# Patient Record
Sex: MALE | Race: WHITE | NOT HISPANIC OR LATINO | Employment: OTHER | URBAN - METROPOLITAN AREA
[De-identification: names, ages, dates, MRNs, and addresses within clinical notes are randomized per-mention and may not be internally consistent; named-entity substitution may affect disease eponyms.]

---

## 2017-03-22 ENCOUNTER — OFFICE VISIT (OUTPATIENT)
Dept: FAMILY MEDICINE | Facility: CLINIC | Age: 77
End: 2017-03-22
Payer: MEDICARE

## 2017-03-22 VITALS
WEIGHT: 269.63 LBS | BODY MASS INDEX: 36.52 KG/M2 | HEIGHT: 72 IN | TEMPERATURE: 98 F | OXYGEN SATURATION: 97 % | DIASTOLIC BLOOD PRESSURE: 82 MMHG | SYSTOLIC BLOOD PRESSURE: 134 MMHG | HEART RATE: 67 BPM

## 2017-03-22 DIAGNOSIS — M19.90 ARTHRITIS: Primary | ICD-10-CM

## 2017-03-22 DIAGNOSIS — E66.9 OBESITY, UNSPECIFIED OBESITY SEVERITY, UNSPECIFIED OBESITY TYPE: ICD-10-CM

## 2017-03-22 DIAGNOSIS — L40.9 PSORIASIS: ICD-10-CM

## 2017-03-22 DIAGNOSIS — I10 ESSENTIAL HYPERTENSION: ICD-10-CM

## 2017-03-22 PROCEDURE — 99397 PER PM REEVAL EST PAT 65+ YR: CPT | Mod: S$GLB,,, | Performed by: FAMILY MEDICINE

## 2017-03-22 RX ORDER — ATENOLOL 50 MG/1
50 TABLET ORAL 2 TIMES DAILY
Qty: 180 TABLET | Refills: 3 | Status: SHIPPED | OUTPATIENT
Start: 2017-03-22 | End: 2018-05-04 | Stop reason: SDUPTHER

## 2017-03-22 RX ORDER — NIFEDIPINE 90 MG/1
90 TABLET, EXTENDED RELEASE ORAL DAILY
Qty: 90 TABLET | Refills: 3 | Status: SHIPPED | OUTPATIENT
Start: 2017-03-22 | End: 2018-05-04 | Stop reason: SDUPTHER

## 2017-03-22 NOTE — PROGRESS NOTES
Subjective:      Patient ID: Aquiles Montana is a 76 y.o. male.    Chief Complaint: Medication Refill    HPI Comments: Refills; saw Brogle, yearly; note for parking declines labs; n    Medication Refill   Associated symptoms include arthralgias, joint swelling and a rash. Pertinent negatives include no abdominal pain, chest pain, congestion, fatigue, fever, headaches, myalgias, sore throat or weakness.     Review of Systems   Constitutional: Negative for appetite change, fatigue, fever and unexpected weight change.   HENT: Negative for congestion, ear pain, sinus pressure and sore throat.    Eyes: Negative for pain and visual disturbance.   Respiratory: Positive for shortness of breath.    Cardiovascular: Positive for leg swelling. Negative for chest pain.   Gastrointestinal: Negative for abdominal pain, constipation and diarrhea.   Endocrine: Negative for polyuria.   Genitourinary: Negative for difficulty urinating and frequency.   Musculoskeletal: Positive for arthralgias, gait problem and joint swelling. Negative for back pain and myalgias.   Skin: Positive for rash. Negative for color change.        Right lower leg rash 40 years   Allergic/Immunologic: Negative.    Neurological: Negative for syncope, weakness and headaches.   Hematological: Does not bruise/bleed easily.   Psychiatric/Behavioral: Positive for sleep disturbance. Negative for dysphoric mood and suicidal ideas. The patient is not nervous/anxious.    All other systems reviewed and are negative.    Objective:     Physical Exam   Constitutional: He is oriented to person, place, and time. He appears well-developed and well-nourished. No distress.   HENT:   Head: Normocephalic and atraumatic.   Right Ear: External ear normal.   Left Ear: External ear normal.   Mouth/Throat: Oropharynx is clear and moist. No oropharyngeal exudate.   Eyes: Conjunctivae and EOM are normal. Pupils are equal, round, and reactive to light. Right eye exhibits no discharge. Left  eye exhibits no discharge. No scleral icterus.   Neck: Normal range of motion. Neck supple. No JVD present. No tracheal deviation present. No thyromegaly present.   Cardiovascular: Normal rate and regular rhythm.  Exam reveals no gallop and no friction rub.    No murmur heard.  Pulmonary/Chest: Effort normal and breath sounds normal. No stridor. No respiratory distress. He has no wheezes. He has no rales. He exhibits no tenderness.   Abdominal: Soft. He exhibits no distension and no mass. There is no tenderness. There is no rebound and no guarding.   Musculoskeletal: Normal range of motion. He exhibits no edema, tenderness or deformity.   Lymphadenopathy:     He has no cervical adenopathy.   Neurological: He is alert and oriented to person, place, and time. He has normal reflexes. He displays normal reflexes. No cranial nerve deficit. He exhibits normal muscle tone. Coordination normal.   Skin: Skin is warm and dry. Rash noted. He is not diaphoretic. No erythema. No pallor.   Right lower leg psoriasis rash   Psychiatric: He has a normal mood and affect. His behavior is normal. Judgment and thought content normal.   Nursing note and vitals reviewed.    Assessment:     1. Arthritis    2. Essential hypertension    3. Obesity, unspecified obesity severity, unspecified obesity type    4. Psoriasis      Plan:     New Prescriptions    No medications on file     Discontinued Medications    No medications on file     Modified Medications    Modified Medication Previous Medication    ATENOLOL (TENORMIN) 50 MG TABLET atenolol (TENORMIN) 50 MG tablet       Take 1 tablet (50 mg total) by mouth 2 (two) times daily.    Take 1 tablet (50 mg total) by mouth 2 (two) times daily.    NIFEDIPINE (PROCARDIA-XL) 90 MG (OSM) TR24 nifedipine (PROCARDIA-XL) 90 MG (OSM) TR24       Take 1 tablet (90 mg total) by mouth once daily.    Take 1 tablet (90 mg total) by mouth once daily.       Arthritis    Essential hypertension    Obesity,  unspecified obesity severity, unspecified obesity type    Psoriasis    Other orders  -     atenolol (TENORMIN) 50 MG tablet; Take 1 tablet (50 mg total) by mouth 2 (two) times daily.  Dispense: 180 tablet; Refill: 3  -     nifedipine (PROCARDIA-XL) 90 MG (OSM) TR24; Take 1 tablet (90 mg total) by mouth once daily.  Dispense: 90 tablet; Refill: 3

## 2017-03-22 NOTE — MR AVS SNAPSHOT
[facility]  [address] [facility] [place] [address]  Phone: [phone]  Fax: [phone]                  Aquiles Montana   3/22/2017 9:20 AM   Office Visit    Description:  Male : 1940   Provider:  Marty Hernandez MD   Department:  [facility]           Reason for Visit     Medication Refill           Diagnoses this Visit        Comments    Arthritis    -  Primary     Essential hypertension         Obesity, unspecified obesity severity, unspecified obesity type         Psoriasis                To Do List           Goals (5 Years of Data)     None      Follow-Up and Disposition     Return in about 1 year (around 3/22/2018).       These Medications        Disp Refills Start End    atenolol (TENORMIN) 50 MG tablet 180 tablet 3 3/22/2017     Take 1 tablet (50 mg total) by mouth 2 (two) times daily. - Oral    Pharmacy: [facility] [address] [place], [place] [address] [facility] Ph #: [phone]       nifedipine (PROCARDIA-XL) 90 MG (OSM) TR24 90 tablet 3 3/22/2017 3/22/2018    Take 1 tablet (90 mg total) by mouth once daily. - Oral    Pharmacy: [facility] [address] [place], [place] [address] [facility] Ph #: [phone]         [facility] On Call     [facility] On Call Nurse Care Line -  Assistance  Registered nurses in the [facility] provide clinical advisement, health education, appointment booking, and other advisory services.  Call for this free service at [phone].             Medications           Message regarding Medications     Verify the changes and/or additions to your medication regime listed below are the same as discussed with your clinician today.  If any of these changes or additions are incorrect, please notify your healthcare provider.             Verify that the below list of medications is an accurate representation of the medications you are currently taking.  If none reported, the list may be blank. If incorrect, please contact your  healthcare provider. Carry this list with you in case of emergency.           Current Medications     aspirin (ECOTRIN) 81 MG EC tablet Take 81 mg by mouth once daily.    atenolol (TENORMIN) 50 MG tablet Take 1 tablet (50 mg total) by mouth 2 (two) times daily.    latanoprost 0.005 % ophthalmic solution Place 1 drop into both eyes every evening.    nifedipine (PROCARDIA-XL) 90 MG (OSM) TR24 Take 1 tablet (90 mg total) by mouth once daily.           Clinical Reference Information           Your Vitals Were     BP Pulse Temp Height Weight SpO2    134/82 67 97.8 °F (36.6 °C) (Oral) 6' (1.829 m) 122.3 kg (269 lb 10 oz) 97%    BMI                36.57 kg/m2          Blood Pressure          Most Recent Value    BP  134/82      Allergies as of 3/22/2017     No Known Allergies      Immunizations Administered on Date of Encounter - 3/22/2017     None      [facility] Sign-Up     Activating your [facility] account is as easy as 1-2-3!     1) Visit [email or web address], select Sign Up Now, enter this activation code and your date of birth, then select Next.  [email or web address]  Expires: 5/6/2017  9:55 AM      2) Create a username and password to use when you visit [facility] in the future and select a security question in case you lose your password and select Next.    3) Enter your e-mail address and click Sign Up!    Additional Information  If you have questions, please e-mail [email or web address] or call [phone] to talk to our [facility] staff. Remember, [facility] is NOT to be used for urgent needs. For medical emergencies, dial 911.         Language Assistance Services     ATTENTION: Language assistance services are available, free of charge. Please call [phone].      ATENCIÓN: Si ketanla mariangel, tiene a layne disposición servicios gratuitos de asistencia lingüística. Llame al [phone].     CHÚ Ý: N?u b?n nói Ti?ng Vi?t, có các d?ch v? h? tr? ngôn ng? mi?n phí dành cho b?n. G?i s? [phone].         [facility] -  [facility] complies with applicable Federal civil rights laws and does not discriminate on the basis of race, color, national origin, age, disability, or sex.

## 2017-03-23 PROBLEM — L40.9 PSORIASIS: Status: ACTIVE | Noted: 2017-03-23

## 2017-12-15 ENCOUNTER — LAB VISIT (OUTPATIENT)
Dept: LAB | Facility: HOSPITAL | Age: 77
End: 2017-12-15
Attending: UROLOGY
Payer: MEDICARE

## 2017-12-15 ENCOUNTER — OFFICE VISIT (OUTPATIENT)
Dept: UROLOGY | Facility: CLINIC | Age: 77
End: 2017-12-15
Payer: MEDICARE

## 2017-12-15 VITALS — HEIGHT: 72 IN | WEIGHT: 269 LBS | BODY MASS INDEX: 36.44 KG/M2

## 2017-12-15 DIAGNOSIS — R35.1 NOCTURIA: ICD-10-CM

## 2017-12-15 DIAGNOSIS — N40.1 BENIGN NON-NODULAR PROSTATIC HYPERPLASIA WITH LOWER URINARY TRACT SYMPTOMS: Primary | ICD-10-CM

## 2017-12-15 DIAGNOSIS — N40.1 BENIGN NON-NODULAR PROSTATIC HYPERPLASIA WITH LOWER URINARY TRACT SYMPTOMS: ICD-10-CM

## 2017-12-15 LAB
BILIRUB UR QL STRIP: NEGATIVE
CLARITY UR REFRACT.AUTO: ABNORMAL
COLOR UR AUTO: YELLOW
GLUCOSE UR QL STRIP: NEGATIVE
HGB UR QL STRIP: NEGATIVE
KETONES UR QL STRIP: NEGATIVE
LEUKOCYTE ESTERASE UR QL STRIP: NEGATIVE
NITRITE UR QL STRIP: NEGATIVE
PH UR STRIP: 5 [PH] (ref 5–8)
PROSTATE SPECIFIC ANTIGEN, TOTAL: 2.7 NG/ML
PROT UR QL STRIP: NEGATIVE
PSA FREE MFR SERPL: 38.15 %
PSA FREE SERPL-MCNC: 1.03 NG/ML
SP GR UR STRIP: 1.02 (ref 1–1.03)
URN SPEC COLLECT METH UR: ABNORMAL
UROBILINOGEN UR STRIP-ACNC: NEGATIVE EU/DL

## 2017-12-15 PROCEDURE — 36415 COLL VENOUS BLD VENIPUNCTURE: CPT | Mod: PO

## 2017-12-15 PROCEDURE — 99213 OFFICE O/P EST LOW 20 MIN: CPT | Mod: S$PBB,,, | Performed by: UROLOGY

## 2017-12-15 PROCEDURE — 99999 PR PBB SHADOW E&M-EST. PATIENT-LVL III: CPT | Mod: PBBFAC,,, | Performed by: UROLOGY

## 2017-12-15 PROCEDURE — 84153 ASSAY OF PSA TOTAL: CPT | Mod: PO

## 2017-12-15 PROCEDURE — 81003 URINALYSIS AUTO W/O SCOPE: CPT

## 2017-12-15 PROCEDURE — 99213 OFFICE O/P EST LOW 20 MIN: CPT | Mod: PBBFAC,PO | Performed by: UROLOGY

## 2017-12-15 NOTE — PROGRESS NOTES
Subjective:       Patient ID: Aquiles Montana is a 77 y.o. male.    Chief Complaint: Annual Exam (prostate check)    78 yo WM with hx of BPH. S/P TURP 4 years ago. Doing well.      Benign Prostatic Hypertrophy   This is a chronic problem. The current episode started more than 1 year ago. The problem has been gradually improving since onset. Irritative symptoms include nocturia (1). Irritative symptoms do not include frequency or urgency. Obstructive symptoms do not include dribbling, incomplete emptying, an intermittent stream, a slower stream, straining or a weak stream. Pertinent negatives include no chills, dysuria, genital pain, hematuria, hesitancy, nausea or vomiting. AUA score is 0-7. He is not sexually active. Nothing aggravates the symptoms. Treatments tried: TURP. The treatment provided significant relief. He has been using treatment for 2 or more years.     Review of Systems   Constitutional: Negative for activity change, appetite change, chills, diaphoresis, fatigue, fever and unexpected weight change.   HENT: Negative for congestion, hearing loss, sinus pressure and trouble swallowing.    Eyes: Negative for photophobia, pain, discharge and visual disturbance.   Respiratory: Negative for apnea, cough and shortness of breath.    Cardiovascular: Negative for chest pain, palpitations and leg swelling.   Gastrointestinal: Negative for abdominal distention, abdominal pain, anal bleeding, blood in stool, constipation, diarrhea, nausea, rectal pain and vomiting.   Endocrine: Negative for cold intolerance, heat intolerance, polydipsia, polyphagia and polyuria.   Genitourinary: Positive for nocturia (1). Negative for decreased urine volume, difficulty urinating, discharge, dysuria, enuresis, flank pain, frequency, genital sores, hematuria, hesitancy, incomplete emptying, penile pain, penile swelling, scrotal swelling, testicular pain and urgency.   Musculoskeletal: Negative for arthralgias, back pain and  myalgias.   Skin: Negative for color change, pallor, rash and wound.   Allergic/Immunologic: Negative for environmental allergies, food allergies and immunocompromised state.   Neurological: Negative for dizziness, seizures, weakness and headaches.   Hematological: Negative for adenopathy. Does not bruise/bleed easily.   Psychiatric/Behavioral: Negative.        Objective:      Physical Exam   Nursing note and vitals reviewed.  Constitutional: He is oriented to person, place, and time. He appears well-developed and well-nourished.   HENT:   Head: Normocephalic.   Nose: Nose normal.   Mouth/Throat: Oropharynx is clear and moist.   Eyes: Conjunctivae and EOM are normal. Pupils are equal, round, and reactive to light.   Neck: Normal range of motion. Neck supple.   Cardiovascular: Normal rate, regular rhythm, normal heart sounds and intact distal pulses.    Pulmonary/Chest: Effort normal and breath sounds normal.   Abdominal: Soft. Bowel sounds are normal.   Musculoskeletal: Normal range of motion.   Neurological: He is alert and oriented to person, place, and time. He has normal reflexes.   Skin: Skin is warm and dry.     Psychiatric: He has a normal mood and affect. His behavior is normal. Judgment and thought content normal.       Assessment:       1. Benign non-nodular prostatic hyperplasia with lower urinary tract symptoms    2. Nocturia        Plan:       Patient Instructions   Check PSA and U/A.  F/U yearly

## 2018-05-04 ENCOUNTER — OFFICE VISIT (OUTPATIENT)
Dept: FAMILY MEDICINE | Facility: CLINIC | Age: 78
End: 2018-05-04
Payer: MEDICARE

## 2018-05-04 VITALS
TEMPERATURE: 98 F | HEART RATE: 65 BPM | SYSTOLIC BLOOD PRESSURE: 150 MMHG | OXYGEN SATURATION: 97 % | DIASTOLIC BLOOD PRESSURE: 90 MMHG | WEIGHT: 279.75 LBS | HEIGHT: 72 IN | BODY MASS INDEX: 37.89 KG/M2

## 2018-05-04 DIAGNOSIS — I10 ESSENTIAL HYPERTENSION: ICD-10-CM

## 2018-05-04 DIAGNOSIS — L40.9 PSORIASIS: Primary | ICD-10-CM

## 2018-05-04 DIAGNOSIS — R60.0 LEG EDEMA, RIGHT: ICD-10-CM

## 2018-05-04 DIAGNOSIS — E66.9 OBESITY, UNSPECIFIED CLASSIFICATION, UNSPECIFIED OBESITY TYPE, UNSPECIFIED WHETHER SERIOUS COMORBIDITY PRESENT: ICD-10-CM

## 2018-05-04 DIAGNOSIS — N40.1 BENIGN NON-NODULAR PROSTATIC HYPERPLASIA WITH LOWER URINARY TRACT SYMPTOMS: ICD-10-CM

## 2018-05-04 PROCEDURE — 99213 OFFICE O/P EST LOW 20 MIN: CPT | Mod: S$GLB,,, | Performed by: FAMILY MEDICINE

## 2018-05-04 RX ORDER — TRIAMCINOLONE ACETONIDE 1 MG/G
OINTMENT TOPICAL 2 TIMES DAILY
COMMUNITY
End: 2019-11-22 | Stop reason: SDUPTHER

## 2018-05-04 RX ORDER — SULFAMETHOXAZOLE AND TRIMETHOPRIM 800; 160 MG/1; MG/1
TABLET ORAL
COMMUNITY
Start: 2018-04-23 | End: 2018-05-04

## 2018-05-04 RX ORDER — NIFEDIPINE 90 MG/1
90 TABLET, EXTENDED RELEASE ORAL DAILY
Qty: 90 TABLET | Refills: 3 | Status: SHIPPED | OUTPATIENT
Start: 2018-05-04 | End: 2019-06-12 | Stop reason: SDUPTHER

## 2018-05-04 RX ORDER — ATENOLOL 50 MG/1
50 TABLET ORAL 2 TIMES DAILY
Qty: 180 TABLET | Refills: 3 | Status: SHIPPED | OUTPATIENT
Start: 2018-05-04 | End: 2019-06-12 | Stop reason: SDUPTHER

## 2018-05-04 NOTE — PROGRESS NOTES
Subjective:      Patient ID: Aquiles Montana is a 77 y.o. male.    Chief Complaint: Medication Refill (atenolol, nifedipine refill for one year  patient is due for pneumo vaccine)      HPI   Check up, refills for BP  Saw vi Zheng, rx salve for psoriasis        Review of Systems   Constitutional: Negative for appetite change, fatigue, fever and unexpected weight change.   HENT: Negative for congestion, ear pain, sinus pressure and sore throat.    Eyes: Negative for pain and visual disturbance.   Respiratory: Negative for shortness of breath.    Cardiovascular: Positive for leg swelling. Negative for chest pain.        Right leg   Gastrointestinal: Negative for abdominal pain, constipation and diarrhea.   Endocrine: Negative for polyuria.   Genitourinary: Negative for difficulty urinating and frequency.   Musculoskeletal: Positive for arthralgias. Negative for back pain and myalgias.   Skin: Positive for wound. Negative for color change.        See photo   Allergic/Immunologic: Negative.    Neurological: Negative for syncope, weakness and headaches.   Hematological: Does not bruise/bleed easily.   Psychiatric/Behavioral: Negative for dysphoric mood and suicidal ideas. The patient is not nervous/anxious.    All other systems reviewed and are negative.    Objective:     Physical Exam   Constitutional: He is oriented to person, place, and time. He appears well-developed and well-nourished. No distress.   HENT:   Head: Normocephalic and atraumatic.   Right Ear: External ear normal.   Left Ear: External ear normal.   Mouth/Throat: Oropharynx is clear and moist. No oropharyngeal exudate.   Eyes: Conjunctivae and EOM are normal. Pupils are equal, round, and reactive to light. Right eye exhibits no discharge. Left eye exhibits no discharge. No scleral icterus.   Neck: Normal range of motion. Neck supple. No JVD present. No tracheal deviation present. No thyromegaly present.   Cardiovascular: Normal rate and regular  rhythm.  Exam reveals no gallop and no friction rub.    No murmur heard.  Pulmonary/Chest: Effort normal and breath sounds normal. No stridor. No respiratory distress. He has no wheezes. He has no rales. He exhibits no tenderness.   Abdominal: Soft. He exhibits no distension and no mass. There is no tenderness. There is no rebound and no guarding.   Musculoskeletal: Normal range of motion. He exhibits no edema or tenderness.   Lymphadenopathy:     He has no cervical adenopathy.   Neurological: He is alert and oriented to person, place, and time. He has normal reflexes. He displays normal reflexes. No cranial nerve deficit. He exhibits normal muscle tone. Coordination normal.   Skin: Skin is warm and dry. No rash noted. He is not diaphoretic. No erythema. No pallor.   See photo   Psychiatric: He has a normal mood and affect. His behavior is normal. Judgment and thought content normal.   Nursing note and vitals reviewed.    Assessment:     1. Psoriasis    2. Essential hypertension    3. Obesity, unspecified classification, unspecified obesity type, unspecified whether serious comorbidity present    4. Leg edema, right    5. Benign non-nodular prostatic hyperplasia with lower urinary tract symptoms      Plan:     New Prescriptions    No medications on file     Discontinued Medications    SULFAMETHOXAZOLE-TRIMETHOPRIM 800-160MG (BACTRIM DS) 800-160 MG TAB         Modified Medications    Modified Medication Previous Medication    ATENOLOL (TENORMIN) 50 MG TABLET atenolol (TENORMIN) 50 MG tablet       Take 1 tablet (50 mg total) by mouth 2 (two) times daily.    Take 1 tablet (50 mg total) by mouth 2 (two) times daily.    NIFEDIPINE (PROCARDIA-XL) 90 MG (OSM) 24 HR TABLET nifedipine (PROCARDIA-XL) 90 MG (OSM) TR24       Take 1 tablet (90 mg total) by mouth once daily.    Take 1 tablet (90 mg total) by mouth once daily.       Psoriasis  Comments:  see leg lesion photo  Orders:  -     Ambulatory referral to  Dermatology    Essential hypertension    Obesity, unspecified classification, unspecified obesity type, unspecified whether serious comorbidity present    Leg edema, right    Benign non-nodular prostatic hyperplasia with lower urinary tract symptoms    Other orders  -     NIFEdipine (PROCARDIA-XL) 90 MG (OSM) 24 hr tablet; Take 1 tablet (90 mg total) by mouth once daily.  Dispense: 90 tablet; Refill: 3  -     atenolol (TENORMIN) 50 MG tablet; Take 1 tablet (50 mg total) by mouth 2 (two) times daily.  Dispense: 180 tablet; Refill: 3    check bp at [facility]se, let me know next few weeks; referred to dermatology main campus; stop ointment and abx, cont bp meds

## 2018-05-22 ENCOUNTER — CLINICAL SUPPORT (OUTPATIENT)
Dept: FAMILY MEDICINE | Facility: CLINIC | Age: 78
End: 2018-05-22
Payer: MEDICARE

## 2018-05-22 DIAGNOSIS — I10 ESSENTIAL HYPERTENSION: Primary | ICD-10-CM

## 2018-05-28 ENCOUNTER — TELEPHONE (OUTPATIENT)
Dept: FAMILY MEDICINE | Facility: CLINIC | Age: 78
End: 2018-05-28

## 2018-05-29 VITALS — DIASTOLIC BLOOD PRESSURE: 82 MMHG | SYSTOLIC BLOOD PRESSURE: 132 MMHG

## 2018-05-29 NOTE — TELEPHONE ENCOUNTER
"Make the home bp reading found in epic under media "official" VS so it is recorded and recongized. The last one listed is a good number.  "

## 2018-12-18 ENCOUNTER — OFFICE VISIT (OUTPATIENT)
Dept: UROLOGY | Facility: CLINIC | Age: 78
End: 2018-12-18
Payer: MEDICARE

## 2018-12-18 ENCOUNTER — LAB VISIT (OUTPATIENT)
Dept: LAB | Facility: HOSPITAL | Age: 78
End: 2018-12-18
Attending: UROLOGY
Payer: MEDICARE

## 2018-12-18 VITALS — WEIGHT: 279 LBS | BODY MASS INDEX: 37.79 KG/M2 | HEIGHT: 72 IN

## 2018-12-18 DIAGNOSIS — N40.1 BENIGN NON-NODULAR PROSTATIC HYPERPLASIA WITH LOWER URINARY TRACT SYMPTOMS: ICD-10-CM

## 2018-12-18 DIAGNOSIS — R35.1 NOCTURIA: ICD-10-CM

## 2018-12-18 DIAGNOSIS — N40.1 BENIGN NON-NODULAR PROSTATIC HYPERPLASIA WITH LOWER URINARY TRACT SYMPTOMS: Primary | ICD-10-CM

## 2018-12-18 LAB
PROSTATE SPECIFIC ANTIGEN, TOTAL: 3 NG/ML
PSA FREE MFR SERPL: 49 %
PSA FREE SERPL-MCNC: 1.47 NG/ML

## 2018-12-18 PROCEDURE — 36415 COLL VENOUS BLD VENIPUNCTURE: CPT | Mod: PO

## 2018-12-18 PROCEDURE — 99213 OFFICE O/P EST LOW 20 MIN: CPT | Mod: S$PBB,,, | Performed by: UROLOGY

## 2018-12-18 PROCEDURE — 99999 PR PBB SHADOW E&M-EST. PATIENT-LVL III: CPT | Mod: PBBFAC,,, | Performed by: UROLOGY

## 2018-12-18 PROCEDURE — 99213 OFFICE O/P EST LOW 20 MIN: CPT | Mod: PBBFAC,PO | Performed by: UROLOGY

## 2018-12-18 PROCEDURE — 84154 ASSAY OF PSA FREE: CPT | Mod: PO

## 2018-12-18 NOTE — PROGRESS NOTES
Subjective:       Patient ID: Aquiles Montana is a 78 y.o. male.    Chief Complaint: Benign Prostatic Hypertrophy    77 yo WM with BPH. Here for f/u and PSA check. S/P TURP~6 yrs ago. No BPH meds. Nocturia x 2, Frequency x 2-4.      Benign Prostatic Hypertrophy   This is a chronic problem. The current episode started more than 1 year ago. The problem has been gradually improving since onset. Irritative symptoms include frequency (x2-4) and nocturia (x2). Irritative symptoms do not include urgency. Obstructive symptoms do not include dribbling, incomplete emptying, an intermittent stream, a slower stream, straining or a weak stream. Pertinent negatives include no chills, dysuria, genital pain, hematuria, hesitancy, nausea or vomiting. AUA score is 0-7. He is not sexually active. Nothing aggravates the symptoms. Treatments tried: TURP. The treatment provided significant relief.     Review of Systems   Constitutional: Negative for activity change, appetite change, chills, diaphoresis, fatigue, fever and unexpected weight change.   HENT: Negative for congestion, hearing loss, sinus pressure and trouble swallowing.    Eyes: Negative for photophobia, pain, discharge and visual disturbance.   Respiratory: Negative for apnea, cough and shortness of breath.    Cardiovascular: Negative for chest pain, palpitations and leg swelling.   Gastrointestinal: Negative for abdominal distention, abdominal pain, anal bleeding, blood in stool, constipation, diarrhea, nausea, rectal pain and vomiting.   Endocrine: Negative for cold intolerance, heat intolerance, polydipsia, polyphagia and polyuria.   Genitourinary: Positive for frequency (x2-4) and nocturia (x2). Negative for decreased urine volume, difficulty urinating, discharge, dysuria, enuresis, flank pain, genital sores, hematuria, hesitancy, incomplete emptying, penile pain, penile swelling, scrotal swelling, testicular pain and urgency.   Musculoskeletal: Negative for arthralgias,  back pain and myalgias.   Skin: Negative for color change, pallor, rash and wound.   Allergic/Immunologic: Negative for environmental allergies, food allergies and immunocompromised state.   Neurological: Negative for dizziness, seizures, weakness and headaches.   Hematological: Negative for adenopathy. Does not bruise/bleed easily.   Psychiatric/Behavioral: Negative.        Objective:      Physical Exam   Nursing note and vitals reviewed.  Constitutional: He is oriented to person, place, and time. He appears well-developed and well-nourished.   HENT:   Head: Normocephalic.   Nose: Nose normal.   Mouth/Throat: Oropharynx is clear and moist.   Eyes: Conjunctivae and EOM are normal. Pupils are equal, round, and reactive to light.   Neck: Normal range of motion. Neck supple.   Cardiovascular: Normal rate, regular rhythm, normal heart sounds and intact distal pulses.    Pulmonary/Chest: Effort normal and breath sounds normal.   Abdominal: Soft. Bowel sounds are normal.   Genitourinary: Rectum normal, prostate normal, testes normal and penis normal. Uncircumcised.   Genitourinary Comments: TUR Defect   Musculoskeletal: Normal range of motion.   Neurological: He is alert and oriented to person, place, and time. He has normal reflexes.   Skin: Skin is warm and dry.     Psychiatric: He has a normal mood and affect. His behavior is normal. Judgment and thought content normal.       Assessment:       1. Benign non-nodular prostatic hyperplasia with lower urinary tract symptoms    2. Nocturia        Plan:       Patient Instructions   Check U/A and PSA at lab  F/U 1 year with PSA

## 2019-06-12 ENCOUNTER — OFFICE VISIT (OUTPATIENT)
Dept: FAMILY MEDICINE | Facility: CLINIC | Age: 79
End: 2019-06-12
Payer: MEDICARE

## 2019-06-12 VITALS
SYSTOLIC BLOOD PRESSURE: 138 MMHG | HEIGHT: 72 IN | DIASTOLIC BLOOD PRESSURE: 82 MMHG | HEART RATE: 66 BPM | WEIGHT: 283.81 LBS | BODY MASS INDEX: 38.44 KG/M2 | TEMPERATURE: 98 F | OXYGEN SATURATION: 95 %

## 2019-06-12 DIAGNOSIS — L40.9 PSORIASIS: ICD-10-CM

## 2019-06-12 DIAGNOSIS — R60.0 LEG EDEMA, RIGHT: Primary | ICD-10-CM

## 2019-06-12 DIAGNOSIS — D49.2 ABNORMAL SKIN GROWTH: ICD-10-CM

## 2019-06-12 PROCEDURE — 99213 OFFICE O/P EST LOW 20 MIN: CPT | Mod: S$GLB,,, | Performed by: FAMILY MEDICINE

## 2019-06-12 PROCEDURE — 99213 PR OFFICE/OUTPT VISIT, EST, LEVL III, 20-29 MIN: ICD-10-PCS | Mod: S$GLB,,, | Performed by: FAMILY MEDICINE

## 2019-06-12 RX ORDER — NIFEDIPINE 90 MG/1
90 TABLET, EXTENDED RELEASE ORAL DAILY
Qty: 90 TABLET | Refills: 3 | Status: SHIPPED | OUTPATIENT
Start: 2019-06-12 | End: 2020-06-03 | Stop reason: SDUPTHER

## 2019-06-12 RX ORDER — ATENOLOL 50 MG/1
50 TABLET ORAL 2 TIMES DAILY
Qty: 180 TABLET | Refills: 3 | Status: SHIPPED | OUTPATIENT
Start: 2019-06-12 | End: 2020-06-03 | Stop reason: SDUPTHER

## 2019-06-12 NOTE — PROGRESS NOTES
Subjective:      Patient ID: Aquiles Montana is a 78 y.o. male.    Chief Complaint: Medication Refill (also requesting a referral to Dr. Taylor)      HPI wants a referal to see Dr Jet Taylor for worsening chronic right lower leg rash with alrge heaped up elevated lesion  Has seen dermalogist in past; no surgeries  Wants fluid pill as that leg swells at time  Never had biopsy    Review of Systems   Cardiovascular: Positive for leg swelling.        Right leg only, swells and drains   Skin: Positive for color change, rash and wound.        drains   All other systems reviewed and are negative.    Objective:     Physical Exam   Constitutional: He is oriented to person, place, and time. He appears well-developed and well-nourished. No distress.   HENT:   Head: Normocephalic and atraumatic.   Right Ear: External ear normal.   Left Ear: External ear normal.   Mouth/Throat: Oropharynx is clear and moist. No oropharyngeal exudate.   Eyes: Pupils are equal, round, and reactive to light. Conjunctivae and EOM are normal. Right eye exhibits no discharge. Left eye exhibits no discharge. No scleral icterus.   Neck: Normal range of motion. Neck supple. No JVD present. No tracheal deviation present. No thyromegaly present.   Cardiovascular: Normal rate and regular rhythm. Exam reveals no gallop and no friction rub.   No murmur heard.  Pulmonary/Chest: Effort normal and breath sounds normal. No stridor. No respiratory distress. He has no wheezes. He has no rales. He exhibits no tenderness.   Abdominal: Soft. He exhibits no distension and no mass. There is no tenderness. There is no rebound and no guarding.   Musculoskeletal: Normal range of motion. He exhibits no edema or tenderness.   Lymphadenopathy:     He has no cervical adenopathy.   Neurological: He is alert and oriented to person, place, and time. He has normal reflexes. He displays normal reflexes. No cranial nerve deficit. He exhibits normal muscle tone. Coordination  normal.   Skin: Skin is warm. No rash noted. He is not diaphoretic. No erythema. No pallor.   Right leg with chronic inflammed, raised leaking skin lesion right lower ant leg   Psychiatric: He has a normal mood and affect. His behavior is normal. Judgment and thought content normal.   Nursing note and vitals reviewed.    Assessment:     1. Leg edema, right    2. Psoriasis    3. Abnormal skin growth      Plan:        Medication List           Accurate as of 6/12/19 11:59 PM. If you have any questions, ask your nurse or doctor.               CONTINUE taking these medications    aspirin 81 MG EC tablet  Commonly known as:  ECOTRIN     atenolol 50 MG tablet  Commonly known as:  TENORMIN  Take 1 tablet (50 mg total) by mouth 2 (two) times daily.     latanoprost 0.005 % ophthalmic solution     NIFEdipine 90 MG (OSM) 24 hr tablet  Commonly known as:  PROCARDIA-XL  Take 1 tablet (90 mg total) by mouth once daily.     triamcinolone acetonide 0.1% 0.1 % ointment  Commonly known as:  KENALOG           Where to Get Your Medications      These medications were sent to [facility] [address] [place], [place] [address] [id number]  [facility]  [address] [place] [address]    Phone:  [phone]   · atenolol 50 MG tablet  · NIFEdipine 90 MG (OSM) 24 hr tablet       Leg edema, right    Psoriasis  -     Ambulatory referral to General Surgery  -     Ambulatory referral to Dermatology    Abnormal skin growth  -     Ambulatory referral to General Surgery  -     Ambulatory referral to Dermatology    Other orders  -     NIFEdipine (PROCARDIA-XL) 90 MG (OSM) 24 hr tablet; Take 1 tablet (90 mg total) by mouth once daily.  Dispense: 90 tablet; Refill: 3  -     atenolol (TENORMIN) 50 MG tablet; Take 1 tablet (50 mg total) by mouth 2 (two) times daily.  Dispense: 180 tablet; Refill: 3    to denisse Taylor and vi Marino, at [facility]  rx written

## 2019-08-20 ENCOUNTER — INITIAL CONSULT (OUTPATIENT)
Dept: DERMATOLOGY | Facility: CLINIC | Age: 79
End: 2019-08-20
Payer: MEDICARE

## 2019-08-20 ENCOUNTER — TELEPHONE (OUTPATIENT)
Dept: HEMATOLOGY/ONCOLOGY | Facility: CLINIC | Age: 79
End: 2019-08-20

## 2019-08-20 ENCOUNTER — TELEPHONE (OUTPATIENT)
Dept: FAMILY MEDICINE | Facility: CLINIC | Age: 79
End: 2019-08-20

## 2019-08-20 DIAGNOSIS — R60.9 EDEMA, UNSPECIFIED TYPE: ICD-10-CM

## 2019-08-20 DIAGNOSIS — M79.89 LEG SWELLING: Primary | ICD-10-CM

## 2019-08-20 DIAGNOSIS — I82.539 CHRONIC DEEP VEIN THROMBOSIS (DVT) OF POPLITEAL VEIN, UNSPECIFIED LATERALITY: ICD-10-CM

## 2019-08-20 DIAGNOSIS — C44.722 SQUAMOUS CELL CARCINOMA OF SKIN OF RIGHT LOWER EXTREMITY: Primary | ICD-10-CM

## 2019-08-20 PROCEDURE — 99212 OFFICE O/P EST SF 10 MIN: CPT | Mod: PBBFAC | Performed by: DERMATOLOGY

## 2019-08-20 PROCEDURE — 99202 OFFICE O/P NEW SF 15 MIN: CPT | Mod: S$PBB,,, | Performed by: DERMATOLOGY

## 2019-08-20 PROCEDURE — 99202 PR OFFICE/OUTPT VISIT, NEW, LEVL II, 15-29 MIN: ICD-10-PCS | Mod: S$PBB,,, | Performed by: DERMATOLOGY

## 2019-08-20 PROCEDURE — 99999 PR PBB SHADOW E&M-EST. PATIENT-LVL II: CPT | Mod: PBBFAC,,, | Performed by: DERMATOLOGY

## 2019-08-20 PROCEDURE — 99999 PR PBB SHADOW E&M-EST. PATIENT-LVL II: ICD-10-PCS | Mod: PBBFAC,,, | Performed by: DERMATOLOGY

## 2019-08-20 NOTE — TELEPHONE ENCOUNTER
----- Message from Cathie Edmonds sent at 8/20/2019 11:41 AM CDT -----  Contact: wife [phone]  Patient wants to know about getting a referral or if she has to make an appt

## 2019-08-20 NOTE — TELEPHONE ENCOUNTER
Once we get results [facility]

## 2019-08-20 NOTE — LETTER
August 21, 2019      No Recipients           [facility] - Dermatology Surgery  [address]  [facility] [address]  Phone: [phone]  Fax: [phone]          Patient: Aquiles Montana   MR Number: [id number]   YOB: 1940   Date of Visit: 8/20/2019       Dear :    Thank you for referring Aquiles Montana to me for evaluation. Attached you will find relevant portions of my assessment and plan of care.    If you have questions, please do not hesitate to call me. I look forward to following Aquiles Montana along with you.    Sincerely,    Heather Roberts MD    Enclosure  CC:  No Recipients    If you would like to receive this communication electronically, please contact [email or web address] or [phone] to request more information on [employer] Link access.    For providers and/or their staff who would like to refer a patient to [facility], please contact us through our one-stop-shop provider referral line, [facility], at [phone].    If you feel you have received this communication in error or would no longer like to receive these types of communications, please e-mail [email or web address]

## 2019-08-20 NOTE — TELEPHONE ENCOUNTER
Called pt back and they are requesting a venous US of his right leg. Pt wife said that he has skin cancer and his right leg and the dermatologist suggested getting an US to see if the swelling is due to circulation or the skin cancer

## 2019-08-20 NOTE — NURSING
Received call from patient intake.  Pt and wife requesting to see someone concerning an appointment for SCC of right leg.  Pathology report located and contacted Dr. Castano's office.  Escorted to clinic area where appointment has been scheduled for pt to be seen today.  All records scanned into media.

## 2019-08-21 ENCOUNTER — HOSPITAL ENCOUNTER (OUTPATIENT)
Dept: RADIOLOGY | Facility: HOSPITAL | Age: 79
Discharge: HOME | End: 2019-08-21
Attending: FAMILY MEDICINE
Payer: MEDICARE

## 2019-08-21 ENCOUNTER — TELEPHONE (OUTPATIENT)
Dept: SURGERY | Facility: CLINIC | Age: 79
End: 2019-08-21

## 2019-08-21 DIAGNOSIS — M79.89 LEG SWELLING: ICD-10-CM

## 2019-08-21 DIAGNOSIS — R60.9 EDEMA, UNSPECIFIED TYPE: ICD-10-CM

## 2019-08-21 PROCEDURE — 93971 EXTREMITY STUDY: CPT | Mod: TC,PO,RT

## 2019-08-21 NOTE — PROGRESS NOTES
"REFERRING MD:  Jet Arrieta MD    CHIEF COMPLAINT:  New patient being consulted for Mohs' surgery evaluation.    HISTORY OF PRESENT ILLNESS:  78 y.o. male presents with a few year history of growth on the R anterior lower leg. States it started off smaller however has recently started to grow. Patient saw dermatologist Dr. Marino who did the initial bx and according to the patient it came back as noncancerous. He was then referred patient to Dr. Arrieta a general surgeon/wound care specialist for treatment with an unna boot. Dr. Arrieta subsequently did another, deeper biopsy and this came back (+) SCC.  At this point, patient was referred to the [facility].  States the swelling in his leg started around the same time as this skin cancer did and according to him, all cardiac workup has been negative. Not on any diuretics.     Negative for scabbing.  Negative for crusting.  Negative for bleeding.  Negative for itching.    Biopsy consistent with squamous cell carcinoma.     No prior treatment.    Pacemaker: No  Defibrillator: No  Artificial joints: No  Artificial heart valves: No    PAST MEDICAL HISTORY:  Past Medical History:   Diagnosis Date    Hypertension     Urinary tract infection        PAST SURGICAL HISTORY:  Past Surgical History:   Procedure Laterality Date    hand sugery      PROSTATE SURGERY      "Rotor rooter- opened up"        SOCIAL HISTORY:  Dependencies:  never smoked    PERTINENT MEDICATIONS:  See medications list.  aspirin    ALLERGIES:  Patient has no known allergies.    ROS:  Skin: See HPI  Constitutional: No fatigue, fever, malaise, weight loss, or night sweats.  Cardiovascular: No chest pain, palpitations, or edema.  Respiratory: No coughing, wheezing, SOB, or sputum production.    Physical Exam   Skin:                General: Mood and affect normal. Alert and orient X3. Normal appearance.  RLE: R anterior lower leg with a 65 x 75 mm exophytic crusted fungating nodule with odor " and drainage and with associated 2+ pitting bradley edema and diffuse peripheral scaly crusted plaques distal to the lesion.    IMPRESSION:  Biopsy proven squamous cell carcinoma, R anterior lower leg, path# [id number].    PLAN:  The diagnosis and the pathology report were discussed in detail with the patient. Treatment options were reviewed, including Mohs Micrographic Surgery, radiation, topical therapy, and standard excision.  After careful review of patient's history and physical exam, and after discussion of treatment options, the decision was made to refer to Surgical Oncology for wide local excision in OR as lesion too large to obtain adequate local anesthesia.  Disc concern for poor wound healing given significant leg edema. Rec daily use of compression hose to help limit swelling for now and may continue with daily dressings.

## 2019-08-21 NOTE — TELEPHONE ENCOUNTER
Called and spoke with patient's wife Jessica, she confirmed the appointment 8/29/19 at 9am. She voiced understanding of his appt date, time, and location. Appt letter sent.    ----- Message from Patel Singh MD sent at 8/21/2019  2:06 PM CDT -----  Next week  ----- Message -----  From: Nuris Lyons MA  Sent: 8/21/2019   1:40 PM  To: Patel Singh MD    When would you like to see this patient?    Nuris DE JESUS  ----- Message -----  From: Nuris Lyons MA  Sent: 8/20/2019   4:26 PM  To: Nuris Lyons MA        ----- Message -----  From: ST Keyur  Sent: 8/20/2019   2:54 PM  To: Samantha Skinner    Good Afternoon,  this patient was seen today by Dr. Roberts in consult for a SCC of the r anterior lower leg. She would like to refer him over to . Please contact Patient to set up a surgical consult.   Thank you

## 2019-08-22 ENCOUNTER — TELEPHONE (OUTPATIENT)
Dept: ADMINISTRATIVE | Facility: OTHER | Age: 79
End: 2019-08-22

## 2019-08-22 ENCOUNTER — TELEPHONE (OUTPATIENT)
Dept: FAMILY MEDICINE | Facility: CLINIC | Age: 79
End: 2019-08-22

## 2019-08-22 NOTE — TELEPHONE ENCOUNTER
He does have evidence of a clot in the vein, but it is not a new clot.    I'm starting eliquis and placing cardiology consult for second opinion.

## 2019-08-22 NOTE — TELEPHONE ENCOUNTER
----- Message from Nora Clemens sent at 8/22/2019  9:20 AM CDT -----  Contact: [facility], [phone]  Called in requesting clarification for Eliquis prescription received.

## 2019-08-22 NOTE — TELEPHONE ENCOUNTER
Spoke with pt they said that the elaquis is 300 dollars I got them and appt with Dr Lea tomorrow at 1pm in [facility] to get a second opinion they don't want to pay that cost if the Dr will eventually change the medication

## 2019-08-23 ENCOUNTER — OFFICE VISIT (OUTPATIENT)
Dept: CARDIOLOGY | Facility: CLINIC | Age: 79
End: 2019-08-23
Payer: MEDICARE

## 2019-08-23 VITALS
HEIGHT: 70 IN | HEART RATE: 80 BPM | SYSTOLIC BLOOD PRESSURE: 160 MMHG | BODY MASS INDEX: 38.51 KG/M2 | WEIGHT: 269 LBS | DIASTOLIC BLOOD PRESSURE: 80 MMHG

## 2019-08-23 DIAGNOSIS — Z01.818 PRE-OP EVALUATION: ICD-10-CM

## 2019-08-23 DIAGNOSIS — I10 ESSENTIAL HYPERTENSION: Primary | ICD-10-CM

## 2019-08-23 PROCEDURE — 99999 PR PBB SHADOW E&M-EST. PATIENT-LVL III: CPT | Mod: PBBFAC,,, | Performed by: INTERNAL MEDICINE

## 2019-08-23 PROCEDURE — 99203 PR OFFICE/OUTPT VISIT, NEW, LEVL III, 30-44 MIN: ICD-10-PCS | Mod: S$PBB,,, | Performed by: INTERNAL MEDICINE

## 2019-08-23 PROCEDURE — 99213 OFFICE O/P EST LOW 20 MIN: CPT | Mod: PBBFAC,PO | Performed by: INTERNAL MEDICINE

## 2019-08-23 PROCEDURE — 99203 OFFICE O/P NEW LOW 30 MIN: CPT | Mod: S$PBB,,, | Performed by: INTERNAL MEDICINE

## 2019-08-23 PROCEDURE — 99999 PR PBB SHADOW E&M-EST. PATIENT-LVL III: ICD-10-PCS | Mod: PBBFAC,,, | Performed by: INTERNAL MEDICINE

## 2019-08-23 NOTE — PROGRESS NOTES
"Subjective:   Patient ID:  Aquiles Montana is a 78 y.o. male who presents for evaluation of No chief complaint on file.      HPI: 77 y/o male with HTN present with complaints of abnormal US of right leg. 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 No pain in leg. Skin surgery is planned for patient     Past Medical History:   Diagnosis Date    Hypertension     Urinary tract infection        Past Surgical History:   Procedure Laterality Date    hand sugery      PROSTATE SURGERY      "Rotor rooter- opened up"       Social History     Tobacco Use    Smoking status: Never Smoker    Smokeless tobacco: Never Used   Substance Use Topics    Alcohol use: No     Alcohol/week: 0.0 oz    Drug use: No       Family History   Problem Relation Age of Onset    Hypertension Father     Hypertension Mother     No Known Problems Sister     Arthritis Son     Prostate cancer Neg Hx     Kidney disease Neg Hx        Patient's Medications   New Prescriptions    No medications on file   Previous Medications    APIXABAN (ELIQUIS) 5 MG TAB    Take 1 tablet (5 mg total) by mouth 2 (two) times daily. For blood clot    ASPIRIN (ECOTRIN) 81 MG EC TABLET    Take 81 mg by mouth once daily.    ATENOLOL (TENORMIN) 50 MG TABLET    Take 1 tablet (50 mg total) by mouth 2 (two) times daily.    LATANOPROST 0.005 % OPHTHALMIC SOLUTION    Place 1 drop into both eyes every evening.    NIFEDIPINE (PROCARDIA-XL) 90 MG (OSM) 24 HR TABLET    Take 1 tablet (90 mg total) by mouth once daily.    TRIAMCINOLONE ACETONIDE 0.1% (KENALOG) 0.1 % OINTMENT    Apply topically 2 (two) times daily.   Modified Medications    No medications on file   Discontinued Medications    No medications on file        Review of patient's allergies indicates:  No Known Allergies    Review of Systems   Constitution: Negative.   HENT: Negative.    Eyes: Negative.    Cardiovascular: Negative.    Respiratory: Negative.    Endocrine: Negative.  "   Hematologic/Lymphatic: Negative.    Skin: Negative.    Musculoskeletal: Negative.    Gastrointestinal: Negative.    Neurological: Negative.    Psychiatric/Behavioral: Negative.    Allergic/Immunologic: Negative.      Objective:   Physical Exam   Constitutional: He is oriented to person, place, and time. He appears well-developed and well-nourished. No distress.   Examination of the digits showed no clubbing or cyanosis   HENT:   Head: Normocephalic and atraumatic.   Eyes: Pupils are equal, round, and reactive to light. Conjunctivae are normal. Right eye exhibits no discharge.   Neck: Normal range of motion. Neck supple. No JVD present. No thyromegaly present.   No carotid bruits   Cardiovascular: Normal rate, regular rhythm, S1 normal, S2 normal, normal heart sounds, intact distal pulses and normal pulses. PMI is not displaced. Exam reveals no gallop, no friction rub and no opening snap.   No murmur heard.  Pulmonary/Chest: Effort normal and breath sounds normal. No respiratory distress. He has no wheezes. He has no rales. He exhibits no tenderness.   Abdominal: Soft. Bowel sounds are normal. He exhibits no distension and no mass. There is no tenderness. There is no guarding.   No hepatosplenomegaly   Musculoskeletal: Normal range of motion. He exhibits no edema or tenderness.   Lymphadenopathy:     He has no cervical adenopathy.   Neurological: He is alert and oriented to person, place, and time.   Skin: Skin is warm. No rash noted. He is not diaphoretic. No erythema.   Psychiatric: He has a normal mood and affect.   Nursing note and vitals reviewed.      Lab Results   Component Value Date    WBC 9.56 01/05/2016    HGB 14.4 01/05/2016    HCT 42.9 01/05/2016    MCV 95 01/05/2016     01/05/2016         Chemistry        Component Value Date/Time     01/05/2016 1541    K 3.9 01/05/2016 1541     01/05/2016 1541    CO2 28 01/05/2016 1541    BUN 28 (H) 01/05/2016 1541    CREATININE 1.3 01/05/2016 1541      (H) 01/05/2016 1541        Component Value Date/Time    CALCIUM 9.4 01/05/2016 1541    ALKPHOS 76 01/05/2016 1541    AST 23 01/05/2016 1541    ALT 17 01/05/2016 1541    BILITOT 0.2 01/05/2016 1541    ESTGFRAFRICA >60.0 01/05/2016 1541    EGFRNONAA 53.4 (A) 01/05/2016 1541            Lab Results   Component Value Date    CHOL 184 01/05/2016     Lab Results   Component Value Date    HDL 35 (L) 01/05/2016     Lab Results   Component Value Date    LDLCALC 110.8 01/05/2016     Lab Results   Component Value Date    TRIG 191 (H) 01/05/2016     Lab Results   Component Value Date    CHOLHDL 19.0 (L) 01/05/2016       Lab Results   Component Value Date    TSH 1.553 01/05/2016       No results found for: HGBA1C        Assessment:     1. Essential hypertension    2. Pre-op evaluation        Plan:     Patient is moderate risk for intermediate risk surgery to have to have skin cancer surgery. No contraindications to surgery such as recent MI, decompensated CHF, tachyarrhythmias or severe obstructive valve disease. METs >4.    No need to take Eliquis, not convince of thrombus. And if present it is non occlusive and chronic with no pain.   F/u with PCP for HTN.

## 2019-08-23 NOTE — LETTER
August 23, 2019      Marty Hernandez MD  [address] [facility] [address]           [facility] - Cardiology  [address] [place] [address]. [address]  [facility] [address]  Phone: [phone]  Fax: [phone]          Patient: Aquiles Montana   MR Number: [id number]   YOB: 1940   Date of Visit: 8/23/2019       Dear Dr. Marty Hernandez:    Thank you for referring Aquiles Montana to me for evaluation. Attached you will find relevant portions of my assessment and plan of care.    If you have questions, please do not hesitate to call me. I look forward to following Aquiles Montana along with you.    Sincerely,    Duane Lea MD    Enclosure  CC:  No Recipients    If you would like to receive this communication electronically, please contact [email or web address] or [phone] to request more information on [employer] Link access.    For providers and/or their staff who would like to refer a patient to [facility], please contact us through our one-stop-shop provider referral line, [facility], at [phone].    If you feel you have received this communication in error or would no longer like to receive these types of communications, please e-mail [email or web address]

## 2019-08-29 ENCOUNTER — OFFICE VISIT (OUTPATIENT)
Dept: SURGERY | Facility: CLINIC | Age: 79
End: 2019-08-29
Payer: MEDICARE

## 2019-08-29 ENCOUNTER — HOSPITAL ENCOUNTER (OUTPATIENT)
Dept: RADIOLOGY | Facility: HOSPITAL | Age: 79
Discharge: HOME | End: 2019-08-29
Attending: SURGERY
Payer: MEDICARE

## 2019-08-29 ENCOUNTER — HOSPITAL ENCOUNTER (OUTPATIENT)
Dept: CARDIOLOGY | Facility: CLINIC | Age: 79
Discharge: HOME | End: 2019-08-29
Payer: MEDICARE

## 2019-08-29 VITALS
DIASTOLIC BLOOD PRESSURE: 60 MMHG | SYSTOLIC BLOOD PRESSURE: 131 MMHG | HEART RATE: 73 BPM | WEIGHT: 280 LBS | TEMPERATURE: 98 F | BODY MASS INDEX: 37.93 KG/M2 | HEIGHT: 72 IN

## 2019-08-29 DIAGNOSIS — C44.92 SCCA (SQUAMOUS CELL CARCINOMA) OF SKIN: ICD-10-CM

## 2019-08-29 DIAGNOSIS — Z01.818 PRE-OP TESTING: Primary | ICD-10-CM

## 2019-08-29 DIAGNOSIS — D49.2 SKIN GROWTH: ICD-10-CM

## 2019-08-29 DIAGNOSIS — C44.729 SQUAMOUS CELL CARCINOMA OF LEFT LOWER LEG: Primary | ICD-10-CM

## 2019-08-29 DIAGNOSIS — Z01.818 PRE-OP TESTING: ICD-10-CM

## 2019-08-29 PROCEDURE — 99999 PR PBB SHADOW E&M-EST. PATIENT-LVL III: CPT | Mod: PBBFAC,,, | Performed by: SURGERY

## 2019-08-29 PROCEDURE — 88305 TISSUE SPECIMEN TO PATHOLOGY, SURGERY: ICD-10-PCS | Mod: 26,,, | Performed by: PATHOLOGY

## 2019-08-29 PROCEDURE — 93010 ELECTROCARDIOGRAM REPORT: CPT | Mod: S$PBB,,, | Performed by: INTERNAL MEDICINE

## 2019-08-29 PROCEDURE — 88342 TISSUE SPECIMEN TO PATHOLOGY, SURGERY: ICD-10-PCS | Mod: 26,,, | Performed by: PATHOLOGY

## 2019-08-29 PROCEDURE — 71046 XR CHEST PA AND LATERAL: ICD-10-PCS | Mod: 26,,, | Performed by: RADIOLOGY

## 2019-08-29 PROCEDURE — 93005 ELECTROCARDIOGRAM TRACING: CPT | Mod: PBBFAC | Performed by: INTERNAL MEDICINE

## 2019-08-29 PROCEDURE — 71046 X-RAY EXAM CHEST 2 VIEWS: CPT | Mod: TC,FY

## 2019-08-29 PROCEDURE — 99204 PR OFFICE/OUTPT VISIT, NEW, LEVL IV, 45-59 MIN: ICD-10-PCS | Mod: S$PBB,,, | Performed by: SURGERY

## 2019-08-29 PROCEDURE — 93010 EKG 12-LEAD: ICD-10-PCS | Mod: S$PBB,,, | Performed by: INTERNAL MEDICINE

## 2019-08-29 PROCEDURE — 99204 OFFICE O/P NEW MOD 45 MIN: CPT | Mod: S$PBB,,, | Performed by: SURGERY

## 2019-08-29 PROCEDURE — 71046 X-RAY EXAM CHEST 2 VIEWS: CPT | Mod: 26,,, | Performed by: RADIOLOGY

## 2019-08-29 PROCEDURE — 88341 IMHCHEM/IMCYTCHM EA ADD ANTB: CPT | Mod: 26,,, | Performed by: PATHOLOGY

## 2019-08-29 PROCEDURE — 99213 OFFICE O/P EST LOW 20 MIN: CPT | Mod: PBBFAC,25 | Performed by: SURGERY

## 2019-08-29 PROCEDURE — 88305 TISSUE EXAM BY PATHOLOGIST: CPT | Performed by: PATHOLOGY

## 2019-08-29 PROCEDURE — 88342 IMHCHEM/IMCYTCHM 1ST ANTB: CPT | Mod: 26,,, | Performed by: PATHOLOGY

## 2019-08-29 PROCEDURE — 99999 PR PBB SHADOW E&M-EST. PATIENT-LVL III: ICD-10-PCS | Mod: PBBFAC,,, | Performed by: SURGERY

## 2019-08-29 PROCEDURE — 88341 PR IHC OR ICC EACH ADD'L SINGLE ANTIBODY  STAINPR: ICD-10-PCS | Mod: 26,,, | Performed by: PATHOLOGY

## 2019-08-29 NOTE — PROGRESS NOTES
Patient ID: Aquiles Montana is a 78 y.o. male.    Chief Complaint: No chief complaint on file.    HPI  Patient presents with large right lower extremity SCCC. He notes it has been growing for about a yr. Denies any other skin cancers. Denies any significant cardiac or pulmonary disease.    Review of Systems   Constitutional: Negative for chills and fever.   HENT: Negative for sinus pain and trouble swallowing.    Eyes: Negative for discharge and redness.   Respiratory: Negative for cough and shortness of breath.    Cardiovascular: Negative for chest pain.   Gastrointestinal: Negative for abdominal distention, abdominal pain, constipation, diarrhea, nausea and vomiting.   Genitourinary: Negative for dysuria.   Musculoskeletal: Negative for back pain and neck pain.   Skin: Positive for color change and wound.   Allergic/Immunologic: Negative for immunocompromised state.   Neurological: Negative for light-headedness and headaches.   Hematological: Negative for adenopathy.       Current Outpatient Medications   Medication Sig Dispense Refill    aspirin (ECOTRIN) 81 MG EC tablet Take 81 mg by mouth once daily.      atenolol (TENORMIN) 50 MG tablet Take 1 tablet (50 mg total) by mouth 2 (two) times daily. 180 tablet 3    latanoprost 0.005 % ophthalmic solution Place 1 drop into both eyes every evening.      NIFEdipine (PROCARDIA-XL) 90 MG (OSM) 24 hr tablet Take 1 tablet (90 mg total) by mouth once daily. 90 tablet 3    apixaban (ELIQUIS) 5 mg Tab Take 1 tablet (5 mg total) by mouth 2 (two) times daily. For blood clot 30 tablet 5    triamcinolone acetonide 0.1% (KENALOG) 0.1 % ointment Apply topically 2 (two) times daily.       No current facility-administered medications for this visit.        Review of patient's allergies indicates:  No Known Allergies    Past Medical History:   Diagnosis Date    Hypertension     Urinary tract infection        Past Surgical History:   Procedure Laterality Date    hand sugery    "   PROSTATE SURGERY      "Rotor rooter- opened up"       Family History   Problem Relation Age of Onset    Hypertension Father     Hypertension Mother     No Known Problems Sister     Arthritis Son     Prostate cancer Neg Hx     Kidney disease Neg Hx        Social History     Socioeconomic History    Marital status:      Spouse name: Not on file    Number of children: Not on file    Years of education: Not on file    Highest education level: Not on file   Occupational History    Not on file   Social Needs    Financial resource strain: Not on file    Food insecurity:     Worry: Not on file     Inability: Not on file    Transportation needs:     Medical: Not on file     Non-medical: Not on file   Tobacco Use    Smoking status: Never Smoker    Smokeless tobacco: Never Used   Substance and Sexual Activity    Alcohol use: No     Alcohol/week: 0.0 oz    Drug use: No    Sexual activity: Never   Lifestyle    Physical activity:     Days per week: Not on file     Minutes per session: Not on file    Stress: Not on file   Relationships    Social connections:     Talks on phone: Not on file     Gets together: Not on file     Attends [employer] service: Not on file     Active member of club or organization: Not on file     Attends meetings of clubs or organizations: Not on file     Relationship status: Not on file   Other Topics Concern    Not on file   Social History Narrative    Not on file       Vitals:    08/29/19 0919   BP: 131/60   Pulse: 73   Temp: 98.1 °F (36.7 °C)       Physical Exam   Constitutional: He is oriented to person, place, and time. He appears well-developed and well-nourished. No distress.   HENT:   Head: Normocephalic and atraumatic.   Eyes: Pupils are equal, round, and reactive to light. EOM are normal.   Neck: Normal range of motion. Neck supple.   Cardiovascular: Normal rate and regular rhythm.   Pulmonary/Chest: Effort normal. No respiratory distress.   Abdominal: Soft. He " exhibits no distension.   Musculoskeletal: Normal range of motion.   Neurological: He is alert and oriented to person, place, and time.   Skin: Skin is warm and dry. He is not diaphoretic.   Psychiatric: He has a normal mood and affect. His behavior is normal. Judgment and thought content normal.   Nursing note and vitals reviewed.            Approx 7 x 8 cm    Path:  Squamous cell carcinoma    Assessment & Plan:    3 mm punch biopsy performed of skin approx 2 cm below lateral aspect of lesion to rule out spread of disease  If biopsy negative, will proceed with 1-2 cm WLE of lesion in OR on 9/18, consent obtained  If positive for SCC, will need repeat biopsy at further distance to determine spread of SCC prior to OR    Procedure Note:  Risks and benefits explained  Skin prepped with chlorhexidine  Skin infiltrated with 1% lidocaine with epi  3 mm punch biopsy performed - specimen placed in formalin  Hemostasis achieved with 2 x 3-0 nylon sutures  Patient tolerated procedure well      I have personally taken the history and examined this patient and agree with the resident's note as stated above.  Patient has significant venous stasis disease of his right lower extremity and lower leg.  There is approximately a 7 x 8 cm heaped up except raised irregular fungating ulcerated squamous cell carcinoma on the anterior shin of the mid lower leg.  Is not well defined distally given the venous stasis disease distally.  We performed a mapping 3 mm punch biopsy approximately 2 cm distal to the heaped up area.  We will phone review this punch biopsy results and the patient was consented and scheduled for surgery.  There is no evidence of popliteal or inguinal or groin lymphadenopathy and the contralateral left lower extremity is within normal limits.    He is consented and scheduled for a 1-2 cm wide local excision in the main operating room on September 18th 2019.  We will review the distal punch biopsy results for mapping the  distal margin extent of resection at the time of surgery.    Addendum:  Distal punch biopsy was negative.  Results phone reviewed with patient's wife.  Proceed with surgery as outlined on Wed 9-18-19.

## 2019-08-29 NOTE — LETTER
August 29, 2019      Heather Roberts MD  [address]  [facility] [address]           [facility]  [address]  [facility] [address]  Phone: [phone]  Fax: [phone]          Patient: Aquiles Montana   MR Number: [id number]   YOB: 1940   Date of Visit: 8/29/2019       Dear Dr. Heather Roberts:    Thank you for referring Aquiles Montana to me for evaluation. Attached you will find relevant portions of my assessment and plan of care.    If you have questions, please do not hesitate to call me. I look forward to following Aquiles Montana along with you.    Sincerely,    Rosaura Del Cid RN    Enclosure  CC:  No Recipients    If you would like to receive this communication electronically, please contact [email or web address] or [phone] to request more information on [employer] access.    For providers and/or their staff who would like to refer a patient to [facility], please contact us through our one-stop-shop provider referral line, [facility], at [phone].    If you feel you have received this communication in error or would no longer like to receive these types of communications, please e-mail [email or web address]

## 2019-08-29 NOTE — H&P (VIEW-ONLY)
Patient ID: Aquiles Montana is a 78 y.o. male.    Chief Complaint: No chief complaint on file.    HPI  Patient presents with large right lower extremity SCCC. He notes it has been growing for about a yr. Denies any other skin cancers. Denies any significant cardiac or pulmonary disease.    Review of Systems   Constitutional: Negative for chills and fever.   HENT: Negative for sinus pain and trouble swallowing.    Eyes: Negative for discharge and redness.   Respiratory: Negative for cough and shortness of breath.    Cardiovascular: Negative for chest pain.   Gastrointestinal: Negative for abdominal distention, abdominal pain, constipation, diarrhea, nausea and vomiting.   Genitourinary: Negative for dysuria.   Musculoskeletal: Negative for back pain and neck pain.   Skin: Positive for color change and wound.   Allergic/Immunologic: Negative for immunocompromised state.   Neurological: Negative for light-headedness and headaches.   Hematological: Negative for adenopathy.       Current Outpatient Medications   Medication Sig Dispense Refill    aspirin (ECOTRIN) 81 MG EC tablet Take 81 mg by mouth once daily.      atenolol (TENORMIN) 50 MG tablet Take 1 tablet (50 mg total) by mouth 2 (two) times daily. 180 tablet 3    latanoprost 0.005 % ophthalmic solution Place 1 drop into both eyes every evening.      NIFEdipine (PROCARDIA-XL) 90 MG (OSM) 24 hr tablet Take 1 tablet (90 mg total) by mouth once daily. 90 tablet 3    apixaban (ELIQUIS) 5 mg Tab Take 1 tablet (5 mg total) by mouth 2 (two) times daily. For blood clot 30 tablet 5    triamcinolone acetonide 0.1% (KENALOG) 0.1 % ointment Apply topically 2 (two) times daily.       No current facility-administered medications for this visit.        Review of patient's allergies indicates:  No Known Allergies    Past Medical History:   Diagnosis Date    Hypertension     Urinary tract infection        Past Surgical History:   Procedure Laterality Date    hand sugery    "   PROSTATE SURGERY      "Rotor rooter- opened up"       Family History   Problem Relation Age of Onset    Hypertension Father     Hypertension Mother     No Known Problems Sister     Arthritis Son     Prostate cancer Neg Hx     Kidney disease Neg Hx        Social History     Socioeconomic History    Marital status:      Spouse name: Not on file    Number of children: Not on file    Years of education: Not on file    Highest education level: Not on file   Occupational History    Not on file   Social Needs    Financial resource strain: Not on file    Food insecurity:     Worry: Not on file     Inability: Not on file    Transportation needs:     Medical: Not on file     Non-medical: Not on file   Tobacco Use    Smoking status: Never Smoker    Smokeless tobacco: Never Used   Substance and Sexual Activity    Alcohol use: No     Alcohol/week: 0.0 oz    Drug use: No    Sexual activity: Never   Lifestyle    Physical activity:     Days per week: Not on file     Minutes per session: Not on file    Stress: Not on file   Relationships    Social connections:     Talks on phone: Not on file     Gets together: Not on file     Attends [employer] service: Not on file     Active member of club or organization: Not on file     Attends meetings of clubs or organizations: Not on file     Relationship status: Not on file   Other Topics Concern    Not on file   Social History Narrative    Not on file       Vitals:    08/29/19 0919   BP: 131/60   Pulse: 73   Temp: 98.1 °F (36.7 °C)       Physical Exam   Constitutional: He is oriented to person, place, and time. He appears well-developed and well-nourished. No distress.   HENT:   Head: Normocephalic and atraumatic.   Eyes: Pupils are equal, round, and reactive to light. EOM are normal.   Neck: Normal range of motion. Neck supple.   Cardiovascular: Normal rate and regular rhythm.   Pulmonary/Chest: Effort normal. No respiratory distress.   Abdominal: Soft. He " exhibits no distension.   Musculoskeletal: Normal range of motion.   Neurological: He is alert and oriented to person, place, and time.   Skin: Skin is warm and dry. He is not diaphoretic.   Psychiatric: He has a normal mood and affect. His behavior is normal. Judgment and thought content normal.   Nursing note and vitals reviewed.            Approx 7 x 8 cm    Path:  Squamous cell carcinoma    Assessment & Plan:    3 mm punch biopsy performed of skin approx 2 cm below lateral aspect of lesion to rule out spread of disease  If biopsy negative, will proceed with 1-2 cm WLE of lesion in OR on 9/18, consent obtained  If positive for SCC, will need repeat biopsy at further distance to determine spread of SCC prior to OR    Procedure Note:  Risks and benefits explained  Skin prepped with chlorhexidine  Skin infiltrated with 1% lidocaine with epi  3 mm punch biopsy performed - specimen placed in formalin  Hemostasis achieved with 2 x 3-0 nylon sutures  Patient tolerated procedure well      I have personally taken the history and examined this patient and agree with the resident's note as stated above.  Patient has significant venous stasis disease of his right lower extremity and lower leg.  There is approximately a 7 x 8 cm heaped up except raised irregular fungating ulcerated squamous cell carcinoma on the anterior shin of the mid lower leg.  Is not well defined distally given the venous stasis disease distally.  We performed a mapping 3 mm punch biopsy approximately 2 cm distal to the heaped up area.  We will phone review this punch biopsy results and the patient was consented and scheduled for surgery.  There is no evidence of popliteal or inguinal or groin lymphadenopathy and the contralateral left lower extremity is within normal limits.    He is consented and scheduled for a 1-2 cm wide local excision in the main operating room on September 18th 2019.  We will review the distal punch biopsy results for mapping the  distal margin extent of resection at the time of surgery.    Addendum:  Distal punch biopsy was negative.  Results phone reviewed with patient's wife.  Proceed with surgery as outlined on Wed 9-18-19.

## 2019-09-01 PROBLEM — Z01.818 PRE-OP TESTING: Status: ACTIVE | Noted: 2019-09-01

## 2019-09-01 PROBLEM — C44.92 SCCA (SQUAMOUS CELL CARCINOMA) OF SKIN: Status: ACTIVE | Noted: 2019-09-01

## 2019-09-17 ENCOUNTER — ANESTHESIA EVENT (OUTPATIENT)
Dept: SURGERY | Facility: HOSPITAL | Age: 79
End: 2019-09-17
Payer: MEDICARE

## 2019-09-17 ENCOUNTER — TELEPHONE (OUTPATIENT)
Dept: SURGERY | Facility: CLINIC | Age: 79
End: 2019-09-17

## 2019-09-17 NOTE — PRE-PROCEDURE INSTRUCTIONS
>>NPO instructions given per surgeons office.     -- Medication information (what to hold and what to take)   -- Arrival place and directions given; time to be given the day before procedure by the Surgeon's Office   -- Bathing with antibacterial/normal soap   -- Don't wear any jewelry or bring any valuables AM of surgery   -- No powder, lotions, creams (except diaper rash)    Pt verbalized understanding.

## 2019-09-17 NOTE — TELEPHONE ENCOUNTER
spoke with patient's wife regarding surgery arrival time, pt unavailable at this time. advised to arrive at [facility] at 0930 for 1100 surgery. wife verbalized understanding.

## 2019-09-17 NOTE — ANESTHESIA PREPROCEDURE EVALUATION
"[facility]  Anesthesia Pre-Operative Evaluation         Patient Name: Aquiles Montana  YOB: 1940  MRN: [id number]    SUBJECTIVE:     Pre-operative evaluation for Procedure(s) (LRB):  EXCISION-WIDE LOCAL-lower leg (Right)     09/17/2019    Aquiles Montana is a 79 y.o. male w/ a significant PMHx of HTN, arthritis, SCCA of skin, psoriasis. large right lower extremity SCCC. He notes it has been growing for about a yr. Denies any other skin cancers. Denies any significant cardiac or pulmonary disease.     Patient now presents for the above procedure(s).    LDA: None documented.     Prev airway: None documented.    Drips: None documented.      Patient Active Problem List   Diagnosis    Benign non-nodular prostatic hyperplasia with lower urinary tract symptoms    Nocturia    Hypertension    Obesity    Arthritis    Psoriasis    SCCA (squamous cell carcinoma) of skin    Pre-op testing       Review of patient's allergies indicates:  No Known Allergies    Current Inpatient Medications:      No current facility-administered medications on file prior to encounter.      Current Outpatient Medications on File Prior to Encounter   Medication Sig Dispense Refill    aspirin (ECOTRIN) 81 MG EC tablet Take 81 mg by mouth once daily.      atenolol (TENORMIN) 50 MG tablet Take 1 tablet (50 mg total) by mouth 2 (two) times daily. 180 tablet 3    latanoprost 0.005 % ophthalmic solution Place 1 drop into both eyes every evening.      NIFEdipine (PROCARDIA-XL) 90 MG (OSM) 24 hr tablet Take 1 tablet (90 mg total) by mouth once daily. 90 tablet 3    apixaban (ELIQUIS) 5 mg Tab Take 1 tablet (5 mg total) by mouth 2 (two) times daily. For blood clot 30 tablet 5    triamcinolone acetonide 0.1% (KENALOG) 0.1 % ointment Apply topically 2 (two) times daily.         Past Surgical History:   Procedure Laterality Date    hand sugery      PROSTATE SURGERY      "Rotor rooter- opened up"       Social History "     Socioeconomic History    Marital status:      Spouse name: Not on file    Number of children: Not on file    Years of education: Not on file    Highest education level: Not on file   Occupational History    Not on file   Social Needs    Financial resource strain: Not on file    Food insecurity:     Worry: Not on file     Inability: Not on file    Transportation needs:     Medical: Not on file     Non-medical: Not on file   Tobacco Use    Smoking status: Never Smoker    Smokeless tobacco: Never Used   Substance and Sexual Activity    Alcohol use: No     Alcohol/week: 0.0 oz    Drug use: No    Sexual activity: Never   Lifestyle    Physical activity:     Days per week: Not on file     Minutes per session: Not on file    Stress: Not on file   Relationships    Social connections:     Talks on phone: Not on file     Gets together: Not on file     Attends [employer] service: Not on file     Active member of club or organization: Not on file     Attends meetings of clubs or organizations: Not on file     Relationship status: Not on file   Other Topics Concern    Not on file   Social History Narrative    Not on file       OBJECTIVE:     Vital Signs Range (Last 24H):         Significant Labs:  Lab Results   Component Value Date    WBC 11.63 08/29/2019    HGB 13.6 (L) 08/29/2019    HCT 41.6 08/29/2019     08/29/2019    CHOL 184 01/05/2016    TRIG 191 (H) 01/05/2016    HDL 35 (L) 01/05/2016    ALT 17 01/05/2016    AST 23 01/05/2016     08/29/2019    K 4.6 08/29/2019     08/29/2019    CREATININE 1.1 08/29/2019    BUN 23 08/29/2019    CO2 31 (H) 08/29/2019    TSH 1.553 01/05/2016       Diagnostic Studies: No relevant studies.    EKG:   Results for orders placed or performed during the hospital encounter of 08/29/19   EKG 12-lead    Collection Time: 08/29/19 11:51 AM    Narrative    Test Reason : Z01.818,    Vent. Rate : 072 BPM     Atrial Rate : 072 BPM     P-R Int : 198 ms           QRS Dur : 090 ms      QT Int : 408 ms       P-R-T Axes : 084 -64 023 degrees     QTc Int : 446 ms    Sinus rhythm with occasional Premature ventricular complexes  Left axis deviation      No previous ECGs available  Confirmed by JOYA PAULINO MD ([id number]) on 8/30/2019 12:22:49 PM    Referred By: BELGICA PERAZA           Confirmed By:JOYA PAULINO MD       2D ECHO:  TTE:  No results found for this or any previous visit.    VITO:  No results found for this or any previous visit.    ASSESSMENT/PLAN:                                                                                                                  09/17/2019  Aquiles Montana is a 79 y.o., male.    Anesthesia Evaluation    I have reviewed the Patient Summary Reports.    I have reviewed the Nursing Notes.   I have reviewed the Medications.     Review of Systems  Anesthesia Hx:  No problems with previous Anesthesia  History of prior surgery of interest to airway management or planning: Denies Family Hx of Anesthesia complications.   Denies Personal Hx of Anesthesia complications.   Hematology/Oncology:         -- Denies Anemia:   Cardiovascular:   Exercise tolerance: poor Hypertension Denies CAD.    Denies CABG/stent.     Pulmonary:   Denies COPD.  Denies Asthma.  Denies Sleep Apnea.    Renal/:   Denies Chronic Renal Disease.     Hepatic/GI:   Denies GERD. Denies Liver Disease.    Neurological:   Denies CVA. Denies Seizures.    Endocrine:   Denies Diabetes.        Physical Exam  General:  Well nourished, Obesity    Airway/Jaw/Neck:  Airway Findings: Mouth Opening: Normal Tongue: Normal  General Airway Assessment: Adult  Mallampati: III  Improves to III with phonation.  TM Distance: Normal, at least 6 cm  Jaw/Neck Findings:  Neck ROM: Normal ROM      Dental:  Dental Findings: In tact   Chest/Lungs:  Chest/Lungs Findings: Clear to auscultation, Normal Respiratory Rate     Heart/Vascular:  Heart Findings: Rate: Normal  Rhythm: Regular Rhythm  Sounds: Normal        Mental  Status:  Mental Status Findings:  Cooperative, Alert and Oriented         Anesthesia Plan  Type of Anesthesia, risks & benefits discussed:  Anesthesia Type:  general, MAC, regional  Patient's Preference:   Intra-op Monitoring Plan: standard ASA monitors  Intra-op Monitoring Plan Comments:   Post Op Pain Control Plan: multimodal analgesia, IV/PO Opioids PRN and per primary service following discharge from PACU  Post Op Pain Control Plan Comments:   Induction:   IV  Beta Blocker:  Patient is on a Beta-Blocker and has received one dose within the past 24 hours (No further documentation required).       Informed Consent: Patient understands risks and agrees with Anesthesia plan.  Questions answered. Anesthesia consent signed with patient.  ASA Score: 3     Day of Surgery Review of History & Physical:    H&P update referred to the surgeon.         Ready For Surgery From Anesthesia Perspective.

## 2019-09-18 ENCOUNTER — ANESTHESIA (OUTPATIENT)
Dept: SURGERY | Facility: HOSPITAL | Age: 79
End: 2019-09-18
Payer: MEDICARE

## 2019-09-18 ENCOUNTER — HOSPITAL ENCOUNTER (OUTPATIENT)
Facility: HOSPITAL | Age: 79
Discharge: HOME | End: 2019-09-18
Attending: SURGERY | Admitting: SURGERY
Payer: MEDICARE

## 2019-09-18 VITALS
TEMPERATURE: 98 F | WEIGHT: 279 LBS | RESPIRATION RATE: 17 BRPM | BODY MASS INDEX: 37.79 KG/M2 | DIASTOLIC BLOOD PRESSURE: 83 MMHG | HEIGHT: 72 IN | OXYGEN SATURATION: 97 % | SYSTOLIC BLOOD PRESSURE: 153 MMHG | HEART RATE: 64 BPM

## 2019-09-18 DIAGNOSIS — C44.722 SQUAMOUS CELL CARCINOMA OF RIGHT LOWER LEG: Primary | ICD-10-CM

## 2019-09-18 PROCEDURE — 71000033 HC RECOVERY, INTIAL HOUR: Performed by: SURGERY

## 2019-09-18 PROCEDURE — 71000015 HC POSTOP RECOV 1ST HR: Performed by: SURGERY

## 2019-09-18 PROCEDURE — 88305 TISSUE EXAM BY PATHOLOGIST: CPT | Performed by: PATHOLOGY

## 2019-09-18 PROCEDURE — 63600175 PHARM REV CODE 636 W HCPCS: Performed by: SURGERY

## 2019-09-18 PROCEDURE — 25000003 PHARM REV CODE 250: Performed by: STUDENT IN AN ORGANIZED HEALTH CARE EDUCATION/TRAINING PROGRAM

## 2019-09-18 PROCEDURE — 64447 NJX AA&/STRD FEMORAL NRV IMG: CPT | Performed by: STUDENT IN AN ORGANIZED HEALTH CARE EDUCATION/TRAINING PROGRAM

## 2019-09-18 PROCEDURE — 76942: ICD-10-PCS | Mod: 26,,, | Performed by: STUDENT IN AN ORGANIZED HEALTH CARE EDUCATION/TRAINING PROGRAM

## 2019-09-18 PROCEDURE — 37000009 HC ANESTHESIA EA ADD 15 MINS: Performed by: SURGERY

## 2019-09-18 PROCEDURE — 27201423 OPTIME MED/SURG SUP & DEVICES STERILE SUPPLY: Performed by: SURGERY

## 2019-09-18 PROCEDURE — 11606 PR EXC SKIN MALIG >4 CM TRUNK,ARM,LEG: ICD-10-PCS | Mod: RT,,, | Performed by: SURGERY

## 2019-09-18 PROCEDURE — 36000707: Performed by: SURGERY

## 2019-09-18 PROCEDURE — 63600175 PHARM REV CODE 636 W HCPCS: Performed by: STUDENT IN AN ORGANIZED HEALTH CARE EDUCATION/TRAINING PROGRAM

## 2019-09-18 PROCEDURE — 25000003 PHARM REV CODE 250: Performed by: SURGERY

## 2019-09-18 PROCEDURE — 88305 TISSUE EXAM BY PATHOLOGIST: CPT | Mod: 26,,, | Performed by: PATHOLOGY

## 2019-09-18 PROCEDURE — 36000706: Performed by: SURGERY

## 2019-09-18 PROCEDURE — 76942 ECHO GUIDE FOR BIOPSY: CPT | Performed by: STUDENT IN AN ORGANIZED HEALTH CARE EDUCATION/TRAINING PROGRAM

## 2019-09-18 PROCEDURE — 88305 TISSUE SPECIMEN TO PATHOLOGY: ICD-10-PCS | Mod: 26,,, | Performed by: PATHOLOGY

## 2019-09-18 PROCEDURE — 64445: ICD-10-PCS | Mod: 59,RT,, | Performed by: STUDENT IN AN ORGANIZED HEALTH CARE EDUCATION/TRAINING PROGRAM

## 2019-09-18 PROCEDURE — 64445 NJX AA&/STRD SCIATIC NRV IMG: CPT | Mod: 59,RT,, | Performed by: STUDENT IN AN ORGANIZED HEALTH CARE EDUCATION/TRAINING PROGRAM

## 2019-09-18 PROCEDURE — 94761 N-INVAS EAR/PLS OXIMETRY MLT: CPT

## 2019-09-18 PROCEDURE — 71000044 HC DOSC ROUTINE RECOVERY FIRST HOUR: Performed by: SURGERY

## 2019-09-18 PROCEDURE — D9220A PRA ANESTHESIA: Mod: ,,, | Performed by: ANESTHESIOLOGY

## 2019-09-18 PROCEDURE — 11606 EXC TR-EXT MAL+MARG >4 CM: CPT | Mod: RT,,, | Performed by: SURGERY

## 2019-09-18 PROCEDURE — 64447 ADDUCTOR SINGLE SHOT: ICD-10-PCS | Mod: 59,RT,, | Performed by: STUDENT IN AN ORGANIZED HEALTH CARE EDUCATION/TRAINING PROGRAM

## 2019-09-18 PROCEDURE — 25000003 PHARM REV CODE 250: Performed by: ANESTHESIOLOGY

## 2019-09-18 PROCEDURE — D9220A PRA ANESTHESIA: ICD-10-PCS | Mod: ,,, | Performed by: ANESTHESIOLOGY

## 2019-09-18 PROCEDURE — 37000008 HC ANESTHESIA 1ST 15 MINUTES: Performed by: SURGERY

## 2019-09-18 RX ORDER — BUPIVACAINE HYDROCHLORIDE AND EPINEPHRINE 5; 5 MG/ML; UG/ML
INJECTION, SOLUTION EPIDURAL; INTRACAUDAL; PERINEURAL
Status: COMPLETED | OUTPATIENT
Start: 2019-09-18 | End: 2019-09-18

## 2019-09-18 RX ORDER — GLYCOPYRROLATE 0.2 MG/ML
INJECTION INTRAMUSCULAR; INTRAVENOUS
Status: DISCONTINUED | OUTPATIENT
Start: 2019-09-18 | End: 2019-09-18

## 2019-09-18 RX ORDER — SODIUM CHLORIDE 9 MG/ML
INJECTION, SOLUTION INTRAVENOUS CONTINUOUS
Status: DISCONTINUED | OUTPATIENT
Start: 2019-09-18 | End: 2019-09-18 | Stop reason: HOSPADM

## 2019-09-18 RX ORDER — HYDROCODONE BITARTRATE AND ACETAMINOPHEN 5; 325 MG/1; MG/1
TABLET ORAL
Status: DISCONTINUED
Start: 2019-09-18 | End: 2019-09-18 | Stop reason: HOSPADM

## 2019-09-18 RX ORDER — PROPOFOL 10 MG/ML
VIAL (ML) INTRAVENOUS CONTINUOUS PRN
Status: DISCONTINUED | OUTPATIENT
Start: 2019-09-18 | End: 2019-09-18

## 2019-09-18 RX ORDER — CEFAZOLIN SODIUM 1 G/3ML
2 INJECTION, POWDER, FOR SOLUTION INTRAMUSCULAR; INTRAVENOUS
Status: COMPLETED | OUTPATIENT
Start: 2019-09-18 | End: 2019-09-18

## 2019-09-18 RX ORDER — SODIUM CHLORIDE 0.9 % (FLUSH) 0.9 %
10 SYRINGE (ML) INJECTION
Status: DISCONTINUED | OUTPATIENT
Start: 2019-09-18 | End: 2019-09-18 | Stop reason: HOSPADM

## 2019-09-18 RX ORDER — FENTANYL CITRATE 50 UG/ML
50 INJECTION, SOLUTION INTRAMUSCULAR; INTRAVENOUS ONCE
Status: COMPLETED | OUTPATIENT
Start: 2019-09-18 | End: 2019-09-18

## 2019-09-18 RX ORDER — DEXAMETHASONE SODIUM PHOSPHATE 4 MG/ML
INJECTION, SOLUTION INTRA-ARTICULAR; INTRALESIONAL; INTRAMUSCULAR; INTRAVENOUS; SOFT TISSUE
Status: DISCONTINUED | OUTPATIENT
Start: 2019-09-18 | End: 2019-09-18

## 2019-09-18 RX ORDER — MIDAZOLAM HYDROCHLORIDE 1 MG/ML
1 INJECTION, SOLUTION INTRAMUSCULAR; INTRAVENOUS ONCE
Status: COMPLETED | OUTPATIENT
Start: 2019-09-18 | End: 2019-09-18

## 2019-09-18 RX ORDER — HYDROCODONE BITARTRATE AND ACETAMINOPHEN 5; 325 MG/1; MG/1
1 TABLET ORAL EVERY 6 HOURS PRN
Qty: 31 TABLET | Refills: 0 | Status: SHIPPED | OUTPATIENT
Start: 2019-09-18 | End: 2021-04-23

## 2019-09-18 RX ORDER — ONDANSETRON 2 MG/ML
INJECTION INTRAMUSCULAR; INTRAVENOUS
Status: DISCONTINUED | OUTPATIENT
Start: 2019-09-18 | End: 2019-09-18

## 2019-09-18 RX ORDER — PROPOFOL 10 MG/ML
VIAL (ML) INTRAVENOUS
Status: DISCONTINUED | OUTPATIENT
Start: 2019-09-18 | End: 2019-09-18

## 2019-09-18 RX ORDER — LIDOCAINE HYDROCHLORIDE 10 MG/ML
1 INJECTION, SOLUTION EPIDURAL; INFILTRATION; INTRACAUDAL; PERINEURAL ONCE
Status: DISCONTINUED | OUTPATIENT
Start: 2019-09-18 | End: 2019-09-18 | Stop reason: HOSPADM

## 2019-09-18 RX ORDER — FENTANYL CITRATE 50 UG/ML
25 INJECTION, SOLUTION INTRAMUSCULAR; INTRAVENOUS EVERY 5 MIN PRN
Status: DISCONTINUED | OUTPATIENT
Start: 2019-09-18 | End: 2019-09-18 | Stop reason: HOSPADM

## 2019-09-18 RX ORDER — ONDANSETRON 2 MG/ML
4 INJECTION INTRAMUSCULAR; INTRAVENOUS DAILY PRN
Status: DISCONTINUED | OUTPATIENT
Start: 2019-09-18 | End: 2019-09-18 | Stop reason: HOSPADM

## 2019-09-18 RX ORDER — PHENYLEPHRINE HYDROCHLORIDE 10 MG/ML
INJECTION INTRAVENOUS
Status: DISCONTINUED | OUTPATIENT
Start: 2019-09-18 | End: 2019-09-18

## 2019-09-18 RX ORDER — ACETAMINOPHEN 10 MG/ML
INJECTION, SOLUTION INTRAVENOUS
Status: DISCONTINUED | OUTPATIENT
Start: 2019-09-18 | End: 2019-09-18

## 2019-09-18 RX ORDER — LIDOCAINE HYDROCHLORIDE 20 MG/ML
INJECTION, SOLUTION EPIDURAL; INFILTRATION; INTRACAUDAL; PERINEURAL
Status: DISCONTINUED | OUTPATIENT
Start: 2019-09-18 | End: 2019-09-18

## 2019-09-18 RX ORDER — HYDROCODONE BITARTRATE AND ACETAMINOPHEN 5; 325 MG/1; MG/1
1 TABLET ORAL EVERY 6 HOURS PRN
Status: DISCONTINUED | OUTPATIENT
Start: 2019-09-18 | End: 2019-09-18 | Stop reason: HOSPADM

## 2019-09-18 RX ORDER — LIDOCAINE HYDROCHLORIDE 10 MG/ML
1 INJECTION, SOLUTION EPIDURAL; INFILTRATION; INTRACAUDAL; PERINEURAL ONCE
Status: COMPLETED | OUTPATIENT
Start: 2019-09-18 | End: 2019-09-18

## 2019-09-18 RX ORDER — SODIUM CHLORIDE 9 MG/ML
INJECTION, SOLUTION INTRAVENOUS CONTINUOUS PRN
Status: DISCONTINUED | OUTPATIENT
Start: 2019-09-18 | End: 2019-09-18

## 2019-09-18 RX ORDER — KETAMINE HCL IN 0.9 % NACL 50 MG/5 ML
SYRINGE (ML) INTRAVENOUS
Status: DISCONTINUED | OUTPATIENT
Start: 2019-09-18 | End: 2019-09-18

## 2019-09-18 RX ORDER — EPHEDRINE SULFATE 50 MG/ML
INJECTION, SOLUTION INTRAVENOUS
Status: DISCONTINUED | OUTPATIENT
Start: 2019-09-18 | End: 2019-09-18

## 2019-09-18 RX ADMIN — HYDROCODONE BITARTRATE AND ACETAMINOPHEN 1 TABLET: 5; 325 TABLET ORAL at 12:09

## 2019-09-18 RX ADMIN — GLYCOPYRROLATE 0.2 MG: 0.2 INJECTION, SOLUTION INTRAMUSCULAR; INTRAVENOUS at 11:09

## 2019-09-18 RX ADMIN — CEFAZOLIN 2 G: 330 INJECTION, POWDER, FOR SOLUTION INTRAMUSCULAR; INTRAVENOUS at 10:09

## 2019-09-18 RX ADMIN — PHENYLEPHRINE HYDROCHLORIDE 200 MCG: 10 INJECTION INTRAVENOUS at 11:09

## 2019-09-18 RX ADMIN — SODIUM CHLORIDE: 0.9 INJECTION, SOLUTION INTRAVENOUS at 09:09

## 2019-09-18 RX ADMIN — LIDOCAINE HYDROCHLORIDE 0.2 MG: 10 INJECTION, SOLUTION EPIDURAL; INFILTRATION; INTRACAUDAL; PERINEURAL at 09:09

## 2019-09-18 RX ADMIN — ONDANSETRON 4 MG: 2 INJECTION INTRAMUSCULAR; INTRAVENOUS at 11:09

## 2019-09-18 RX ADMIN — PROPOFOL 25 MG: 10 INJECTION, EMULSION INTRAVENOUS at 10:09

## 2019-09-18 RX ADMIN — MIDAZOLAM HYDROCHLORIDE 1 MG: 1 INJECTION, SOLUTION INTRAMUSCULAR; INTRAVENOUS at 10:09

## 2019-09-18 RX ADMIN — ACETAMINOPHEN 1000 MG: 10 INJECTION, SOLUTION INTRAVENOUS at 11:09

## 2019-09-18 RX ADMIN — BUPIVACAINE HYDROCHLORIDE AND EPINEPHRINE BITARTRATE 25 ML: 5; .005 INJECTION, SOLUTION EPIDURAL; INTRACAUDAL; PERINEURAL at 10:09

## 2019-09-18 RX ADMIN — BUPIVACAINE HYDROCHLORIDE AND EPINEPHRINE BITARTRATE 15 ML: 5; .005 INJECTION, SOLUTION EPIDURAL; INTRACAUDAL; PERINEURAL at 10:09

## 2019-09-18 RX ADMIN — PROPOFOL 25 MG: 10 INJECTION, EMULSION INTRAVENOUS at 11:09

## 2019-09-18 RX ADMIN — FENTANYL CITRATE 50 MCG: 50 INJECTION INTRAMUSCULAR; INTRAVENOUS at 10:09

## 2019-09-18 RX ADMIN — LIDOCAINE HYDROCHLORIDE 75 MG: 20 INJECTION, SOLUTION EPIDURAL; INFILTRATION; INTRACAUDAL; PERINEURAL at 11:09

## 2019-09-18 RX ADMIN — PROPOFOL 120 MG: 10 INJECTION, EMULSION INTRAVENOUS at 11:09

## 2019-09-18 RX ADMIN — SODIUM CHLORIDE, SODIUM GLUCONATE, SODIUM ACETATE, POTASSIUM CHLORIDE, MAGNESIUM CHLORIDE, SODIUM PHOSPHATE, DIBASIC, AND POTASSIUM PHOSPHATE: .53; .5; .37; .037; .03; .012; .00082 INJECTION, SOLUTION INTRAVENOUS at 11:09

## 2019-09-18 RX ADMIN — Medication 25 MG: at 10:09

## 2019-09-18 RX ADMIN — PROPOFOL 50 MG: 10 INJECTION, EMULSION INTRAVENOUS at 11:09

## 2019-09-18 RX ADMIN — DEXAMETHASONE SODIUM PHOSPHATE 8 MG: 4 INJECTION, SOLUTION INTRAMUSCULAR; INTRAVENOUS at 11:09

## 2019-09-18 RX ADMIN — PROPOFOL 50 MCG/KG/MIN: 10 INJECTION, EMULSION INTRAVENOUS at 11:09

## 2019-09-18 RX ADMIN — Medication 5 MG: at 11:09

## 2019-09-18 RX ADMIN — EPHEDRINE SULFATE 5 MG: 50 INJECTION, SOLUTION INTRAMUSCULAR; INTRAVENOUS; SUBCUTANEOUS at 11:09

## 2019-09-18 NOTE — PLAN OF CARE
Discharge instructions given, patient verbalized understanding. Consents in chart, Vitals stable, no complaints.

## 2019-09-18 NOTE — ANESTHESIA POSTPROCEDURE EVALUATION
Anesthesia Post Evaluation    Patient: Aquiles Audiffred    Procedure(s) Performed: Procedure(s) (LRB):  EXCISION-WIDE LOCAL-lower leg (Right)    Final Anesthesia Type: general  Patient location during evaluation: PACU  Patient participation: Yes- Able to Participate  Level of consciousness: awake and alert and oriented  Post-procedure vital signs: reviewed and stable  Pain management: adequate  Airway patency: patent  PONV status at discharge: No PONV  Anesthetic complications: no      Cardiovascular status: blood pressure returned to baseline, hemodynamically stable and stable  Respiratory status: unassisted, room air and spontaneous ventilation  Hydration status: euvolemic  Follow-up not needed.          Vitals Value Taken Time   /83 9/18/2019  1:30 PM   Temp 36.6 °C (97.9 °F) 9/18/2019  1:00 PM   Pulse 64 9/18/2019  1:30 PM   Resp 17 9/18/2019  1:30 PM   SpO2 97 % 9/18/2019  1:30 PM         Event Time     Out of Recovery 12:55:37          Pain/Ruddy Score: Pain Rating Prior to Med Admin: 4 (9/18/2019 12:23 PM)  Pain Rating Post Med Admin: 0 (9/18/2019  1:00 PM)  Ruddy Score: 10 (9/18/2019  1:01 PM)

## 2019-09-18 NOTE — TRANSFER OF CARE
Anesthesia Transfer of Care Note    Patient: Aquiles Audiffred    Procedure(s) Performed: Procedure(s) (LRB):  EXCISION-WIDE LOCAL-lower leg (Right)    Patient location: PACU    Anesthesia Type: general    Transport from OR: Transported from OR on 6-10 L/min O2 by face mask with adequate spontaneous ventilation    Post pain: adequate analgesia    Post assessment: no apparent anesthetic complications    Post vital signs: stable    Level of consciousness: awake    Nausea/Vomiting: no nausea/vomiting    Complications: none    Transfer of care protocol was followed      Last vitals:   Visit Vitals  BP (!) 147/76 (BP Location: Left arm, Patient Position: Lying)   Pulse 69   Temp 36.7 °C (98.1 °F) (Oral)   Resp 16   Ht 6' (1.829 m)   Wt 126.6 kg (279 lb)   SpO2 (!) 92%   BMI 37.84 kg/m²

## 2019-09-18 NOTE — PLAN OF CARE
Pt AAOx4. No complaints of pain to RLE. Dressing to site remains CDI. VSS. Tolerating sips of water. Safety maintained. Pt to be discharged home per [facility].

## 2019-09-18 NOTE — DISCHARGE INSTRUCTIONS

## 2019-09-18 NOTE — ANESTHESIA PROCEDURE NOTES
Popliteal (sciatic) single shot    Patient location during procedure: pre-op   Block not for primary anesthetic.  Reason for block: at surgeon's request and post-op pain management   Post-op Pain Location: Right Lower Extremity  Start time: 9/18/2019 10:31 AM  Timeout: 9/18/2019 10:30 AM   End time: 9/18/2019 10:35 AM    Staffing  Authorizing Provider: Lazaro Monteiro MD  Performing Provider: Mukul Ojeda MD    Preanesthetic Checklist  Completed: patient identified, site marked, surgical consent, pre-op evaluation, timeout performed, IV checked, risks and benefits discussed and monitors and equipment checked  Peripheral Block  Patient position: supine  Prep: ChloraPrep  Patient monitoring: heart rate, cardiac monitor, continuous pulse ox, continuous capnometry and frequent blood pressure checks  Block type: popliteal  Laterality: right  Injection technique: single shot  Needle  Needle type: Stimuplex   Needle gauge: 21 G  Needle length: 4 in  Needle localization: anatomical landmarks and ultrasound guidance   -ultrasound image captured on disc.  Assessment  Injection assessment: negative aspiration, negative parasthesia and local visualized surrounding nerve  Paresthesia pain: none  Heart rate change: no  Slow fractionated injection: yes  Additional Notes  VSS.  [facility] RN monitoring vitals throughout procedure.  Patient tolerated procedure well.

## 2019-09-18 NOTE — ANESTHESIA PROCEDURE NOTES
Adductor Single Shot    Patient location during procedure: pre-op   Block not for primary anesthetic.  Reason for block: at surgeon's request and post-op pain management   Post-op Pain Location: Right Lower Extremity  Start time: 9/18/2019 10:36 AM  Timeout: 9/18/2019 10:31 AM   End time: 9/18/2019 10:37 AM    Staffing  Authorizing Provider: Lazaro Monteiro MD  Performing Provider: Mukul Ojeda MD    Preanesthetic Checklist  Completed: patient identified, site marked, surgical consent, pre-op evaluation, timeout performed, IV checked, risks and benefits discussed and monitors and equipment checked  Peripheral Block  Patient position: supine  Prep: ChloraPrep  Patient monitoring: heart rate, cardiac monitor, continuous pulse ox, continuous capnometry and frequent blood pressure checks  Block type: adductor canal  Laterality: right  Injection technique: single shot  Needle  Needle type: Stimuplex   Needle gauge: 21 G  Needle length: 4 in  Needle localization: anatomical landmarks and ultrasound guidance   -ultrasound image captured on disc.  Assessment  Injection assessment: negative aspiration, negative parasthesia and local visualized surrounding nerve  Paresthesia pain: none  Heart rate change: no  Slow fractionated injection: yes  Additional Notes  VSS.  [facility] RN monitoring vitals throughout procedure.  Patient tolerated procedure well.

## 2019-09-18 NOTE — OP NOTE
DATE OF PROCEDURE:  09/18/2019    PRIMARY SURGEON:  Patel Singh M.D.    ASSISTANT:  Arabella Oswald M.D. (RES).    PREOPERATIVE DIAGNOSIS:  An 8 x 10 cm fungating exophytic ulcerated polypoid   squamous cell carcinoma of the right anterior lower leg.    POSTOPERATIVE DIAGNOSIS:  An 8 x 10 cm fungating exophytic ulcerated polypoid   squamous cell carcinoma of the right anterior lower leg on top of chronic venous   stasis disease and venous insufficiency.    PROCEDURES PERFORMED:  A 2 cm wide local excision of 8 x 10 cm area of squamous   cell carcinoma making specimen resection approximately 12 x 14 cm in greatest   dimensions.    ANESTHESIA:  Right lower extremity regional block with general anesthesia.    COMPLICATIONS:  None.    ESTIMATED BLOOD LOSS:  Minimal.    DRAINS:  No drains were placed.  The wound was not closed primarily.    PROCEDURE IN DETAIL:  The patient underwent informed consent.  The history and   physical examination was reviewed and updated.  The prior distal anterior biopsy   was negative.  The patient underwent regional block of the right lower   extremity in the preop holding area.  The right lower extremity was marked.    Again, the history and physical examination was reviewed and updated and the   consent was confirmed.  He was brought to the Operating Room under the block.    The right lower extremity was prepped and draped in a sterile fashion.  We   marked a 2 cm wide local excision margin around the 8 x 10 cm tumor, measuring   the specimen of resection approximately 12 x 14 cm.  The skin was incised   sharply.  Full-thickness skin and subcutaneous tissue was divided with   electrocautery.  The venous tributaries and venous branches were controlled with   cautery and clips as were sensory cutaneous nerves.  The patient did require   general anesthesia on top of the regional block.  Estimated blood loss was   minimal.  Full-thickness skin and subcutaneous tissue was taken down to  the   underlying fascia and the pretibial fascia on the tibia bone.  The specimen was   removed and oriented with a short stitch superiorly and a long stitch laterally   and submitted to pathology for permanent sectioning.  With hemostasis achieved,   the wound was dressed with Xeroform dressing followed by 4 x 4, ABD pad, Kerlix   roll and Ace bandage.  The patient will be seen in the Surgical Wound Care   Clinic 48 hours postoperatively to initiate local wound care management.  He   tolerated the procedure well without complication.  Estimated blood loss was   minimal.  All needle, instrument and sponge counts were correct.  The specimen   was sent to Pathology for permanent sectioning.      PETRA/IN  dd: 09/18/2019 13:00:13 (CDT)  td: 09/18/2019 13:18:10 (CDT)  Doc ID   #[id number]  Job ID #[id number]    CC:

## 2019-09-18 NOTE — BRIEF OP NOTE
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 Wound left open.     Findings/Key Components: see above    Estimated Blood Loss: 20cc         Specimens:   Specimen (12h ago, onward)    Start     Ordered    09/18/19 1134  Specimen to Pathology - Surgery  Once     Comments:  Pre-op Diagnosis: Melanoma of skin of lower limb, right [C43.71]Procedure(s):EXCISION-WIDE LOCAL-lower leg Number of specimens: Name of specimens: 1.) 2 cm wide excision of squamous cell carcinoma, right lower leg. Short stitch superior, long stitch lateral. - permanent     Start Status     09/18/19 1134 Needs to be Collected Order ID: [id number]       09/18/19 1142          Discharge Note    SUMMARY     Admit Date: 9/18/2019    Discharge Date and Time:  09/18/2019 12:05 PM    Hospital Course (synopsis of major diagnoses, care, treatment, and services provided during the course of the hospital stay): Patient presented for scheduled procedure today. Tolerated the procedure well, without complication. Extubated in OR and transferred to recovery. Plan to discharge home today with follow up appointment in 2 weeks.       Final Diagnosis: Post-Op Diagnosis Codes:     * Melanoma of skin of lower limb, right [C43.71]    Disposition: Home or Self Care with home wound care for daily dressing changes     Follow Up/Patient Instructions:     Medications:  Reconciled Home Medications:       Medication List      START taking these medications    HYDROcodone-acetaminophen 5-325 mg per tablet  Commonly known as:  NORCO  Take 1 tablet by mouth every 6 (six) hours as needed.        CONTINUE taking these medications    apixaban 5 mg Tab  Commonly known as:  ELIQUIS  Take 1 tablet (5 mg total) by mouth 2 (two) times daily. For blood clot     aspirin 81 MG EC tablet  Commonly known as:  ECOTRIN  Take 81 mg by mouth once daily.     atenolol 50 MG tablet  Commonly known as:  TENORMIN  Take 1 tablet (50 mg total) by mouth 2 (two) times daily.     latanoprost 0.005 % ophthalmic solution  Place 1 drop into both eyes every evening.     NIFEdipine 90 MG (OSM) 24 hr tablet  Commonly known as:  PROCARDIA-XL  Take 1 tablet (90 mg total) by mouth once daily.     triamcinolone acetonide 0.1% 0.1 % ointment  Commonly known as:  KENALOG  Apply topically 2 (two) times daily.          Discharge Procedure Orders   Diet Adult Regular     Keep surgical extremity elevated   Order Comments: Elevate R leg to help improve swelling and drainage as needed     Notify your health care provider if you experience any of the following:  increased confusion or weakness     Notify your health care provider if you experience any of the following:  persistent dizziness, light-headedness, or visual disturbances     Notify your health care provider if you experience any of the following:  worsening rash     Notify your health care provider if you experience any of the following:  severe persistent headache     Notify your health care provider if you experience any of the following:  difficulty breathing or increased cough     Notify your health care provider if you experience any of the following:  redness, tenderness, or signs of infection (pain, swelling, redness, odor or green/yellow discharge around incision site)     Notify your health care provider if you experience any of the following:  severe uncontrolled pain     Notify your health  care provider if you experience any of the following:  persistent nausea and vomiting or diarrhea     Notify your health care provider if you experience any of the following:  temperature >100.4     Remove dressing in 24 hours   Order Comments: Change dressing daily. Wound is covered with xeroform gauze. Then covered with gauze, kerlix wrap and ace bandage wrap.   Wound care has been consulted and should be helping with dressing changes at home     Activity as tolerated     Shower on day dressing removed (No bath)   Order Comments: Okay to take a shower in 48 hours after surgery. Do not soak/submerge incision (aka no bathing, swimming)until cleared in clinic.     Follow-up Information     Patel Singh MD In 2 weeks.    Specialty:  General Surgery  Why:  For wound check and pathology review  Contact information:  [facility]  [facility] [id number] [phone]                 KYLE Oswald MD   General Surgery- PGYIII  [phone]

## 2019-09-20 ENCOUNTER — INITIAL CONSULT (OUTPATIENT)
Dept: WOUND CARE | Facility: CLINIC | Age: 79
End: 2019-09-20
Payer: MEDICARE

## 2019-09-20 VITALS
HEART RATE: 63 BPM | BODY MASS INDEX: 37.84 KG/M2 | SYSTOLIC BLOOD PRESSURE: 136 MMHG | DIASTOLIC BLOOD PRESSURE: 80 MMHG | HEIGHT: 72 IN | TEMPERATURE: 97 F

## 2019-09-20 DIAGNOSIS — R60.0 LOWER LEG EDEMA: ICD-10-CM

## 2019-09-20 DIAGNOSIS — I83.891 VARICOSE VEINS OF RIGHT LOWER EXTREMITY WITH COMPLICATIONS: ICD-10-CM

## 2019-09-20 DIAGNOSIS — T81.89XA DELAYED SURGICAL WOUND HEALING, INITIAL ENCOUNTER: ICD-10-CM

## 2019-09-20 DIAGNOSIS — C44.729 SQUAMOUS CELL CARCINOMA OF LEFT LOWER LEG: Primary | ICD-10-CM

## 2019-09-20 PROCEDURE — 29580 STRAPPING UNNA BOOT: CPT | Mod: PBBFAC | Performed by: NURSE PRACTITIONER

## 2019-09-20 PROCEDURE — 99999 PR PBB SHADOW E&M-EST. PATIENT-LVL IV: ICD-10-PCS | Mod: PBBFAC,,, | Performed by: NURSE PRACTITIONER

## 2019-09-20 PROCEDURE — 99214 OFFICE O/P EST MOD 30 MIN: CPT | Mod: PBBFAC,25 | Performed by: NURSE PRACTITIONER

## 2019-09-20 PROCEDURE — 99203 OFFICE O/P NEW LOW 30 MIN: CPT | Mod: 25,S$PBB,, | Performed by: NURSE PRACTITIONER

## 2019-09-20 PROCEDURE — 29580 STRAPPING UNNA BOOT: CPT | Mod: S$PBB,RT,, | Performed by: NURSE PRACTITIONER

## 2019-09-20 PROCEDURE — 99203 PR OFFICE/OUTPT VISIT, NEW, LEVL III, 30-44 MIN: ICD-10-PCS | Mod: 25,S$PBB,, | Performed by: NURSE PRACTITIONER

## 2019-09-20 PROCEDURE — 99999 PR PBB SHADOW E&M-EST. PATIENT-LVL IV: CPT | Mod: PBBFAC,,, | Performed by: NURSE PRACTITIONER

## 2019-09-20 PROCEDURE — 29580 PR STRAPPING UNNA BOOT: ICD-10-PCS | Mod: S$PBB,RT,, | Performed by: NURSE PRACTITIONER

## 2019-09-20 NOTE — LETTER
September 20, 2019      Patel Singh MD  [address]  [facility] [address]           [facility]  [address]  [facility] [address]  Phone: [phone]          Patient: Aquiles Montana   MR Number: [id number]   YOB: 1940   Date of Visit: 9/20/2019       Dear Dr. Patel iSngh:    Thank you for referring Aquiles Montana to me for evaluation. Attached you will find relevant portions of my assessment and plan of care.    If you have questions, please do not hesitate to call me. I look forward to following Aquiles Montana along with you.    Sincerely,    Tiffanie Goodson, KWABENA    Enclosure  CC:  No Recipients    If you would like to receive this communication electronically, please contact [email or web address] or [phone] to request more information on [employer] Link access.    For providers and/or their staff who would like to refer a patient to [facility], please contact us through our one-stop-shop provider referral line, [facility], at [phone].    If you feel you have received this communication in error or would no longer like to receive these types of communications, please e-mail [email or web address]

## 2019-09-20 NOTE — PATIENT INSTRUCTIONS
Elevate legs as much as possible. Do not get the dressings wet and use cast covers for showering.  Should the dressing become wet, remove it, place a wet-to-dry dressing over the wound, cover with gauze and roll gauze and use ace wraps for compression and to secure bandages.  Notify home health as soon as possible to have a new dressing applied.      Call [phone] to notify us of the name and phone number of your home health agency.

## 2019-09-20 NOTE — Clinical Note
He lives in [place] and has a problem getting here because he relies on others for transportation right now.  Could you order home health and let me know which agency?  I will then send them orders for frank [facility]elton.Thanks, Maisha

## 2019-09-20 NOTE — PROGRESS NOTES
Subjective:       Patient ID: Aquiles Montana is a 79 y.o. male.    Chief Complaint: Wound Check    HPI   This is a 79 year old male referred by Dr. Singh for evaluation and management of an incisional wound to the right lower leg.  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 Singh.  His original surgical dressing is still in place.  His medical history is significant for arthritis, hypertension and psoriasis. He is afebrile. He denies increased redness, swelling or purulent drainage.  He does not complain of pain. He lives in [place] and is not driving.  He is requesting home health and would be a good candidate for their services.  Review of Systems   Constitutional: Negative for chills, diaphoresis and fever.   HENT: Negative for hearing loss, postnasal drip, rhinorrhea, sinus pressure, sneezing, sore throat, tinnitus and trouble swallowing.    Eyes: Negative for visual disturbance.   Respiratory: Negative for apnea, cough, shortness of breath and wheezing.    Cardiovascular: Positive for leg swelling. Negative for chest pain and palpitations.   Gastrointestinal: Negative for constipation, diarrhea, nausea and vomiting.   Genitourinary: Negative for difficulty urinating, dysuria, frequency and hematuria.   Musculoskeletal: Negative for arthralgias, back pain and joint swelling.   Skin: Positive for wound.   Neurological: Negative for dizziness, weakness, light-headedness and headaches.   Hematological: Does not bruise/bleed easily.   Psychiatric/Behavioral: Positive for sleep disturbance. Negative for confusion, decreased concentration and dysphoric mood. The patient is not nervous/anxious.        Objective:      Physical Exam   Constitutional: He is oriented to person, place, and time. He appears well-developed and well-nourished. No distress.   HENT:   Head: Normocephalic and atraumatic.   Mouth/Throat: Oropharynx is clear and moist.   Eyes: Pupils are equal, round,  and reactive to light. Conjunctivae and EOM are normal. Right eye exhibits no discharge. Left eye exhibits no discharge. No scleral icterus.   Neck: Normal range of motion. Neck supple. No JVD present. No tracheal deviation present. No thyromegaly present.   Cardiovascular: Normal rate, regular rhythm, normal heart sounds and intact distal pulses. Exam reveals no gallop and no friction rub.   No murmur heard.  Pulmonary/Chest: Effort normal and breath sounds normal. No respiratory distress. He has no wheezes. He has no rales.   Abdominal: Soft. Bowel sounds are normal. He exhibits no distension. There is no tenderness.   Musculoskeletal: Normal range of motion. He exhibits edema (3-4+ right lower leg). He exhibits no tenderness.        Legs:  Neurological: He is alert and oriented to person, place, and time.   Skin: Skin is warm and dry. No rash noted. He is not diaphoretic. No erythema.   Psychiatric: He has a normal mood and affect. His behavior is normal. Judgment and thought content normal.   Nursing note and vitals reviewed.        Right saige Kwan was seen in the clinic room and placed in the supine position on the treatment table.  The right leg was cleansed with Easi-clense sponges and dried thoroughly.  Medihoney gel and a hydrofiber dressing was applied to the wound and covered with an ABD pad.  Eucerin cream was applied to the lower legs.  The patient's foot was positioned at a 90 degree angle.  A zinc oxide wrap, followed by kerlix roll gauze and coban were applied using a spiral technique avoiding creases or folds.  The wrap was started behind the first metatarsal and ended below the tibial tubercle of the knee.  There was overlap of each turn half the width of the previous turn.  The compression wrap will be changed every 2-3 days.    Assessment:       1. Delayed surgical wound healing, initial encounter    2. 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 Lower leg edema                Plan:            Unna boot right lower leg as detailed above.  Patient was warned not to get the dressings wet and to use cast covers for showering.  Should the dressing become wet, he is to remove it, place a wet-to-dry dressing over the wound, cover with gauze and roll gauze and use ace wraps for compression and to secure bandages.  He should then notify this office as soon as possible to have a new dressing applied.  Return to clinic in one month.    Home Health Orders:  Cleanse wound with wound cleanser.  Apply calmoseptine to periwound area.  Apply medihoney gel to wound and cover with hydrofiber and ABD pad.  Unna boot (zinc oxide, kerlix and coban) right lower leg.  Change dressing three times weekly (next dressing change 9/23/19).

## 2019-09-21 PROCEDURE — G0180 PR HOME HEALTH MD CERTIFICATION: ICD-10-PCS | Mod: ,,, | Performed by: SURGERY

## 2019-09-21 PROCEDURE — G0180 MD CERTIFICATION HHA PATIENT: HCPCS | Mod: ,,, | Performed by: SURGERY

## 2019-09-23 ENCOUNTER — TELEPHONE (OUTPATIENT)
Dept: SURGERY | Facility: CLINIC | Age: 79
End: 2019-09-23

## 2019-09-23 NOTE — TELEPHONE ENCOUNTER
Spoke with Dione from [facility]. She questioned the Home Health orders. I faxed over orders from Tiffanie West NP this am to [facility] at [phone] and spoke with Linette. Dione stated that the orders mustn't have been put in yet. Advised if she needed anything or had any issues to please contact our office again.

## 2019-09-23 NOTE — TELEPHONE ENCOUNTER
Spoke with Linette in regards to Home Health orders. Informed Linette Plan of care orders per Tiffanie eWst NP faxed to [facility] this am to [phone].

## 2019-09-23 NOTE — TELEPHONE ENCOUNTER
Spoke with patient and spouse regarding home health status. Informed patient home health orders were sent to [facility] this am per Tiffanie West NP. Patient verbalized understanding.

## 2019-09-26 ENCOUNTER — TELEPHONE (OUTPATIENT)
Dept: HOME HEALTH SERVICES | Facility: HOSPITAL | Age: 79
End: 2019-09-26

## 2019-09-26 ENCOUNTER — EXTERNAL HOME HEALTH (OUTPATIENT)
Dept: HOME HEALTH SERVICES | Facility: HOSPITAL | Age: 79
End: 2019-09-26
Payer: MEDICARE

## 2019-10-02 ENCOUNTER — TELEPHONE (OUTPATIENT)
Dept: HOME HEALTH SERVICES | Facility: HOSPITAL | Age: 79
End: 2019-10-02

## 2019-10-02 DIAGNOSIS — C44.729 SQUAMOUS CELL CARCINOMA OF LEFT LOWER LEG: Primary | ICD-10-CM

## 2019-10-03 ENCOUNTER — OFFICE VISIT (OUTPATIENT)
Dept: SURGERY | Facility: CLINIC | Age: 79
End: 2019-10-03
Payer: MEDICARE

## 2019-10-03 VITALS
SYSTOLIC BLOOD PRESSURE: 139 MMHG | HEART RATE: 63 BPM | TEMPERATURE: 98 F | WEIGHT: 271.69 LBS | HEIGHT: 72 IN | DIASTOLIC BLOOD PRESSURE: 71 MMHG | BODY MASS INDEX: 36.8 KG/M2

## 2019-10-03 DIAGNOSIS — C44.722 SQUAMOUS CELL CARCINOMA OF RIGHT LOWER LEG: Primary | ICD-10-CM

## 2019-10-03 PROCEDURE — 99024 POSTOP FOLLOW-UP VISIT: CPT | Mod: POP,,, | Performed by: SURGERY

## 2019-10-03 PROCEDURE — 99213 OFFICE O/P EST LOW 20 MIN: CPT | Mod: PBBFAC | Performed by: SURGERY

## 2019-10-03 PROCEDURE — 99024 PR POST-OP FOLLOW-UP VISIT: ICD-10-PCS | Mod: POP,,, | Performed by: SURGERY

## 2019-10-03 PROCEDURE — 99999 PR PBB SHADOW E&M-EST. PATIENT-LVL III: CPT | Mod: PBBFAC,,, | Performed by: SURGERY

## 2019-10-03 PROCEDURE — 99999 PR PBB SHADOW E&M-EST. PATIENT-LVL III: ICD-10-PCS | Mod: PBBFAC,,, | Performed by: SURGERY

## 2019-10-03 NOTE — LETTER
[facility]  [address]  [facility] [address]  Phone: [phone]  Fax: [phone] October 4, 2019        Delonte Marino MD  [address] [facility]  [place] [address]    Patient: Aquiles Montana   MR Number: [id number]   YOB: 1940   Date of Visit: 10/3/2019     Dear Dr. Marino:    Thank you for referring Aquiles Montana to me for evaluation. Attached you will find relevant portions of my assessment and plan of care.    If you have questions, please do not hesitate to call me. I look forward to following Aquiles Montana along with you.    Sincerely,        Patel Singh MD      Enclosure

## 2019-10-03 NOTE — PROGRESS NOTES
GENERAL SURGERY  Post Operative Clinic Note    SUBJECTIVE:     HISTORY OF PRESENT ILLNESS:  Aquiles Montana is a 79 y.o. male who is s/p wide local excision measuring [id number] of large 5nmo12mq ulcerated right lower SCC on 9/18/19. Wound was left open to heal by secondary intention. Skin graft not performed due to patient's severe venous stasis disease. Patient reports doing well post operatively with good pain control. He is ambulating with a cane. He has seen Josee with wound care once and receives home health wound changes 3 times per week.    OBJECTIVE:     Most Recent Vitals:  Temp: 97.8 °F (36.6 °C) (10/03/19 1050)  Pulse: 63 (10/03/19 1050)  BP: 139/71 (10/03/19 1050)    PHYSICAL EXAM:  Physical Exam   Constitutional: He is oriented to person, place, and time and well-developed, well-nourished, and in no distress. No distress.   Cardiovascular: Normal rate and regular rhythm.   Pulmonary/Chest: No respiratory distress.   Musculoskeletal: He exhibits edema (chronic edema and venous stasis. Right improved from pre operative evaulation).   Neurological: He is alert and oriented to person, place, and time.   Skin: Skin is warm and dry. He is not diaphoretic.              LABORATORY VALUES:  Lab Results   Component Value Date    WBC 11.63 08/29/2019    HGB 13.6 (L) 08/29/2019    HCT 41.6 08/29/2019     08/29/2019     Lab Results   Component Value Date     08/29/2019    K 4.6 08/29/2019     08/29/2019    CO2 31 (H) 08/29/2019    BUN 23 08/29/2019    CREATININE 1.1 08/29/2019     (H) 08/29/2019    CALCIUM 9.9 08/29/2019    AST 23 01/05/2016    ALT 17 01/05/2016    ALKPHOS 76 01/05/2016    BILITOT 0.2 01/05/2016    PROT 7.1 01/05/2016    ALBUMIN 3.9 01/05/2016     No results found for: INR, PTT  Lab Results   Component Value Date    TSH 1.553 01/05/2016       PATHOLOGY:  SKIN, RIGHT LOWER LEG, WIDE EXCISION:  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  Size of  tumor: 7.5 CM.  Tumor thickness/depth of invasion: 11 MM.  No lymphovascular or perineural invasion.  Tumor is completely excised.    ASSESSMENT:     Aquiles Montana is a 79 y.o. male s/p right lower extremity WLE. Pathology revealing 7.5cm area of invasive SCC with negative margins. Wound healing via secondary intention with no skin graft due to venous stasis.     PLAN:  -Return to clinic in 6 weeks for wound check   -Continue home health dressing changes. Message sent to Josee, wound care, for possible weakly wound care.   -Okay to resume normal daily activities.     KYLE Oswald MD   General Surgery- PGYIII  [phone]    I have personally taken the history and examined this patient and agree with the resident's note as stated above.  Wound is showing signs of early granulation with some mild to moderate fibrinous exudate.  No surrounding cellulitis.  Continue primary wound care management by wound care team.  Margins were negative w/ WLE as above.  No role for any further oncologic therapy.  F/U in 6 weeks.

## 2019-10-11 ENCOUNTER — TELEPHONE (OUTPATIENT)
Dept: WOUND CARE | Facility: CLINIC | Age: 79
End: 2019-10-11

## 2019-10-11 NOTE — TELEPHONE ENCOUNTER
----- Message from Tiffanie Goodson NP sent at 10/11/2019  6:14 AM CDT -----  Contact: pt [phone]  Jessica (wife)  I will forward this to my nurse so she can look at the schedule.    Thanks!    Maisha  ----- Message -----  From: Jina Arredondo  Sent: 10/10/2019  11:41 AM CDT  To: Tiffanie Goodson NP    Wife is calling to get a earlier aaron. Pt is schedule for 10/23/19 Pt want to be seen to make sure everything is ok.   Please call Jessica.

## 2019-10-11 NOTE — TELEPHONE ENCOUNTER
Spoke with wife and asked if there was a changed in the wound since the patient saw Dr. Singh last week on 10/3/19 because in his note Dr. Singh stated there was no evidence of infection and continued the treatment of care with 3x weekly skilled nursing visits.  The wife stated no change however, states they don't know what the wound should look like.  Advised patient has a massive wound from and excision.  The wound bed must get cleaned up with the use of the medihoney and once it gets cleaned and fills in to the level of the skin at that time the skin will cover the wound because there will be healthy granulation tissue.  Wife also asked if patient could be seen every week - advised Maisha's access is limited as she is part-time and sees patients on Wednesday and Friday only.  Wife states she will keep the current appointment for 10/23/19

## 2019-10-21 ENCOUNTER — PATIENT OUTREACH (OUTPATIENT)
Dept: ADMINISTRATIVE | Facility: OTHER | Age: 79
End: 2019-10-21

## 2019-10-23 ENCOUNTER — OFFICE VISIT (OUTPATIENT)
Dept: WOUND CARE | Facility: CLINIC | Age: 79
End: 2019-10-23
Payer: MEDICARE

## 2019-10-23 VITALS
WEIGHT: 276.81 LBS | TEMPERATURE: 97 F | HEIGHT: 72 IN | DIASTOLIC BLOOD PRESSURE: 86 MMHG | HEART RATE: 66 BPM | SYSTOLIC BLOOD PRESSURE: 146 MMHG | BODY MASS INDEX: 37.49 KG/M2

## 2019-10-23 DIAGNOSIS — I83.891 VARICOSE VEINS OF RIGHT LOWER EXTREMITY WITH COMPLICATIONS: ICD-10-CM

## 2019-10-23 DIAGNOSIS — T81.89XA DELAYED SURGICAL WOUND HEALING, INITIAL ENCOUNTER: Primary | ICD-10-CM

## 2019-10-23 DIAGNOSIS — R60.0 LOWER LEG EDEMA: ICD-10-CM

## 2019-10-23 PROCEDURE — 11042 DBRDMT SUBQ TIS 1ST 20SQCM/<: CPT | Mod: S$PBB,,, | Performed by: NURSE PRACTITIONER

## 2019-10-23 PROCEDURE — 11045 DBRDMT SUBQ TISS EACH ADDL: CPT | Mod: S$PBB,,, | Performed by: NURSE PRACTITIONER

## 2019-10-23 PROCEDURE — 99999 PR PBB SHADOW E&M-EST. PATIENT-LVL IV: CPT | Mod: PBBFAC,,, | Performed by: NURSE PRACTITIONER

## 2019-10-23 PROCEDURE — 11042 DBRDMT SUBQ TIS 1ST 20SQCM/<: CPT | Mod: PBBFAC | Performed by: NURSE PRACTITIONER

## 2019-10-23 PROCEDURE — 29580 STRAPPING UNNA BOOT: CPT | Mod: PBBFAC,RT | Performed by: NURSE PRACTITIONER

## 2019-10-23 PROCEDURE — 99499 UNLISTED E&M SERVICE: CPT | Mod: S$PBB,,, | Performed by: NURSE PRACTITIONER

## 2019-10-23 PROCEDURE — 99214 OFFICE O/P EST MOD 30 MIN: CPT | Mod: PBBFAC,25 | Performed by: NURSE PRACTITIONER

## 2019-10-23 PROCEDURE — 11045 DBRDMT SUBQ TISS EACH ADDL: CPT | Mod: PBBFAC | Performed by: NURSE PRACTITIONER

## 2019-10-23 PROCEDURE — 11045 PR DEB SUBQ TISSUE ADD-ON: ICD-10-PCS | Mod: S$PBB,,, | Performed by: NURSE PRACTITIONER

## 2019-10-23 PROCEDURE — 99999 PR PBB SHADOW E&M-EST. PATIENT-LVL IV: ICD-10-PCS | Mod: PBBFAC,,, | Performed by: NURSE PRACTITIONER

## 2019-10-23 PROCEDURE — 11042 PR DEBRIDEMENT, SKIN, SUB-Q TISSUE,=<20 SQ CM: ICD-10-PCS | Mod: S$PBB,,, | Performed by: NURSE PRACTITIONER

## 2019-10-23 PROCEDURE — 99499 NO LOS: ICD-10-PCS | Mod: S$PBB,,, | Performed by: NURSE PRACTITIONER

## 2019-10-23 NOTE — PROGRESS NOTES
Subjective:       Patient ID: Aquiles Montana is a 79 y.o. male.    Chief Complaint: Wound Check    HPI     This is a 79 year old male referred by Dr. Singh for evaluation and management of an incisional wound to the right lower leg.  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 Singh.  He is having compression therapy wraps placed on the leg three times weekly by home health.  The wound is healing as evidenced by increased granulation tissue.  His medical history is significant for arthritis, hypertension and psoriasis. He is afebrile. He denies increased redness, swelling or purulent drainage.  He does not complain of pain. He He has home health through [facility]. .  Review of Systems   Constitutional: Negative for chills, diaphoresis and fever.   HENT: Negative for hearing loss, postnasal drip, rhinorrhea, sinus pressure, sneezing, sore throat, tinnitus and trouble swallowing.    Eyes: Negative for visual disturbance.   Respiratory: Negative for apnea, cough, shortness of breath and wheezing.    Cardiovascular: Positive for leg swelling. Negative for chest pain and palpitations.   Gastrointestinal: Negative for constipation, diarrhea, nausea and vomiting.   Genitourinary: Negative for difficulty urinating, dysuria, frequency and hematuria.   Musculoskeletal: Negative for arthralgias, back pain and joint swelling.   Skin: Positive for wound.   Neurological: Negative for dizziness, weakness, light-headedness and headaches.   Hematological: Does not bruise/bleed easily.   Psychiatric/Behavioral: Positive for sleep disturbance. Negative for confusion, decreased concentration and dysphoric mood. The patient is not nervous/anxious.        Objective:      Physical Exam   Constitutional: He is oriented to person, place, and time. He appears well-developed and well-nourished. No distress.   HENT:   Head: Normocephalic and atraumatic.   Musculoskeletal: He exhibits  edema (3-4+ right lower leg). He exhibits no tenderness.        Legs:  Neurological: He is alert and oriented to person, place, and time.   Skin: Skin is warm and dry. No rash noted. He is not diaphoretic. No erythema.   Psychiatric: He has a normal mood and affect. His behavior is normal. Judgment and thought content normal.   Nursing note and vitals reviewed.        Right shin      Procedure: Aquiles was seen in the clinic room and placed in the supine position on the treatment table. Attention was directed to the wound which was located on the right shin, where an wound measuring 113.5x11.8x0.7 cm is noted. The wound was covered with 100% fibrin and slough. No acute signs of infection were noted. The wound was non-tender. The wound was prepped with alcohol. Utilizing a curette, I debrided the wound full-thickness through skin and subcutaneous tissue to excise viable and nonviable tissue inside the wound margins. The patient tolerated well. Because of a lack of sensation, anesthesia was not necessary. The wound was flushed with wound . There was minimal bleeding with debridement which was well controlled with direct pressure. The wound was then dressed with medihoney gel and a ydrofiber dressing. The patient tolerated the procedure well.    Aquiles was seen in the clinic room and placed in the supine position on the treatment table.  The right leg was cleansed with Easi-clense sponges and dried thoroughly.  Medihoney gel and a hydrofiber dressing was applied to the wound and covered with an ABD pad.  Eucerin cream was applied to the lower legs.  The patient's foot was positioned at a 90 degree angle.  A zinc oxide wrap, followed by kerlix roll gauze and coban were applied using a spiral technique avoiding creases or folds.  The wrap was started behind the first metatarsal and ended below the tibial tubercle of the knee.  There was overlap of each turn half the width of the previous turn.  The compression wrap  will be changed every 2-3 days.    Assessment:       1. Delayed surgical wound healing, initial encounter    2. 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 Lower leg edema               Plan:            Abdirizaka boot right lower leg as detailed above.  Patient was warned not to get the dressings wet and to use cast covers for showering.  Should the dressing become wet, he is to remove it, place a wet-to-dry dressing over the wound, cover with gauze and roll gauze and use ace wraps for compression and to secure bandages.  He should then notify this office as soon as possible to have a new dressing applied.  Return to clinic in one month.  [facility] notified of orders via [employer] fax at [phone]. We will ask them to see the patient three times weekly.    Home Health Orders:  Cleanse wound with wound cleanser.  Apply hydrocortisone cream to periwound area.  Apply calmoseptine to periwound area.  Apply medihoney gel to wound and cover with hydrofiber and ABD pad.  Unna boot (zinc oxide, kerlix and coban) right lower leg.  Change dressing three times weekly.

## 2019-10-23 NOTE — Clinical Note
I debrided him today. Compare today's pic to the last visit.  It should start rakan now that it is clean.

## 2019-10-24 ENCOUNTER — TELEPHONE (OUTPATIENT)
Dept: HOME HEALTH SERVICES | Facility: HOSPITAL | Age: 79
End: 2019-10-24

## 2019-11-01 NOTE — ADDENDUM NOTE
Addendum  created 11/01/19 0453 by Lazaro Monteiro MD    Attestation recorded in Intraprocedure, Intraprocedure Attestations filed

## 2019-11-14 ENCOUNTER — OFFICE VISIT (OUTPATIENT)
Dept: SURGERY | Facility: CLINIC | Age: 79
End: 2019-11-14
Payer: MEDICARE

## 2019-11-14 VITALS
DIASTOLIC BLOOD PRESSURE: 82 MMHG | SYSTOLIC BLOOD PRESSURE: 137 MMHG | HEIGHT: 72 IN | BODY MASS INDEX: 37.07 KG/M2 | HEART RATE: 63 BPM | TEMPERATURE: 98 F | WEIGHT: 273.69 LBS

## 2019-11-14 DIAGNOSIS — C44.92 SCCA (SQUAMOUS CELL CARCINOMA) OF SKIN: Primary | ICD-10-CM

## 2019-11-14 PROCEDURE — 99999 PR PBB SHADOW E&M-EST. PATIENT-LVL III: ICD-10-PCS | Mod: PBBFAC,,, | Performed by: SURGERY

## 2019-11-14 PROCEDURE — 99999 PR PBB SHADOW E&M-EST. PATIENT-LVL III: CPT | Mod: PBBFAC,,, | Performed by: SURGERY

## 2019-11-14 PROCEDURE — 99213 OFFICE O/P EST LOW 20 MIN: CPT | Mod: PBBFAC | Performed by: SURGERY

## 2019-11-14 PROCEDURE — 99024 PR POST-OP FOLLOW-UP VISIT: ICD-10-PCS | Mod: POP,,, | Performed by: SURGERY

## 2019-11-14 PROCEDURE — 99024 POSTOP FOLLOW-UP VISIT: CPT | Mod: POP,,, | Performed by: SURGERY

## 2019-11-14 NOTE — PROGRESS NOTES
GENERAL SURGERY  Clinic Note    SUBJECTIVE:     HISTORY OF PRESENT ILLNESS:  Aquiles Montana is a 79 y.o. male who is s/p wide local excision measuring [id number] of large [id number] ulcerated right lower SCC on 9/18/19. Wound was left open to heal by secondary intention. Skin graft not performed due to patient's severe venous stasis disease. He returns today for wound check. Wound is still large but granulating nicely. He seeing wound care and receives home health wound changes 3 times per week.    OBJECTIVE:     Most Recent Vitals:  Temp: 97.5 °F (36.4 °C) (11/14/19 0846)  Pulse: 63 (11/14/19 0846)  BP: 137/82 (11/14/19 0846)    PHYSICAL EXAM:  Physical Exam   Constitutional: He is oriented to person, place, and time and well-developed, well-nourished, and in no distress. No distress.   Cardiovascular: Normal rate and regular rhythm.   Pulmonary/Chest: No respiratory distress.   Musculoskeletal: He exhibits edema (chronic edema and venous stasis. Right improved from pre operative evaulation).   Neurological: He is alert and oriented to person, place, and time.   Skin: Skin is warm and dry. He is not diaphoretic.              LABORATORY VALUES:  Lab Results   Component Value Date    WBC 11.63 08/29/2019    HGB 13.6 (L) 08/29/2019    HCT 41.6 08/29/2019     08/29/2019     Lab Results   Component Value Date     08/29/2019    K 4.6 08/29/2019     08/29/2019    CO2 31 (H) 08/29/2019    BUN 23 08/29/2019    CREATININE 1.1 08/29/2019     (H) 08/29/2019    CALCIUM 9.9 08/29/2019    AST 23 01/05/2016    ALT 17 01/05/2016    ALKPHOS 76 01/05/2016    BILITOT 0.2 01/05/2016    PROT 7.1 01/05/2016    ALBUMIN 3.9 01/05/2016     No results found for: INR, PTT  Lab Results   Component Value Date    TSH 1.553 01/05/2016       PATHOLOGY:  SKIN, RIGHT LOWER LEG, WIDE EXCISION:  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  Size of tumor: 7.5 CM.  Tumor thickness/depth of invasion:  11 MM.  No lymphovascular or perineural invasion.  Tumor is completely excised.    ASSESSMENT:     Aquiles Montana is a 79 y.o. male s/p right lower extremity WLE. Pathology revealing 7.5cm area of invasive SCC with negative margins. Wound healing via secondary intention with no skin graft due to venous stasis.     PLAN:  - Healing nicely. Continue wound care follow up and dressing changes per home health  - Return to clinic in 6 weeks for wound check      Ken Madsen MD  General Surgery PGYIII  [phone]      I have personally taken the history and examined this patient and agree with the resident's note as stated above.  The patient has approximately a 10 x 10 cm residual granular nodular granulation tissue bed of the anterior right lower leg at the site of the wide local excision of his squamous cell carcinoma.  There is a slight amount of fibrinous exudate within the wound diffusely between the nodular granular granulation tissue.  There are no signs of infection.  He will continue follow up with the wound care clinic who is currently having him have outpatient dressing changes 3 times weekly.  He will follow up with me in 6 months for clinical reassessment.    [facility]  No signs of clinical local or regional recurrence and no signs of infection as the wound continues to slowly granulate by secondary intention showing minimal if any peripheral re-epithelialization at this point.

## 2019-11-19 ENCOUNTER — EXTERNAL HOME HEALTH (OUTPATIENT)
Dept: HOME HEALTH SERVICES | Facility: HOSPITAL | Age: 79
End: 2019-11-19
Payer: MEDICARE

## 2019-11-19 ENCOUNTER — PATIENT OUTREACH (OUTPATIENT)
Dept: ADMINISTRATIVE | Facility: OTHER | Age: 79
End: 2019-11-19

## 2019-11-20 PROCEDURE — G0179 MD RECERTIFICATION HHA PT: HCPCS | Mod: ,,, | Performed by: SURGERY

## 2019-11-20 PROCEDURE — G0179 PR HOME HEALTH MD RECERTIFICATION: ICD-10-PCS | Mod: ,,, | Performed by: SURGERY

## 2019-11-22 ENCOUNTER — OFFICE VISIT (OUTPATIENT)
Dept: WOUND CARE | Facility: CLINIC | Age: 79
End: 2019-11-22
Payer: MEDICARE

## 2019-11-22 ENCOUNTER — TELEPHONE (OUTPATIENT)
Dept: HOME HEALTH SERVICES | Facility: HOSPITAL | Age: 79
End: 2019-11-22

## 2019-11-22 VITALS
HEIGHT: 72 IN | WEIGHT: 276.88 LBS | HEART RATE: 65 BPM | BODY MASS INDEX: 37.5 KG/M2 | DIASTOLIC BLOOD PRESSURE: 81 MMHG | TEMPERATURE: 98 F | SYSTOLIC BLOOD PRESSURE: 162 MMHG

## 2019-11-22 DIAGNOSIS — B35.3 TINEA PEDIS OF BOTH FEET: ICD-10-CM

## 2019-11-22 DIAGNOSIS — T81.89XA DELAYED SURGICAL WOUND HEALING, INITIAL ENCOUNTER: Primary | ICD-10-CM

## 2019-11-22 DIAGNOSIS — L30.9 DERMATITIS: ICD-10-CM

## 2019-11-22 DIAGNOSIS — I83.891 VARICOSE VEINS OF RIGHT LOWER EXTREMITY WITH COMPLICATIONS: ICD-10-CM

## 2019-11-22 DIAGNOSIS — L92.9 ABNORMAL GRANULATION TISSUE: ICD-10-CM

## 2019-11-22 PROCEDURE — 99214 OFFICE O/P EST MOD 30 MIN: CPT | Mod: PBBFAC,25 | Performed by: NURSE PRACTITIONER

## 2019-11-22 PROCEDURE — 99499 UNLISTED E&M SERVICE: CPT | Mod: S$PBB,,, | Performed by: NURSE PRACTITIONER

## 2019-11-22 PROCEDURE — 17250 PR CHEM CAUTERY GRANULATN TISSUE: ICD-10-PCS | Mod: S$PBB,,, | Performed by: NURSE PRACTITIONER

## 2019-11-22 PROCEDURE — 99999 PR PBB SHADOW E&M-EST. PATIENT-LVL IV: ICD-10-PCS | Mod: PBBFAC,,, | Performed by: NURSE PRACTITIONER

## 2019-11-22 PROCEDURE — 17250 CHEM CAUT OF GRANLTJ TISSUE: CPT | Mod: PBBFAC | Performed by: NURSE PRACTITIONER

## 2019-11-22 PROCEDURE — 17250 CHEM CAUT OF GRANLTJ TISSUE: CPT | Mod: S$PBB,,, | Performed by: NURSE PRACTITIONER

## 2019-11-22 PROCEDURE — 29580 STRAPPING UNNA BOOT: CPT | Mod: 51,S$PBB,RT, | Performed by: NURSE PRACTITIONER

## 2019-11-22 PROCEDURE — 29580 STRAPPING UNNA BOOT: CPT | Mod: PBBFAC,RT | Performed by: NURSE PRACTITIONER

## 2019-11-22 PROCEDURE — 29580 PR STRAPPING UNNA BOOT: ICD-10-PCS | Mod: 51,S$PBB,RT, | Performed by: NURSE PRACTITIONER

## 2019-11-22 PROCEDURE — 99999 PR PBB SHADOW E&M-EST. PATIENT-LVL IV: CPT | Mod: PBBFAC,,, | Performed by: NURSE PRACTITIONER

## 2019-11-22 PROCEDURE — 99499 NO LOS: ICD-10-PCS | Mod: S$PBB,,, | Performed by: NURSE PRACTITIONER

## 2019-11-22 RX ORDER — TRIAMCINOLONE ACETONIDE 1 MG/G
OINTMENT TOPICAL 2 TIMES DAILY
Qty: 453.6 G | Refills: 0 | Status: SHIPPED | OUTPATIENT
Start: 2019-11-22 | End: 2020-09-11

## 2019-11-22 RX ORDER — KETOCONAZOLE 20 MG/G
CREAM TOPICAL DAILY
Qty: 60 G | Refills: 2 | Status: SHIPPED | OUTPATIENT
Start: 2019-11-22 | End: 2020-11-04 | Stop reason: SDUPTHER

## 2019-11-22 NOTE — PROGRESS NOTES
Subjective:       Patient ID: Aquiles Montana is a 79 y.o. male.    Chief Complaint: Wound Check    HPI     This is a 79 year old male referred by Dr. Singh for evaluation and management of an incisional wound to the right lower leg.  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 Singh.  He is having compression therapy wraps placed on the leg three times weekly by home health.  The wound is healing as evidenced by increased granulation tissue and wound contracture.  His medical history is significant for arthritis, hypertension and psoriasis. He is afebrile. He denies increased redness, swelling or purulent drainage.  He does not complain of pain. He has home health through [facility]. .  Review of Systems   Constitutional: Negative for chills, diaphoresis and fever.   HENT: Negative for hearing loss, postnasal drip, rhinorrhea, sinus pressure, sneezing, sore throat, tinnitus and trouble swallowing.    Eyes: Negative for visual disturbance.   Respiratory: Negative for apnea, cough, shortness of breath and wheezing.    Cardiovascular: Positive for leg swelling. Negative for chest pain and palpitations.   Gastrointestinal: Negative for constipation, diarrhea, nausea and vomiting.   Genitourinary: Negative for difficulty urinating, dysuria, frequency and hematuria.   Musculoskeletal: Negative for arthralgias, back pain and joint swelling.   Skin: Positive for wound.   Neurological: Negative for dizziness, weakness, light-headedness and headaches.   Hematological: Does not bruise/bleed easily.   Psychiatric/Behavioral: Positive for sleep disturbance. Negative for confusion, decreased concentration and dysphoric mood. The patient is not nervous/anxious.        Objective:      Physical Exam   Constitutional: He is oriented to person, place, and time. He appears well-developed and well-nourished. No distress.   HENT:   Head: Normocephalic and atraumatic.    Musculoskeletal: He exhibits edema (3-4+ right lower leg). He exhibits no tenderness.        Legs:  Neurological: He is alert and oriented to person, place, and time.   Skin: Skin is warm and dry. No rash noted. He is not diaphoretic. No erythema.   Psychiatric: He has a normal mood and affect. His behavior is normal. Judgment and thought content normal.   Nursing note and vitals reviewed.        Right shin      PROCEDURE NOTE:  The wound was cauterized today by applying silver nitrate directly to the wound bed.  The patient tolerated the procedure well.  The wound was then covered with a dry dressing.    Aquiles was seen in the clinic room and placed in the supine position on the treatment table.  The right leg was cleansed with Easi-clense sponges and dried thoroughly.  A hydrofiber dressing was applied to the wound and covered with an ABD pad.  Eucerin cream was applied to the lower legs.  The patient's foot was positioned at a 90 degree angle.  A zinc oxide wrap, followed by kerlix roll gauze and coban were applied using a spiral technique avoiding creases or folds.  The wrap was started behind the first metatarsal and ended below the tibial tubercle of the knee.  There was overlap of each turn half the width of the previous turn.  The compression wrap will be changed every 2-3 days.    Assessment:       1. Delayed surgical wound healing, initial encounter    2. 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 Dermatitis    4. Tinea pedis of both feet    5. Abnormal granulation tissue               Plan:            Unna boot right lower leg as detailed above.  Patient was warned not to get the dressings wet and to use cast covers for showering.  Should the dressing become wet, he is to remove it, place a wet-to-dry dressing over the wound, cover with gauze and roll gauze and use ace wraps for compression and to secure bandages.  He should then notify this office as soon as possible to have a new  dressing applied.  Return to clinic in one month.  [facility] notified of orders via [facility] fax at [phone]. We will ask them to see the patient three times weekly.    Home Health Orders:  Cleanse wound with wound cleanser.  Apply triamcinolone cream to periwound area and lower leg.  Apply ketoconazole cream to foot.  Apply calmoseptine to periwound area.  Cover wound with hydrofiber and ABD pad.  Unna boot (zinc oxide, kerlix and coban) right lower leg.  Change dressing three times weekly.

## 2019-12-17 ENCOUNTER — PATIENT OUTREACH (OUTPATIENT)
Dept: ADMINISTRATIVE | Facility: OTHER | Age: 79
End: 2019-12-17

## 2019-12-18 ENCOUNTER — OFFICE VISIT (OUTPATIENT)
Dept: WOUND CARE | Facility: CLINIC | Age: 79
End: 2019-12-18
Payer: MEDICARE

## 2019-12-18 VITALS
WEIGHT: 282.19 LBS | SYSTOLIC BLOOD PRESSURE: 186 MMHG | HEIGHT: 72 IN | BODY MASS INDEX: 38.22 KG/M2 | HEART RATE: 66 BPM | DIASTOLIC BLOOD PRESSURE: 92 MMHG | TEMPERATURE: 97 F

## 2019-12-18 DIAGNOSIS — T81.89XA DELAYED SURGICAL WOUND HEALING, INITIAL ENCOUNTER: ICD-10-CM

## 2019-12-18 DIAGNOSIS — I83.891 VARICOSE VEINS OF RIGHT LOWER EXTREMITY WITH COMPLICATIONS: Primary | ICD-10-CM

## 2019-12-18 DIAGNOSIS — R60.0 LOWER LEG EDEMA: ICD-10-CM

## 2019-12-18 DIAGNOSIS — L92.9 ABNORMAL GRANULATION TISSUE: ICD-10-CM

## 2019-12-18 DIAGNOSIS — B35.3 TINEA PEDIS OF BOTH FEET: ICD-10-CM

## 2019-12-18 PROCEDURE — 17250 CHEM CAUT OF GRANLTJ TISSUE: CPT | Mod: S$PBB,,, | Performed by: NURSE PRACTITIONER

## 2019-12-18 PROCEDURE — 99999 PR PBB SHADOW E&M-EST. PATIENT-LVL IV: CPT | Mod: PBBFAC,,, | Performed by: NURSE PRACTITIONER

## 2019-12-18 PROCEDURE — 17250 CHEM CAUT OF GRANLTJ TISSUE: CPT | Mod: PBBFAC | Performed by: NURSE PRACTITIONER

## 2019-12-18 PROCEDURE — 29580 STRAPPING UNNA BOOT: CPT | Mod: PBBFAC,RT | Performed by: NURSE PRACTITIONER

## 2019-12-18 PROCEDURE — 17250 PR CHEM CAUTERY GRANULATN TISSUE: ICD-10-PCS | Mod: S$PBB,,, | Performed by: NURSE PRACTITIONER

## 2019-12-18 PROCEDURE — 99499 NO LOS: ICD-10-PCS | Mod: S$PBB,,, | Performed by: NURSE PRACTITIONER

## 2019-12-18 PROCEDURE — 29580 PR STRAPPING UNNA BOOT: ICD-10-PCS | Mod: 51,S$PBB,RT, | Performed by: NURSE PRACTITIONER

## 2019-12-18 PROCEDURE — 29580 STRAPPING UNNA BOOT: CPT | Mod: 51,S$PBB,RT, | Performed by: NURSE PRACTITIONER

## 2019-12-18 PROCEDURE — 99499 UNLISTED E&M SERVICE: CPT | Mod: S$PBB,,, | Performed by: NURSE PRACTITIONER

## 2019-12-18 PROCEDURE — 99214 OFFICE O/P EST MOD 30 MIN: CPT | Mod: PBBFAC | Performed by: NURSE PRACTITIONER

## 2019-12-18 PROCEDURE — 99999 PR PBB SHADOW E&M-EST. PATIENT-LVL IV: ICD-10-PCS | Mod: PBBFAC,,, | Performed by: NURSE PRACTITIONER

## 2019-12-18 NOTE — PROGRESS NOTES
Subjective:       Patient ID: Aquiles Montana is a 79 y.o. male.    Chief Complaint: Wound Check    HPI     This is a 79 year old male referred by Dr. Singh for evaluation and management of an incisional wound to the right lower leg.  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 Singh.  He is having compression therapy wraps placed on the leg three times weekly by home health.  The wound is healing as evidenced by increased granulation tissue and wound contracture.  His medical history is significant for arthritis, hypertension and psoriasis. He is afebrile. He denies increased redness, swelling or purulent drainage.  He does not complain of pain. He has home health through [facility]. .  Review of Systems   Constitutional: Negative for chills, diaphoresis and fever.   HENT: Negative for hearing loss, postnasal drip, rhinorrhea, sinus pressure, sneezing, sore throat, tinnitus and trouble swallowing.    Eyes: Negative for visual disturbance.   Respiratory: Negative for apnea, cough, shortness of breath and wheezing.    Cardiovascular: Positive for leg swelling. Negative for chest pain and palpitations.   Gastrointestinal: Negative for constipation, diarrhea, nausea and vomiting.   Genitourinary: Negative for difficulty urinating, dysuria, frequency and hematuria.   Musculoskeletal: Negative for arthralgias, back pain and joint swelling.   Skin: Positive for wound.   Neurological: Negative for dizziness, weakness, light-headedness and headaches.   Hematological: Does not bruise/bleed easily.   Psychiatric/Behavioral: Positive for sleep disturbance. Negative for confusion, decreased concentration and dysphoric mood. The patient is not nervous/anxious.        Objective:      Physical Exam   Constitutional: He is oriented to person, place, and time. He appears well-developed and well-nourished. No distress.   HENT:   Head: Normocephalic and atraumatic.    Musculoskeletal: He exhibits edema (3-4+ right lower leg). He exhibits no tenderness.        Legs:  Neurological: He is alert and oriented to person, place, and time.   Skin: Skin is warm and dry. No rash noted. He is not diaphoretic. No erythema.   Psychiatric: He has a normal mood and affect. His behavior is normal. Judgment and thought content normal.   Nursing note and vitals reviewed.        Right shin      PROCEDURE NOTE:  The wound was cauterized today by applying silver nitrate directly to the wound bed.  The patient tolerated the procedure well.  The wound was then covered with a dry dressing.    Aquiles was seen in the clinic room and placed in the supine position on the treatment table.  The right leg was cleansed with Easi-clense sponges and dried thoroughly.  A hydrofiber dressing was applied to the wound and covered with an ABD pad.  Eucerin cream was applied to the lower legs.  The patient's foot was positioned at a 90 degree angle.  A zinc oxide wrap, followed by kerlix roll gauze and coban were applied using a spiral technique avoiding creases or folds.  The wrap was started behind the first metatarsal and ended below the tibial tubercle of the knee.  There was overlap of each turn half the width of the previous turn.  The compression wrap will be changed every 2-3 days.    Assessment:       1. 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 Tinea pedis of both feet    3. Lower leg edema    4. Abnormal granulation tissue               Plan:            Unna boot right lower leg as detailed above.  Patient was warned not to get the dressings wet and to use cast covers for showering.  Should the dressing become wet, he is to remove it, place a wet-to-dry dressing over the wound, cover with gauze and roll gauze and use ace wraps for compression and to secure bandages.  He should then notify this office as soon as possible to have a new dressing applied.  Return to clinic in one  month.  [facility] notified of orders via [facility] fax at [phone]. We will ask them to see the patient three times weekly.    Home Health Orders:  Cleanse wound with wound cleanser.  Apply triamcinolone cream to periwound area and lower leg.  Apply ketoconazole cream to foot.  Apply calmoseptine to periwound area.  Cover wound with hydrofiber and ABD pad.  Unna boot (zinc oxide, kerlix and coban) right lower leg.  Change dressing three times weekly.

## 2019-12-20 ENCOUNTER — OFFICE VISIT (OUTPATIENT)
Dept: UROLOGY | Facility: CLINIC | Age: 79
End: 2019-12-20
Payer: MEDICARE

## 2019-12-20 ENCOUNTER — LAB VISIT (OUTPATIENT)
Dept: LAB | Facility: HOSPITAL | Age: 79
End: 2019-12-20
Attending: UROLOGY
Payer: MEDICARE

## 2019-12-20 VITALS
HEIGHT: 72 IN | RESPIRATION RATE: 18 BRPM | WEIGHT: 282 LBS | TEMPERATURE: 97 F | BODY MASS INDEX: 38.19 KG/M2 | OXYGEN SATURATION: 98 % | HEART RATE: 88 BPM | DIASTOLIC BLOOD PRESSURE: 74 MMHG | SYSTOLIC BLOOD PRESSURE: 116 MMHG

## 2019-12-20 DIAGNOSIS — N40.1 BENIGN NON-NODULAR PROSTATIC HYPERPLASIA WITH LOWER URINARY TRACT SYMPTOMS: ICD-10-CM

## 2019-12-20 DIAGNOSIS — R35.1 NOCTURIA: ICD-10-CM

## 2019-12-20 DIAGNOSIS — N40.1 BENIGN NON-NODULAR PROSTATIC HYPERPLASIA WITH LOWER URINARY TRACT SYMPTOMS: Primary | ICD-10-CM

## 2019-12-20 LAB
PROSTATE SPECIFIC ANTIGEN, TOTAL: 1.9 NG/ML (ref 0–4)
PSA FREE MFR SERPL: 53.16 %
PSA FREE SERPL-MCNC: 1.01 NG/ML (ref 0.01–1.5)

## 2019-12-20 PROCEDURE — 1159F MED LIST DOCD IN RCRD: CPT | Mod: ,,, | Performed by: UROLOGY

## 2019-12-20 PROCEDURE — 84153 ASSAY OF PSA TOTAL: CPT | Mod: PO

## 2019-12-20 PROCEDURE — 99999 PR PBB SHADOW E&M-EST. PATIENT-LVL III: ICD-10-PCS | Mod: PBBFAC,,, | Performed by: UROLOGY

## 2019-12-20 PROCEDURE — 99213 OFFICE O/P EST LOW 20 MIN: CPT | Mod: PBBFAC,PO | Performed by: UROLOGY

## 2019-12-20 PROCEDURE — 99999 PR PBB SHADOW E&M-EST. PATIENT-LVL III: CPT | Mod: PBBFAC,,, | Performed by: UROLOGY

## 2019-12-20 PROCEDURE — 1159F PR MEDICATION LIST DOCUMENTED IN MEDICAL RECORD: ICD-10-PCS | Mod: ,,, | Performed by: UROLOGY

## 2019-12-20 PROCEDURE — 36415 COLL VENOUS BLD VENIPUNCTURE: CPT | Mod: PO

## 2019-12-20 PROCEDURE — 99214 PR OFFICE/OUTPT VISIT, EST, LEVL IV, 30-39 MIN: ICD-10-PCS | Mod: S$PBB,,, | Performed by: UROLOGY

## 2019-12-20 PROCEDURE — 1126F PR PAIN SEVERITY QUANTIFIED, NO PAIN PRESENT: ICD-10-PCS | Mod: ,,, | Performed by: UROLOGY

## 2019-12-20 PROCEDURE — 99214 OFFICE O/P EST MOD 30 MIN: CPT | Mod: S$PBB,,, | Performed by: UROLOGY

## 2019-12-20 PROCEDURE — 1126F AMNT PAIN NOTED NONE PRSNT: CPT | Mod: ,,, | Performed by: UROLOGY

## 2019-12-20 NOTE — PROGRESS NOTES
Subjective:       Patient ID: Aquiles Montana is a 79 y.o. male.    Chief Complaint: Benign Prostatic Hypertrophy and Annual Exam    80 yo WM s/p TURP several years ago. Doing well. Nocturia x 2. No BPH meds. Recent wide excision of Right LE for SCCA. Healing slowly.    Benign Prostatic Hypertrophy   This is a chronic problem. The current episode started more than 1 year ago. The problem has been resolved since onset. Irritative symptoms include nocturia ( x 2). Irritative symptoms do not include frequency or urgency. Obstructive symptoms do not include dribbling, incomplete emptying, an intermittent stream, a slower stream, straining or a weak stream. Pertinent negatives include no chills, dysuria, genital pain, hematuria, hesitancy, nausea or vomiting. He is not sexually active. Nothing aggravates the symptoms. Treatments tried: TURP. He has been using treatment for 2 or more years.     Review of Systems   Constitutional: Negative for activity change, appetite change, chills, diaphoresis, fatigue, fever and unexpected weight change.   HENT: Negative for congestion, hearing loss, sinus pressure and trouble swallowing.    Eyes: Negative for photophobia, pain, discharge and visual disturbance.   Respiratory: Negative for apnea, cough and shortness of breath.    Cardiovascular: Negative for chest pain, palpitations and leg swelling.   Gastrointestinal: Negative for abdominal distention, abdominal pain, anal bleeding, blood in stool, constipation, diarrhea, nausea, rectal pain and vomiting.   Endocrine: Negative for cold intolerance, heat intolerance, polydipsia, polyphagia and polyuria.   Genitourinary: Positive for nocturia ( x 2). Negative for decreased urine volume, difficulty urinating, discharge, dysuria, enuresis, flank pain, frequency, genital sores, hematuria, hesitancy, incomplete emptying, penile pain, penile swelling, scrotal swelling, testicular pain and urgency.   Musculoskeletal: Negative for  arthralgias, back pain and myalgias.   Skin: Negative for color change, pallor, rash and wound.   Allergic/Immunologic: Negative for environmental allergies, food allergies and immunocompromised state.   Neurological: Negative for dizziness, seizures, weakness and headaches.   Hematological: Negative for adenopathy. Does not bruise/bleed easily.   Psychiatric/Behavioral: Negative.        Objective:      Physical Exam   Nursing note and vitals reviewed.  Constitutional: He is oriented to person, place, and time. He appears well-developed and well-nourished.   HENT:   Head: Normocephalic.   Nose: Nose normal.   Mouth/Throat: Oropharynx is clear and moist.   Eyes: Conjunctivae and EOM are normal. Pupils are equal, round, and reactive to light.   Neck: Normal range of motion. Neck supple.   Cardiovascular: Normal rate, regular rhythm, normal heart sounds and intact distal pulses.    Pulmonary/Chest: Effort normal and breath sounds normal.   Abdominal: Soft. Bowel sounds are normal.   Genitourinary: Penis normal.   Musculoskeletal: Normal range of motion.   Neurological: He is alert and oriented to person, place, and time. He has normal reflexes.   Skin: Skin is warm and dry.     Psychiatric: He has a normal mood and affect. His behavior is normal. Judgment and thought content normal.       Assessment:       1. Benign non-nodular prostatic hyperplasia with lower urinary tract symptoms    2. Nocturia        Plan:       There are no Patient Instructions on file for this visit.

## 2020-01-13 ENCOUNTER — PATIENT OUTREACH (OUTPATIENT)
Dept: ADMINISTRATIVE | Facility: OTHER | Age: 80
End: 2020-01-13

## 2020-01-15 ENCOUNTER — OFFICE VISIT (OUTPATIENT)
Dept: WOUND CARE | Facility: CLINIC | Age: 80
End: 2020-01-15
Payer: MEDICARE

## 2020-01-15 VITALS
SYSTOLIC BLOOD PRESSURE: 153 MMHG | TEMPERATURE: 98 F | BODY MASS INDEX: 37.3 KG/M2 | HEIGHT: 72 IN | DIASTOLIC BLOOD PRESSURE: 86 MMHG | HEART RATE: 59 BPM | WEIGHT: 275.38 LBS

## 2020-01-15 DIAGNOSIS — T81.89XA DELAYED SURGICAL WOUND HEALING, INITIAL ENCOUNTER: Primary | ICD-10-CM

## 2020-01-15 DIAGNOSIS — L92.9 ABNORMAL GRANULATION TISSUE: ICD-10-CM

## 2020-01-15 DIAGNOSIS — I83.891 VARICOSE VEINS OF RIGHT LOWER EXTREMITY WITH COMPLICATIONS: ICD-10-CM

## 2020-01-15 DIAGNOSIS — R60.0 LOWER LEG EDEMA: ICD-10-CM

## 2020-01-15 PROCEDURE — 99499 UNLISTED E&M SERVICE: CPT | Mod: S$PBB,,, | Performed by: NURSE PRACTITIONER

## 2020-01-15 PROCEDURE — 99214 OFFICE O/P EST MOD 30 MIN: CPT | Mod: PBBFAC,25 | Performed by: NURSE PRACTITIONER

## 2020-01-15 PROCEDURE — 29580 STRAPPING UNNA BOOT: CPT | Mod: 51,S$PBB,RT, | Performed by: NURSE PRACTITIONER

## 2020-01-15 PROCEDURE — 99499 NO LOS: ICD-10-PCS | Mod: S$PBB,,, | Performed by: NURSE PRACTITIONER

## 2020-01-15 PROCEDURE — 99999 PR PBB SHADOW E&M-EST. PATIENT-LVL IV: CPT | Mod: PBBFAC,,, | Performed by: NURSE PRACTITIONER

## 2020-01-15 PROCEDURE — 17250 CHEM CAUT OF GRANLTJ TISSUE: CPT | Mod: S$PBB,,, | Performed by: NURSE PRACTITIONER

## 2020-01-15 PROCEDURE — 29580 PR STRAPPING UNNA BOOT: ICD-10-PCS | Mod: 51,S$PBB,RT, | Performed by: NURSE PRACTITIONER

## 2020-01-15 PROCEDURE — 17250 CHEM CAUT OF GRANLTJ TISSUE: CPT | Mod: PBBFAC | Performed by: NURSE PRACTITIONER

## 2020-01-15 PROCEDURE — 99999 PR PBB SHADOW E&M-EST. PATIENT-LVL IV: ICD-10-PCS | Mod: PBBFAC,,, | Performed by: NURSE PRACTITIONER

## 2020-01-15 PROCEDURE — 29580 STRAPPING UNNA BOOT: CPT | Mod: PBBFAC,RT | Performed by: NURSE PRACTITIONER

## 2020-01-15 PROCEDURE — 17250 PR CHEM CAUTERY GRANULATN TISSUE: ICD-10-PCS | Mod: S$PBB,,, | Performed by: NURSE PRACTITIONER

## 2020-01-15 NOTE — PROGRESS NOTES
Subjective:       Patient ID: Aquiles Montana is a 79 y.o. male.    Chief Complaint: Wound Check    HPI     This is a 79 year old male referred by Dr. Singh for evaluation and management of an incisional wound to the right lower leg.  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 Singh.  He is having compression therapy wraps placed on the leg three times weekly by home health.  The wound is healing as evidenced by increased granulation tissue and wound contracture.  His medical history is significant for arthritis, hypertension and psoriasis. He is afebrile. He denies increased redness, swelling or purulent drainage.  He does not complain of pain. He has home health through [facility]. .  Review of Systems   Constitutional: Negative for chills, diaphoresis and fever.   HENT: Negative for hearing loss, postnasal drip, rhinorrhea, sinus pressure, sneezing, sore throat, tinnitus and trouble swallowing.    Eyes: Negative for visual disturbance.   Respiratory: Negative for apnea, cough, shortness of breath and wheezing.    Cardiovascular: Positive for leg swelling. Negative for chest pain and palpitations.   Gastrointestinal: Negative for constipation, diarrhea, nausea and vomiting.   Genitourinary: Negative for difficulty urinating, dysuria, frequency and hematuria.   Musculoskeletal: Negative for arthralgias, back pain and joint swelling.   Skin: Positive for wound.   Neurological: Negative for dizziness, weakness, light-headedness and headaches.   Hematological: Does not bruise/bleed easily.   Psychiatric/Behavioral: Positive for sleep disturbance. Negative for confusion, decreased concentration and dysphoric mood. The patient is not nervous/anxious.        Objective:      Physical Exam   Constitutional: He is oriented to person, place, and time. He appears well-developed and well-nourished. No distress.   HENT:   Head: Normocephalic and atraumatic.    Musculoskeletal: He exhibits edema (3-4+ right lower leg). He exhibits no tenderness.        Legs:  Neurological: He is alert and oriented to person, place, and time.   Skin: Skin is warm and dry. No rash noted. He is not diaphoretic. No erythema.   Psychiatric: He has a normal mood and affect. His behavior is normal. Judgment and thought content normal.   Nursing note and vitals reviewed.        Right shin          PROCEDURE NOTE:  The wound was cauterized today by applying silver nitrate directly to the wound bed.  The patient tolerated the procedure well.  The wound was then covered with a dry dressing.    Aquiles was seen in the clinic room and placed in the supine position on the treatment table.  The right leg was cleansed with Easi-clense sponges and dried thoroughly.  A hydrofiber dressing was applied to the wound and covered with an ABD pad.  Eucerin cream was applied to the lower legs.  The patient's foot was positioned at a 90 degree angle.  A zinc oxide wrap, followed by kerlix roll gauze and coban were applied using a spiral technique avoiding creases or folds.  The wrap was started behind the first metatarsal and ended below the tibial tubercle of the knee.  There was overlap of each turn half the width of the previous turn.  The compression wrap will be changed every 2-3 days.    Assessment:       1. Delayed surgical wound healing, initial encounter    2. 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 Lower leg edema    4. Abnormal granulation tissue               Plan:            Unna boot right lower leg as detailed above.  Patient was warned not to get the dressings wet and to use cast covers for showering.  Should the dressing become wet, he is to remove it, place a wet-to-dry dressing over the wound, cover with gauze and roll gauze and use ace wraps for compression and to secure bandages.  He should then notify this office as soon as possible to have a new dressing  applied.  Return to clinic in one month.  [facility] notified of orders via [facility] fax at [phone]. We will ask them to see the patient three times weekly.    Home Health Orders:  Cleanse wound with wound cleanser.  Apply triamcinolone cream to periwound area and lower leg.  Apply ketoconazole cream to foot.  Apply calmoseptine to periwound area.  Cover wound with hydrofiber and ABD pad.  Unna boot (zinc oxide, kerlix and coban) right lower leg.  Change dressing three times weekly.

## 2020-01-19 PROCEDURE — G0179 PR HOME HEALTH MD RECERTIFICATION: ICD-10-PCS | Mod: ,,, | Performed by: SURGERY

## 2020-01-19 PROCEDURE — G0179 MD RECERTIFICATION HHA PT: HCPCS | Mod: ,,, | Performed by: SURGERY

## 2020-01-22 ENCOUNTER — EXTERNAL HOME HEALTH (OUTPATIENT)
Dept: HOME HEALTH SERVICES | Facility: HOSPITAL | Age: 80
End: 2020-01-22
Payer: MEDICARE

## 2020-02-14 ENCOUNTER — TELEPHONE (OUTPATIENT)
Dept: WOUND CARE | Facility: CLINIC | Age: 80
End: 2020-02-14

## 2020-02-14 NOTE — TELEPHONE ENCOUNTER
Returned call and spoke with  nurse, Danay, who advised the patient's leg is more red than normal however, the LPN nurse sees him on a regular bases and Danay only sees patient once monthly.  Nurse asked if she could send photos through patient's [facility] account - nurse not familiar.  Nurse asked if patient's leg felt warm to touch, any increased in pain or running a fever.  Nurse stated it does not look like cellulitis.  Patient scheduled for follow up on 2/19/2020.

## 2020-02-14 NOTE — TELEPHONE ENCOUNTER
----- Message from Jet Norman sent at 2/14/2020 10:24 AM CST -----  Contact: Danay with [facility]        The caller states that she needs to send you some pictures and a report of the wounds to an email address.  Please contact the caller to discuss.    Phone # [phone]

## 2020-02-18 ENCOUNTER — PATIENT OUTREACH (OUTPATIENT)
Dept: ADMINISTRATIVE | Facility: OTHER | Age: 80
End: 2020-02-18

## 2020-02-18 NOTE — PROGRESS NOTES
Chart reviewed.   Requested updates from Care Everywhere.  Immunizations reconciled failed.   HM updated.

## 2020-02-19 ENCOUNTER — OFFICE VISIT (OUTPATIENT)
Dept: WOUND CARE | Facility: CLINIC | Age: 80
End: 2020-02-19
Payer: MEDICARE

## 2020-02-19 VITALS
WEIGHT: 280 LBS | DIASTOLIC BLOOD PRESSURE: 73 MMHG | HEIGHT: 72 IN | SYSTOLIC BLOOD PRESSURE: 131 MMHG | TEMPERATURE: 98 F | HEART RATE: 59 BPM | BODY MASS INDEX: 37.93 KG/M2

## 2020-02-19 DIAGNOSIS — T81.89XA DELAYED SURGICAL WOUND HEALING, INITIAL ENCOUNTER: Primary | ICD-10-CM

## 2020-02-19 DIAGNOSIS — I83.891 VARICOSE VEINS OF RIGHT LOWER EXTREMITY WITH COMPLICATIONS: ICD-10-CM

## 2020-02-19 DIAGNOSIS — L92.9 ABNORMAL GRANULATION TISSUE: ICD-10-CM

## 2020-02-19 PROCEDURE — 17250 PR CHEM CAUTERY GRANULATN TISSUE: ICD-10-PCS | Mod: S$PBB,,, | Performed by: NURSE PRACTITIONER

## 2020-02-19 PROCEDURE — 29580 PR STRAPPING UNNA BOOT: ICD-10-PCS | Mod: 51,S$PBB,RT, | Performed by: NURSE PRACTITIONER

## 2020-02-19 PROCEDURE — 17250 CHEM CAUT OF GRANLTJ TISSUE: CPT | Mod: PBBFAC | Performed by: NURSE PRACTITIONER

## 2020-02-19 PROCEDURE — 99499 UNLISTED E&M SERVICE: CPT | Mod: S$PBB,,, | Performed by: NURSE PRACTITIONER

## 2020-02-19 PROCEDURE — 29580 STRAPPING UNNA BOOT: CPT | Mod: 51,S$PBB,RT, | Performed by: NURSE PRACTITIONER

## 2020-02-19 PROCEDURE — 99999 PR PBB SHADOW E&M-EST. PATIENT-LVL IV: ICD-10-PCS | Mod: PBBFAC,,, | Performed by: NURSE PRACTITIONER

## 2020-02-19 PROCEDURE — 17250 CHEM CAUT OF GRANLTJ TISSUE: CPT | Mod: S$PBB,,, | Performed by: NURSE PRACTITIONER

## 2020-02-19 PROCEDURE — 29580 STRAPPING UNNA BOOT: CPT | Mod: PBBFAC,RT | Performed by: NURSE PRACTITIONER

## 2020-02-19 PROCEDURE — 99214 OFFICE O/P EST MOD 30 MIN: CPT | Mod: PBBFAC,25 | Performed by: NURSE PRACTITIONER

## 2020-02-19 PROCEDURE — 99499 NO LOS: ICD-10-PCS | Mod: S$PBB,,, | Performed by: NURSE PRACTITIONER

## 2020-02-19 PROCEDURE — 99999 PR PBB SHADOW E&M-EST. PATIENT-LVL IV: CPT | Mod: PBBFAC,,, | Performed by: NURSE PRACTITIONER

## 2020-02-19 NOTE — PROGRESS NOTES
Subjective:       Patient ID: Aquiles Montana is a 79 y.o. male.    Chief Complaint: Wound Check    HPI     This is a 79 year old male referred by Dr. Singh for evaluation and management of an incisional wound to the right lower leg.  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 Singh.  He is having compression therapy wraps placed on the leg three times weekly by home health.  The wound is healing as evidenced by increased granulation tissue and wound contracture.  His medical history is significant for arthritis, hypertension and psoriasis. He is afebrile. He denies increased redness, swelling or purulent drainage.  He does not complain of pain. He has home health through [facility]. .  Review of Systems   Constitutional: Negative for chills, diaphoresis and fever.   HENT: Negative for hearing loss, postnasal drip, rhinorrhea, sinus pressure, sneezing, sore throat, tinnitus and trouble swallowing.    Eyes: Negative for visual disturbance.   Respiratory: Negative for apnea, cough, shortness of breath and wheezing.    Cardiovascular: Positive for leg swelling. Negative for chest pain and palpitations.   Gastrointestinal: Negative for constipation, diarrhea, nausea and vomiting.   Genitourinary: Negative for difficulty urinating, dysuria, frequency and hematuria.   Musculoskeletal: Negative for arthralgias, back pain and joint swelling.   Skin: Positive for wound.   Neurological: Negative for dizziness, weakness, light-headedness and headaches.   Hematological: Does not bruise/bleed easily.   Psychiatric/Behavioral: Positive for sleep disturbance. Negative for confusion, decreased concentration and dysphoric mood. The patient is not nervous/anxious.        Objective:      Physical Exam   Constitutional: He is oriented to person, place, and time. He appears well-developed and well-nourished. No distress.   HENT:   Head: Normocephalic and atraumatic.    Musculoskeletal: He exhibits edema (3-4+ right lower leg). He exhibits no tenderness.        Legs:  Neurological: He is alert and oriented to person, place, and time.   Skin: Skin is warm and dry. No rash noted. He is not diaphoretic. No erythema.   Psychiatric: He has a normal mood and affect. His behavior is normal. Judgment and thought content normal.   Nursing note and vitals reviewed.      Right shin        PROCEDURE NOTE:  The wound was cauterized today by applying silver nitrate directly to the wound bed.  The patient tolerated the procedure well.  The wound was then covered with a dry dressing.    Aquiles was seen in the clinic room and placed in the supine position on the treatment table.  The right leg was cleansed with Easi-clense sponges and dried thoroughly.  A hydrofiber dressing was applied to the wound and covered with an ABD pad.  Eucerin cream was applied to the lower legs.  The patient's foot was positioned at a 90 degree angle.  A zinc oxide wrap with calamine, followed by kerlix roll gauze and coban were applied using a spiral technique avoiding creases or folds.  The wrap was started behind the first metatarsal and ended below the tibial tubercle of the knee.  There was overlap of each turn half the width of the previous turn.  The compression wrap will be changed every 2-3 days.    Assessment:       1. Delayed surgical wound healing, initial encounter    2. 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  Patient was warned not to get the dressings wet and to use cast covers for showering.  Should the dressing become wet, he is to remove it, place a wet-to-dry dressing over the wound, cover with gauze and roll gauze and use ace wraps for compression and to secure bandages.  He should then notify this office as soon as possible to have a new dressing applied.  Return to clinic in one month.  [facility] notified of orders via [facility] fax at [phone]. We will ask them to see the patient three times weekly.    Home Health Orders:  Cleanse wound with wound cleanser.  Apply triamcinolone cream to periwound area and lower leg.  Apply ketoconazole cream to foot.  Apply calmoseptine to periwound area.  Cover wound with hydrofiber and ABD pad.  Unna boot (zinc oxide with calamine, kerlix and coban) right lower leg.  Change dressing three times weekly.

## 2020-03-16 ENCOUNTER — TELEPHONE (OUTPATIENT)
Dept: WOUND CARE | Facility: CLINIC | Age: 80
End: 2020-03-16

## 2020-03-16 NOTE — TELEPHONE ENCOUNTER
----- Message from Jessi Funk sent at 3/16/2020  3:39 PM CDT -----  Contact: [phone]  Pt called in to r/s appt due to the virus. Pt does not want to contract the virus.

## 2020-03-18 ENCOUNTER — TELEPHONE (OUTPATIENT)
Dept: WOUND CARE | Facility: CLINIC | Age: 80
End: 2020-03-18

## 2020-03-18 NOTE — TELEPHONE ENCOUNTER
Returned call and spoke with patient who request to cancel his appointment for next week on 3/25/2020 due to the coronavirus.  Appointment cancelled per patient request; patient has home health which comes out on Monday, Wednesday and Friday.  Advised patient that we are working on setting up virtual visits and will return call to discuss the process and set up a visit once we are on line for this starting next week.

## 2020-03-19 PROCEDURE — G0179 PR HOME HEALTH MD RECERTIFICATION: ICD-10-PCS | Mod: ,,, | Performed by: SURGERY

## 2020-03-19 PROCEDURE — G0179 MD RECERTIFICATION HHA PT: HCPCS | Mod: ,,, | Performed by: SURGERY

## 2020-03-23 ENCOUNTER — EXTERNAL HOME HEALTH (OUTPATIENT)
Dept: HOME HEALTH SERVICES | Facility: HOSPITAL | Age: 80
End: 2020-03-23
Payer: MEDICARE

## 2020-05-18 PROCEDURE — G0179 MD RECERTIFICATION HHA PT: HCPCS | Mod: ,,, | Performed by: SURGERY

## 2020-05-18 PROCEDURE — G0179 PR HOME HEALTH MD RECERTIFICATION: ICD-10-PCS | Mod: ,,, | Performed by: SURGERY

## 2020-05-20 ENCOUNTER — EXTERNAL HOME HEALTH (OUTPATIENT)
Dept: HOME HEALTH SERVICES | Facility: HOSPITAL | Age: 80
End: 2020-05-20
Payer: MEDICARE

## 2020-06-03 ENCOUNTER — OFFICE VISIT (OUTPATIENT)
Dept: FAMILY MEDICINE | Facility: CLINIC | Age: 80
End: 2020-06-03
Payer: MEDICARE

## 2020-06-03 VITALS
DIASTOLIC BLOOD PRESSURE: 72 MMHG | BODY MASS INDEX: 38.24 KG/M2 | HEIGHT: 72 IN | TEMPERATURE: 98 F | SYSTOLIC BLOOD PRESSURE: 138 MMHG | WEIGHT: 282.31 LBS | HEART RATE: 63 BPM | OXYGEN SATURATION: 95 %

## 2020-06-03 DIAGNOSIS — I10 ESSENTIAL HYPERTENSION: Primary | ICD-10-CM

## 2020-06-03 DIAGNOSIS — E66.01 CLASS 2 SEVERE OBESITY WITH SERIOUS COMORBIDITY AND BODY MASS INDEX (BMI) OF 38.0 TO 38.9 IN ADULT, UNSPECIFIED OBESITY TYPE: ICD-10-CM

## 2020-06-03 PROBLEM — Z01.818 PRE-OP TESTING: Status: RESOLVED | Noted: 2019-09-01 | Resolved: 2020-06-03

## 2020-06-03 PROCEDURE — 99213 OFFICE O/P EST LOW 20 MIN: CPT | Mod: S$GLB,,, | Performed by: FAMILY MEDICINE

## 2020-06-03 PROCEDURE — 99213 PR OFFICE/OUTPT VISIT, EST, LEVL III, 20-29 MIN: ICD-10-PCS | Mod: S$GLB,,, | Performed by: FAMILY MEDICINE

## 2020-06-03 RX ORDER — NIFEDIPINE 90 MG/1
90 TABLET, EXTENDED RELEASE ORAL DAILY
Qty: 90 TABLET | Refills: 3 | Status: SHIPPED | OUTPATIENT
Start: 2020-06-03 | End: 2021-03-22

## 2020-06-03 RX ORDER — ATENOLOL 50 MG/1
50 TABLET ORAL 2 TIMES DAILY
Qty: 180 TABLET | Refills: 3 | Status: SHIPPED | OUTPATIENT
Start: 2020-06-03 | End: 2021-05-24 | Stop reason: SDUPTHER

## 2020-06-03 NOTE — PROGRESS NOTES
Subjective:      Patient ID: Aquiles Montana is a 79 y.o. male.    Chief Complaint: Follow-up and Medication Refill      Vitals:    06/03/20 1030   BP: 138/72   Pulse: 63   Temp: 97.8 °F (36.6 °C)   TempSrc: Temporal   SpO2: 95%   Weight: 128 kg (282 lb 4.8 oz)   Height: 6' (1.829 m)        HPI   Here with wife check up and Rx; had sq cell ca excised last September, Dr Singh's note read; had been going to wound care; still goes, last visit March, has had home health 3 times a week    Problem List  Patient Active Problem List   Diagnosis    Benign non-nodular prostatic hyperplasia with lower urinary tract symptoms    Nocturia    Hypertension    Obesity    Arthritis    Psoriasis    SCCA (squamous cell carcinoma) of skin    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    Delayed surgical wound healing    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    Lower leg edema    Dermatitis    Tinea pedis of both feet    Abnormal granulation tissue        ALLERGIES: Review of patient's allergies indicates:  No Known Allergies    MEDS:   Current Outpatient Medications:     aspirin (ECOTRIN) 81 MG EC tablet, Take 81 mg by mouth once daily., Disp: , Rfl:     ketoconazole (NIZORAL) 2 % cream, Apply topically once daily., Disp: 60 g, Rfl: 2    latanoprost 0.005 % ophthalmic solution, Place 1 drop into both eyes every evening., Disp: , Rfl:     NIFEdipine (PROCARDIA-XL) 90 MG (OSM) 24 hr tablet, Take 1 tablet (90 mg total) by mouth once daily., Disp: 90 tablet, Rfl: 3    triamcinolone acetonide 0.1% (KENALOG) 0.1 % ointment, Apply topically 2 (two) times daily., Disp: 453.6 g, Rfl: 0    atenoloL (TENORMIN) 50 MG tablet, Take 1 tablet (50 mg total) by mouth 2 (two) times daily., Disp: 180 tablet, Rfl: 3    HYDROcodone-acetaminophen (NORCO) 5-325 mg per tablet, Take 1 tablet by mouth every 6 (six) hours as needed. (Patient not taking: Reported on 2/19/2020), Disp: 31 tablet, Rfl:  "0      History:  Current Providers as of 6/3/2020  PCP: Marty Hernanedz MD  Care Team Provider: Dario Boucher MD  Care Team Provider: Rafael Schwab MD  Care Team Provider: King Zheng IV, MD  Care Team Provider: Joey Garg LPN  Care Team Provider: Joey Garg LPN  Care Team Provider: Marty Hernandez MD  Encounter Provider: Marty Hernandez MD, starting on Wed Yoni 3, 2020 12:00 AM  Referring Provider: not found, starting on Wed Yoni 3, 2020 12:00 AM  Consulting Physician: Marty Hernandez MD, starting on Wed Yoni 3, 2020 10:28 AM (Active)   Past Medical History:   Diagnosis Date    Hypertension     Urinary tract infection      Past Surgical History:   Procedure Laterality Date    hand sugery      PROSTATE SURGERY      "Rotor rooter- opened up"     Social History     Tobacco Use    Smoking status: Never Smoker    Smokeless tobacco: Never Used   Substance Use Topics    Alcohol use: No     Alcohol/week: 0.0 standard drinks    Drug use: No         Review of Systems   Constitutional: Negative for appetite change, fatigue, fever and unexpected weight change.   HENT: Negative for congestion, ear pain, sinus pressure and sore throat.    Eyes: Negative for pain and visual disturbance.   Respiratory: Negative for shortness of breath.    Cardiovascular: Negative for chest pain.   Gastrointestinal: Negative for abdominal pain, constipation and diarrhea.   Endocrine: Negative for polyuria.   Genitourinary: Negative for difficulty urinating and frequency.   Musculoskeletal: Negative for arthralgias, back pain and myalgias.   Skin: Positive for wound. Negative for color change.   Allergic/Immunologic: Negative.    Neurological: Negative for syncope, weakness and headaches.   Hematological: Does not bruise/bleed easily.   Psychiatric/Behavioral: Negative for dysphoric mood and suicidal ideas. The patient is not nervous/anxious.    All other systems reviewed and are negative.    Objective:     Physical Exam "   Constitutional: He is oriented to person, place, and time. He appears well-developed and well-nourished. No distress.   HENT:   Head: Normocephalic and atraumatic.   Right Ear: External ear normal.   Left Ear: External ear normal.   Mouth/Throat: Oropharynx is clear and moist. No oropharyngeal exudate.   Eyes: Pupils are equal, round, and reactive to light. Conjunctivae and EOM are normal. Right eye exhibits no discharge. Left eye exhibits no discharge. No scleral icterus.   Neck: Normal range of motion. Neck supple. No JVD present. No tracheal deviation present. No thyromegaly present.   Cardiovascular: Normal rate and regular rhythm. Exam reveals no gallop and no friction rub.   No murmur heard.  Pulmonary/Chest: Effort normal and breath sounds normal. No stridor. No respiratory distress. He has no wheezes. He has no rales. He exhibits no tenderness.   Abdominal: Soft. He exhibits no distension and no mass. There is no tenderness. There is no rebound and no guarding.   Musculoskeletal: Normal range of motion. He exhibits no edema or tenderness.   Lymphadenopathy:     He has no cervical adenopathy.   Neurological: He is alert and oriented to person, place, and time. He has normal reflexes. He displays normal reflexes. No cranial nerve deficit. He exhibits normal muscle tone. Coordination normal.   Skin: Skin is warm and dry. No rash noted. He is not diaphoretic. No erythema. No pallor.   Psychiatric: He has a normal mood and affect. His behavior is normal. Judgment and thought content normal.   Nursing note and vitals reviewed.          Assessment:     1. Essential hypertension    2. Class 2 severe obesity with serious comorbidity and body mass index (BMI) of 38.0 to 38.9 in adult, unspecified obesity type      Plan:        Medication List           Accurate as of Danyelle 3, 2020 11:05 AM. If you have any questions, ask your nurse or doctor.               CONTINUE taking these medications    aspirin 81 MG EC  tablet  Commonly known as:  ECOTRIN     atenoloL 50 MG tablet  Commonly known as:  TENORMIN  Take 1 tablet (50 mg total) by mouth 2 (two) times daily.     HYDROcodone-acetaminophen 5-325 mg per tablet  Commonly known as:  NORCO  Take 1 tablet by mouth every 6 (six) hours as needed.     ketoconazole 2 % cream  Commonly known as:  NIZORAL  Apply topically once daily.     latanoprost 0.005 % ophthalmic solution     NIFEdipine 90 MG (OSM) 24 hr tablet  Commonly known as:  PROCARDIA-XL  Take 1 tablet (90 mg total) by mouth once daily.     triamcinolone acetonide 0.1% 0.1 % ointment  Commonly known as:  KENALOG  Apply topically 2 (two) times daily.        STOP taking these medications    apixaban 5 mg Tab  Commonly known as:  ELIQUIS  Stopped by:  Marty Hernandez MD           Where to Get Your Medications      These medications were sent to [facility] [address] [place], [place] [address]lace [place] [address] [facility]  [address]  [facility] [place] [address]    Phone:  [phone]   · atenoloL 50 MG tablet  · NIFEdipine 90 MG (OSM) 24 hr tablet       Essential hypertension    Class 2 severe obesity with serious comorbidity and body mass index (BMI) of 38.0 to 38.9 in adult, unspecified obesity type    Other orders  -     atenoloL (TENORMIN) 50 MG tablet; Take 1 tablet (50 mg total) by mouth 2 (two) times daily.  Dispense: 180 tablet; Refill: 3  -     NIFEdipine (PROCARDIA-XL) 90 MG (OSM) 24 hr tablet; Take 1 tablet (90 mg total) by mouth once daily.  Dispense: 90 tablet; Refill: 3

## 2020-06-17 ENCOUNTER — PATIENT OUTREACH (OUTPATIENT)
Dept: ADMINISTRATIVE | Facility: OTHER | Age: 80
End: 2020-06-17

## 2020-06-17 ENCOUNTER — OFFICE VISIT (OUTPATIENT)
Dept: WOUND CARE | Facility: CLINIC | Age: 80
End: 2020-06-17
Payer: MEDICARE

## 2020-06-17 VITALS
WEIGHT: 280 LBS | HEART RATE: 65 BPM | DIASTOLIC BLOOD PRESSURE: 77 MMHG | HEIGHT: 72 IN | BODY MASS INDEX: 37.93 KG/M2 | SYSTOLIC BLOOD PRESSURE: 151 MMHG | TEMPERATURE: 98 F

## 2020-06-17 DIAGNOSIS — I83.891 VARICOSE VEINS OF RIGHT LOWER EXTREMITY WITH COMPLICATIONS: ICD-10-CM

## 2020-06-17 DIAGNOSIS — T81.89XA DELAYED SURGICAL WOUND HEALING, INITIAL ENCOUNTER: Primary | ICD-10-CM

## 2020-06-17 PROCEDURE — 99499 NO LOS: ICD-10-PCS | Mod: S$PBB,,, | Performed by: NURSE PRACTITIONER

## 2020-06-17 PROCEDURE — 99499 UNLISTED E&M SERVICE: CPT | Mod: S$PBB,,, | Performed by: NURSE PRACTITIONER

## 2020-06-17 PROCEDURE — 99999 PR PBB SHADOW E&M-EST. PATIENT-LVL IV: CPT | Mod: PBBFAC,,, | Performed by: NURSE PRACTITIONER

## 2020-06-17 PROCEDURE — 99999 PR PBB SHADOW E&M-EST. PATIENT-LVL IV: ICD-10-PCS | Mod: PBBFAC,,, | Performed by: NURSE PRACTITIONER

## 2020-06-17 PROCEDURE — 99214 OFFICE O/P EST MOD 30 MIN: CPT | Mod: PBBFAC | Performed by: NURSE PRACTITIONER

## 2020-06-17 NOTE — PROGRESS NOTES
Patient's chart was reviewed.   Requested updates within Care Everywhere.  Immunizations reconciled.    Health Maintenance was updated.\

## 2020-06-17 NOTE — PROGRESS NOTES
Subjective:       Patient ID: Aquiles Montana is a 79 y.o. male.    Chief Complaint: Wound Check    HPI     This is a 79 year old male referred by Dr. Singh for evaluation and management of an incisional wound to the right lower leg.  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 Singh.  He is having compression therapy wraps placed on the leg three times weekly by home health.  The wound is healing as evidenced by wound contracture.  His medical history is significant for arthritis, hypertension and psoriasis. He is afebrile. He denies increased redness, swelling or purulent drainage.  He does not complain of pain. He has home health through [facility]. .  Review of Systems   Constitutional: Negative for chills, diaphoresis and fever.   HENT: Negative for hearing loss, postnasal drip, rhinorrhea, sinus pressure, sneezing, sore throat, tinnitus and trouble swallowing.    Eyes: Negative for visual disturbance.   Respiratory: Negative for apnea, cough, shortness of breath and wheezing.    Cardiovascular: Positive for leg swelling. Negative for chest pain and palpitations.   Gastrointestinal: Negative for constipation, diarrhea, nausea and vomiting.   Genitourinary: Negative for difficulty urinating, dysuria, frequency and hematuria.   Musculoskeletal: Negative for arthralgias, back pain and joint swelling.   Skin: Positive for wound.   Neurological: Negative for dizziness, weakness, light-headedness and headaches.   Hematological: Does not bruise/bleed easily.   Psychiatric/Behavioral: Positive for sleep disturbance. Negative for confusion, decreased concentration and dysphoric mood. The patient is not nervous/anxious.        Objective:      Physical Exam  Vitals signs and nursing note reviewed.   Constitutional:       General: He is not in acute distress.     Appearance: He is well-developed. He is not diaphoretic.   HENT:      Head: Normocephalic and atraumatic.    Musculoskeletal:         General: No tenderness.        Legs:    Skin:     General: Skin is warm and dry.      Findings: No erythema or rash.   Neurological:      Mental Status: He is alert and oriented to person, place, and time.   Psychiatric:         Behavior: Behavior normal.         Thought Content: Thought content normal.         Judgment: Judgment normal.         Right saige Kwan was seen in the clinic room and placed in the supine position on the treatment table.  The right leg was cleansed with Easi-clense sponges and dried thoroughly.  Medihoney gel and a hydrofiber dressing was applied to the wound.  Eucerin cream was applied to the lower legs.  The patient's foot was positioned at a 90 degree angle.  A zinc oxide wrap with calamine, followed by kerlix roll gauze and coban were applied using a spiral technique avoiding creases or folds.  The wrap was started behind the first metatarsal and ended below the tibial tubercle of the knee.  There was overlap of each turn half the width of the previous turn.  The compression wrap will be changed every 2-3 days.    Assessment:       1. Delayed surgical wound healing, initial encounter    2. 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  Patient was warned not to get the dressings wet and to use cast covers for showering.  Should the dressing become wet, he is to remove it, place a wet-to-dry dressing over the wound, cover with gauze and roll gauze and use ace wraps for compression and to secure bandages.  He should then notify this office as soon as possible to have a new dressing applied.  Return to clinic in one month.  [facility] notified of orders via [employer] fax at [phone]. We will ask them to see the patient three times weekly.    Home Health Orders:  Cleanse wound with wound cleanser.  Apply triamcinolone cream to periwound area and lower  leg.  Apply ketoconazole cream to foot.  Apply calmoseptine to periwound area.  Apply medihoney gel to wound and cover wound with hydrofiber.  Unna boot (zinc oxide with calamine, kerlix and coban) right lower leg.  Change dressing two times weekly.

## 2020-06-18 ENCOUNTER — DOCUMENT SCAN (OUTPATIENT)
Dept: HOME HEALTH SERVICES | Facility: HOSPITAL | Age: 80
End: 2020-06-18
Payer: MEDICARE

## 2020-06-30 ENCOUNTER — PATIENT OUTREACH (OUTPATIENT)
Dept: ADMINISTRATIVE | Facility: OTHER | Age: 80
End: 2020-06-30

## 2020-07-02 ENCOUNTER — OFFICE VISIT (OUTPATIENT)
Dept: SURGERY | Facility: CLINIC | Age: 80
End: 2020-07-02
Payer: MEDICARE

## 2020-07-02 VITALS
BODY MASS INDEX: 37.93 KG/M2 | WEIGHT: 280 LBS | HEART RATE: 70 BPM | HEIGHT: 72 IN | DIASTOLIC BLOOD PRESSURE: 79 MMHG | SYSTOLIC BLOOD PRESSURE: 146 MMHG

## 2020-07-02 DIAGNOSIS — C44.92 SCCA (SQUAMOUS CELL CARCINOMA) OF SKIN: Primary | ICD-10-CM

## 2020-07-02 PROCEDURE — 99213 PR OFFICE/OUTPT VISIT, EST, LEVL III, 20-29 MIN: ICD-10-PCS | Mod: S$PBB,,, | Performed by: SURGERY

## 2020-07-02 PROCEDURE — 99999 PR PBB SHADOW E&M-EST. PATIENT-LVL III: ICD-10-PCS | Mod: PBBFAC,,, | Performed by: SURGERY

## 2020-07-02 PROCEDURE — 99213 OFFICE O/P EST LOW 20 MIN: CPT | Mod: PBBFAC | Performed by: SURGERY

## 2020-07-02 PROCEDURE — 99213 OFFICE O/P EST LOW 20 MIN: CPT | Mod: S$PBB,,, | Performed by: SURGERY

## 2020-07-02 PROCEDURE — 99999 PR PBB SHADOW E&M-EST. PATIENT-LVL III: CPT | Mod: PBBFAC,,, | Performed by: SURGERY

## 2020-07-02 NOTE — PROGRESS NOTES
GENERAL SURGERY  Clinic Note    SUBJECTIVE:     HISTORY OF PRESENT ILLNESS:  Aquiles Montana is a 79 y.o. male who is s/p wide local excision measuring [id number] of large [id number] ulcerated right lower SCC on 9/18/19. Wound was left open to heal by secondary intention. Skin graft not performed due to patient's severe venous stasis disease. He returns today for 6 month follow up and wound check.     No recent fever, chills, lymphadenopathy or other wounds. Continues with home health dressing changes twice weekly, nursing appies an Unna Boot.         OBJECTIVE:     Most Recent Vitals:       PHYSICAL EXAM:  Physical Exam   Constitutional: He is oriented to person, place, and time and well-developed, well-nourished, and in no distress. No distress.   Cardiovascular: Normal rate and regular rhythm.   Pulmonary/Chest: No respiratory distress.   Musculoskeletal:         General: Edema (chronic edema and venous stasis. Right improved from pre operative evaulation) present.   Neurological: He is alert and oriented to person, place, and time.   Skin: Skin is warm and dry. He is not diaphoretic.                  LABORATORY VALUES:  Lab Results   Component Value Date    WBC 11.63 08/29/2019    HGB 13.6 (L) 08/29/2019    HCT 41.6 08/29/2019     08/29/2019     Lab Results   Component Value Date     08/29/2019    K 4.6 08/29/2019     08/29/2019    CO2 31 (H) 08/29/2019    BUN 23 08/29/2019    CREATININE 1.1 08/29/2019     (H) 08/29/2019    CALCIUM 9.9 08/29/2019    AST 23 01/05/2016    ALT 17 01/05/2016    ALKPHOS 76 01/05/2016    BILITOT 0.2 01/05/2016    PROT 7.1 01/05/2016    ALBUMIN 3.9 01/05/2016     No results found for: INR, PTT  Lab Results   Component Value Date    TSH 1.553 01/05/2016       PATHOLOGY:  SKIN, RIGHT LOWER LEG, WIDE EXCISION:  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  Size of tumor: 7.5 CM.  Tumor thickness/depth of invasion: 11 MM.  No  lymphovascular or perineural invasion.  Tumor is completely excised.    ASSESSMENT:     Aquiles Montana is a 79 y.o. male s/p right lower extremity WLE. Pathology revealing 7.5cm area of invasive SCC with negative margins. Wound healing via secondary intention with no skin graft due to venous stasis.     PLAN:  - Healing nicely. Continue wound care follow up and dressing changes per home health  - Return to clinic in 6 months for wound check     Jessica Olmedo MD  General Surgery Resident, PGY III  Pager [phone]    I have personally taken the history and examined this patient and agree with the resident's note as stated above.  Wound continues to granulate slowly but very nicely with healthy granulation tissue did in the wound bed.  Open wound measurements now approximately 2 x 6 cm with no signs of cellulitis or local recurrence of the squamous cell carcinoma.  Continue local wound care as being managed by the wound care team.  Follow up with me in 6 months.  I hope and expect the wound to be likely fully healed at that time.  Patient continues to understand that wound healing will be slow but steady given the prior history of radiation exposure.

## 2020-07-15 ENCOUNTER — OFFICE VISIT (OUTPATIENT)
Dept: WOUND CARE | Facility: CLINIC | Age: 80
End: 2020-07-15
Payer: MEDICARE

## 2020-07-15 VITALS
DIASTOLIC BLOOD PRESSURE: 85 MMHG | HEART RATE: 63 BPM | HEIGHT: 72 IN | WEIGHT: 283.06 LBS | BODY MASS INDEX: 38.34 KG/M2 | SYSTOLIC BLOOD PRESSURE: 181 MMHG | TEMPERATURE: 97 F

## 2020-07-15 DIAGNOSIS — I83.891 VARICOSE VEINS OF RIGHT LOWER EXTREMITY WITH COMPLICATIONS: ICD-10-CM

## 2020-07-15 DIAGNOSIS — B35.3 TINEA PEDIS OF BOTH FEET: ICD-10-CM

## 2020-07-15 DIAGNOSIS — L30.9 DERMATITIS: ICD-10-CM

## 2020-07-15 DIAGNOSIS — R60.0 LOWER LEG EDEMA: ICD-10-CM

## 2020-07-15 DIAGNOSIS — T81.89XA DELAYED SURGICAL WOUND HEALING, INITIAL ENCOUNTER: Primary | ICD-10-CM

## 2020-07-15 PROCEDURE — 99999 PR PBB SHADOW E&M-EST. PATIENT-LVL IV: ICD-10-PCS | Mod: PBBFAC,,, | Performed by: NURSE PRACTITIONER

## 2020-07-15 PROCEDURE — 99214 OFFICE O/P EST MOD 30 MIN: CPT | Mod: PBBFAC,25 | Performed by: NURSE PRACTITIONER

## 2020-07-15 PROCEDURE — 99499 NO LOS: ICD-10-PCS | Mod: S$PBB,,, | Performed by: NURSE PRACTITIONER

## 2020-07-15 PROCEDURE — 29580 STRAPPING UNNA BOOT: CPT | Mod: S$PBB,RT,, | Performed by: NURSE PRACTITIONER

## 2020-07-15 PROCEDURE — 99999 PR PBB SHADOW E&M-EST. PATIENT-LVL IV: CPT | Mod: PBBFAC,,, | Performed by: NURSE PRACTITIONER

## 2020-07-15 PROCEDURE — 99499 UNLISTED E&M SERVICE: CPT | Mod: S$PBB,,, | Performed by: NURSE PRACTITIONER

## 2020-07-15 PROCEDURE — 29580 STRAPPING UNNA BOOT: CPT | Mod: PBBFAC,RT | Performed by: NURSE PRACTITIONER

## 2020-07-15 PROCEDURE — 29580 PR STRAPPING UNNA BOOT: ICD-10-PCS | Mod: S$PBB,RT,, | Performed by: NURSE PRACTITIONER

## 2020-07-15 NOTE — PROGRESS NOTES
Subjective:       Patient ID: Aquiles Montana is a 79 y.o. male.    Chief Complaint: Wound Check    HPI     This is a 79 year old male referred by Dr. Singh for evaluation and management of an incisional wound to the right lower leg.  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 Singh.  He is having compression therapy wraps placed on the leg two times weekly by home health.  The wound is healing as evidenced by wound contracture.  His medical history is significant for arthritis, hypertension and psoriasis. He is afebrile. He denies increased redness, swelling or purulent drainage.  He does not complain of pain. He has home health through [employer]. .  Review of Systems   Constitutional: Negative for chills, diaphoresis and fever.   HENT: Negative for hearing loss, postnasal drip, rhinorrhea, sinus pressure, sneezing, sore throat, tinnitus and trouble swallowing.    Eyes: Negative for visual disturbance.   Respiratory: Negative for apnea, cough, shortness of breath and wheezing.    Cardiovascular: Positive for leg swelling. Negative for chest pain and palpitations.   Gastrointestinal: Negative for constipation, diarrhea, nausea and vomiting.   Genitourinary: Negative for difficulty urinating, dysuria, frequency and hematuria.   Musculoskeletal: Negative for arthralgias, back pain and joint swelling.   Skin: Positive for wound.   Neurological: Negative for dizziness, weakness, light-headedness and headaches.   Hematological: Does not bruise/bleed easily.   Psychiatric/Behavioral: Positive for sleep disturbance. Negative for confusion, decreased concentration and dysphoric mood. The patient is not nervous/anxious.        Objective:      Physical Exam  Vitals signs and nursing note reviewed.   Constitutional:       General: He is not in acute distress.     Appearance: He is well-developed. He is not diaphoretic.   HENT:      Head: Normocephalic and atraumatic.    Musculoskeletal:         General: No tenderness.        Legs:    Skin:     General: Skin is warm and dry.      Findings: No erythema or rash.   Neurological:      Mental Status: He is alert and oriented to person, place, and time.   Psychiatric:         Behavior: Behavior normal.         Thought Content: Thought content normal.         Judgment: Judgment normal.         Right saige Kwan was seen in the clinic room and placed in the supine position on the treatment table.  The right leg was cleansed with Easi-clense sponges and dried thoroughly.  Promogran and a mepilex foam dressing was applied to the wound.  Eucerin cream was applied to the lower legs.  The patient's foot was positioned at a 90 degree angle.  A zinc oxide wrap with calamine, followed by kerlix roll gauze and coban were applied using a spiral technique avoiding creases or folds.  The wrap was started behind the first metatarsal and ended below the tibial tubercle of the knee.  There was overlap of each turn half the width of the previous turn.  The compression wrap will be changed every 2-3 days.    Assessment:       1. Delayed surgical wound healing, initial encounter    2. 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 Tinea pedis of both feet    4. Dermatitis               Plan:            Unna boot right lower leg as detailed above.  Patient was warned not to get the dressings wet and to use cast covers for showering.  Should the dressing become wet, he is to remove it, place a wet-to-dry dressing over the wound, cover with gauze and roll gauze and use ace wraps for compression and to secure bandages.  He should then notify this office as soon as possible to have a new dressing applied.  Return to clinic in one month.  [facility] notified of orders via [employer] fax at [phone]. We will ask them to see the patient three times weekly.    [facility] Orders:  Cleanse wound with wound cleanser.  Apply  triamcinolone cream to periwound area and lower leg.  Apply ketoconazole cream to foot.  Apply calmoseptine to periwound area.  Apply promogran to wound and cover wound with mepilex foam.  Unna boot (zinc oxide with calamine, kerlix and coban) right lower leg.  Change dressing two times weekly.

## 2020-07-17 DIAGNOSIS — Z71.89 COMPLEX CARE COORDINATION: ICD-10-CM

## 2020-07-17 PROCEDURE — G0179 MD RECERTIFICATION HHA PT: HCPCS | Mod: ,,, | Performed by: SURGERY

## 2020-07-17 PROCEDURE — G0179 PR HOME HEALTH MD RECERTIFICATION: ICD-10-PCS | Mod: ,,, | Performed by: SURGERY

## 2020-07-22 ENCOUNTER — EXTERNAL HOME HEALTH (OUTPATIENT)
Dept: HOME HEALTH SERVICES | Facility: HOSPITAL | Age: 80
End: 2020-07-22
Payer: MEDICARE

## 2020-07-24 ENCOUNTER — DOCUMENT SCAN (OUTPATIENT)
Dept: HOME HEALTH SERVICES | Facility: HOSPITAL | Age: 80
End: 2020-07-24
Payer: MEDICARE

## 2020-08-11 ENCOUNTER — PATIENT OUTREACH (OUTPATIENT)
Dept: ADMINISTRATIVE | Facility: OTHER | Age: 80
End: 2020-08-11

## 2020-08-12 ENCOUNTER — OFFICE VISIT (OUTPATIENT)
Dept: WOUND CARE | Facility: CLINIC | Age: 80
End: 2020-08-12
Payer: MEDICARE

## 2020-08-12 DIAGNOSIS — I83.891 VARICOSE VEINS OF RIGHT LOWER EXTREMITY WITH COMPLICATIONS: ICD-10-CM

## 2020-08-12 DIAGNOSIS — B35.3 TINEA PEDIS OF BOTH FEET: ICD-10-CM

## 2020-08-12 DIAGNOSIS — L30.9 DERMATITIS: ICD-10-CM

## 2020-08-12 DIAGNOSIS — T81.89XA DELAYED SURGICAL WOUND HEALING, INITIAL ENCOUNTER: Primary | ICD-10-CM

## 2020-08-12 PROCEDURE — 99499 NO LOS: ICD-10-PCS | Mod: S$PBB,,, | Performed by: NURSE PRACTITIONER

## 2020-08-12 PROCEDURE — 29580 PR STRAPPING UNNA BOOT: ICD-10-PCS | Mod: S$PBB,RT,, | Performed by: NURSE PRACTITIONER

## 2020-08-12 PROCEDURE — 99999 PR PBB SHADOW E&M-EST. PATIENT-LVL III: ICD-10-PCS | Mod: PBBFAC,,, | Performed by: NURSE PRACTITIONER

## 2020-08-12 PROCEDURE — 99213 OFFICE O/P EST LOW 20 MIN: CPT | Mod: PBBFAC,25 | Performed by: NURSE PRACTITIONER

## 2020-08-12 PROCEDURE — 99499 UNLISTED E&M SERVICE: CPT | Mod: S$PBB,,, | Performed by: NURSE PRACTITIONER

## 2020-08-12 PROCEDURE — 29580 STRAPPING UNNA BOOT: CPT | Mod: S$PBB,RT,, | Performed by: NURSE PRACTITIONER

## 2020-08-12 PROCEDURE — 99999 PR PBB SHADOW E&M-EST. PATIENT-LVL III: CPT | Mod: PBBFAC,,, | Performed by: NURSE PRACTITIONER

## 2020-08-12 PROCEDURE — 29580 STRAPPING UNNA BOOT: CPT | Mod: PBBFAC,RT | Performed by: NURSE PRACTITIONER

## 2020-08-12 NOTE — PROGRESS NOTES
Subjective:       Patient ID: Aquiles Montana is a 79 y.o. male.    Chief Complaint: Wound Check    Wound Check     This is a 79 year old male referred by Dr. Singh for evaluation and management of an incisional wound to the right lower leg.  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 Singh.  He is having compression therapy wraps placed on the leg two times weekly by home health.  The wound is healing as evidenced by wound contracture.  His medical history is significant for arthritis, hypertension and psoriasis. He is afebrile. He denies increased redness, swelling or purulent drainage.  He does not complain of pain. He has home health through [employer]. .  Review of Systems   Constitutional: Negative for chills, diaphoresis and fever.   HENT: Negative for hearing loss, postnasal drip, rhinorrhea, sinus pressure, sneezing, sore throat, tinnitus and trouble swallowing.    Eyes: Negative for visual disturbance.   Respiratory: Negative for apnea, cough, shortness of breath and wheezing.    Cardiovascular: Positive for leg swelling. Negative for chest pain and palpitations.   Gastrointestinal: Negative for constipation, diarrhea, nausea and vomiting.   Genitourinary: Negative for difficulty urinating, dysuria, frequency and hematuria.   Musculoskeletal: Negative for arthralgias, back pain and joint swelling.   Skin: Positive for wound.   Neurological: Negative for dizziness, weakness, light-headedness and headaches.   Hematological: Does not bruise/bleed easily.   Psychiatric/Behavioral: Positive for sleep disturbance. Negative for confusion, decreased concentration and dysphoric mood. The patient is not nervous/anxious.        Objective:      Physical Exam  Vitals signs and nursing note reviewed.   Constitutional:       General: He is not in acute distress.     Appearance: He is well-developed. He is not diaphoretic.   HENT:      Head: Normocephalic and  atraumatic.   Musculoskeletal:         General: No tenderness.        Legs:    Skin:     General: Skin is warm and dry.      Findings: No erythema or rash.   Neurological:      Mental Status: He is alert and oriented to person, place, and time.   Psychiatric:         Behavior: Behavior normal.         Thought Content: Thought content normal.         Judgment: Judgment normal.         Right saige Kwan was seen in the clinic room and placed in the supine position on the treatment table.  The right leg was cleansed with Easi-clense sponges and dried thoroughly.  Promogran and a mepilex foam dressing was applied to the wound.  Eucerin cream was applied to the lower legs.  The patient's foot was positioned at a 90 degree angle.  A zinc oxide wrap with calamine, followed by kerlix roll gauze and coban were applied using a spiral technique avoiding creases or folds.  The wrap was started behind the first metatarsal and ended below the tibial tubercle of the knee.  There was overlap of each turn half the width of the previous turn.  The compression wrap will be changed every 2-3 days.    Assessment:       1. Delayed surgical wound healing, initial encounter    2. 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 Dermatitis    4. Tinea pedis of both feet               Plan:            Unna boot right lower leg as detailed above.  Patient was warned not to get the dressings wet and to use cast covers for showering.  Should the dressing become wet, he is to remove it, place a wet-to-dry dressing over the wound, cover with gauze and roll gauze and use ace wraps for compression and to secure bandages.  He should then notify this office as soon as possible to have a new dressing applied.  Return to clinic in one month.  [facility] notified of orders via [employer] fax at [phone]. We will ask them to see the patient three times weekly.    Home Health Orders:  Cleanse wound with wound  cleanser.  Apply triamcinolone cream to periwound area and lower leg.  Apply ketoconazole cream to foot.  Apply calmoseptine to periwound area.  Apply promogran to wound and cover wound with mepilex foam.  Unna boot (zinc oxide with calamine, kerlix and coban) right lower leg.  Change dressing two times weekly.

## 2020-08-12 NOTE — PROGRESS NOTES
Requested updates from Care Everywhere.  Reviewed chart for overdue TRISTEN topics.  Updated Health Maintenance.   Reconciled immunizations in LINKS.

## 2020-09-09 ENCOUNTER — DOCUMENT SCAN (OUTPATIENT)
Dept: HOME HEALTH SERVICES | Facility: HOSPITAL | Age: 80
End: 2020-09-09
Payer: MEDICARE

## 2020-09-09 ENCOUNTER — OFFICE VISIT (OUTPATIENT)
Dept: WOUND CARE | Facility: CLINIC | Age: 80
End: 2020-09-09
Payer: MEDICARE

## 2020-09-09 VITALS
DIASTOLIC BLOOD PRESSURE: 88 MMHG | BODY MASS INDEX: 38.67 KG/M2 | TEMPERATURE: 97 F | SYSTOLIC BLOOD PRESSURE: 156 MMHG | HEART RATE: 63 BPM | WEIGHT: 285.5 LBS | HEIGHT: 72 IN

## 2020-09-09 DIAGNOSIS — I83.891 VARICOSE VEINS OF RIGHT LOWER EXTREMITY WITH COMPLICATIONS: ICD-10-CM

## 2020-09-09 DIAGNOSIS — R60.0 LOWER LEG EDEMA: ICD-10-CM

## 2020-09-09 DIAGNOSIS — T81.89XA DELAYED SURGICAL WOUND HEALING, INITIAL ENCOUNTER: Primary | ICD-10-CM

## 2020-09-09 PROCEDURE — 99499 NO LOS: ICD-10-PCS | Mod: ,,, | Performed by: SURGERY

## 2020-09-09 PROCEDURE — 99999 PR PBB SHADOW E&M-EST. PATIENT-LVL IV: ICD-10-PCS | Mod: PBBFAC,,, | Performed by: NURSE PRACTITIONER

## 2020-09-09 PROCEDURE — 29580 PR STRAPPING UNNA BOOT: ICD-10-PCS | Mod: S$PBB,RT,, | Performed by: NURSE PRACTITIONER

## 2020-09-09 PROCEDURE — 99214 OFFICE O/P EST MOD 30 MIN: CPT | Mod: PBBFAC,25 | Performed by: NURSE PRACTITIONER

## 2020-09-09 PROCEDURE — 99499 UNLISTED E&M SERVICE: CPT | Mod: S$PBB,,, | Performed by: NURSE PRACTITIONER

## 2020-09-09 PROCEDURE — 29580 STRAPPING UNNA BOOT: CPT | Mod: PBBFAC,RT | Performed by: NURSE PRACTITIONER

## 2020-09-09 PROCEDURE — 99499 NO LOS: ICD-10-PCS | Mod: S$PBB,,, | Performed by: NURSE PRACTITIONER

## 2020-09-09 PROCEDURE — 29580 STRAPPING UNNA BOOT: CPT | Mod: S$PBB,RT,, | Performed by: NURSE PRACTITIONER

## 2020-09-09 PROCEDURE — 99499 UNLISTED E&M SERVICE: CPT | Mod: ,,, | Performed by: SURGERY

## 2020-09-09 PROCEDURE — 99999 PR PBB SHADOW E&M-EST. PATIENT-LVL IV: CPT | Mod: PBBFAC,,, | Performed by: NURSE PRACTITIONER

## 2020-09-09 NOTE — PROGRESS NOTES
Subjective:       Patient ID: Aquiles Montana is a 80 y.o. male.    Chief Complaint: Wound Check    Wound Check     This is a 79 year old male referred by Dr. Singh for evaluation and management of an incisional wound to the right lower leg.  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 Singh.  He is having compression therapy wraps placed on the leg two times weekly by home health.  The wound is healing as evidenced by wound contracture and epithelialization.  His medical history is significant for arthritis, hypertension and psoriasis. He is afebrile. He denies increased redness, swelling or purulent drainage.  He does not complain of pain. He has home health through [employer].     Review of Systems   Constitutional: Negative for chills, diaphoresis and fever.   HENT: Negative for hearing loss, postnasal drip, rhinorrhea, sinus pressure, sneezing, sore throat, tinnitus and trouble swallowing.    Eyes: Negative for visual disturbance.   Respiratory: Negative for apnea, cough, shortness of breath and wheezing.    Cardiovascular: Positive for leg swelling. Negative for chest pain and palpitations.   Gastrointestinal: Negative for constipation, diarrhea, nausea and vomiting.   Genitourinary: Negative for difficulty urinating, dysuria, frequency and hematuria.   Musculoskeletal: Negative for arthralgias, back pain and joint swelling.   Skin: Positive for wound.   Neurological: Negative for dizziness, weakness, light-headedness and headaches.   Hematological: Does not bruise/bleed easily.   Psychiatric/Behavioral: Positive for sleep disturbance. Negative for confusion, decreased concentration and dysphoric mood. The patient is not nervous/anxious.        Objective:      Physical Exam  Vitals signs and nursing note reviewed.   Constitutional:       General: He is not in acute distress.     Appearance: He is well-developed. He is not diaphoretic.   HENT:      Head:  Normocephalic and atraumatic.   Musculoskeletal:         General: No tenderness.        Legs:    Skin:     General: Skin is warm and dry.      Findings: No erythema or rash.   Neurological:      Mental Status: He is alert and oriented to person, place, and time.   Psychiatric:         Behavior: Behavior normal.         Thought Content: Thought content normal.         Judgment: Judgment normal.         Right saige Kwan was seen in the clinic room and placed in the supine position on the treatment table.  The right leg was cleansed with Easi-clense sponges and dried thoroughly.  A mepilex lite foam dressing was applied to the wound.  Eucerin cream was applied to the lower legs.  The patient's foot was positioned at a 90 degree angle.  A zinc oxide wrap with calamine, followed by kerlix roll gauze and coban were applied using a spiral technique avoiding creases or folds.  The wrap was started behind the first metatarsal and ended below the tibial tubercle of the knee.  There was overlap of each turn half the width of the previous turn.  The compression wrap will be changed every 2-3 days.    Assessment:       1. Delayed surgical wound healing, initial encounter    2. 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 Lower leg edema               Plan:            Unna boot right lower leg as detailed above.  Patient was warned not to get the dressings wet and to use cast covers for showering.  Should the dressing become wet, he is to remove it, place a wet-to-dry dressing over the wound, cover with gauze and roll gauze and use ace wraps for compression and to secure bandages.  He should then notify this office as soon as possible to have a new dressing applied.  Return to clinic in one month.  [facility] notified of orders via [employer] fax at [phone]. We will ask them to see the patient three times weekly.    Home Health Orders:  Cleanse wound with wound cleanser.  Apply triamcinolone  cream to periwound area and lower leg.  Apply ketoconazole cream to foot.  Apply mepilex lite foam dressing to cover wounds.  Unna boot (zinc oxide with calamine, kerlix and coban) right lower leg.  Change dressing two times weekly.

## 2020-09-15 ENCOUNTER — DOCUMENT SCAN (OUTPATIENT)
Dept: HOME HEALTH SERVICES | Facility: HOSPITAL | Age: 80
End: 2020-09-15
Payer: MEDICARE

## 2020-09-15 PROCEDURE — G0179 MD RECERTIFICATION HHA PT: HCPCS | Mod: ,,, | Performed by: SURGERY

## 2020-09-15 PROCEDURE — G0179 PR HOME HEALTH MD RECERTIFICATION: ICD-10-PCS | Mod: ,,, | Performed by: SURGERY

## 2020-09-17 ENCOUNTER — PATIENT OUTREACH (OUTPATIENT)
Dept: HOME HEALTH SERVICES | Facility: HOSPITAL | Age: 80
End: 2020-09-17

## 2020-09-21 ENCOUNTER — EXTERNAL HOME HEALTH (OUTPATIENT)
Dept: HOME HEALTH SERVICES | Facility: HOSPITAL | Age: 80
End: 2020-09-21
Payer: MEDICARE

## 2020-10-01 ENCOUNTER — PATIENT OUTREACH (OUTPATIENT)
Dept: ADMINISTRATIVE | Facility: OTHER | Age: 80
End: 2020-10-01

## 2020-10-01 NOTE — PROGRESS NOTES
Care Everywhere:   Immunization: no profile in links   Health Maintenance: updated  Media Review:   Legacy Review:   Order placed:   Upcoming appts:

## 2020-10-02 ENCOUNTER — OFFICE VISIT (OUTPATIENT)
Dept: WOUND CARE | Facility: CLINIC | Age: 80
End: 2020-10-02
Payer: MEDICARE

## 2020-10-02 VITALS
DIASTOLIC BLOOD PRESSURE: 86 MMHG | BODY MASS INDEX: 38.43 KG/M2 | TEMPERATURE: 98 F | WEIGHT: 283.75 LBS | SYSTOLIC BLOOD PRESSURE: 155 MMHG | HEIGHT: 72 IN | HEART RATE: 68 BPM

## 2020-10-02 DIAGNOSIS — T81.89XA DELAYED SURGICAL WOUND HEALING, INITIAL ENCOUNTER: Primary | ICD-10-CM

## 2020-10-02 DIAGNOSIS — I83.891 VARICOSE VEINS OF RIGHT LOWER EXTREMITY WITH COMPLICATIONS: ICD-10-CM

## 2020-10-02 PROCEDURE — 99499 NO LOS: ICD-10-PCS | Mod: S$PBB,,, | Performed by: NURSE PRACTITIONER

## 2020-10-02 PROCEDURE — 99214 OFFICE O/P EST MOD 30 MIN: CPT | Mod: PBBFAC | Performed by: NURSE PRACTITIONER

## 2020-10-02 PROCEDURE — 99999 PR PBB SHADOW E&M-EST. PATIENT-LVL IV: ICD-10-PCS | Mod: PBBFAC,,, | Performed by: NURSE PRACTITIONER

## 2020-10-02 PROCEDURE — 29580 PR STRAPPING UNNA BOOT: ICD-10-PCS | Mod: S$PBB,RT,, | Performed by: NURSE PRACTITIONER

## 2020-10-02 PROCEDURE — 99999 PR PBB SHADOW E&M-EST. PATIENT-LVL IV: CPT | Mod: PBBFAC,,, | Performed by: NURSE PRACTITIONER

## 2020-10-02 PROCEDURE — 29580 STRAPPING UNNA BOOT: CPT | Mod: PBBFAC,RT | Performed by: NURSE PRACTITIONER

## 2020-10-02 PROCEDURE — 99499 UNLISTED E&M SERVICE: CPT | Mod: S$PBB,,, | Performed by: NURSE PRACTITIONER

## 2020-10-02 PROCEDURE — 29580 STRAPPING UNNA BOOT: CPT | Mod: S$PBB,RT,, | Performed by: NURSE PRACTITIONER

## 2020-10-02 NOTE — PROGRESS NOTES
Subjective:       Patient ID: Aquiles Montana is a 80 y.o. male.    Chief Complaint: Wound Check    Wound Check       This is a 79 year old male referred by Dr. Singh for evaluation and management of an incisional wound to the right lower leg.  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 Singh.  He is having compression therapy wraps placed on the leg two times weekly by home health.  The wound is healing as evidenced by wound contracture and epithelialization.  His medical history is significant for arthritis, hypertension and psoriasis. He is afebrile. He denies increased redness, swelling or purulent drainage.  He does not complain of pain. He has home health through [employer].     Review of Systems   Constitutional: Negative for chills, diaphoresis and fever.   HENT: Negative for hearing loss, postnasal drip, rhinorrhea, sinus pressure, sneezing, sore throat, tinnitus and trouble swallowing.    Eyes: Negative for visual disturbance.   Respiratory: Negative for apnea, cough, shortness of breath and wheezing.    Cardiovascular: Positive for leg swelling. Negative for chest pain and palpitations.   Gastrointestinal: Negative for constipation, diarrhea, nausea and vomiting.   Genitourinary: Negative for difficulty urinating, dysuria, frequency and hematuria.   Musculoskeletal: Negative for arthralgias, back pain and joint swelling.   Skin: Positive for wound.   Neurological: Negative for dizziness, weakness, light-headedness and headaches.   Hematological: Does not bruise/bleed easily.   Psychiatric/Behavioral: Positive for sleep disturbance. Negative for confusion, decreased concentration and dysphoric mood. The patient is not nervous/anxious.        Objective:      Physical Exam  Vitals signs and nursing note reviewed.   Constitutional:       General: He is not in acute distress.     Appearance: He is well-developed. He is not diaphoretic.   HENT:      Head:  Normocephalic and atraumatic.   Musculoskeletal:         General: No tenderness.        Legs:    Skin:     General: Skin is warm and dry.      Findings: No erythema or rash.   Neurological:      Mental Status: He is alert and oriented to person, place, and time.   Psychiatric:         Behavior: Behavior normal.         Thought Content: Thought content normal.         Judgment: Judgment normal.         Right shin    PROCEDURE NOTE:  The wound was cauterized today by applying silver nitrate directly to the wound bed.  The patient tolerated the procedure well.  The wound was then covered with a dry dressing.    Aquiles was seen in the clinic room and placed in the supine position on the treatment table.  The right leg was cleansed with Easi-clense sponges and dried thoroughly.  A mepilex lite foam dressing was applied to the wound.  Eucerin cream was applied to the lower legs.  The patient's foot was positioned at a 90 degree angle.  A zinc oxide wrap with calamine, followed by kerlix roll gauze and coban were applied using a spiral technique avoiding creases or folds.  The wrap was started behind the first metatarsal and ended below the tibial tubercle of the knee.  There was overlap of each turn half the width of the previous turn.  The compression wrap will be changed every 2-3 days.    Assessment:       1. Delayed surgical wound healing, initial encounter    2. 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  Patient was warned not to get the dressings wet and to use cast covers for showering.  Should the dressing become wet, he is to remove it, place a wet-to-dry dressing over the wound, cover with gauze and roll gauze and use ace wraps for compression and to secure bandages.  He should then notify this office as soon as possible to have a new dressing applied.  Return to clinic in one month.  [facility] notified of  orders via [facility] fax at [phone]. We will ask them to see the patient weekly.    Home Health Orders:  Cleanse wound with wound cleanser.  Apply triamcinolone cream to periwound area and lower leg.  Apply ketoconazole cream to foot.  Apply mepilex lite foam dressing to cover wounds.  Unna boot (zinc oxide with calamine, kerlix and coban) right lower leg.  Change dressing weekly.

## 2020-10-05 ENCOUNTER — DOCUMENT SCAN (OUTPATIENT)
Dept: HOME HEALTH SERVICES | Facility: HOSPITAL | Age: 80
End: 2020-10-05
Payer: MEDICARE

## 2020-10-19 ENCOUNTER — DOCUMENT SCAN (OUTPATIENT)
Dept: HOME HEALTH SERVICES | Facility: HOSPITAL | Age: 80
End: 2020-10-19
Payer: MEDICARE

## 2020-10-21 ENCOUNTER — TELEPHONE (OUTPATIENT)
Dept: WOUND CARE | Facility: CLINIC | Age: 80
End: 2020-10-21

## 2020-10-31 ENCOUNTER — EXTERNAL CHRONIC CARE MANAGEMENT (OUTPATIENT)
Dept: PRIMARY CARE CLINIC | Facility: CLINIC | Age: 80
End: 2020-10-31
Payer: MEDICARE

## 2020-10-31 PROCEDURE — 99490 PR CHRONIC CARE MGMT, 1ST 20 MIN: ICD-10-PCS | Mod: S$GLB,,, | Performed by: FAMILY MEDICINE

## 2020-10-31 PROCEDURE — 99490 CHRNC CARE MGMT STAFF 1ST 20: CPT | Mod: S$GLB,,, | Performed by: FAMILY MEDICINE

## 2020-11-02 ENCOUNTER — PATIENT OUTREACH (OUTPATIENT)
Dept: ADMINISTRATIVE | Facility: OTHER | Age: 80
End: 2020-11-02

## 2020-11-02 NOTE — PROGRESS NOTES
Care Everywhere: updated  Immunization: no profile in links   Health Maintenance: updated  Media Review:   Legacy Review:   Order placed:   Upcoming appts

## 2020-11-04 ENCOUNTER — OFFICE VISIT (OUTPATIENT)
Dept: WOUND CARE | Facility: CLINIC | Age: 80
End: 2020-11-04
Payer: MEDICARE

## 2020-11-04 VITALS
HEART RATE: 58 BPM | SYSTOLIC BLOOD PRESSURE: 136 MMHG | BODY MASS INDEX: 37.99 KG/M2 | DIASTOLIC BLOOD PRESSURE: 80 MMHG | WEIGHT: 280.44 LBS | HEIGHT: 72 IN | TEMPERATURE: 98 F

## 2020-11-04 DIAGNOSIS — R60.0 LOWER LEG EDEMA: ICD-10-CM

## 2020-11-04 DIAGNOSIS — I83.891 VARICOSE VEINS OF RIGHT LOWER EXTREMITY WITH COMPLICATIONS: ICD-10-CM

## 2020-11-04 DIAGNOSIS — T81.89XA DELAYED SURGICAL WOUND HEALING, INITIAL ENCOUNTER: Primary | ICD-10-CM

## 2020-11-04 PROCEDURE — 99499 NO LOS: ICD-10-PCS | Mod: S$PBB,,, | Performed by: NURSE PRACTITIONER

## 2020-11-04 PROCEDURE — 99999 PR PBB SHADOW E&M-EST. PATIENT-LVL IV: CPT | Mod: PBBFAC,,, | Performed by: NURSE PRACTITIONER

## 2020-11-04 PROCEDURE — 29580 STRAPPING UNNA BOOT: CPT | Mod: S$PBB,RT,, | Performed by: NURSE PRACTITIONER

## 2020-11-04 PROCEDURE — 99214 OFFICE O/P EST MOD 30 MIN: CPT | Mod: PBBFAC,25 | Performed by: NURSE PRACTITIONER

## 2020-11-04 PROCEDURE — 99499 UNLISTED E&M SERVICE: CPT | Mod: S$PBB,,, | Performed by: NURSE PRACTITIONER

## 2020-11-04 PROCEDURE — 29580 PR STRAPPING UNNA BOOT: ICD-10-PCS | Mod: S$PBB,RT,, | Performed by: NURSE PRACTITIONER

## 2020-11-04 PROCEDURE — 29580 STRAPPING UNNA BOOT: CPT | Mod: PBBFAC,RT | Performed by: NURSE PRACTITIONER

## 2020-11-04 PROCEDURE — 99999 PR PBB SHADOW E&M-EST. PATIENT-LVL IV: ICD-10-PCS | Mod: PBBFAC,,, | Performed by: NURSE PRACTITIONER

## 2020-11-04 RX ORDER — KETOCONAZOLE 20 MG/G
CREAM TOPICAL DAILY
Qty: 60 G | Refills: 2 | Status: SHIPPED | OUTPATIENT
Start: 2020-11-04 | End: 2021-05-24

## 2020-11-04 RX ORDER — TRIAMCINOLONE ACETONIDE 1 MG/G
OINTMENT TOPICAL
Qty: 454 G | Refills: 0 | Status: SHIPPED | OUTPATIENT
Start: 2020-11-04 | End: 2020-12-16 | Stop reason: ALTCHOICE

## 2020-11-04 NOTE — PROGRESS NOTES
Subjective:       Patient ID: Aquiles Montana is a 80 y.o. male.    Chief Complaint: Wound Check    Wound Check     This is a 79 year old male referred by Dr. Singh for evaluation and management of an incisional wound to the right lower leg.  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 Singh.  He is having compression therapy wraps placed on the leg two times weekly by home health.  The wound is healing as evidenced by wound contracture and epithelialization.  His medical history is significant for arthritis, hypertension and psoriasis. He is afebrile. He denies increased redness, swelling or purulent drainage.  He does not complain of pain. He has home health through [employer].     Review of Systems   Constitutional: Negative for chills, diaphoresis and fever.   HENT: Negative for hearing loss, postnasal drip, rhinorrhea, sinus pressure, sneezing, sore throat, tinnitus and trouble swallowing.    Eyes: Negative for visual disturbance.   Respiratory: Negative for apnea, cough, shortness of breath and wheezing.    Cardiovascular: Positive for leg swelling. Negative for chest pain and palpitations.   Gastrointestinal: Negative for constipation, diarrhea, nausea and vomiting.   Genitourinary: Negative for difficulty urinating, dysuria, frequency and hematuria.   Musculoskeletal: Negative for arthralgias, back pain and joint swelling.   Skin: Positive for wound.   Neurological: Negative for dizziness, weakness, light-headedness and headaches.   Hematological: Does not bruise/bleed easily.   Psychiatric/Behavioral: Positive for sleep disturbance. Negative for confusion, decreased concentration and dysphoric mood. The patient is not nervous/anxious.        Objective:      Physical Exam  Vitals signs and nursing note reviewed.   Constitutional:       General: He is not in acute distress.     Appearance: He is well-developed. He is not diaphoretic.   HENT:      Head:  Normocephalic and atraumatic.   Musculoskeletal:         General: No tenderness.        Legs:    Skin:     General: Skin is warm and dry.      Findings: No erythema or rash.   Neurological:      Mental Status: He is alert and oriented to person, place, and time.   Psychiatric:         Behavior: Behavior normal.         Thought Content: Thought content normal.         Judgment: Judgment normal.         Right saige Kwan was seen in the clinic room and placed in the supine position on the treatment table.  The right leg was cleansed with Easi-clense sponges and dried thoroughly.  A mepilex lite foam dressing was applied to the wound.  Eucerin cream was applied to the lower legs.  The patient's foot was positioned at a 90 degree angle.  A zinc oxide wrap with calamine, followed by kerlix roll gauze and coban were applied using a spiral technique avoiding creases or folds.  The wrap was started behind the first metatarsal and ended below the tibial tubercle of the knee.  There was overlap of each turn half the width of the previous turn.  The compression wrap will be changed every 2-3 days.    Assessment:       1. Delayed surgical wound healing, initial encounter    2. 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 Lower leg edema               Plan:            Unna boot right lower leg as detailed above.  Patient was warned not to get the dressings wet and to use cast covers for showering.  Should the dressing become wet, he is to remove it, place a wet-to-dry dressing over the wound, cover with gauze and roll gauze and use ace wraps for compression and to secure bandages.  He should then notify this office as soon as possible to have a new dressing applied.  Return to clinic in 6 weeks.  [facility] notified of orders via [employer] fax at [phone]. We will ask them to see the patient weekly.    Home Health Orders:  Cleanse wound with wound cleanser.  Apply triamcinolone cream to  periwound area and lower leg.  Apply ketoconazole cream to foot.  Apply mepilex lite foam dressing to cover wounds.  Unna boot (zinc oxide with calamine, kerlix and coban) right lower leg.  Change dressing weekly.

## 2020-11-14 PROCEDURE — G0179 PR HOME HEALTH MD RECERTIFICATION: ICD-10-PCS | Mod: ,,, | Performed by: FAMILY MEDICINE

## 2020-11-14 PROCEDURE — G0179 MD RECERTIFICATION HHA PT: HCPCS | Mod: ,,, | Performed by: FAMILY MEDICINE

## 2020-11-20 ENCOUNTER — EXTERNAL HOME HEALTH (OUTPATIENT)
Dept: HOME HEALTH SERVICES | Facility: HOSPITAL | Age: 80
End: 2020-11-20
Payer: MEDICARE

## 2020-11-20 ENCOUNTER — PATIENT OUTREACH (OUTPATIENT)
Dept: HOME HEALTH SERVICES | Facility: HOSPITAL | Age: 80
End: 2020-11-20

## 2020-11-30 ENCOUNTER — EXTERNAL CHRONIC CARE MANAGEMENT (OUTPATIENT)
Dept: PRIMARY CARE CLINIC | Facility: CLINIC | Age: 80
End: 2020-11-30
Payer: MEDICARE

## 2020-11-30 PROCEDURE — 99490 CHRNC CARE MGMT STAFF 1ST 20: CPT | Mod: S$GLB,,, | Performed by: FAMILY MEDICINE

## 2020-11-30 PROCEDURE — 99490 PR CHRONIC CARE MGMT, 1ST 20 MIN: ICD-10-PCS | Mod: S$GLB,,, | Performed by: FAMILY MEDICINE

## 2020-12-15 ENCOUNTER — PATIENT OUTREACH (OUTPATIENT)
Dept: ADMINISTRATIVE | Facility: OTHER | Age: 80
End: 2020-12-15

## 2020-12-16 ENCOUNTER — OFFICE VISIT (OUTPATIENT)
Dept: WOUND CARE | Facility: CLINIC | Age: 80
End: 2020-12-16
Payer: MEDICARE

## 2020-12-16 VITALS
HEIGHT: 72 IN | TEMPERATURE: 98 F | DIASTOLIC BLOOD PRESSURE: 90 MMHG | SYSTOLIC BLOOD PRESSURE: 151 MMHG | WEIGHT: 284.81 LBS | HEART RATE: 58 BPM | BODY MASS INDEX: 38.58 KG/M2

## 2020-12-16 DIAGNOSIS — L30.9 DERMATITIS: Primary | ICD-10-CM

## 2020-12-16 DIAGNOSIS — R60.0 LOWER LEG EDEMA: ICD-10-CM

## 2020-12-16 PROBLEM — I83.891 VARICOSE VEINS OF RIGHT LOWER EXTREMITY WITH COMPLICATIONS: Status: RESOLVED | Noted: 2019-09-20 | Resolved: 2020-12-16

## 2020-12-16 PROBLEM — T81.89XA DELAYED SURGICAL WOUND HEALING: Status: RESOLVED | Noted: 2019-09-20 | Resolved: 2020-12-16

## 2020-12-16 PROBLEM — L92.9 ABNORMAL GRANULATION TISSUE: Status: RESOLVED | Noted: 2019-11-22 | Resolved: 2020-12-16

## 2020-12-16 PROCEDURE — 99999 PR PBB SHADOW E&M-EST. PATIENT-LVL IV: CPT | Mod: PBBFAC,,, | Performed by: NURSE PRACTITIONER

## 2020-12-16 PROCEDURE — 99212 PR OFFICE/OUTPT VISIT, EST, LEVL II, 10-19 MIN: ICD-10-PCS | Mod: S$PBB,,, | Performed by: NURSE PRACTITIONER

## 2020-12-16 PROCEDURE — 99214 OFFICE O/P EST MOD 30 MIN: CPT | Mod: PBBFAC | Performed by: NURSE PRACTITIONER

## 2020-12-16 PROCEDURE — 99212 OFFICE O/P EST SF 10 MIN: CPT | Mod: S$PBB,,, | Performed by: NURSE PRACTITIONER

## 2020-12-16 PROCEDURE — 99999 PR PBB SHADOW E&M-EST. PATIENT-LVL IV: ICD-10-PCS | Mod: PBBFAC,,, | Performed by: NURSE PRACTITIONER

## 2020-12-16 RX ORDER — TRIAMCINOLONE ACETONIDE 1 MG/G
CREAM TOPICAL 2 TIMES DAILY
Qty: 453.6 G | Refills: 0 | Status: SHIPPED | OUTPATIENT
Start: 2020-12-16 | End: 2021-04-23

## 2020-12-16 NOTE — PATIENT INSTRUCTIONS
Apply triamcinolone cream twice daily to right shin as directed.  Once dry flaky skin is resolved, you may use a good moisturizer such as Cerave Cream, Eucerin Cream or Aquaphor one to two times daily. Bathe with a mild soap such as dove or ivory.

## 2020-12-31 ENCOUNTER — EXTERNAL CHRONIC CARE MANAGEMENT (OUTPATIENT)
Dept: PRIMARY CARE CLINIC | Facility: CLINIC | Age: 80
End: 2020-12-31
Payer: MEDICARE

## 2020-12-31 PROCEDURE — 99490 PR CHRONIC CARE MGMT, 1ST 20 MIN: ICD-10-PCS | Mod: S$GLB,,, | Performed by: FAMILY MEDICINE

## 2020-12-31 PROCEDURE — 99490 CHRNC CARE MGMT STAFF 1ST 20: CPT | Mod: S$GLB,,, | Performed by: FAMILY MEDICINE

## 2021-01-04 ENCOUNTER — PATIENT OUTREACH (OUTPATIENT)
Dept: HOME HEALTH SERVICES | Facility: HOSPITAL | Age: 81
End: 2021-01-04

## 2021-01-05 ENCOUNTER — EXTERNAL HOME HEALTH (OUTPATIENT)
Dept: HOME HEALTH SERVICES | Facility: HOSPITAL | Age: 81
End: 2021-01-05
Payer: MEDICARE

## 2021-01-13 PROCEDURE — G0179 MD RECERTIFICATION HHA PT: HCPCS | Mod: ,,, | Performed by: FAMILY MEDICINE

## 2021-01-13 PROCEDURE — G0179 PR HOME HEALTH MD RECERTIFICATION: ICD-10-PCS | Mod: ,,, | Performed by: FAMILY MEDICINE

## 2021-01-19 ENCOUNTER — PATIENT OUTREACH (OUTPATIENT)
Dept: HOME HEALTH SERVICES | Facility: HOSPITAL | Age: 81
End: 2021-01-19

## 2021-01-20 ENCOUNTER — EXTERNAL HOME HEALTH (OUTPATIENT)
Dept: HOME HEALTH SERVICES | Facility: HOSPITAL | Age: 81
End: 2021-01-20
Payer: MEDICARE

## 2021-01-27 ENCOUNTER — DOCUMENT SCAN (OUTPATIENT)
Dept: HOME HEALTH SERVICES | Facility: HOSPITAL | Age: 81
End: 2021-01-27
Payer: MEDICARE

## 2021-01-27 ENCOUNTER — PATIENT OUTREACH (OUTPATIENT)
Dept: ADMINISTRATIVE | Facility: OTHER | Age: 81
End: 2021-01-27

## 2021-01-27 ENCOUNTER — OFFICE VISIT (OUTPATIENT)
Dept: WOUND CARE | Facility: CLINIC | Age: 81
End: 2021-01-27
Payer: MEDICARE

## 2021-01-27 ENCOUNTER — TELEPHONE (OUTPATIENT)
Dept: WOUND CARE | Facility: CLINIC | Age: 81
End: 2021-01-27

## 2021-01-27 VITALS
WEIGHT: 284.81 LBS | SYSTOLIC BLOOD PRESSURE: 149 MMHG | DIASTOLIC BLOOD PRESSURE: 87 MMHG | HEIGHT: 72 IN | HEART RATE: 70 BPM | TEMPERATURE: 98 F | BODY MASS INDEX: 38.58 KG/M2

## 2021-01-27 DIAGNOSIS — R60.0 LOWER LEG EDEMA: ICD-10-CM

## 2021-01-27 DIAGNOSIS — S81.801A OPEN WOUND OF RIGHT LOWER LEG, INITIAL ENCOUNTER: Primary | ICD-10-CM

## 2021-01-27 PROCEDURE — 99999 PR PBB SHADOW E&M-EST. PATIENT-LVL IV: CPT | Mod: PBBFAC,,, | Performed by: NURSE PRACTITIONER

## 2021-01-27 PROCEDURE — 29580 STRAPPING UNNA BOOT: CPT | Mod: S$PBB,RT,, | Performed by: NURSE PRACTITIONER

## 2021-01-27 PROCEDURE — 29580 PR STRAPPING UNNA BOOT: ICD-10-PCS | Mod: S$PBB,RT,, | Performed by: NURSE PRACTITIONER

## 2021-01-27 PROCEDURE — 99999 PR PBB SHADOW E&M-EST. PATIENT-LVL IV: ICD-10-PCS | Mod: PBBFAC,,, | Performed by: NURSE PRACTITIONER

## 2021-01-27 PROCEDURE — 29580 STRAPPING UNNA BOOT: CPT | Mod: PBBFAC,RT | Performed by: NURSE PRACTITIONER

## 2021-01-27 PROCEDURE — 99214 OFFICE O/P EST MOD 30 MIN: CPT | Mod: PBBFAC | Performed by: NURSE PRACTITIONER

## 2021-01-29 ENCOUNTER — DOCUMENT SCAN (OUTPATIENT)
Dept: HOME HEALTH SERVICES | Facility: HOSPITAL | Age: 81
End: 2021-01-29
Payer: MEDICARE

## 2021-01-31 ENCOUNTER — EXTERNAL CHRONIC CARE MANAGEMENT (OUTPATIENT)
Dept: PRIMARY CARE CLINIC | Facility: CLINIC | Age: 81
End: 2021-01-31
Payer: MEDICARE

## 2021-01-31 PROCEDURE — 99490 PR CHRONIC CARE MGMT, 1ST 20 MIN: ICD-10-PCS | Mod: S$GLB,,, | Performed by: FAMILY MEDICINE

## 2021-01-31 PROCEDURE — 99490 CHRNC CARE MGMT STAFF 1ST 20: CPT | Mod: S$GLB,,, | Performed by: FAMILY MEDICINE

## 2021-02-23 ENCOUNTER — TELEPHONE (OUTPATIENT)
Dept: FAMILY MEDICINE | Facility: CLINIC | Age: 81
End: 2021-02-23

## 2021-02-23 DIAGNOSIS — M79.89 LEG SWELLING: Primary | ICD-10-CM

## 2021-03-14 PROCEDURE — G0179 MD RECERTIFICATION HHA PT: HCPCS | Mod: ,,, | Performed by: FAMILY MEDICINE

## 2021-03-14 PROCEDURE — G0179 PR HOME HEALTH MD RECERTIFICATION: ICD-10-PCS | Mod: ,,, | Performed by: FAMILY MEDICINE

## 2021-03-18 ENCOUNTER — PATIENT OUTREACH (OUTPATIENT)
Dept: ADMINISTRATIVE | Facility: OTHER | Age: 81
End: 2021-03-18

## 2021-03-18 ENCOUNTER — TELEPHONE (OUTPATIENT)
Dept: CARDIOLOGY | Facility: CLINIC | Age: 81
End: 2021-03-18

## 2021-03-22 ENCOUNTER — OFFICE VISIT (OUTPATIENT)
Dept: CARDIOLOGY | Facility: CLINIC | Age: 81
End: 2021-03-22
Payer: MEDICARE

## 2021-03-22 VITALS
SYSTOLIC BLOOD PRESSURE: 144 MMHG | OXYGEN SATURATION: 99 % | DIASTOLIC BLOOD PRESSURE: 76 MMHG | BODY MASS INDEX: 37.68 KG/M2 | HEART RATE: 66 BPM | WEIGHT: 278.19 LBS | HEIGHT: 72 IN

## 2021-03-22 DIAGNOSIS — I10 ESSENTIAL HYPERTENSION: Primary | ICD-10-CM

## 2021-03-22 DIAGNOSIS — M79.89 RIGHT LEG SWELLING: ICD-10-CM

## 2021-03-22 PROBLEM — R60.0 LOWER LEG EDEMA: Status: RESOLVED | Noted: 2019-09-20 | Resolved: 2021-03-22

## 2021-03-22 PROCEDURE — 99999 PR PBB SHADOW E&M-EST. PATIENT-LVL III: CPT | Mod: PBBFAC,,, | Performed by: INTERNAL MEDICINE

## 2021-03-22 PROCEDURE — 99999 PR PBB SHADOW E&M-EST. PATIENT-LVL III: ICD-10-PCS | Mod: PBBFAC,,, | Performed by: INTERNAL MEDICINE

## 2021-03-22 PROCEDURE — 99213 OFFICE O/P EST LOW 20 MIN: CPT | Mod: PBBFAC,PO | Performed by: INTERNAL MEDICINE

## 2021-03-22 PROCEDURE — 99214 PR OFFICE/OUTPT VISIT, EST, LEVL IV, 30-39 MIN: ICD-10-PCS | Mod: S$PBB,,, | Performed by: INTERNAL MEDICINE

## 2021-03-22 PROCEDURE — 99214 OFFICE O/P EST MOD 30 MIN: CPT | Mod: S$PBB,,, | Performed by: INTERNAL MEDICINE

## 2021-03-22 RX ORDER — LOSARTAN POTASSIUM 100 MG/1
100 TABLET ORAL DAILY
Qty: 90 TABLET | Refills: 4 | Status: SHIPPED | OUTPATIENT
Start: 2021-03-22 | End: 2021-04-23

## 2021-04-12 ENCOUNTER — TELEPHONE (OUTPATIENT)
Dept: CARDIOLOGY | Facility: CLINIC | Age: 81
End: 2021-04-12

## 2021-04-13 DIAGNOSIS — I10 ESSENTIAL HYPERTENSION: Primary | ICD-10-CM

## 2021-04-13 RX ORDER — DOXAZOSIN 2 MG/1
2 TABLET ORAL NIGHTLY
Qty: 30 TABLET | Refills: 4 | Status: SHIPPED | OUTPATIENT
Start: 2021-04-13 | End: 2021-04-23

## 2021-04-21 ENCOUNTER — PATIENT OUTREACH (OUTPATIENT)
Dept: ADMINISTRATIVE | Facility: OTHER | Age: 81
End: 2021-04-21

## 2021-04-23 ENCOUNTER — OFFICE VISIT (OUTPATIENT)
Dept: CARDIOLOGY | Facility: CLINIC | Age: 81
End: 2021-04-23
Payer: MEDICARE

## 2021-04-23 VITALS
DIASTOLIC BLOOD PRESSURE: 74 MMHG | WEIGHT: 281.63 LBS | HEIGHT: 72 IN | SYSTOLIC BLOOD PRESSURE: 142 MMHG | HEART RATE: 64 BPM | OXYGEN SATURATION: 98 % | BODY MASS INDEX: 38.15 KG/M2

## 2021-04-23 DIAGNOSIS — M79.89 RIGHT LEG SWELLING: ICD-10-CM

## 2021-04-23 DIAGNOSIS — I10 ESSENTIAL HYPERTENSION: ICD-10-CM

## 2021-04-23 DIAGNOSIS — I44.4 LEFT ANTERIOR FASCICULAR BLOCK: ICD-10-CM

## 2021-04-23 PROCEDURE — 99213 PR OFFICE/OUTPT VISIT, EST, LEVL III, 20-29 MIN: ICD-10-PCS | Mod: S$PBB,,, | Performed by: INTERNAL MEDICINE

## 2021-04-23 PROCEDURE — 99213 OFFICE O/P EST LOW 20 MIN: CPT | Mod: S$PBB,,, | Performed by: INTERNAL MEDICINE

## 2021-04-23 PROCEDURE — 99999 PR PBB SHADOW E&M-EST. PATIENT-LVL III: ICD-10-PCS | Mod: PBBFAC,,, | Performed by: INTERNAL MEDICINE

## 2021-04-23 PROCEDURE — 99999 PR PBB SHADOW E&M-EST. PATIENT-LVL III: CPT | Mod: PBBFAC,,, | Performed by: INTERNAL MEDICINE

## 2021-04-23 PROCEDURE — 99213 OFFICE O/P EST LOW 20 MIN: CPT | Mod: PBBFAC,PO | Performed by: INTERNAL MEDICINE

## 2021-04-23 RX ORDER — NIFEDIPINE 90 MG/1
90 TABLET, FILM COATED, EXTENDED RELEASE ORAL DAILY
COMMUNITY
End: 2021-04-23 | Stop reason: SDUPTHER

## 2021-04-23 RX ORDER — NIFEDIPINE 90 MG/1
90 TABLET, FILM COATED, EXTENDED RELEASE ORAL DAILY
Qty: 90 TABLET | Refills: 4 | Status: SHIPPED | OUTPATIENT
Start: 2021-04-23 | End: 2021-05-24 | Stop reason: SDUPTHER

## 2021-04-28 ENCOUNTER — TELEPHONE (OUTPATIENT)
Dept: FAMILY MEDICINE | Facility: CLINIC | Age: 81
End: 2021-04-28

## 2021-05-10 ENCOUNTER — PATIENT OUTREACH (OUTPATIENT)
Dept: ADMINISTRATIVE | Facility: HOSPITAL | Age: 81
End: 2021-05-10

## 2021-05-10 DIAGNOSIS — I10 ESSENTIAL HYPERTENSION: Primary | ICD-10-CM

## 2021-05-14 ENCOUNTER — LAB VISIT (OUTPATIENT)
Dept: LAB | Facility: HOSPITAL | Age: 81
End: 2021-05-14
Attending: FAMILY MEDICINE
Payer: MEDICARE

## 2021-05-14 ENCOUNTER — PES CALL (OUTPATIENT)
Dept: ADMINISTRATIVE | Facility: CLINIC | Age: 81
End: 2021-05-14

## 2021-05-14 DIAGNOSIS — I10 ESSENTIAL HYPERTENSION: ICD-10-CM

## 2021-05-14 LAB
CHOLEST SERPL-MCNC: 163 MG/DL (ref 120–199)
CHOLEST/HDLC SERPL: 6 {RATIO} (ref 2–5)
HDLC SERPL-MCNC: 27 MG/DL (ref 40–75)
HDLC SERPL: 16.6 % (ref 20–50)
LDLC SERPL CALC-MCNC: 113.2 MG/DL (ref 63–159)
NONHDLC SERPL-MCNC: 136 MG/DL
TRIGL SERPL-MCNC: 114 MG/DL (ref 30–150)

## 2021-05-14 PROCEDURE — 36415 COLL VENOUS BLD VENIPUNCTURE: CPT | Mod: PO | Performed by: FAMILY MEDICINE

## 2021-05-14 PROCEDURE — 80061 LIPID PANEL: CPT | Performed by: FAMILY MEDICINE

## 2021-05-17 ENCOUNTER — TELEPHONE (OUTPATIENT)
Dept: FAMILY MEDICINE | Facility: CLINIC | Age: 81
End: 2021-05-17

## 2021-05-20 ENCOUNTER — PATIENT OUTREACH (OUTPATIENT)
Dept: HOME HEALTH SERVICES | Facility: HOSPITAL | Age: 81
End: 2021-05-20

## 2021-05-21 ENCOUNTER — EXTERNAL HOME HEALTH (OUTPATIENT)
Dept: HOME HEALTH SERVICES | Facility: HOSPITAL | Age: 81
End: 2021-05-21
Payer: MEDICARE

## 2021-05-21 PROBLEM — E66.01 CLASS 2 SEVERE OBESITY WITH SERIOUS COMORBIDITY AND BODY MASS INDEX (BMI) OF 38.0 TO 38.9 IN ADULT: Status: ACTIVE | Noted: 2021-05-21

## 2021-05-24 ENCOUNTER — OFFICE VISIT (OUTPATIENT)
Dept: FAMILY MEDICINE | Facility: CLINIC | Age: 81
End: 2021-05-24
Payer: MEDICARE

## 2021-05-24 VITALS
HEIGHT: 72 IN | TEMPERATURE: 98 F | DIASTOLIC BLOOD PRESSURE: 66 MMHG | WEIGHT: 282.5 LBS | BODY MASS INDEX: 38.26 KG/M2 | OXYGEN SATURATION: 94 % | HEART RATE: 68 BPM | SYSTOLIC BLOOD PRESSURE: 132 MMHG

## 2021-05-24 DIAGNOSIS — E78.6 LOW HDL (UNDER 40): ICD-10-CM

## 2021-05-24 DIAGNOSIS — Z00.00 WELLNESS EXAMINATION: Primary | ICD-10-CM

## 2021-05-24 DIAGNOSIS — E66.01 CLASS 2 SEVERE OBESITY WITH SERIOUS COMORBIDITY AND BODY MASS INDEX (BMI) OF 38.0 TO 38.9 IN ADULT, UNSPECIFIED OBESITY TYPE: ICD-10-CM

## 2021-05-24 DIAGNOSIS — I10 ESSENTIAL HYPERTENSION: ICD-10-CM

## 2021-05-24 PROCEDURE — 99213 OFFICE O/P EST LOW 20 MIN: CPT | Mod: S$GLB,,, | Performed by: FAMILY MEDICINE

## 2021-05-24 PROCEDURE — 99213 PR OFFICE/OUTPT VISIT, EST, LEVL III, 20-29 MIN: ICD-10-PCS | Mod: S$GLB,,, | Performed by: FAMILY MEDICINE

## 2021-05-24 RX ORDER — NIFEDIPINE 90 MG/1
90 TABLET, FILM COATED, EXTENDED RELEASE ORAL DAILY
Qty: 90 TABLET | Refills: 3 | Status: SHIPPED | OUTPATIENT
Start: 2021-05-24 | End: 2022-06-29 | Stop reason: SDUPTHER

## 2021-05-24 RX ORDER — ATENOLOL 50 MG/1
50 TABLET ORAL 2 TIMES DAILY
Qty: 180 TABLET | Refills: 3 | Status: CANCELLED | OUTPATIENT
Start: 2021-05-24

## 2021-05-24 RX ORDER — ATENOLOL 50 MG/1
50 TABLET ORAL 2 TIMES DAILY
Qty: 180 TABLET | Refills: 3 | Status: SHIPPED | OUTPATIENT
Start: 2021-05-24 | End: 2022-07-07 | Stop reason: SDUPTHER

## 2021-05-24 RX ORDER — NIFEDIPINE 90 MG/1
90 TABLET, FILM COATED, EXTENDED RELEASE ORAL DAILY
Qty: 90 TABLET | Refills: 4 | Status: CANCELLED | OUTPATIENT
Start: 2021-05-24

## 2021-05-24 RX ORDER — ATENOLOL 50 MG/1
50 TABLET ORAL 2 TIMES DAILY
Qty: 180 TABLET | Refills: 3 | Status: SHIPPED | OUTPATIENT
Start: 2021-05-24 | End: 2021-05-24 | Stop reason: SDUPTHER

## 2021-05-28 ENCOUNTER — DOCUMENT SCAN (OUTPATIENT)
Dept: HOME HEALTH SERVICES | Facility: HOSPITAL | Age: 81
End: 2021-05-28
Payer: MEDICARE

## 2021-06-03 NOTE — PROGRESS NOTES
Left another message for Mr. Hernandez to reschedule his one month follow-up with NP and labs that he missed on 5/27.    Subjective:       Patient ID: Aquiles Montana is a 80 y.o. male.    Chief Complaint: Wound Check    Wound Check     This is a 79 year old male referred by Dr. Singh for evaluation and management of an incisional wound to the right lower leg.  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 Singh.  He is having compression therapy wraps placed on the leg two times weekly by home health.  The wound is now healed.  His medical history is significant for arthritis, hypertension and psoriasis. He is afebrile. He denies increased redness, swelling or purulent drainage.  He does not complain of pain. He has home health through [facility].     Review of Systems   Constitutional: Negative for chills, diaphoresis and fever.   HENT: Negative for hearing loss, postnasal drip, rhinorrhea, sinus pressure, sneezing, sore throat, tinnitus and trouble swallowing.    Eyes: Negative for visual disturbance.   Respiratory: Negative for apnea, cough, shortness of breath and wheezing.    Cardiovascular: Positive for leg swelling. Negative for chest pain and palpitations.   Gastrointestinal: Negative for constipation, diarrhea, nausea and vomiting.   Genitourinary: Negative for difficulty urinating, dysuria, frequency and hematuria.   Musculoskeletal: Negative for arthralgias, back pain and joint swelling.   Skin: Positive for wound.   Neurological: Negative for dizziness, weakness, light-headedness and headaches.   Hematological: Does not bruise/bleed easily.   Psychiatric/Behavioral: Positive for sleep disturbance. Negative for confusion, decreased concentration and dysphoric mood. The patient is not nervous/anxious.        Objective:      Physical Exam  Vitals signs and nursing note reviewed.   Constitutional:       General: He is not in acute distress.     Appearance: He is well-developed. He is not diaphoretic.   HENT:      Head: Normocephalic and atraumatic.   Musculoskeletal:          General: No tenderness.        Legs:    Skin:     General: Skin is warm and dry.      Findings: No erythema or rash.   Neurological:      Mental Status: He is alert and oriented to person, place, and time.   Psychiatric:         Behavior: Behavior normal.         Thought Content: Thought content normal.         Judgment: Judgment normal.         Right shin      Assessment:       1. Dermatitis    2. Lower leg edema               Plan:            Apply triamcinolone cream twice daily to affected area right shin.  [facility] notified to discharge patient from wound care via [employer] fax at [phone].  Return to this clinic as needed.

## 2022-03-11 ENCOUNTER — PES CALL (OUTPATIENT)
Dept: ADMINISTRATIVE | Facility: CLINIC | Age: 82
End: 2022-03-11
Payer: MEDICARE

## 2022-04-25 ENCOUNTER — PATIENT OUTREACH (OUTPATIENT)
Dept: ADMINISTRATIVE | Facility: HOSPITAL | Age: 82
End: 2022-04-25
Payer: MEDICARE

## 2022-05-16 ENCOUNTER — PATIENT OUTREACH (OUTPATIENT)
Dept: ADMINISTRATIVE | Facility: HOSPITAL | Age: 82
End: 2022-05-16
Payer: MEDICARE

## 2022-06-15 ENCOUNTER — PATIENT OUTREACH (OUTPATIENT)
Dept: ADMINISTRATIVE | Facility: HOSPITAL | Age: 82
End: 2022-06-15
Payer: MEDICARE

## 2022-06-29 ENCOUNTER — OFFICE VISIT (OUTPATIENT)
Dept: FAMILY MEDICINE | Facility: CLINIC | Age: 82
End: 2022-06-29
Payer: MEDICARE

## 2022-06-29 VITALS
HEIGHT: 72 IN | WEIGHT: 280 LBS | TEMPERATURE: 98 F | DIASTOLIC BLOOD PRESSURE: 62 MMHG | BODY MASS INDEX: 37.93 KG/M2 | HEART RATE: 73 BPM | OXYGEN SATURATION: 95 % | SYSTOLIC BLOOD PRESSURE: 110 MMHG

## 2022-06-29 DIAGNOSIS — Z12.5 ENCOUNTER FOR SCREENING FOR MALIGNANT NEOPLASM OF PROSTATE: ICD-10-CM

## 2022-06-29 DIAGNOSIS — E66.01 CLASS 2 SEVERE OBESITY WITH SERIOUS COMORBIDITY AND BODY MASS INDEX (BMI) OF 38.0 TO 38.9 IN ADULT, UNSPECIFIED OBESITY TYPE: ICD-10-CM

## 2022-06-29 DIAGNOSIS — I10 ESSENTIAL HYPERTENSION: ICD-10-CM

## 2022-06-29 DIAGNOSIS — C44.722 SQUAMOUS CELL CARCINOMA OF RIGHT LOWER LEG: ICD-10-CM

## 2022-06-29 DIAGNOSIS — R22.42 LOCALIZED SWELLING, MASS AND LUMP, LEFT LOWER LIMB: ICD-10-CM

## 2022-06-29 DIAGNOSIS — I89.0 LYMPHEDEMA: Primary | ICD-10-CM

## 2022-06-29 DIAGNOSIS — Z00.00 WELLNESS EXAMINATION: ICD-10-CM

## 2022-06-29 DIAGNOSIS — L03.116 CELLULITIS OF LEFT LOWER EXTREMITY: ICD-10-CM

## 2022-06-29 PROCEDURE — 99214 OFFICE O/P EST MOD 30 MIN: CPT | Mod: S$GLB,,, | Performed by: FAMILY MEDICINE

## 2022-06-29 PROCEDURE — 99214 PR OFFICE/OUTPT VISIT, EST, LEVL IV, 30-39 MIN: ICD-10-PCS | Mod: S$GLB,,, | Performed by: FAMILY MEDICINE

## 2022-06-29 RX ORDER — FUROSEMIDE 40 MG/1
40 TABLET ORAL DAILY
Qty: 30 TABLET | Refills: 11 | Status: ON HOLD | OUTPATIENT
Start: 2022-06-29 | End: 2023-11-03 | Stop reason: HOSPADM

## 2022-06-29 RX ORDER — CEPHALEXIN 500 MG/1
500 CAPSULE ORAL 2 TIMES DAILY
Qty: 20 CAPSULE | Refills: 0 | Status: SHIPPED | OUTPATIENT
Start: 2022-06-29 | End: 2023-02-09

## 2022-06-29 NOTE — TELEPHONE ENCOUNTER
Care Due:                  Date            Visit Type   Department     Provider  --------------------------------------------------------------------------------                                EP -                              PRIMARY      [facility]  Last Visit: 06-      CARE ([facility])   KEELY Hernandez                               -                              PRIMARY      [facility]  Next Visit: 07-      CARE ([facility])   KEELY Hernandez                                                            Last  Test          Frequency    Reason                     Performed    Due Date  --------------------------------------------------------------------------------    CMP.........  12 months..  furosemide...............  Not Found    Overdue    [employer] Embedded Care Gaps. Reference number: [id number]. 6/29/2022   4:10:50 PM CDT

## 2022-06-29 NOTE — PROGRESS NOTES
Subjective:      Patient ID: Aquiles Montana is a 81 y.o. male.    Chief Complaint: No chief complaint on file.      Vitals:    06/29/22 1438   BP: 110/62   Pulse: 73   Temp: 97.6 °F (36.4 °C)   TempSrc: Oral   SpO2: 95%   Weight: 127 kg (279 lb 15.8 oz)   Height: 6' (1.829 m)        HPI   Left leg swelling and redness for one month; used to be normal; was always right leg with issues and Dr Arrieta at [facility], now it is his leg also; denies SOB; no fever; no surgery aor trauma to laft leg    Problem List  Patient Active Problem List   Diagnosis    Benign non-nodular prostatic hyperplasia with lower urinary tract symptoms    Nocturia    Hypertension    Obesity    Arthritis    Psoriasis    SCCA (squamous cell carcinoma) of skin    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    Dermatitis    Tinea pedis of both feet    Open wound of right lower leg    Right leg swelling    Left anterior fascicular block    Class 2 severe obesity with serious comorbidity and body mass index (BMI) of 38.0 to 38.9 in adult        ALLERGIES: Review of patient's allergies indicates:  No Known Allergies    MEDS:   Current Outpatient Medications:     aspirin (ECOTRIN) 81 MG EC tablet, Take 81 mg by mouth once daily., Disp: , Rfl:     atenoloL (TENORMIN) 50 MG tablet, Take 1 tablet (50 mg total) by mouth 2 (two) times daily., Disp: 180 tablet, Rfl: 3    latanoprost 0.005 % ophthalmic solution, Place 1 drop into both eyes every evening., Disp: , Rfl:     NIFEdipine (ADALAT CC) 90 MG TbSR, Take 1 tablet (90 mg total) by mouth once daily., Disp: 90 tablet, Rfl: 3    cephALEXin (KEFLEX) 500 MG capsule, Take 1 capsule (500 mg total) by mouth 2 (two) times daily. For infection, Disp: 20 capsule, Rfl: 0    furosemide (LASIX) 40 MG tablet, Take 1 tablet (40 mg total) by mouth once daily., Disp: 30 tablet, Rfl: 11      History:  Current Providers as of 6/29/2022  PCP: Marty Hernandez MD  Care Team Provider: Dario Boucher MD  Care  "Team Provider: Rafael Schwab MD  Care Team Provider: Sola Hampton LPN  Care Team Provider: Patel Singh  Encounter Provider: Marty Hernandez MD, starting on Wed Jun 29, 2022 12:00 AM  Referring Provider: not found, starting on Wed Jun 29, 2022 12:00 AM  Consulting Physician: Marty Hernandez MD, starting on Wed Jun 29, 2022  2:37 PM (Active)   Past Medical History:   Diagnosis Date    Hypertension     Urinary tract infection      Past Surgical History:   Procedure Laterality Date    hand sugery      PROSTATE SURGERY      "Rotor rooter- opened up"     Social History     Tobacco Use    Smoking status: Never Smoker    Smokeless tobacco: Never Used   Substance Use Topics    Alcohol use: No     Alcohol/week: 0.0 standard drinks    Drug use: No         Review of Systems   Cardiovascular: Positive for leg swelling.   Skin: Positive for color change and rash.   All other systems reviewed and are negative.    Objective:     Physical Exam  Vitals and nursing note reviewed.   Constitutional:       General: He is not in acute distress.     Appearance: He is well-developed. He is obese. He is not diaphoretic.   HENT:      Head: Normocephalic and atraumatic.      Right Ear: External ear normal.      Left Ear: External ear normal.      Mouth/Throat:      Pharynx: No oropharyngeal exudate.   Eyes:      General: No scleral icterus.        Right eye: No discharge.         Left eye: No discharge.      Conjunctiva/sclera: Conjunctivae normal.      Pupils: Pupils are equal, round, and reactive to light.   Neck:      Thyroid: No thyromegaly.      Vascular: No JVD.      Trachea: No tracheal deviation.   Cardiovascular:      Rate and Rhythm: Normal rate and regular rhythm.      Heart sounds: No murmur heard.    No friction rub. No gallop.   Pulmonary:      Effort: Pulmonary effort is normal. No respiratory distress.      Breath sounds: Normal breath sounds. No stridor. No wheezing or rales.   Chest:      " Chest wall: No tenderness.   Abdominal:      General: There is no distension.      Palpations: Abdomen is soft. There is no mass.      Tenderness: There is no abdominal tenderness. There is no guarding or rebound.   Musculoskeletal:         General: Swelling present. No tenderness. Normal range of motion.      Cervical back: Normal range of motion and neck supple.      Right lower leg: Edema present.      Left lower leg: Edema present.   Lymphadenopathy:      Cervical: No cervical adenopathy.   Skin:     General: Skin is warm and dry.      Coloration: Skin is not pale.      Findings: Erythema present. No rash.   Neurological:      Mental Status: He is alert and oriented to person, place, and time.      Cranial Nerves: No cranial nerve deficit.      Motor: No abnormal muscle tone.      Coordination: Coordination normal.      Deep Tendon Reflexes: Reflexes are normal and symmetric. Reflexes normal.   Psychiatric:         Behavior: Behavior normal.         Thought Content: Thought content normal.         Judgment: Judgment normal.             Assessment:     1. Lymphedema    2. Cellulitis of left lower extremity    3. Class 2 severe obesity with serious comorbidity and body mass index (BMI) of 38.0 to 38.9 in adult, unspecified obesity type    4. Wellness examination    5. Essential hypertension    6. 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    7. Encounter for screening for malignant neoplasm of prostate     8. Localized swelling, mass and lump, left lower limb       Plan:        Medication List          Accurate as of June 29, 2022  3:58 PM. If you have any questions, ask your nurse or doctor.            START taking these medications    cephALEXin 500 MG capsule  Commonly known as: KEFLEX  Take 1 capsule (500 mg total) by mouth 2 (two) times daily. For infection  Started by: Marty Hernandez MD     furosemide 40 MG tablet  Commonly known as: LASIX  Take 1 tablet (40 mg total) by mouth once daily.  Started by: Marty  MAGO Hernandez MD        CHANGE how you take these medications    NIFEdipine 90 MG Tbsr  Commonly known as: ADALAT CC  Take 1 tablet (90 mg total) by mouth once daily.  What changed: Another medication with the same name was removed. Continue taking this medication, and follow the directions you see here.  Changed by: Marty Hernandez MD        CONTINUE taking these medications    aspirin 81 MG EC tablet  Commonly known as: ECOTRIN     atenoloL 50 MG tablet  Commonly known as: TENORMIN  Take 1 tablet (50 mg total) by mouth 2 (two) times daily.     latanoprost 0.005 % ophthalmic solution           Where to Get Your Medications      These medications were sent to [id number] - [place], [place] - [address] [facility]  [address] [facility] [address]    Phone: [phone]   · cephALEXin 500 MG capsule  · furosemide 40 MG tablet       Lymphedema  -     CBC Auto Differential; Future; Expected date: 06/29/2022  -     Comprehensive Metabolic Panel; Future; Expected date: 06/29/2022  -     BNP; Future; Expected date: 06/29/2022  -     Lipid Panel; Future  -     PSA, Screening; Future  -     Sedimentation rate; Future  -     TSH; Future  -     Urinalysis; Future  -     Vitamin D; Future  -     US Lower Extremity Veins Left; Future; Expected date: 06/29/2022    Cellulitis of left lower extremity  -     CBC Auto Differential; Future; Expected date: 06/29/2022  -     Comprehensive Metabolic Panel; Future; Expected date: 06/29/2022  -     BNP; Future; Expected date: 06/29/2022  -     Lipid Panel; Future  -     PSA, Screening; Future  -     Sedimentation rate; Future  -     TSH; Future  -     Urinalysis; Future  -     Vitamin D; Future  -     US Lower Extremity Veins Left; Future; Expected date: 06/29/2022    Class 2 severe obesity with serious comorbidity and body mass index (BMI) of 38.0 to 38.9 in adult, unspecified obesity type  -     CBC Auto Differential; Future; Expected date: 06/29/2022  -     Comprehensive Metabolic  Panel; Future; Expected date: 06/29/2022  -     BNP; Future; Expected date: 06/29/2022  -     Lipid Panel; Future  -     PSA, Screening; Future  -     Sedimentation rate; Future  -     TSH; Future  -     Urinalysis; Future  -     Vitamin D; Future  -     US Lower Extremity Veins Left; Future; Expected date: 06/29/2022    Wellness examination  -     CBC Auto Differential; Future; Expected date: 06/29/2022  -     Comprehensive Metabolic Panel; Future; Expected date: 06/29/2022  -     BNP; Future; Expected date: 06/29/2022  -     Lipid Panel; Future  -     PSA, Screening; Future  -     Sedimentation rate; Future  -     TSH; Future  -     Urinalysis; Future  -     Vitamin D; Future  -     US Lower Extremity Veins Left; Future; Expected date: 06/29/2022    Essential hypertension  -     CBC Auto Differential; Future; Expected date: 06/29/2022  -     Comprehensive Metabolic Panel; Future; Expected date: 06/29/2022  -     BNP; Future; Expected date: 06/29/2022  -     Lipid Panel; Future  -     PSA, Screening; Future  -     Sedimentation rate; Future  -     TSH; Future  -     Urinalysis; Future  -     Vitamin D; Future  -     US Lower Extremity Veins Left; Future; Expected date: 06/29/2022    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  -     CBC Auto Differential; Future; Expected date: 06/29/2022  -     Comprehensive Metabolic Panel; Future; Expected date: 06/29/2022  -     BNP; Future; Expected date: 06/29/2022  -     Lipid Panel; Future  -     PSA, Screening; Future  -     Sedimentation rate; Future  -     TSH; Future  -     Urinalysis; Future  -     Vitamin D; Future  -     US Lower Extremity Veins Left; Future; Expected date: 06/29/2022    Encounter for screening for malignant neoplasm of prostate   -     PSA, Screening; Future  -     US Lower Extremity Veins Left; Future; Expected date: 06/29/2022    Localized swelling, mass and lump, left lower limb   -     US Lower Extremity Veins Left; Future; Expected date:  06/29/2022    Other orders  -     furosemide (LASIX) 40 MG tablet; Take 1 tablet (40 mg total) by mouth once daily.  Dispense: 30 tablet; Refill: 11  -     cephALEXin (KEFLEX) 500 MG capsule; Take 1 capsule (500 mg total) by mouth 2 (two) times daily. For infection  Dispense: 20 capsule; Refill: 0

## 2022-07-02 RX ORDER — NIFEDIPINE 90 MG/1
90 TABLET, FILM COATED, EXTENDED RELEASE ORAL DAILY
Qty: 90 TABLET | Refills: 3 | Status: SHIPPED | OUTPATIENT
Start: 2022-07-02 | End: 2023-08-11

## 2022-07-05 ENCOUNTER — HOSPITAL ENCOUNTER (OUTPATIENT)
Dept: RADIOLOGY | Facility: HOSPITAL | Age: 82
Discharge: HOME | End: 2022-07-05
Attending: FAMILY MEDICINE
Payer: MEDICARE

## 2022-07-05 DIAGNOSIS — Z12.5 ENCOUNTER FOR SCREENING FOR MALIGNANT NEOPLASM OF PROSTATE: ICD-10-CM

## 2022-07-05 DIAGNOSIS — I10 ESSENTIAL HYPERTENSION: ICD-10-CM

## 2022-07-05 DIAGNOSIS — R22.42 LOCALIZED SWELLING, MASS AND LUMP, LEFT LOWER LIMB: ICD-10-CM

## 2022-07-05 DIAGNOSIS — I89.0 LYMPHEDEMA: ICD-10-CM

## 2022-07-05 DIAGNOSIS — L03.116 CELLULITIS OF LEFT LOWER EXTREMITY: ICD-10-CM

## 2022-07-05 DIAGNOSIS — Z00.00 WELLNESS EXAMINATION: ICD-10-CM

## 2022-07-05 DIAGNOSIS — E66.01 CLASS 2 SEVERE OBESITY WITH SERIOUS COMORBIDITY AND BODY MASS INDEX (BMI) OF 38.0 TO 38.9 IN ADULT, UNSPECIFIED OBESITY TYPE: ICD-10-CM

## 2022-07-05 DIAGNOSIS — C44.722 SQUAMOUS CELL CARCINOMA OF RIGHT LOWER LEG: ICD-10-CM

## 2022-07-05 PROCEDURE — 93971 EXTREMITY STUDY: CPT | Mod: TC,PO,LT

## 2022-07-07 ENCOUNTER — OFFICE VISIT (OUTPATIENT)
Dept: FAMILY MEDICINE | Facility: CLINIC | Age: 82
End: 2022-07-07
Payer: MEDICARE

## 2022-07-07 VITALS
SYSTOLIC BLOOD PRESSURE: 106 MMHG | DIASTOLIC BLOOD PRESSURE: 68 MMHG | OXYGEN SATURATION: 95 % | HEART RATE: 78 BPM | TEMPERATURE: 98 F | BODY MASS INDEX: 36.36 KG/M2 | HEIGHT: 72 IN | WEIGHT: 268.44 LBS

## 2022-07-07 DIAGNOSIS — E87.6 HYPOKALEMIA: Primary | ICD-10-CM

## 2022-07-07 DIAGNOSIS — M79.89 RIGHT LEG SWELLING: ICD-10-CM

## 2022-07-07 DIAGNOSIS — E78.6 LOW HDL (UNDER 40): ICD-10-CM

## 2022-07-07 DIAGNOSIS — I10 PRIMARY HYPERTENSION: ICD-10-CM

## 2022-07-07 DIAGNOSIS — E66.01 CLASS 2 SEVERE OBESITY WITH SERIOUS COMORBIDITY AND BODY MASS INDEX (BMI) OF 38.0 TO 38.9 IN ADULT, UNSPECIFIED OBESITY TYPE: ICD-10-CM

## 2022-07-07 DIAGNOSIS — S81.801A OPEN WOUND OF RIGHT LOWER LEG, INITIAL ENCOUNTER: ICD-10-CM

## 2022-07-07 PROCEDURE — 99214 OFFICE O/P EST MOD 30 MIN: CPT | Mod: S$GLB,,, | Performed by: FAMILY MEDICINE

## 2022-07-07 PROCEDURE — 99214 PR OFFICE/OUTPT VISIT, EST, LEVL IV, 30-39 MIN: ICD-10-PCS | Mod: S$GLB,,, | Performed by: FAMILY MEDICINE

## 2022-07-07 RX ORDER — ATENOLOL 50 MG/1
50 TABLET ORAL 2 TIMES DAILY
Qty: 180 TABLET | Refills: 3 | Status: SHIPPED | OUTPATIENT
Start: 2022-07-07 | End: 2023-08-11

## 2022-07-07 RX ORDER — POTASSIUM CHLORIDE 750 MG/1
10 TABLET, EXTENDED RELEASE ORAL DAILY
Qty: 90 TABLET | Refills: 3 | Status: SHIPPED | OUTPATIENT
Start: 2022-07-07 | End: 2023-10-24 | Stop reason: ALTCHOICE

## 2022-07-07 NOTE — PROGRESS NOTES
Subjective:      Patient ID: Aquiles Montana is a 81 y.o. male.    Chief Complaint: Follow-up (1 week)      Vitals:    07/07/22 1015   BP: 106/68   Pulse: 78   Temp: 97.8 °F (36.6 °C)   TempSrc: Oral   SpO2: 95%   Weight: 121.7 kg (268 lb 6.6 oz)   Height: 6' (1.829 m)        HPI   Follow up edema, celluilitis and bp  Need atenolol refilled; stil takin keflec and lasix  Has home helath comionradha to office  Goes [facility] tomorrow, wound care at [facility]  Lost 11 pounds since last visit    Problem List  Patient Active Problem List   Diagnosis    Benign non-nodular prostatic hyperplasia with lower urinary tract symptoms    Nocturia    Hypertension    Obesity    Arthritis    Psoriasis    SCCA (squamous cell carcinoma) of skin    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    Dermatitis    Tinea pedis of both feet    Open wound of right lower leg    Right leg swelling    Left anterior fascicular block    Class 2 severe obesity with serious comorbidity and body mass index (BMI) of 38.0 to 38.9 in adult        ALLERGIES: Review of patient's allergies indicates:  No Known Allergies    MEDS:   Current Outpatient Medications:     aspirin (ECOTRIN) 81 MG EC tablet, Take 81 mg by mouth once daily., Disp: , Rfl:     cephALEXin (KEFLEX) 500 MG capsule, Take 1 capsule (500 mg total) by mouth 2 (two) times daily. For infection, Disp: 20 capsule, Rfl: 0    furosemide (LASIX) 40 MG tablet, Take 1 tablet (40 mg total) by mouth once daily., Disp: 30 tablet, Rfl: 11    latanoprost 0.005 % ophthalmic solution, Place 1 drop into both eyes every evening., Disp: , Rfl:     NIFEdipine (ADALAT CC) 90 MG TbSR, Take 1 tablet (90 mg total) by mouth once daily., Disp: 90 tablet, Rfl: 3    atenoloL (TENORMIN) 50 MG tablet, Take 1 tablet (50 mg total) by mouth 2 (two) times daily., Disp: 180 tablet, Rfl: 3    potassium chloride (KLOR-CON) 10 MEQ TbSR, Take 1 tablet (10 mEq total) by mouth once daily. For potassium, Disp: 90 tablet,  "Rfl: 3      History:  Current Providers as of 7/7/2022  PCP: Marty Hernandez MD  Care Team Provider: Rafael Schwab MD  Care Team Provider: Sola Hampton LPN  Care Team Provider: Patel Singh  Care Team Provider: Delonte Marino MD  Care Team Provider: Jet Arrieta MD  Encounter Provider: Marty Hernandez MD, starting on Thu Jul 7, 2022 12:00 AM  Referring Provider: not found, starting on Thu Jul 7, 2022 12:00 AM  Consulting Physician: Marty Hernandez MD, starting on Thu Jul 7, 2022 10:12 AM (Active)   Past Medical History:   Diagnosis Date    Hypertension     Urinary tract infection      Past Surgical History:   Procedure Laterality Date    hand sugery      PROSTATE SURGERY      "Rotor rooter- opened up"     Social History     Tobacco Use    Smoking status: Never Smoker    Smokeless tobacco: Never Used   Substance Use Topics    Alcohol use: No     Alcohol/week: 0.0 standard drinks    Drug use: No         Review of Systems   Constitutional: Negative for appetite change, fatigue, fever and unexpected weight change.   HENT: Negative for congestion, ear pain, sinus pressure and sore throat.    Eyes: Negative for pain and visual disturbance.   Respiratory: Negative for shortness of breath.    Cardiovascular: Negative for chest pain.   Gastrointestinal: Negative for abdominal pain, constipation and diarrhea.   Endocrine: Negative for polyuria.   Genitourinary: Negative for difficulty urinating and frequency.   Musculoskeletal: Negative for arthralgias, back pain and myalgias.   Skin: Negative for color change.   Allergic/Immunologic: Negative.    Neurological: Negative for syncope, weakness and headaches.   Hematological: Does not bruise/bleed easily.   Psychiatric/Behavioral: Negative for dysphoric mood and suicidal ideas. The patient is not nervous/anxious.    All other systems reviewed and are negative.    Objective:     Physical Exam  Vitals and nursing note reviewed. "   Constitutional:       General: He is not in acute distress.     Appearance: He is well-developed. He is obese. He is not diaphoretic.   HENT:      Head: Normocephalic and atraumatic.      Right Ear: External ear normal.      Left Ear: External ear normal.      Mouth/Throat:      Pharynx: No oropharyngeal exudate.   Eyes:      General: No scleral icterus.        Right eye: No discharge.         Left eye: No discharge.      Conjunctiva/sclera: Conjunctivae normal.      Pupils: Pupils are equal, round, and reactive to light.   Neck:      Thyroid: No thyromegaly.      Vascular: No JVD.      Trachea: No tracheal deviation.   Cardiovascular:      Rate and Rhythm: Normal rate and regular rhythm.      Heart sounds: No murmur heard.    No friction rub. No gallop.   Pulmonary:      Effort: Pulmonary effort is normal. No respiratory distress.      Breath sounds: Normal breath sounds. No stridor. No wheezing or rales.   Chest:      Chest wall: No tenderness.   Abdominal:      General: There is no distension.      Palpations: Abdomen is soft. There is no mass.      Tenderness: There is no abdominal tenderness. There is no guarding or rebound.   Musculoskeletal:         General: No tenderness. Normal range of motion.      Cervical back: Normal range of motion and neck supple.   Lymphadenopathy:      Cervical: No cervical adenopathy.   Skin:     General: Skin is warm and dry.      Coloration: Skin is not pale.      Findings: No erythema or rash.   Neurological:      Mental Status: He is alert and oriented to person, place, and time.      Cranial Nerves: No cranial nerve deficit.      Motor: No abnormal muscle tone.      Coordination: Coordination normal.      Deep Tendon Reflexes: Reflexes are normal and symmetric. Reflexes normal.   Psychiatric:         Behavior: Behavior normal.         Thought Content: Thought content normal.         Judgment: Judgment normal.             Assessment:     1. Hypokalemia    2. Low HDL (under  40)    3. Right leg swelling    4. Open wound of right lower leg, initial encounter    5. Class 2 severe obesity with serious comorbidity and body mass index (BMI) of 38.0 to 38.9 in adult, unspecified obesity type    6. Primary hypertension      Plan:        Medication List          Accurate as of July 7, 2022 11:17 AM. If you have any questions, ask your nurse or doctor.            START taking these medications    potassium chloride 10 MEQ Tbsr  Commonly known as: KLOR-CON  Take 1 tablet (10 mEq total) by mouth once daily. For potassium  Started by: Marty Hernandez MD        CONTINUE taking these medications    aspirin 81 MG EC tablet  Commonly known as: ECOTRIN     atenoloL 50 MG tablet  Commonly known as: TENORMIN  Take 1 tablet (50 mg total) by mouth 2 (two) times daily.     cephALEXin 500 MG capsule  Commonly known as: KEFLEX  Take 1 capsule (500 mg total) by mouth 2 (two) times daily. For infection     furosemide 40 MG tablet  Commonly known as: LASIX  Take 1 tablet (40 mg total) by mouth once daily.     latanoprost 0.005 % ophthalmic solution     NIFEdipine 90 MG Tbsr  Commonly known as: ADALAT CC  Take 1 tablet (90 mg total) by mouth once daily.           Where to Get Your Medications      These medications were sent to [facility] [address] [place] [address] [address] [facility]  [address] [facility] [address]    Phone: [phone]   · atenoloL 50 MG tablet  · potassium chloride 10 MEQ Tbsr       Hypokalemia  -     Basic Metabolic Panel; Future; Expected date: 07/07/2022    Low HDL (under 40)    Right leg swelling    Open wound of right lower leg, initial encounter    Class 2 severe obesity with serious comorbidity and body mass index (BMI) of 38.0 to 38.9 in adult, unspecified obesity type    Primary hypertension  -     Basic Metabolic Panel; Future; Expected date: 07/07/2022    Other orders  -     atenoloL (TENORMIN) 50 MG tablet; Take 1 tablet (50 mg total) by mouth 2 (two) times daily.  Dispense:  180 tablet; Refill: 3  -     potassium chloride (KLOR-CON) 10 MEQ TbSR; Take 1 tablet (10 mEq total) by mouth once daily. For potassium  Dispense: 90 tablet; Refill: 3

## 2022-07-13 ENCOUNTER — TELEPHONE (OUTPATIENT)
Dept: FAMILY MEDICINE | Facility: CLINIC | Age: 82
End: 2022-07-13
Payer: MEDICARE

## 2022-07-13 DIAGNOSIS — R73.9 HYPERGLYCEMIA: Primary | ICD-10-CM

## 2022-07-13 NOTE — TELEPHONE ENCOUNTER
Wife stated MD spoke to pt at office visit on 7/7/22    Can you order A1c and send to staff so it can be linked to 8/9/22 lab appt

## 2022-07-13 NOTE — TELEPHONE ENCOUNTER
----- Message from Marty Hernandez MD sent at 7/10/2022 10:12 PM CDT -----  CALL PT TESTS ARE NORMAL

## 2022-07-13 NOTE — TELEPHONE ENCOUNTER
----- Message from Marty Hernandez MD sent at 7/10/2022 10:15 PM CDT -----  Sed rate high due to leg cellulitis  Glucose elevated, needs A1C  Potassium low; take potassium pills daily

## 2022-07-13 NOTE — TELEPHONE ENCOUNTER
----- Message from Marty Hernandez MD sent at 7/12/2022  9:32 AM CDT -----  CALL PT TESTS ARE NORMAL

## 2022-08-09 ENCOUNTER — TELEPHONE (OUTPATIENT)
Dept: FAMILY MEDICINE | Facility: CLINIC | Age: 82
End: 2022-08-09
Payer: MEDICARE

## 2022-08-09 ENCOUNTER — LAB VISIT (OUTPATIENT)
Dept: LAB | Facility: HOSPITAL | Age: 82
End: 2022-08-09
Attending: FAMILY MEDICINE
Payer: MEDICARE

## 2022-08-09 DIAGNOSIS — R73.9 HYPERGLYCEMIA: ICD-10-CM

## 2022-08-09 DIAGNOSIS — E87.6 HYPOKALEMIA: ICD-10-CM

## 2022-08-09 DIAGNOSIS — I10 PRIMARY HYPERTENSION: ICD-10-CM

## 2022-08-09 LAB
ANION GAP SERPL CALC-SCNC: 11 MMOL/L (ref 8–16)
CALCIUM SERPL-MCNC: 9.4 MG/DL (ref 8.7–10.5)
CHLORIDE SERPL-SCNC: 101 MMOL/L (ref 95–110)
CO2 SERPL-SCNC: 30 MMOL/L (ref 23–29)
CREAT SERPL-MCNC: 0.89 MG/DL (ref 0.5–1.4)
EST. GFR  (NO RACE VARIABLE): >60 ML/MIN/1.73 M^2
ESTIMATED AVG GLUCOSE: 143 MG/DL (ref 68–131)
GLUCOSE SERPL-MCNC: 122 MG/DL (ref 70–110)
HBA1C MFR BLD: 6.6 % (ref 4–5.6)
POTASSIUM SERPL-SCNC: 4.2 MMOL/L (ref 3.5–5.1)
SODIUM SERPL-SCNC: 142 MMOL/L (ref 136–145)
UUN UR-MCNC: 23 MG/DL (ref 2–20)

## 2022-08-09 PROCEDURE — 83036 HEMOGLOBIN GLYCOSYLATED A1C: CPT | Mod: PO | Performed by: FAMILY MEDICINE

## 2022-08-09 PROCEDURE — 36415 COLL VENOUS BLD VENIPUNCTURE: CPT | Mod: PO | Performed by: FAMILY MEDICINE

## 2022-08-09 PROCEDURE — 80048 BASIC METABOLIC PNL TOTAL CA: CPT | Mod: PO | Performed by: FAMILY MEDICINE

## 2022-08-11 ENCOUNTER — PES CALL (OUTPATIENT)
Dept: ADMINISTRATIVE | Facility: OTHER | Age: 82
End: 2022-08-11
Payer: MEDICARE

## 2022-08-13 NOTE — PROGRESS NOTES
You have diabetes, A1C 6.6    Lose wieght, avoid sugar and sweets and white starchy food and repeat A1C 3 months; no medicine for this yet

## 2022-08-18 ENCOUNTER — TELEPHONE (OUTPATIENT)
Dept: FAMILY MEDICINE | Facility: CLINIC | Age: 82
End: 2022-08-18
Payer: MEDICARE

## 2022-08-18 DIAGNOSIS — R73.9 HYPERGLYCEMIA: Primary | ICD-10-CM

## 2022-08-18 NOTE — TELEPHONE ENCOUNTER
----- Message from Marty Hernandez MD sent at 8/9/2022  9:33 AM CDT -----  Sugar high; get A1C; ordered

## 2022-08-18 NOTE — TELEPHONE ENCOUNTER
----- Message from Marty Hernandez MD sent at 8/13/2022  3:42 PM CDT -----  You have diabetes, A1C 6.6    Lose wieght, avoid sugar and sweets and white starchy food and repeat A1C 3 months; no medicine for this yet

## 2022-08-18 NOTE — TELEPHONE ENCOUNTER
Pt's wife has been notified and verbalized understanding of lab results. Repeat A1C scheduled. Please place A1C order so that we can link to upcoming appt.

## 2022-10-13 ENCOUNTER — OFFICE VISIT (OUTPATIENT)
Dept: FAMILY MEDICINE | Facility: CLINIC | Age: 82
End: 2022-10-13
Payer: MEDICARE

## 2022-10-13 VITALS
HEIGHT: 72 IN | OXYGEN SATURATION: 93 % | BODY MASS INDEX: 35.15 KG/M2 | HEART RATE: 77 BPM | DIASTOLIC BLOOD PRESSURE: 68 MMHG | SYSTOLIC BLOOD PRESSURE: 122 MMHG | WEIGHT: 259.56 LBS

## 2022-10-13 DIAGNOSIS — R73.9 HYPERGLYCEMIA: Primary | ICD-10-CM

## 2022-10-13 PROCEDURE — 99214 OFFICE O/P EST MOD 30 MIN: CPT | Mod: S$GLB,,, | Performed by: FAMILY MEDICINE

## 2022-10-13 PROCEDURE — 99214 PR OFFICE/OUTPT VISIT, EST, LEVL IV, 30-39 MIN: ICD-10-PCS | Mod: S$GLB,,, | Performed by: FAMILY MEDICINE

## 2022-10-13 RX ORDER — TRIAMCINOLONE ACETONIDE 1 MG/G
CREAM TOPICAL
Status: ON HOLD | COMMUNITY
Start: 2022-07-18 | End: 2023-11-03 | Stop reason: HOSPADM

## 2022-10-13 NOTE — PROGRESS NOTES
Subjective:      Patient ID: Aquiles Montana is a 82 y.o. male.    Chief Complaint: Follow-up      Vitals:    10/13/22 1038   BP: 122/68   Pulse: 77   SpO2: (!) 93%   Weight: 117.8 kg (259 lb 9.5 oz)   Height: 6' (1.829 m)        HPI   Check up; has right leg wound for which he goes to Dr Taylor at [facility]  Has home health twice a week, Mon/Wed and Brandon and wife treats rest of days  Uses santyl  Able to get cheaper comm[facility] in  [place] [address]  Lost 10 pounds in 3 months    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    Dermatitis    Tinea pedis of both feet    Open wound of right lower leg    Right leg swelling    Left anterior fascicular block    Class 2 severe obesity with serious comorbidity and body mass index (BMI) of 38.0 to 38.9 in adult        ALLERGIES: Review of patient's allergies indicates:  No Known Allergies    MEDS:   Current Outpatient Medications:     aspirin (ECOTRIN) 81 MG EC tablet, Take 81 mg by mouth once daily., Disp: , Rfl:     atenoloL (TENORMIN) 50 MG tablet, Take 1 tablet (50 mg total) by mouth 2 (two) times daily., Disp: 180 tablet, Rfl: 3    furosemide (LASIX) 40 MG tablet, Take 1 tablet (40 mg total) by mouth once daily., Disp: 30 tablet, Rfl: 11    latanoprost 0.005 % ophthalmic solution, Place 1 drop into both eyes every evening., Disp: , Rfl:     NIFEdipine (ADALAT CC) 90 MG TbSR, Take 1 tablet (90 mg total) by mouth once daily., Disp: 90 tablet, Rfl: 3    potassium chloride (KLOR-CON) 10 MEQ TbSR, Take 1 tablet (10 mEq total) by mouth once daily. For potassium, Disp: 90 tablet, Rfl: 3    triamcinolone acetonide 0.1% (KENALOG) 0.1 % cream, APPLY TO LEGS DAILY BEFORE COMPRESSION DRESSINGS, Disp: , Rfl:     cephALEXin (KEFLEX) 500 MG  "capsule, Take 1 capsule (500 mg total) by mouth 2 (two) times daily. For infection (Patient not taking: Reported on 10/13/2022), Disp: 20 capsule, Rfl: 0      History:  Current Providers as of 10/13/2022  PCP: Marty Hernandez MD  Care Team Provider: Rafael Schwab MD  Care Team Provider: Sola Hampton LPN  Care Team Provider: Patel Singh  Care Team Provider: Delonte Marino MD  Care Team Provider: Jet Arrieta MD  Encounter Provider: Marty Hernandez MD, starting on Thu Oct 13, 2022 12:00 AM  Referring Provider: not found, starting on Thu Oct 13, 2022 12:00 AM  Consulting Physician: Marty Hernandez MD, starting on Thu Oct 13, 2022  9:55 AM (Active)   Past Medical History:   Diagnosis Date    Hypertension     Urinary tract infection      Past Surgical History:   Procedure Laterality Date    hand sugery      PROSTATE SURGERY      "Rotor rooter- opened up"     Social History     Tobacco Use    Smoking status: Never    Smokeless tobacco: Never   Substance Use Topics    Alcohol use: No     Alcohol/week: 0.0 standard drinks    Drug use: No         Review of Systems   Constitutional:  Negative for appetite change, fatigue, fever and unexpected weight change.   HENT:  Negative for congestion, ear pain, sinus pressure and sore throat.    Eyes:  Negative for pain and visual disturbance.   Respiratory:  Negative for shortness of breath.    Cardiovascular:  Negative for chest pain.   Gastrointestinal:  Negative for abdominal pain, constipation and diarrhea.   Endocrine: Negative for polyuria.   Genitourinary:  Negative for difficulty urinating and frequency.   Musculoskeletal:  Negative for arthralgias, back pain and myalgias.   Skin:  Negative for color change.   Allergic/Immunologic: Negative.    Neurological:  Negative for syncope, weakness and headaches.   Hematological:  Does not bruise/bleed easily.   Psychiatric/Behavioral:  Negative for dysphoric mood and suicidal ideas. The patient is not " nervous/anxious.    All other systems reviewed and are negative.  Objective:     Physical Exam        Assessment:     No diagnosis found.  Plan:        Medication List            Accurate as of October 13, 2022 11:15 AM. If you have any questions, ask your nurse or doctor.                CONTINUE taking these medications      aspirin 81 MG EC tablet  Commonly known as: ECOTRIN     atenoloL 50 MG tablet  Commonly known as: TENORMIN  Take 1 tablet (50 mg total) by mouth 2 (two) times daily.     cephALEXin 500 MG capsule  Commonly known as: KEFLEX  Take 1 capsule (500 mg total) by mouth 2 (two) times daily. For infection     furosemide 40 MG tablet  Commonly known as: LASIX  Take 1 tablet (40 mg total) by mouth once daily.     latanoprost 0.005 % ophthalmic solution     NIFEdipine 90 MG Tbsr  Commonly known as: ADALAT CC  Take 1 tablet (90 mg total) by mouth once daily.     potassium chloride 10 MEQ Tbsr  Commonly known as: KLOR-CON  Take 1 tablet (10 mEq total) by mouth once daily. For potassium     triamcinolone acetonide 0.1% 0.1 % cream  Commonly known as: KENALOG            There are no diagnoses linked to this encounter.

## 2022-11-22 ENCOUNTER — LAB VISIT (OUTPATIENT)
Dept: LAB | Facility: HOSPITAL | Age: 82
End: 2022-11-22
Attending: FAMILY MEDICINE
Payer: MEDICARE

## 2022-11-22 DIAGNOSIS — R73.9 HYPERGLYCEMIA: ICD-10-CM

## 2022-11-22 LAB
ESTIMATED AVG GLUCOSE: 131 MG/DL (ref 68–131)
HBA1C MFR BLD: 6.2 % (ref 4–5.6)

## 2022-11-22 PROCEDURE — 36415 COLL VENOUS BLD VENIPUNCTURE: CPT | Mod: PO | Performed by: FAMILY MEDICINE

## 2022-11-22 PROCEDURE — 83036 HEMOGLOBIN GLYCOSYLATED A1C: CPT | Performed by: FAMILY MEDICINE

## 2022-11-25 ENCOUNTER — TELEPHONE (OUTPATIENT)
Dept: FAMILY MEDICINE | Facility: CLINIC | Age: 82
End: 2022-11-25
Payer: MEDICARE

## 2022-11-25 NOTE — TELEPHONE ENCOUNTER
----- Message from Marty Hernandez MD sent at 11/24/2022  9:03 PM CST -----  Please contact the patient and let them know that their results were fine and do not require any change in treatment.  Diabetes good; repeat a1c 6 months

## 2023-02-09 ENCOUNTER — OFFICE VISIT (OUTPATIENT)
Dept: FAMILY MEDICINE | Facility: CLINIC | Age: 83
End: 2023-02-09
Payer: MEDICARE

## 2023-02-09 VITALS
DIASTOLIC BLOOD PRESSURE: 62 MMHG | HEART RATE: 70 BPM | SYSTOLIC BLOOD PRESSURE: 108 MMHG | HEIGHT: 72 IN | BODY MASS INDEX: 34.94 KG/M2 | OXYGEN SATURATION: 93 % | WEIGHT: 257.94 LBS

## 2023-02-09 DIAGNOSIS — E66.01 CLASS 2 SEVERE OBESITY WITH SERIOUS COMORBIDITY AND BODY MASS INDEX (BMI) OF 38.0 TO 38.9 IN ADULT, UNSPECIFIED OBESITY TYPE: ICD-10-CM

## 2023-02-09 DIAGNOSIS — E87.6 HYPOKALEMIA: ICD-10-CM

## 2023-02-09 DIAGNOSIS — I89.0 LYMPHEDEMA: ICD-10-CM

## 2023-02-09 DIAGNOSIS — R73.9 HYPERGLYCEMIA: Primary | ICD-10-CM

## 2023-02-09 DIAGNOSIS — I10 ESSENTIAL HYPERTENSION: ICD-10-CM

## 2023-02-09 DIAGNOSIS — I10 PRIMARY HYPERTENSION: ICD-10-CM

## 2023-02-09 PROCEDURE — 99214 OFFICE O/P EST MOD 30 MIN: CPT | Mod: S$GLB,,, | Performed by: FAMILY MEDICINE

## 2023-02-09 PROCEDURE — 99214 PR OFFICE/OUTPT VISIT, EST, LEVL IV, 30-39 MIN: ICD-10-PCS | Mod: S$GLB,,, | Performed by: FAMILY MEDICINE

## 2023-02-09 NOTE — PROGRESS NOTES
Subjective:      Patient ID: Aquiles Montana is a 82 y.o. male.    Chief Complaint: No chief complaint on file.      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    Dermatitis    Tinea pedis of both feet    Open wound of right lower leg    Right leg swelling    Left anterior fascicular block    Class 2 severe obesity with serious comorbidity and body mass index (BMI) of 38.0 to 38.9 in adult        ALLERGIES: Review of patient's allergies indicates:  No Known Allergies    MEDS:   Current Outpatient Medications:     aspirin (ECOTRIN) 81 MG EC tablet, Take 81 mg by mouth once daily., Disp: , Rfl:     atenoloL (TENORMIN) 50 MG tablet, Take 1 tablet (50 mg total) by mouth 2 (two) times daily., Disp: 180 tablet, Rfl: 3    cephALEXin (KEFLEX) 500 MG capsule, Take 1 capsule (500 mg total) by mouth 2 (two) times daily. For infection, Disp: 20 capsule, Rfl: 0    furosemide (LASIX) 40 MG tablet, Take 1 tablet (40 mg total) by mouth once daily., Disp: 30 tablet, Rfl: 11    latanoprost 0.005 % ophthalmic solution, Place 1 drop into both eyes every evening., Disp: , Rfl:     NIFEdipine (ADALAT CC) 90 MG TbSR, Take 1 tablet (90 mg total) by mouth once daily., Disp: 90 tablet, Rfl: 3    potassium chloride (KLOR-CON) 10 MEQ TbSR, Take 1 tablet (10 mEq total) by mouth once daily. For potassium, Disp: 90 tablet, Rfl: 3    triamcinolone acetonide 0.1% (KENALOG) 0.1 % cream, APPLY TO LEGS DAILY BEFORE COMPRESSION DRESSINGS, Disp: , Rfl:       History:  Current Providers as of 2/9/2023  PCP: Marty Hernandez,  "MD  Care Team Provider: Rafael Schwab MD  Care Team Provider: Sola Hampton LPN  Care Team Provider: Patel Singh  Care Team Provider: Delonte Marino MD  Care Team Provider: Jet Arrieta MD  Encounter Provider: Marty Hernandez MD, starting on Thu Feb 9, 2023 12:00 AM  Referring Provider: not found, starting on Thu Feb 9, 2023 12:00 AM  Consulting Physician: Marty Hernandez MD, starting on Thu Feb 9, 2023  3:17 PM (Active)   Past Medical History:   Diagnosis Date    Hypertension     Urinary tract infection      Past Surgical History:   Procedure Laterality Date    hand sugery      PROSTATE SURGERY      "Rotor rooter- opened up"     Social History     Tobacco Use    Smoking status: Never    Smokeless tobacco: Never   Substance Use Topics    Alcohol use: No     Alcohol/week: 0.0 standard drinks    Drug use: No         Review of Systems   Constitutional:  Negative for appetite change, fatigue, fever and unexpected weight change.   HENT:  Negative for congestion, ear pain, sinus pressure and sore throat.    Eyes:  Negative for pain and visual disturbance.   Respiratory:  Negative for shortness of breath.    Cardiovascular:  Negative for chest pain.   Gastrointestinal:  Negative for abdominal pain, constipation and diarrhea.   Endocrine: Negative for polyuria.   Genitourinary:  Negative for difficulty urinating and frequency.   Musculoskeletal:  Negative for arthralgias, back pain and myalgias.   Skin:  Negative for color change.   Allergic/Immunologic: Negative.    Neurological:  Negative for syncope, weakness and headaches.   Hematological:  Does not bruise/bleed easily.   Psychiatric/Behavioral:  Negative for dysphoric mood and suicidal ideas. The patient is not nervous/anxious.    All other systems reviewed and are negative.  Objective:     Physical Exam  Vitals and nursing note reviewed.   Constitutional:       General: He is not in acute distress.     Appearance: He is well-developed. " He is not diaphoretic.   HENT:      Head: Normocephalic and atraumatic.      Right Ear: External ear normal.      Left Ear: External ear normal.      Mouth/Throat:      Pharynx: No oropharyngeal exudate.   Eyes:      General: No scleral icterus.        Right eye: No discharge.         Left eye: No discharge.      Conjunctiva/sclera: Conjunctivae normal.      Pupils: Pupils are equal, round, and reactive to light.   Neck:      Thyroid: No thyromegaly.      Vascular: No JVD.      Trachea: No tracheal deviation.   Cardiovascular:      Rate and Rhythm: Normal rate and regular rhythm.      Heart sounds: No murmur heard.    No friction rub. No gallop.   Pulmonary:      Effort: Pulmonary effort is normal. No respiratory distress.      Breath sounds: Normal breath sounds. No stridor. No wheezing or rales.   Chest:      Chest wall: No tenderness.   Abdominal:      General: There is no distension.      Palpations: Abdomen is soft. There is no mass.      Tenderness: There is no abdominal tenderness. There is no guarding or rebound.   Musculoskeletal:         General: No tenderness. Normal range of motion.      Cervical back: Normal range of motion and neck supple.   Lymphadenopathy:      Cervical: No cervical adenopathy.   Skin:     General: Skin is warm and dry.      Coloration: Skin is not pale.      Findings: No erythema or rash.   Neurological:      Mental Status: He is alert and oriented to person, place, and time.      Cranial Nerves: No cranial nerve deficit.      Motor: No abnormal muscle tone.      Coordination: Coordination normal.      Deep Tendon Reflexes: Reflexes are normal and symmetric. Reflexes normal.   Psychiatric:         Behavior: Behavior normal.         Thought Content: Thought content normal.         Judgment: Judgment normal.           Assessment:     No diagnosis found.  Plan:        Medication List            Accurate as of February 9, 2023  3:39 PM. If you have any questions, ask your nurse or  doctor.                CONTINUE taking these medications      aspirin 81 MG EC tablet  Commonly known as: ECOTRIN     atenoloL 50 MG tablet  Commonly known as: TENORMIN  Take 1 tablet (50 mg total) by mouth 2 (two) times daily.     cephALEXin 500 MG capsule  Commonly known as: KEFLEX  Take 1 capsule (500 mg total) by mouth 2 (two) times daily. For infection     furosemide 40 MG tablet  Commonly known as: LASIX  Take 1 tablet (40 mg total) by mouth once daily.     latanoprost 0.005 % ophthalmic solution     NIFEdipine 90 MG Tbsr  Commonly known as: ADALAT CC  Take 1 tablet (90 mg total) by mouth once daily.     potassium chloride 10 MEQ Tbsr  Commonly known as: KLOR-CON  Take 1 tablet (10 mEq total) by mouth once daily. For potassium     triamcinolone acetonide 0.1% 0.1 % cream  Commonly known as: KENALOG            There are no diagnoses linked to this encounter.

## 2023-04-20 ENCOUNTER — PES CALL (OUTPATIENT)
Dept: ADMINISTRATIVE | Facility: CLINIC | Age: 83
End: 2023-04-20
Payer: MEDICARE

## 2023-07-25 ENCOUNTER — LAB VISIT (OUTPATIENT)
Dept: LAB | Facility: HOSPITAL | Age: 83
End: 2023-07-25
Attending: FAMILY MEDICINE
Payer: MEDICARE

## 2023-07-25 DIAGNOSIS — R73.9 HYPERGLYCEMIA: ICD-10-CM

## 2023-07-25 LAB
ESTIMATED AVG GLUCOSE: 126 MG/DL (ref 68–131)
HBA1C MFR BLD: 6 % (ref 4–5.6)

## 2023-07-25 PROCEDURE — 83036 HEMOGLOBIN GLYCOSYLATED A1C: CPT | Performed by: FAMILY MEDICINE

## 2023-07-25 PROCEDURE — 36415 COLL VENOUS BLD VENIPUNCTURE: CPT | Mod: PO | Performed by: FAMILY MEDICINE

## 2023-08-10 ENCOUNTER — PATIENT OUTREACH (OUTPATIENT)
Dept: ADMINISTRATIVE | Facility: OTHER | Age: 83
End: 2023-08-10
Payer: MEDICARE

## 2023-08-10 ENCOUNTER — OFFICE VISIT (OUTPATIENT)
Dept: FAMILY MEDICINE | Facility: CLINIC | Age: 83
End: 2023-08-10
Payer: MEDICARE

## 2023-08-10 VITALS
WEIGHT: 242.38 LBS | DIASTOLIC BLOOD PRESSURE: 70 MMHG | HEIGHT: 72 IN | OXYGEN SATURATION: 96 % | BODY MASS INDEX: 32.83 KG/M2 | HEART RATE: 78 BPM | TEMPERATURE: 98 F | SYSTOLIC BLOOD PRESSURE: 110 MMHG

## 2023-08-10 DIAGNOSIS — I10 ESSENTIAL HYPERTENSION: ICD-10-CM

## 2023-08-10 DIAGNOSIS — E66.01 CLASS 2 SEVERE OBESITY WITH SERIOUS COMORBIDITY AND BODY MASS INDEX (BMI) OF 38.0 TO 38.9 IN ADULT, UNSPECIFIED OBESITY TYPE: ICD-10-CM

## 2023-08-10 DIAGNOSIS — M79.89 RIGHT LEG SWELLING: ICD-10-CM

## 2023-08-10 DIAGNOSIS — R73.9 HYPERGLYCEMIA: Primary | ICD-10-CM

## 2023-08-10 DIAGNOSIS — I10 PRIMARY HYPERTENSION: ICD-10-CM

## 2023-08-10 DIAGNOSIS — I89.0 LYMPHEDEMA: ICD-10-CM

## 2023-08-10 DIAGNOSIS — Z12.5 ENCOUNTER FOR SCREENING FOR MALIGNANT NEOPLASM OF PROSTATE: ICD-10-CM

## 2023-08-10 DIAGNOSIS — E87.6 HYPOKALEMIA: ICD-10-CM

## 2023-08-10 PROCEDURE — 99214 OFFICE O/P EST MOD 30 MIN: CPT | Mod: S$GLB,,, | Performed by: FAMILY MEDICINE

## 2023-08-10 PROCEDURE — 99214 PR OFFICE/OUTPT VISIT, EST, LEVL IV, 30-39 MIN: ICD-10-PCS | Mod: S$GLB,,, | Performed by: FAMILY MEDICINE

## 2023-08-10 NOTE — PROGRESS NOTES
CHW - Initial Contact    This Community Health Worker completed the Social Determinant of Health questionnaire with MRN [id number] in person today.    Pt identified barriers of most importance are: No barriers reported   Referrals to community agencies completed with patient/caregiver consent outside of [facility] include: No  Referrals were put through [facility] - No  Support and Services: No support & services have been documented.  Other information discussed the patient needs / wants help with: SDOH complete. No assistance requested at this time.  Follow up required: No  No future outreach task assigned

## 2023-08-10 NOTE — PROGRESS NOTES
Subjective:      Patient ID: Aquiles Montana is a 82 y.o. male.    Chief Complaint: Follow-up (6 month)      Vitals:    08/10/23 0952   BP: 110/70   Pulse: 78   Temp: 97.5 °F (36.4 °C)   SpO2: 96%   Weight: 109.9 kg (242 lb 6.3 oz)   Height: 6' (1.829 m)        HPI   Follow up DM 6.1 a1c, lost 15 ounds  Goes to [facility] and has [facility], [facility]  Right leg stays wraped  Here with wife, uses a walker    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    Dermatitis    Tinea pedis of both feet    Open wound of right lower leg    Right leg swelling    Left anterior fascicular block    Class 2 severe obesity with serious comorbidity and body mass index (BMI) of 38.0 to 38.9 in adult        ALLERGIES: Review of patient's allergies indicates:  No Known Allergies    MEDS:   Current Outpatient Medications:     aspirin (ECOTRIN) 81 MG EC tablet, Take 81 mg by mouth once daily., Disp: , Rfl:     latanoprost 0.005 % ophthalmic solution, Place 1 drop into both eyes every evening., Disp: , Rfl:     triamcinolone acetonide 0.1% (KENALOG) 0.1 % cream, APPLY TO LEGS DAILY BEFORE COMPRESSION DRESSINGS, Disp: , Rfl:     atenoloL (TENORMIN) 50 MG tablet, Take 1 tablet by mouth twice daily, Disp: 180 tablet, Rfl: 3    furosemide (LASIX) 40 MG tablet, Take 1 tablet (40 mg total) by mouth once daily., Disp: 30 tablet, Rfl: 11    NIFEdipine (ADALAT CC) 90 MG TbSR, Take 1 tablet by mouth once daily, Disp: 90 tablet, Rfl: 3    potassium chloride (KLOR-CON) 10 MEQ TbSR, Take 1 tablet (10 mEq total) by mouth once daily. For potassium (Patient not taking: Reported on 8/10/2023), Disp: 90 tablet, Rfl: 3      History:  Current Providers as of 8/10/2023  PCP: Marty Hernandez MD  Care Team Provider:  "Rafael Schwab MD  Care Team Provider: Sola Hampton LPN  Care Team Provider: Patel Singh  Care Team Provider: Delonte Marino MD  Care Team Provider: Jet Arrieta MD  Care Team Provider: Jack Sawyer  Encounter Provider: Marty Hernandez MD, starting on Thu Aug 10, 2023 12:00 AM  Referring Provider: not found, starting on Thu Aug 10, 2023 12:00 AM  Consulting Physician: Marty Hernandez MD, starting on Thu Aug 10, 2023  9:49 AM (Active)   Past Medical History:   Diagnosis Date    Hypertension     Urinary tract infection      Past Surgical History:   Procedure Laterality Date    hand sugery      PROSTATE SURGERY      "Rotor rooter- opened up"     Social History     Tobacco Use    Smoking status: Never     Passive exposure: Never    Smokeless tobacco: Never   Substance Use Topics    Alcohol use: No     Alcohol/week: 0.0 standard drinks of alcohol    Drug use: No         Review of Systems   Constitutional:  Negative for appetite change, fatigue, fever and unexpected weight change.   HENT:  Negative for congestion, ear pain, sinus pressure and sore throat.    Eyes:  Negative for pain and visual disturbance.   Respiratory:  Negative for shortness of breath.    Cardiovascular:  Positive for leg swelling. Negative for chest pain.   Gastrointestinal:  Negative for abdominal pain, constipation and diarrhea.   Endocrine: Negative for polyuria.   Genitourinary:  Negative for difficulty urinating and frequency.   Musculoskeletal:  Negative for arthralgias, back pain and myalgias.   Skin:  Negative for color change.   Allergic/Immunologic: Negative.    Neurological:  Negative for syncope, weakness and headaches.   Hematological:  Does not bruise/bleed easily.   Psychiatric/Behavioral:  Negative for dysphoric mood and suicidal ideas. The patient is not nervous/anxious.    All other systems reviewed and are negative.    Objective:     Physical Exam  Vitals and nursing note reviewed. "   Constitutional:       General: He is not in acute distress.     Appearance: He is well-developed. He is not diaphoretic.   HENT:      Head: Normocephalic and atraumatic.      Right Ear: External ear normal.      Left Ear: External ear normal.      Mouth/Throat:      Pharynx: No oropharyngeal exudate.   Eyes:      General: No scleral icterus.        Right eye: No discharge.         Left eye: No discharge.      Conjunctiva/sclera: Conjunctivae normal.      Pupils: Pupils are equal, round, and reactive to light.   Neck:      Thyroid: No thyromegaly.      Vascular: No JVD.      Trachea: No tracheal deviation.   Cardiovascular:      Rate and Rhythm: Normal rate and regular rhythm.      Heart sounds: No murmur heard.     No friction rub. No gallop.   Pulmonary:      Effort: Pulmonary effort is normal. No respiratory distress.      Breath sounds: Normal breath sounds. No stridor. No wheezing or rales.   Chest:      Chest wall: No tenderness.   Abdominal:      General: There is no distension.      Palpations: Abdomen is soft. There is no mass.      Tenderness: There is no abdominal tenderness. There is no guarding or rebound.   Musculoskeletal:         General: No tenderness. Normal range of motion.      Cervical back: Normal range of motion and neck supple.      Right lower leg: Edema present.   Lymphadenopathy:      Cervical: No cervical adenopathy.   Skin:     General: Skin is warm and dry.      Coloration: Skin is not pale.      Findings: No erythema or rash.   Neurological:      Mental Status: He is alert and oriented to person, place, and time.      Cranial Nerves: No cranial nerve deficit.      Motor: No abnormal muscle tone.      Coordination: Coordination normal.      Deep Tendon Reflexes: Reflexes are normal and symmetric. Reflexes normal.   Psychiatric:         Behavior: Behavior normal.         Thought Content: Thought content normal.         Judgment: Judgment normal.             Assessment:     1.  Hyperglycemia    2. Hypokalemia    3. Primary hypertension    4. Class 2 severe obesity with serious comorbidity and body mass index (BMI) of 38.0 to 38.9 in adult, unspecified obesity type    5. Essential hypertension    6. Lymphedema    7. Right leg swelling    8. Encounter for screening for malignant neoplasm of prostate      Plan:        Medication List            Accurate as of August 10, 2023 11:59 PM. If you have any questions, ask your nurse or doctor.                CONTINUE taking these medications      aspirin 81 MG EC tablet  Commonly known as: ECOTRIN     atenoloL 50 MG tablet  Commonly known as: TENORMIN  Take 1 tablet (50 mg total) by mouth 2 (two) times daily.     furosemide 40 MG tablet  Commonly known as: LASIX  Take 1 tablet (40 mg total) by mouth once daily.     latanoprost 0.005 % ophthalmic solution     NIFEdipine 90 MG Tbsr  Commonly known as: ADALAT CC  Take 1 tablet (90 mg total) by mouth once daily.     potassium chloride 10 MEQ Tbsr  Commonly known as: KLOR-CON  Take 1 tablet (10 mEq total) by mouth once daily. For potassium     triamcinolone acetonide 0.1% 0.1 % cream  Commonly known as: KENALOG            Hyperglycemia    Hypokalemia    Primary hypertension    Class 2 severe obesity with serious comorbidity and body mass index (BMI) of 38.0 to 38.9 in adult, unspecified obesity type    Essential hypertension    Lymphedema    Right leg swelling    Encounter for screening for malignant neoplasm of prostate

## 2023-08-11 RX ORDER — ATENOLOL 50 MG/1
50 TABLET ORAL 2 TIMES DAILY
Qty: 180 TABLET | Refills: 3 | Status: SHIPPED | OUTPATIENT
Start: 2023-08-11 | End: 2023-10-24

## 2023-08-11 RX ORDER — NIFEDIPINE 90 MG/1
90 TABLET, FILM COATED, EXTENDED RELEASE ORAL
Qty: 90 TABLET | Refills: 3 | Status: SHIPPED | OUTPATIENT
Start: 2023-08-11 | End: 2023-10-24

## 2023-08-11 NOTE — TELEPHONE ENCOUNTER
----- Message from Britany Wiggins sent at 8/11/2023  4:07 PM CDT -----  Contact: wife  Type:  RX Refill Request    Who Called: pt wife   Refill or New Rx:refill  RX Name and Strength:NIFEdipine (ADALAT CC) 90 MG TbSR  How is the patient currently taking it? (ex. 1XDay):1  Is this a 30 day or 90 day RX:90  Preferred Pharmacy with phone number:[facility] [address] [place] [address] [facility]   Phone: [phone]  Fax:  [phone]  Local or Mail Order:local  Ordering Provider:Mary  Would the patient rather a call back or a response via [facility]? Call   Best Call Back Number:[phone]  Additional Information:        Type:  RX Refill Request      Refill or New Rx:  RX Name and Strength:atenoloL (TENORMIN) 50 MG tablet  How is the patient currently taking it? (ex. 1XDay):2  Is this a 30 day or 90 day RX:180

## 2023-08-11 NOTE — TELEPHONE ENCOUNTER
Care Due:                  Date            Visit Type   Department     Provider  --------------------------------------------------------------------------------                                EP -                              PRIMARY      [facility]  Last Visit: 08-      CARE ([facility])   KEELY Hernandez                               -                              PRIMARY      [facility]  Next Visit: 08-      CARE ([facility])   KEELY Hernandez                                                            Last  Test          Frequency    Reason                     Performed    Due Date  --------------------------------------------------------------------------------    CMP.........  12 months..  furosemide, potassium....  07- 08-    [facility] Embedded Care Due Messages. Reference number: [id number].   8/11/2023 2:47:54 PM CDT

## 2023-08-12 NOTE — TELEPHONE ENCOUNTER
Provider Staff:  Action required for this patient     Please see care gap opportunities below in Care Due Message.    Thanks!  [facility]     Appointments      Date Provider   Last Visit   8/10/2023 Marty Hernandez MD   Next Visit   Visit date not found Marty Hernandez MD     Refill Decision Note   Aquiles Montana  is requesting a refill authorization.  Brief Assessment and Rationale for Refill:  Approve     Medication Therapy Plan:         Comments:     Note composed:7:39 PM 08/11/2023

## 2023-08-13 RX ORDER — ATENOLOL 50 MG/1
50 TABLET ORAL 2 TIMES DAILY
Qty: 180 TABLET | Refills: 3 | Status: SHIPPED | OUTPATIENT
Start: 2023-08-13 | End: 2023-10-24

## 2023-08-13 RX ORDER — NIFEDIPINE 90 MG/1
90 TABLET, FILM COATED, EXTENDED RELEASE ORAL DAILY
Qty: 90 TABLET | Refills: 3 | Status: SHIPPED | OUTPATIENT
Start: 2023-08-13 | End: 2023-10-24

## 2023-09-29 ENCOUNTER — TELEPHONE (OUTPATIENT)
Dept: FAMILY MEDICINE | Facility: CLINIC | Age: 83
End: 2023-09-29
Payer: MEDICARE

## 2023-09-29 NOTE — TELEPHONE ENCOUNTER
LM for pt stating hfu has been scheduled for 10/12/23    ----- Message from Tommy Edmonds sent at 9/29/2023  8:12 AM CDT -----  Type:  Sooner Apoointment Request    Caller is requesting a sooner appointment. Caller will  accept being placed on the waitlist and is requesting a message be sent to doctor.  Name of Caller:[facility]   When is the first available appointment?01/08/2024  Symptoms:pt was admitted for cellulitis  Would the patient rather a call back or a response via [facility]? call  Best Call Back Number:[phone]  Additional Information:  hospital f/u pt will be D/C on 10/05/2023 need to be seen within 7 days please call pt

## 2023-10-02 ENCOUNTER — TELEPHONE (OUTPATIENT)
Dept: INTERNAL MEDICINE | Facility: CLINIC | Age: 83
End: 2023-10-02
Payer: MEDICARE

## 2023-10-02 ENCOUNTER — TELEPHONE (OUTPATIENT)
Dept: HEMATOLOGY/ONCOLOGY | Facility: CLINIC | Age: 83
End: 2023-10-02
Payer: MEDICARE

## 2023-10-02 NOTE — TELEPHONE ENCOUNTER
----- Message from Sirisha Corbin sent at 10/2/2023 10:37 AM CDT -----  Type:  Patient Returning Call    Who Called:Jessica   Would the patient rather a call back or a response via [facility]? Call back   Best Call Back Number:[phone]  Additional Information: wants to change appt to 10/13 for HFU

## 2023-10-02 NOTE — TELEPHONE ENCOUNTER
Spoke with t wife and she states that pt cannot due appt on 10/12 he has a GI appt in [place].She wants to know can she do next day or call pt to set up different appt.

## 2023-10-02 NOTE — TELEPHONE ENCOUNTER
----- Message from Ajay Carmen sent at 10/2/2023  2:48 PM CDT -----  Contact: Pt  .Type:  Needs Medical Advice    Who Called: pt. Wife Jessica   Symptoms (please be specific):  Mass in groining   Would the patient rather a call back or a response via [facility]?  Call back  Best Call Back Number: [phone]  Additional Information: pt. Was told to call to schedule an appt. With provider coming from [facility].

## 2023-10-12 ENCOUNTER — PATIENT OUTREACH (OUTPATIENT)
Dept: ADMINISTRATIVE | Facility: CLINIC | Age: 83
End: 2023-10-12
Payer: MEDICARE

## 2023-10-12 ENCOUNTER — EXTERNAL HOSPITAL ADMISSION (OUTPATIENT)
Dept: ADMINISTRATIVE | Facility: CLINIC | Age: 83
End: 2023-10-12
Payer: MEDICARE

## 2023-10-12 RX ORDER — ASCORBIC ACID 250 MG
250 TABLET ORAL EVERY MORNING
COMMUNITY
Start: 2023-09-20 | End: 2024-09-19

## 2023-10-12 RX ORDER — PANTOPRAZOLE SODIUM 40 MG/1
40 TABLET, DELAYED RELEASE ORAL 2 TIMES DAILY
COMMUNITY
Start: 2023-10-12 | End: 2023-12-05 | Stop reason: SDUPTHER

## 2023-10-12 RX ORDER — DIGOXIN 125 MCG
0.12 TABLET ORAL DAILY
COMMUNITY
Start: 2023-10-11 | End: 2023-12-05 | Stop reason: SDUPTHER

## 2023-10-12 RX ORDER — KETOROLAC TROMETHAMINE 5 MG/ML
1 SOLUTION OPHTHALMIC 4 TIMES DAILY
COMMUNITY
Start: 2023-09-08 | End: 2023-12-14

## 2023-10-12 RX ORDER — ZINC SULFATE 50(220)MG
220 CAPSULE ORAL EVERY MORNING
COMMUNITY
Start: 2023-09-20 | End: 2024-09-19

## 2023-10-12 RX ORDER — OFLOXACIN 3 MG/ML
1 SOLUTION/ DROPS OPHTHALMIC 4 TIMES DAILY
COMMUNITY
Start: 2023-09-08 | End: 2023-12-14

## 2023-10-12 RX ORDER — AMIODARONE HYDROCHLORIDE 200 MG/1
200 TABLET ORAL 3 TIMES DAILY
COMMUNITY
Start: 2023-09-20 | End: 2023-10-24

## 2023-10-12 RX ORDER — MULTIVITAMIN
1 TABLET ORAL EVERY MORNING
COMMUNITY
End: 2024-01-04 | Stop reason: DRUGHIGH

## 2023-10-12 RX ORDER — CEFDINIR 300 MG/1
300 CAPSULE ORAL EVERY 12 HOURS
COMMUNITY
Start: 2023-10-11 | End: 2023-10-24

## 2023-10-12 RX ORDER — METOPROLOL SUCCINATE 100 MG/1
1 TABLET, EXTENDED RELEASE ORAL EVERY MORNING
COMMUNITY
Start: 2023-09-20 | End: 2023-10-24 | Stop reason: SDUPTHER

## 2023-10-12 RX ORDER — TALC
2 POWDER (GRAM) TOPICAL NIGHTLY PRN
COMMUNITY
Start: 2023-09-20

## 2023-10-12 NOTE — PROGRESS NOTES
C3 nurse spoke with Aquiles Montana's wife, Jessica, for a TCC post hospital discharge follow up call. The patient has a scheduled [facility] appointment with Delonte Robin MD (HEM ONC) 10/20/23 @ 8:00am, Marty Hernandez MD (PCP) 10/24/23 @ 2:00pm, Pulmonologist in [place] on 10/25/23, and Cardiologist in [place] on 11/13/23 @ 3:15pm (this is closer for them).    Needs to cancel cardiologist appointment scheduled in [place] for 10/18/23.     She also noted that the patient is also taking Tylenol PRN, Guafenissen cough syrup PRN, and Magnesium TID.

## 2023-10-12 NOTE — PROGRESS NOTES
C3 nurse attempted to contact Aquiles Montana's wife, Jessica, for a TCC post hospital discharge follow up call. No answer. Left voicemail with callback information. The patient has a scheduled [facility] appointment with Cardio 10/18/23 @ 3:15pm, Delonte Robin MD (HEM ONC) 10/20/23 @ 8:00am, and Marty Hernandez MD (PCP) 10/24/23 @ 2:00pm.

## 2023-10-23 ENCOUNTER — TELEPHONE (OUTPATIENT)
Dept: FAMILY MEDICINE | Facility: CLINIC | Age: 83
End: 2023-10-23
Payer: MEDICARE

## 2023-10-23 NOTE — TELEPHONE ENCOUNTER
----- Message from Janine Gamino sent at 10/23/2023 12:52 PM CDT -----  .Type:  Needs Medical Advice    Who Called: Nidia with Homehealth    Would the patient rather a call back or a response via [facility]? Call back  Best Call Back Number:   Additional Information:     Nidia stated pt is very confused and not making sense to what he is saying or where he is at and Nidia stated you can call her if you have further questions

## 2023-10-24 ENCOUNTER — TELEPHONE (OUTPATIENT)
Dept: FAMILY MEDICINE | Facility: CLINIC | Age: 83
End: 2023-10-24

## 2023-10-24 ENCOUNTER — TELEPHONE (OUTPATIENT)
Dept: FAMILY MEDICINE | Facility: HOSPITAL | Age: 83
End: 2023-10-24
Payer: MEDICARE

## 2023-10-24 ENCOUNTER — OFFICE VISIT (OUTPATIENT)
Dept: FAMILY MEDICINE | Facility: CLINIC | Age: 83
End: 2023-10-24
Payer: MEDICARE

## 2023-10-24 ENCOUNTER — PATIENT OUTREACH (OUTPATIENT)
Dept: ADMINISTRATIVE | Facility: OTHER | Age: 83
End: 2023-10-24
Payer: MEDICARE

## 2023-10-24 ENCOUNTER — HOSPITAL ENCOUNTER (INPATIENT)
Facility: HOSPITAL | Age: 83
LOS: 10 days | Discharge: NURSING, REHAB OR LONG-TERM CARE | DRG: 606 | End: 2023-11-03
Attending: STUDENT IN AN ORGANIZED HEALTH CARE EDUCATION/TRAINING PROGRAM | Admitting: HOSPITALIST
Payer: MEDICARE

## 2023-10-24 VITALS
SYSTOLIC BLOOD PRESSURE: 122 MMHG | OXYGEN SATURATION: 96 % | HEART RATE: 69 BPM | DIASTOLIC BLOOD PRESSURE: 74 MMHG | TEMPERATURE: 98 F

## 2023-10-24 DIAGNOSIS — A41.9 SEPSIS, DUE TO UNSPECIFIED ORGANISM, UNSPECIFIED WHETHER ACUTE ORGAN DYSFUNCTION PRESENT: ICD-10-CM

## 2023-10-24 DIAGNOSIS — M79.89 RIGHT LEG SWELLING: ICD-10-CM

## 2023-10-24 DIAGNOSIS — S81.801A OPEN WOUND OF RIGHT LOWER LEG, INITIAL ENCOUNTER: ICD-10-CM

## 2023-10-24 DIAGNOSIS — R07.9 CHEST PAIN: ICD-10-CM

## 2023-10-24 DIAGNOSIS — E87.6 HYPOKALEMIA: ICD-10-CM

## 2023-10-24 DIAGNOSIS — I10 PRIMARY HYPERTENSION: ICD-10-CM

## 2023-10-24 DIAGNOSIS — E66.01 CLASS 2 SEVERE OBESITY WITH SERIOUS COMORBIDITY AND BODY MASS INDEX (BMI) OF 38.0 TO 38.9 IN ADULT, UNSPECIFIED OBESITY TYPE: ICD-10-CM

## 2023-10-24 DIAGNOSIS — N17.9 AKI (ACUTE KIDNEY INJURY): ICD-10-CM

## 2023-10-24 DIAGNOSIS — L02.91 ABSCESS: Primary | ICD-10-CM

## 2023-10-24 DIAGNOSIS — Z71.89 ACP (ADVANCE CARE PLANNING): ICD-10-CM

## 2023-10-24 DIAGNOSIS — E83.52 HYPERCALCEMIA: ICD-10-CM

## 2023-10-24 DIAGNOSIS — C44.722 SQUAMOUS CELL CARCINOMA OF RIGHT LOWER LEG: Primary | ICD-10-CM

## 2023-10-24 DIAGNOSIS — A41.9 SEPSIS: ICD-10-CM

## 2023-10-24 DIAGNOSIS — I89.0 LYMPHEDEMA: ICD-10-CM

## 2023-10-24 DIAGNOSIS — R73.9 HYPERGLYCEMIA: ICD-10-CM

## 2023-10-24 DIAGNOSIS — R41.82 AMS (ALTERED MENTAL STATUS): ICD-10-CM

## 2023-10-24 DIAGNOSIS — I10 ESSENTIAL HYPERTENSION: ICD-10-CM

## 2023-10-24 DIAGNOSIS — L03.116 CELLULITIS OF LEFT LOWER EXTREMITY: ICD-10-CM

## 2023-10-24 LAB
ALBUMIN SERPL BCP-MCNC: 3 G/DL (ref 3.5–5.2)
ALP SERPL-CCNC: 108 U/L (ref 55–135)
ALT SERPL W/O P-5'-P-CCNC: 12 U/L (ref 10–44)
ANION GAP SERPL CALC-SCNC: 12 MMOL/L (ref 8–16)
APTT PPP: 22.9 SEC (ref 21–32)
AST SERPL-CCNC: 20 U/L (ref 10–40)
BASOPHILS # BLD AUTO: 0.08 K/UL (ref 0–0.2)
BASOPHILS NFR BLD: 0.3 % (ref 0–1.9)
BILIRUB SERPL-MCNC: 0.4 MG/DL (ref 0.1–1)
BNP SERPL-MCNC: 94 PG/ML (ref 0–99)
BUN SERPL-MCNC: 31 MG/DL (ref 8–23)
CALCIUM SERPL-MCNC: 13.1 MG/DL (ref 8.7–10.5)
CHLORIDE SERPL-SCNC: 99 MMOL/L (ref 95–110)
CO2 SERPL-SCNC: 32 MMOL/L (ref 23–29)
CREAT SERPL-MCNC: 1.7 MG/DL (ref 0.5–1.4)
CTP QC/QA: YES
DIFFERENTIAL METHOD: ABNORMAL
EOSINOPHIL # BLD AUTO: 0 K/UL (ref 0–0.5)
EOSINOPHIL NFR BLD: 0 % (ref 0–8)
ERYTHROCYTE [DISTWIDTH] IN BLOOD BY AUTOMATED COUNT: 16 % (ref 11.5–14.5)
EST. GFR  (NO RACE VARIABLE): 40 ML/MIN/1.73 M^2
FIO2: 28 %
GLUCOSE SERPL-MCNC: 130 MG/DL (ref 70–110)
HCT VFR BLD AUTO: 39.8 % (ref 40–54)
HCT VFR BLD CALC: 39.7 % (ref 36–54)
HGB BLD-MCNC: 12.3 G/DL (ref 14–18)
HGB BLD-MCNC: 12.9 G/DL (ref 9–18)
IMM GRANULOCYTES # BLD AUTO: 0.19 K/UL (ref 0–0.04)
IMM GRANULOCYTES NFR BLD AUTO: 0.7 % (ref 0–0.5)
INR PPP: 1.2 (ref 0.8–1.2)
LACTATE SERPL-SCNC: 1.6 MMOL/L (ref 0.5–2.2)
LDH SERPL L TO P-CCNC: 1.8 MMOL/L (ref 0.5–2.2)
LIPASE SERPL-CCNC: 83 U/L (ref 4–60)
LPM: 2
LYMPHOCYTES # BLD AUTO: 1 K/UL (ref 1–4.8)
LYMPHOCYTES NFR BLD: 3.9 % (ref 18–48)
MAGNESIUM SERPL-MCNC: 2.1 MG/DL (ref 1.6–2.6)
MCH RBC QN AUTO: 29 PG (ref 27–31)
MCHC RBC AUTO-ENTMCNC: 30.9 G/DL (ref 32–36)
MCV RBC AUTO: 94 FL (ref 82–98)
MONOCYTES # BLD AUTO: 1.4 K/UL (ref 0.3–1)
MONOCYTES NFR BLD: 5.3 % (ref 4–15)
NEUTROPHILS # BLD AUTO: 23 K/UL (ref 1.8–7.7)
NEUTROPHILS NFR BLD: 89.8 % (ref 38–73)
NRBC BLD-RTO: 0 /100 WBC
PCO2 BLDA: 71.4 MMHG (ref 35–45)
PH SMN: 7.36 [PH] (ref 7.35–7.45)
PHOSPHATE SERPL-MCNC: 3.4 MG/DL (ref 2.7–4.5)
PLATELET # BLD AUTO: 274 K/UL (ref 150–450)
PMV BLD AUTO: 9.8 FL (ref 9.2–12.9)
PO2 BLDA: 27.6 MMHG (ref 40–60)
POC BASE DEFICIT: 11.3 MMOL/L (ref -2–2)
POC HCO3: 39.8 MMOL/L (ref 24–28)
POC IONIZED CALCIUM: 1.66 MMOL/L (ref 1.06–1.42)
POC PERFORMED BY: ABNORMAL
POC SATURATED O2: 44.6 % (ref 95–100)
POTASSIUM BLD-SCNC: 4.5 MMOL/L (ref 3.5–5.1)
POTASSIUM SERPL-SCNC: 4.5 MMOL/L (ref 3.5–5.1)
PROT SERPL-MCNC: 7.6 G/DL (ref 6–8.4)
PROTHROMBIN TIME: 13.3 SEC (ref 9–12.5)
RBC # BLD AUTO: 4.24 M/UL (ref 4.6–6.2)
SARS-COV-2 RDRP RESP QL NAA+PROBE: NEGATIVE
SODIUM BLD-SCNC: 145 MMOL/L (ref 136–145)
SODIUM SERPL-SCNC: 143 MMOL/L (ref 136–145)
SPECIMEN SOURCE: ABNORMAL
TROPONIN I SERPL DL<=0.01 NG/ML-MCNC: 0.13 NG/ML (ref 0–0.03)
WBC # BLD AUTO: 25.67 K/UL (ref 3.9–12.7)

## 2023-10-24 PROCEDURE — 63600175 PHARM REV CODE 636 W HCPCS: Performed by: STUDENT IN AN ORGANIZED HEALTH CARE EDUCATION/TRAINING PROGRAM

## 2023-10-24 PROCEDURE — 83735 ASSAY OF MAGNESIUM: CPT | Performed by: STUDENT IN AN ORGANIZED HEALTH CARE EDUCATION/TRAINING PROGRAM

## 2023-10-24 PROCEDURE — 85610 PROTHROMBIN TIME: CPT | Performed by: STUDENT IN AN ORGANIZED HEALTH CARE EDUCATION/TRAINING PROGRAM

## 2023-10-24 PROCEDURE — 84100 ASSAY OF PHOSPHORUS: CPT | Performed by: STUDENT IN AN ORGANIZED HEALTH CARE EDUCATION/TRAINING PROGRAM

## 2023-10-24 PROCEDURE — 83605 ASSAY OF LACTIC ACID: CPT | Performed by: STUDENT IN AN ORGANIZED HEALTH CARE EDUCATION/TRAINING PROGRAM

## 2023-10-24 PROCEDURE — 87635 SARS-COV-2 COVID-19 AMP PRB: CPT | Performed by: STUDENT IN AN ORGANIZED HEALTH CARE EDUCATION/TRAINING PROGRAM

## 2023-10-24 PROCEDURE — 83690 ASSAY OF LIPASE: CPT | Performed by: STUDENT IN AN ORGANIZED HEALTH CARE EDUCATION/TRAINING PROGRAM

## 2023-10-24 PROCEDURE — 80053 COMPREHEN METABOLIC PANEL: CPT | Performed by: STUDENT IN AN ORGANIZED HEALTH CARE EDUCATION/TRAINING PROGRAM

## 2023-10-24 PROCEDURE — 85730 THROMBOPLASTIN TIME PARTIAL: CPT | Performed by: STUDENT IN AN ORGANIZED HEALTH CARE EDUCATION/TRAINING PROGRAM

## 2023-10-24 PROCEDURE — 83880 ASSAY OF NATRIURETIC PEPTIDE: CPT | Performed by: STUDENT IN AN ORGANIZED HEALTH CARE EDUCATION/TRAINING PROGRAM

## 2023-10-24 PROCEDURE — 25000003 PHARM REV CODE 250: Performed by: STUDENT IN AN ORGANIZED HEALTH CARE EDUCATION/TRAINING PROGRAM

## 2023-10-24 PROCEDURE — 96360 HYDRATION IV INFUSION INIT: CPT

## 2023-10-24 PROCEDURE — 84484 ASSAY OF TROPONIN QUANT: CPT | Performed by: STUDENT IN AN ORGANIZED HEALTH CARE EDUCATION/TRAINING PROGRAM

## 2023-10-24 PROCEDURE — 93010 ELECTROCARDIOGRAM REPORT: CPT | Mod: ,,, | Performed by: INTERNAL MEDICINE

## 2023-10-24 PROCEDURE — 12000002 HC ACUTE/MED SURGE SEMI-PRIVATE ROOM

## 2023-10-24 PROCEDURE — 96365 THER/PROPH/DIAG IV INF INIT: CPT

## 2023-10-24 PROCEDURE — 99214 PR OFFICE/OUTPT VISIT, EST, LEVL IV, 30-39 MIN: ICD-10-PCS | Mod: S$GLB,,, | Performed by: FAMILY MEDICINE

## 2023-10-24 PROCEDURE — 99214 OFFICE O/P EST MOD 30 MIN: CPT | Mod: S$GLB,,, | Performed by: FAMILY MEDICINE

## 2023-10-24 PROCEDURE — 87040 BLOOD CULTURE FOR BACTERIA: CPT | Mod: 59 | Performed by: STUDENT IN AN ORGANIZED HEALTH CARE EDUCATION/TRAINING PROGRAM

## 2023-10-24 PROCEDURE — 99285 EMERGENCY DEPT VISIT HI MDM: CPT | Mod: 25

## 2023-10-24 PROCEDURE — 93010 EKG 12-LEAD: ICD-10-PCS | Mod: ,,, | Performed by: INTERNAL MEDICINE

## 2023-10-24 PROCEDURE — 93005 ELECTROCARDIOGRAM TRACING: CPT

## 2023-10-24 PROCEDURE — 85025 COMPLETE CBC W/AUTO DIFF WBC: CPT | Performed by: STUDENT IN AN ORGANIZED HEALTH CARE EDUCATION/TRAINING PROGRAM

## 2023-10-24 RX ORDER — SULFAMETHOXAZOLE AND TRIMETHOPRIM 800; 160 MG/1; MG/1
1 TABLET ORAL 2 TIMES DAILY
Qty: 30 TABLET | Refills: 1 | Status: ON HOLD | OUTPATIENT
Start: 2023-10-24 | End: 2023-11-03 | Stop reason: HOSPADM

## 2023-10-24 RX ORDER — FUROSEMIDE 40 MG/1
40 TABLET ORAL DAILY
Status: DISCONTINUED | OUTPATIENT
Start: 2023-10-25 | End: 2023-10-25

## 2023-10-24 RX ORDER — IBUPROFEN 200 MG
16 TABLET ORAL
Status: DISCONTINUED | OUTPATIENT
Start: 2023-10-25 | End: 2023-11-03 | Stop reason: HOSPADM

## 2023-10-24 RX ORDER — SODIUM CHLORIDE 0.9 % (FLUSH) 0.9 %
3 SYRINGE (ML) INJECTION EVERY 12 HOURS PRN
Status: DISCONTINUED | OUTPATIENT
Start: 2023-10-25 | End: 2023-11-03 | Stop reason: HOSPADM

## 2023-10-24 RX ORDER — METOPROLOL SUCCINATE 50 MG/1
100 TABLET, EXTENDED RELEASE ORAL DAILY
Status: DISCONTINUED | OUTPATIENT
Start: 2023-10-25 | End: 2023-10-27

## 2023-10-24 RX ORDER — POTASSIUM CHLORIDE 20 MEQ/1
20 TABLET, EXTENDED RELEASE ORAL
Status: ON HOLD | COMMUNITY
Start: 2023-10-11 | End: 2023-11-03 | Stop reason: HOSPADM

## 2023-10-24 RX ORDER — PREDNISOLONE ACETATE 10 MG/ML
1 SUSPENSION/ DROPS OPHTHALMIC 4 TIMES DAILY
COMMUNITY
Start: 2023-09-08 | End: 2023-12-14

## 2023-10-24 RX ORDER — ONDANSETRON 2 MG/ML
4 INJECTION INTRAMUSCULAR; INTRAVENOUS EVERY 8 HOURS PRN
Status: DISCONTINUED | OUTPATIENT
Start: 2023-10-25 | End: 2023-11-03 | Stop reason: HOSPADM

## 2023-10-24 RX ORDER — COLLAGENASE SANTYL 250 [ARB'U]/G
OINTMENT TOPICAL
Status: ON HOLD | COMMUNITY
Start: 2023-09-12 | End: 2023-11-03 | Stop reason: SDUPTHER

## 2023-10-24 RX ORDER — IBUPROFEN 200 MG
24 TABLET ORAL
Status: DISCONTINUED | OUTPATIENT
Start: 2023-10-25 | End: 2023-11-03 | Stop reason: HOSPADM

## 2023-10-24 RX ORDER — GLUCAGON 1 MG
1 KIT INJECTION
Status: DISCONTINUED | OUTPATIENT
Start: 2023-10-25 | End: 2023-11-03 | Stop reason: HOSPADM

## 2023-10-24 RX ORDER — NALOXONE HCL 0.4 MG/ML
0.02 VIAL (ML) INJECTION
Status: DISCONTINUED | OUTPATIENT
Start: 2023-10-25 | End: 2023-11-03 | Stop reason: HOSPADM

## 2023-10-24 RX ORDER — PANTOPRAZOLE SODIUM 40 MG/1
40 TABLET, DELAYED RELEASE ORAL 2 TIMES DAILY
Status: DISCONTINUED | OUTPATIENT
Start: 2023-10-25 | End: 2023-10-25

## 2023-10-24 RX ORDER — HEPARIN SODIUM 5000 [USP'U]/ML
5000 INJECTION, SOLUTION INTRAVENOUS; SUBCUTANEOUS EVERY 8 HOURS
Status: DISCONTINUED | OUTPATIENT
Start: 2023-10-25 | End: 2023-10-31

## 2023-10-24 RX ORDER — DIGOXIN 125 MCG
0.12 TABLET ORAL DAILY
Status: DISCONTINUED | OUTPATIENT
Start: 2023-10-25 | End: 2023-11-03 | Stop reason: HOSPADM

## 2023-10-24 RX ORDER — TALC
6 POWDER (GRAM) TOPICAL NIGHTLY PRN
Status: DISCONTINUED | OUTPATIENT
Start: 2023-10-25 | End: 2023-11-03 | Stop reason: HOSPADM

## 2023-10-24 RX ORDER — METOPROLOL SUCCINATE 50 MG/1
100 TABLET, EXTENDED RELEASE ORAL DAILY
Qty: 90 TABLET | Refills: 3 | Status: ON HOLD | OUTPATIENT
Start: 2023-10-24 | End: 2023-11-03 | Stop reason: HOSPADM

## 2023-10-24 RX ADMIN — CEFTRIAXONE SODIUM 1 G: 1 INJECTION, POWDER, FOR SOLUTION INTRAMUSCULAR; INTRAVENOUS at 08:10

## 2023-10-24 RX ADMIN — SODIUM CHLORIDE 1000 ML: 9 INJECTION, SOLUTION INTRAVENOUS at 08:10

## 2023-10-24 NOTE — Clinical Note
Diagnosis: Sepsis, due to unspecified organism, unspecified whether acute organ dysfunction present [[id number]]  Future Attending Provider: CHRISTIAN CAMACHO [[id number]]  Admitting Provider:: CHRISTIAN CAMACHO [[id number]]

## 2023-10-24 NOTE — TELEPHONE ENCOUNTER
Kimberli from [facility] called with a Critical lab of Calcium-12.7. High Priority message sent to Dr. Marty Hernandez and staff. Value entered into Flowsheets.

## 2023-10-24 NOTE — PROGRESS NOTES
Subjective:      Patient ID: Aquiles Montana is a 83 y.o. male.    Chief Complaint: Hospital Follow Up      Vitals:    10/24/23 1402   BP: 122/74   Pulse: 69   Temp: 98 °F (36.7 °C)   TempSrc: Oral   SpO2: 96%        HPI   Here with wife and son for altered level of alertness with recent inpt stay, hx of skin cancer on leg, nodule in right groin, high WBC on labs; WC bound; pt unalbe to give hx, comes from family      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  No current outpatient medications on file.    Facility-Administered Medications Ordered in Other Visits:     collagenase ointment, , Topical (Top), Daily, Giuliana Smith MD, Given at 10/29/23 0829    dextrose 10% bolus 125 mL 125 mL, 12.5 g, Intravenous, PRN, Jessica Neff NP    dextrose 10% bolus 250 mL 250 mL, 25 g, Intravenous, PRN, Jessica Neff NP    digoxin tablet 0.125 mg, 0.125 mg, Oral, Daily, Jessica Neff NP, 0.125 mg at 10/29/23 0821    glucagon (human recombinant) injection 1 mg, 1 mg, Intramuscular, PRN, Jessica Neff NP    glucose chewable tablet 16 g, 16 g, Oral, PRN, Bridges, Jessica, NP    glucose chewable tablet 24 g, 24 g, Oral, PRN, Jessica Neff NP    heparin (porcine) injection 5,000  Units, 5,000 Units, Subcutaneous, Q8H, Jessica Neff NP, 5,000 Units at 10/29/23 0504    latanoprost 0.005 % ophthalmic solution 1 drop, 1 drop, Both Eyes, QHS, Giuliana Smith MD, 1 drop at 10/28/23 2207    melatonin tablet 6 mg, 6 mg, Oral, Nightly PRN, Jessica Neff NP    melatonin tablet 6 mg, 6 mg, Oral, Nightly, Benjamín Baxter Jr., MD, 6 mg at 10/28/23 2206    metoprolol tartrate (LOPRESSOR) tablet 25 mg, 25 mg, Oral, BID, Giuliana Smith MD, 25 mg at 10/29/23 0821    mupirocin 2 % ointment, , Nasal, BID, Veronica Clay MD    naloxone 0.4 mg/mL injection 0.02 mg, 0.02 mg, Intravenous, PRN, Jessica Neff NP    ondansetron injection 4 mg, 4 mg, Intravenous, Q8H PRN, Jessica Neff NP    pantoprazole injection 40 mg, 40 mg, Intravenous, BID, Jessica Neff NP, 40 mg at 10/29/23 0822    piperacillin-tazobactam (ZOSYN) 4.5 g in dextrose 5 % in water (D5W) 100 mL IVPB (MB+), 4.5 g, Intravenous, Q8H, Veronica Clay MD    Flushing PICC/Midline Protocol, , , Until Discontinued **AND** sodium chloride 0.9% flush 10 mL, 10 mL, Intravenous, Q6H, 10 mL at 10/29/23 0600 **AND** sodium chloride 0.9% flush 10 mL, 10 mL, Intravenous, PRN, Terrance Majano MD    Flushing PICC/Midline Protocol, , , Until Discontinued **AND** sodium chloride 0.9% flush 10 mL, 10 mL, Intravenous, Q6H, 10 mL at 10/29/23 0600 **AND** sodium chloride 0.9% flush 10 mL, 10 mL, Intravenous, PRN, Terrance Majano MD    sodium chloride 0.9% flush 3 mL, 3 mL, Intravenous, Q12H PRN, Jessica Neff NP    trazodone split tablet 25 mg, 25 mg, Oral, QHS, Benjamín Baxter Jr., MD, 25 mg at 10/28/23 2206    Pharmacy to dose Vancomycin consult, , , Once **AND** vancomycin - pharmacy to dose, , Intravenous, pharmacy to manage frequency, Jessica Neff NP    vancomycin 1,500 mg in dextrose 5 % (D5W) 250 mL IVPB (Vial-Mate), 1,500 mg, Intravenous, Q24H, Giuliana Smith MD, Stopped at 10/29/23 0317      History:  Current Providers as of  "10/24/2023  PCP: Marty Hernandez MD  Care Team Provider: Rafael Schwab MD  Care Team Provider: Sola Hampton LPN  Care Team Provider: Delonte Marino MD  Care Team Provider: Haleigh Veliz PA-C  Care Team Provider: Jack Sawyer  Encounter Provider: Marty Hernandez MD, starting on Tue Oct 24, 2023 12:00 AM  Referring Provider: not found, starting on Tue Oct 24, 2023 12:00 AM  Consulting Physician: Marty Hernandez MD, starting on Tue Oct 24, 2023  1:54 PM (Active)   Past Medical History:   Diagnosis Date    Hypertension     Urinary tract infection      Past Surgical History:   Procedure Laterality Date    hand sugery      PROSTATE SURGERY      "Rotor rooter- opened up"     Social History     Tobacco Use    Smoking status: Never     Passive exposure: Never    Smokeless tobacco: Never   Substance Use Topics    Alcohol use: No     Alcohol/week: 0.0 standard drinks of alcohol    Drug use: No         Review of Systems   Skin:  Positive for wound.   Neurological:  Positive for speech difficulty and weakness.   Psychiatric/Behavioral:  Positive for behavioral problems, confusion and decreased concentration.    All other systems reviewed and are negative.    Objective:     Physical Exam  Vitals and nursing note reviewed.   Constitutional:       General: He is not in acute distress.     Appearance: He is well-developed. He is ill-appearing. He is not toxic-appearing or diaphoretic.   HENT:      Head: Normocephalic and atraumatic.      Right Ear: External ear normal.      Left Ear: External ear normal.      Mouth/Throat:      Pharynx: No oropharyngeal exudate.   Eyes:      General: No scleral icterus.        Right eye: No discharge.         Left eye: No discharge.      Conjunctiva/sclera: Conjunctivae normal.      Pupils: Pupils are equal, round, and reactive to light.   Neck:      Thyroid: No thyromegaly.      Vascular: No JVD.      Trachea: No tracheal deviation.   Cardiovascular:      Rate " and Rhythm: Normal rate and regular rhythm.      Heart sounds: No murmur heard.     No friction rub. No gallop.   Pulmonary:      Effort: Pulmonary effort is normal. No respiratory distress.      Breath sounds: Normal breath sounds. No stridor. No wheezing or rales.   Chest:      Chest wall: No tenderness.   Abdominal:      General: There is no distension.      Palpations: Abdomen is soft. There is no mass.      Tenderness: There is no abdominal tenderness. There is no guarding or rebound.   Musculoskeletal:         General: Swelling present. No tenderness.      Cervical back: Neck supple.   Lymphadenopathy:      Cervical: No cervical adenopathy.   Skin:     General: Skin is warm and dry.      Coloration: Skin is not pale.      Findings: Lesion present. No erythema or rash.   Neurological:      Mental Status: He is alert.      Cranial Nerves: No cranial nerve deficit.      Motor: Weakness present. No abnormal muscle tone.      Coordination: Coordination normal.      Gait: Gait abnormal.      Deep Tendon Reflexes: Reflexes are normal and symmetric. Reflexes normal.             Assessment:     1. Abscess    2. Hyperglycemia    3. Hypokalemia    4. Primary hypertension    5. Lymphedema    6. Essential hypertension    7. Class 2 severe obesity with serious comorbidity and body mass index (BMI) of 38.0 to 38.9 in adult, unspecified obesity type    8. Right leg swelling    9. Open wound of right lower leg, initial encounter    10. Cellulitis of left lower extremity      Plan:        Medication List            Accurate as of October 24, 2023  7:45 PM. If you have any questions, ask your nurse or doctor.                START taking these medications      sulfamethoxazole-trimethoprim 800-160mg 800-160 mg Tab  Commonly known as: BACTRIM DS  Take 1 tablet by mouth 2 (two) times daily. For infection  Started by: Marty Hernandez MD            CHANGE how you take these medications      metoprolol succinate 50 MG 24 hr  tablet  Commonly known as: TOPROL-XL  Take 2 tablets (100 mg total) by mouth once daily.  What changed:   medication strength  when to take this  Changed by: Marty Hernandez MD     potassium chloride SA 20 MEQ tablet  Commonly known as: K-DUR,KLOR-CON  What changed: Another medication with the same name was removed. Continue taking this medication, and follow the directions you see here.  Changed by: Marty Hernandez MD            CONTINUE taking these medications      ascorbic acid (vitamin C) 250 MG tablet  Commonly known as: VITAMIN C     digoxin 125 mcg tablet  Commonly known as: LANOXIN     furosemide 40 MG tablet  Commonly known as: LASIX  Take 1 tablet (40 mg total) by mouth once daily.     ketorolac 0.5% 0.5 % Drop  Commonly known as: ACULAR     latanoprost 0.005 % ophthalmic solution     melatonin 3 mg tablet  Commonly known as: MELATIN     multivitamin with folic acid 400 mcg Tab     OCUFLOX 0.3 % ophthalmic solution  Generic drug: ofloxacin     pantoprazole 40 MG tablet  Commonly known as: PROTONIX     prednisoLONE acetate 1 % Drps  Commonly known as: PRED FORTE     SantyL ointment  Generic drug: collagenase     triamcinolone acetonide 0.1% 0.1 % cream  Commonly known as: KENALOG     zinc sulfate 50 mg zinc (220 mg) capsule  Commonly known as: ZINCATE            STOP taking these medications      amiodarone 200 MG Tab  Commonly known as: PACERONE  Stopped by: Marty Hernandez MD     apixaban 5 mg Tab  Commonly known as: ELIQUIS  Stopped by: Marty Hernandez MD     aspirin 81 MG EC tablet  Commonly known as: ECOTRIN  Stopped by: Marty Hernandez MD     atenoloL 50 MG tablet  Commonly known as: TENORMIN  Stopped by: Marty Hernandez MD     cefdinir 300 MG capsule  Commonly known as: OMNICEF  Stopped by: Marty Hernandez MD     NIFEdipine 90 MG Tbsr  Commonly known as: ADALAT CC  Stopped by: Marty Hernandez MD               Where to Get Your Medications        These medications were sent to [id number] -  [place] - [address] [facility]  [address] [place] [address]      Phone: [phone]   metoprolol succinate 50 MG 24 hr tablet  sulfamethoxazole-trimethoprim 800-160mg 800-160 mg Tab       Abscess  Comments:  right inguinal    Orders:  -     CBC Auto Differential; Future; Expected date: 10/24/2023  -     Comprehensive Metabolic Panel; Future; Expected date: 10/24/2023  -     TSH; Future  -     Sedimentation rate; Future    Hyperglycemia  -     CBC Auto Differential; Future; Expected date: 10/24/2023  -     Comprehensive Metabolic Panel; Future; Expected date: 10/24/2023  -     TSH; Future  -     Sedimentation rate; Future    Hypokalemia  -     CBC Auto Differential; Future; Expected date: 10/24/2023  -     Comprehensive Metabolic Panel; Future; Expected date: 10/24/2023  -     TSH; Future  -     Sedimentation rate; Future    Primary hypertension  -     CBC Auto Differential; Future; Expected date: 10/24/2023  -     Comprehensive Metabolic Panel; Future; Expected date: 10/24/2023  -     TSH; Future  -     Sedimentation rate; Future    Lymphedema  -     CBC Auto Differential; Future; Expected date: 10/24/2023  -     Comprehensive Metabolic Panel; Future; Expected date: 10/24/2023  -     TSH; Future  -     Sedimentation rate; Future    Essential hypertension  -     CBC Auto Differential; Future; Expected date: 10/24/2023  -     Comprehensive Metabolic Panel; Future; Expected date: 10/24/2023  -     TSH; Future  -     Sedimentation rate; Future    Class 2 severe obesity with serious comorbidity and body mass index (BMI) of 38.0 to 38.9 in adult, unspecified obesity type  -     CBC Auto Differential; Future; Expected date: 10/24/2023  -     Comprehensive Metabolic Panel; Future; Expected date: 10/24/2023  -     TSH; Future  -     Sedimentation rate; Future    Right leg swelling  -     CBC Auto Differential; Future; Expected date: 10/24/2023  -     Comprehensive Metabolic Panel; Future; Expected date:  10/24/2023  -     TSH; Future  -     Sedimentation rate; Future    Open wound of right lower leg, initial encounter  -     CBC Auto Differential; Future; Expected date: 10/24/2023  -     Comprehensive Metabolic Panel; Future; Expected date: 10/24/2023  -     TSH; Future  -     Sedimentation rate; Future    Cellulitis of left lower extremity  -     CBC Auto Differential; Future; Expected date: 10/24/2023  -     Comprehensive Metabolic Panel; Future; Expected date: 10/24/2023  -     TSH; Future  -     Sedimentation rate; Future    Other orders  -     sulfamethoxazole-trimethoprim 800-160mg (BACTRIM DS) 800-160 mg Tab; Take 1 tablet by mouth 2 (two) times daily. For infection  Dispense: 30 tablet; Refill: 1  -     metoprolol succinate (TOPROL-XL) 50 MG 24 hr tablet; Take 2 tablets (100 mg total) by mouth once daily.  Dispense: 90 tablet; Refill: 3

## 2023-10-24 NOTE — TELEPHONE ENCOUNTER
Critical lab was given to Dr. Hernandez. Dr. Hernandez advised that the pt needed to go to the ED due to his calcium being high.     Pt's spouse verbally understood the message and will bring him to the ED.

## 2023-10-24 NOTE — PROGRESS NOTES
CHW - Initial Contact    This Community Health Worker reviewed the Social Determinant of Health questionnaire with MRN [id number] in clinic today.    Pt identified barriers of most importance are: No barriers reported   Referrals to community agencies completed with patient/caregiver consent outside of [facility] include: No  Referrals were put through [facility] - No  Support and Services: No support & services have been documented.  Other information discussed the patient needs / wants help with: SDOH reviewed. No assistance requested at this time.   Follow up required: No  No future outreach task assigned

## 2023-10-25 PROBLEM — Z71.89 ACP (ADVANCE CARE PLANNING): Status: ACTIVE | Noted: 2023-10-25

## 2023-10-25 PROBLEM — E83.52 HYPERCALCEMIA: Status: ACTIVE | Noted: 2023-10-25

## 2023-10-25 PROBLEM — G93.41 ENCEPHALOPATHY, METABOLIC: Status: ACTIVE | Noted: 2023-10-25

## 2023-10-25 PROBLEM — I48.91 A-FIB: Status: ACTIVE | Noted: 2023-10-25

## 2023-10-25 PROBLEM — L02.214 ABSCESS OF RIGHT GROIN: Status: ACTIVE | Noted: 2023-10-25

## 2023-10-25 PROBLEM — D64.9 ANEMIA: Status: ACTIVE | Noted: 2023-10-25

## 2023-10-25 LAB
ALBUMIN SERPL BCP-MCNC: 2.6 G/DL (ref 3.5–5.2)
ALP SERPL-CCNC: 92 U/L (ref 55–135)
ALT SERPL W/O P-5'-P-CCNC: 10 U/L (ref 10–44)
ANION GAP SERPL CALC-SCNC: 10 MMOL/L (ref 8–16)
ANISOCYTOSIS BLD QL SMEAR: SLIGHT
AST SERPL-CCNC: 18 U/L (ref 10–40)
BACTERIA #/AREA URNS HPF: ABNORMAL /HPF
BASOPHILS # BLD AUTO: 0.13 K/UL (ref 0–0.2)
BASOPHILS NFR BLD: 0.4 % (ref 0–1.9)
BILIRUB SERPL-MCNC: 0.3 MG/DL (ref 0.1–1)
BILIRUB UR QL STRIP: NEGATIVE
BUN SERPL-MCNC: 29 MG/DL (ref 8–23)
CA-I BLDV-SCNC: 1.6 MMOL/L (ref 1.06–1.42)
CALCIUM SERPL-MCNC: 12.2 MG/DL (ref 8.7–10.5)
CHLORIDE SERPL-SCNC: 101 MMOL/L (ref 95–110)
CLARITY UR: ABNORMAL
CO2 SERPL-SCNC: 32 MMOL/L (ref 23–29)
COLOR UR: YELLOW
CREAT SERPL-MCNC: 1.4 MG/DL (ref 0.5–1.4)
DIFFERENTIAL METHOD: ABNORMAL
DIGOXIN SERPL-MCNC: 1.3 NG/ML (ref 0.8–2)
EOSINOPHIL # BLD AUTO: 0.2 K/UL (ref 0–0.5)
EOSINOPHIL NFR BLD: 0.5 % (ref 0–8)
ERYTHROCYTE [DISTWIDTH] IN BLOOD BY AUTOMATED COUNT: 15.9 % (ref 11.5–14.5)
EST. GFR  (NO RACE VARIABLE): 50 ML/MIN/1.73 M^2
GLUCOSE SERPL-MCNC: 103 MG/DL (ref 70–110)
GLUCOSE UR QL STRIP: NEGATIVE
HCT VFR BLD AUTO: 36.4 % (ref 40–54)
HGB BLD-MCNC: 11.2 G/DL (ref 14–18)
HGB UR QL STRIP: NEGATIVE
HYPOCHROMIA BLD QL SMEAR: ABNORMAL
IGA SERPL-MCNC: 599 MG/DL (ref 40–350)
IGG SERPL-MCNC: 1350 MG/DL (ref 650–1600)
IGM SERPL-MCNC: 109 MG/DL (ref 50–300)
IMM GRANULOCYTES # BLD AUTO: 0.17 K/UL (ref 0–0.04)
IMM GRANULOCYTES NFR BLD AUTO: 0.6 % (ref 0–0.5)
KETONES UR QL STRIP: NEGATIVE
LACTATE SERPL-SCNC: 1.1 MMOL/L (ref 0.5–2.2)
LDH SERPL L TO P-CCNC: 149 U/L (ref 110–260)
LEUKOCYTE ESTERASE UR QL STRIP: ABNORMAL
LIPASE SERPL-CCNC: 68 U/L (ref 4–60)
LYMPHOCYTES # BLD AUTO: 1.3 K/UL (ref 1–4.8)
LYMPHOCYTES NFR BLD: 4.4 % (ref 18–48)
MAGNESIUM SERPL-MCNC: 2 MG/DL (ref 1.6–2.6)
MCH RBC QN AUTO: 28.9 PG (ref 27–31)
MCHC RBC AUTO-ENTMCNC: 30.8 G/DL (ref 32–36)
MCV RBC AUTO: 94 FL (ref 82–98)
MICROSCOPIC COMMENT: ABNORMAL
MONOCYTES # BLD AUTO: 1.7 K/UL (ref 0.3–1)
MONOCYTES NFR BLD: 5.7 % (ref 4–15)
NEUTROPHILS # BLD AUTO: 26 K/UL (ref 1.8–7.7)
NEUTROPHILS NFR BLD: 88.4 % (ref 38–73)
NITRITE UR QL STRIP: NEGATIVE
NRBC BLD-RTO: 0 /100 WBC
OVALOCYTES BLD QL SMEAR: ABNORMAL
PH UR STRIP: 5 [PH] (ref 5–8)
PHOSPHATE SERPL-MCNC: 3.1 MG/DL (ref 2.7–4.5)
PLATELET # BLD AUTO: 244 K/UL (ref 150–450)
PLATELET BLD QL SMEAR: ABNORMAL
PMV BLD AUTO: 10.2 FL (ref 9.2–12.9)
POCT GLUCOSE: 114 MG/DL (ref 70–110)
POCT GLUCOSE: 92 MG/DL (ref 70–110)
POLYCHROMASIA BLD QL SMEAR: ABNORMAL
POTASSIUM SERPL-SCNC: 3.8 MMOL/L (ref 3.5–5.1)
PROT SERPL-MCNC: 6.7 G/DL (ref 6–8.4)
PROT UR QL STRIP: ABNORMAL
PTH-INTACT SERPL-MCNC: 6.5 PG/ML (ref 9–77)
RBC # BLD AUTO: 3.87 M/UL (ref 4.6–6.2)
RBC #/AREA URNS HPF: 27 /HPF (ref 0–4)
SODIUM SERPL-SCNC: 143 MMOL/L (ref 136–145)
SP GR UR STRIP: 1.02 (ref 1–1.03)
URN SPEC COLLECT METH UR: ABNORMAL
UROBILINOGEN UR STRIP-ACNC: NEGATIVE EU/DL
WBC # BLD AUTO: 29.46 K/UL (ref 3.9–12.7)
WBC #/AREA URNS HPF: 21 /HPF (ref 0–5)

## 2023-10-25 PROCEDURE — 51701 INSERT BLADDER CATHETER: CPT

## 2023-10-25 PROCEDURE — 83615 LACTATE (LD) (LDH) ENZYME: CPT | Performed by: INTERNAL MEDICINE

## 2023-10-25 PROCEDURE — 63600175 PHARM REV CODE 636 W HCPCS: Performed by: HOSPITALIST

## 2023-10-25 PROCEDURE — 80162 ASSAY OF DIGOXIN TOTAL: CPT | Performed by: NURSE PRACTITIONER

## 2023-10-25 PROCEDURE — 51798 US URINE CAPACITY MEASURE: CPT

## 2023-10-25 PROCEDURE — 25000003 PHARM REV CODE 250: Performed by: INTERNAL MEDICINE

## 2023-10-25 PROCEDURE — 82330 ASSAY OF CALCIUM: CPT | Performed by: NURSE PRACTITIONER

## 2023-10-25 PROCEDURE — 83605 ASSAY OF LACTIC ACID: CPT | Performed by: STUDENT IN AN ORGANIZED HEALTH CARE EDUCATION/TRAINING PROGRAM

## 2023-10-25 PROCEDURE — 83521 IG LIGHT CHAINS FREE EACH: CPT | Performed by: INTERNAL MEDICINE

## 2023-10-25 PROCEDURE — C9113 INJ PANTOPRAZOLE SODIUM, VIA: HCPCS | Performed by: NURSE PRACTITIONER

## 2023-10-25 PROCEDURE — 81000 URINALYSIS NONAUTO W/SCOPE: CPT | Performed by: HOSPITALIST

## 2023-10-25 PROCEDURE — 87086 URINE CULTURE/COLONY COUNT: CPT | Performed by: HOSPITALIST

## 2023-10-25 PROCEDURE — 86334 IMMUNOFIX E-PHORESIS SERUM: CPT | Performed by: INTERNAL MEDICINE

## 2023-10-25 PROCEDURE — 11000001 HC ACUTE MED/SURG PRIVATE ROOM

## 2023-10-25 PROCEDURE — 99222 PR INITIAL HOSPITAL CARE,LEVL II: ICD-10-PCS | Mod: ,,, | Performed by: INTERNAL MEDICINE

## 2023-10-25 PROCEDURE — 63600175 PHARM REV CODE 636 W HCPCS: Performed by: NURSE PRACTITIONER

## 2023-10-25 PROCEDURE — 27000221 HC OXYGEN, UP TO 24 HOURS

## 2023-10-25 PROCEDURE — 80053 COMPREHEN METABOLIC PANEL: CPT | Performed by: NURSE PRACTITIONER

## 2023-10-25 PROCEDURE — 83735 ASSAY OF MAGNESIUM: CPT | Performed by: NURSE PRACTITIONER

## 2023-10-25 PROCEDURE — 85025 COMPLETE CBC W/AUTO DIFF WBC: CPT | Performed by: NURSE PRACTITIONER

## 2023-10-25 PROCEDURE — 83690 ASSAY OF LIPASE: CPT | Performed by: NURSE PRACTITIONER

## 2023-10-25 PROCEDURE — 83970 ASSAY OF PARATHORMONE: CPT | Performed by: NURSE PRACTITIONER

## 2023-10-25 PROCEDURE — 25000003 PHARM REV CODE 250: Performed by: NURSE PRACTITIONER

## 2023-10-25 PROCEDURE — 86334 IMMUNOFIX E-PHORESIS SERUM: CPT | Mod: 26,,, | Performed by: PATHOLOGY

## 2023-10-25 PROCEDURE — 25000003 PHARM REV CODE 250: Performed by: HOSPITALIST

## 2023-10-25 PROCEDURE — 94761 N-INVAS EAR/PLS OXIMETRY MLT: CPT

## 2023-10-25 PROCEDURE — 25000003 PHARM REV CODE 250: Performed by: STUDENT IN AN ORGANIZED HEALTH CARE EDUCATION/TRAINING PROGRAM

## 2023-10-25 PROCEDURE — 99222 1ST HOSP IP/OBS MODERATE 55: CPT | Mod: ,,, | Performed by: INTERNAL MEDICINE

## 2023-10-25 PROCEDURE — 36415 COLL VENOUS BLD VENIPUNCTURE: CPT | Performed by: NURSE PRACTITIONER

## 2023-10-25 PROCEDURE — 84100 ASSAY OF PHOSPHORUS: CPT | Performed by: NURSE PRACTITIONER

## 2023-10-25 PROCEDURE — 86334 PATHOLOGIST INTERPRETATION IFE: ICD-10-PCS | Mod: 26,,, | Performed by: PATHOLOGY

## 2023-10-25 PROCEDURE — 82784 ASSAY IGA/IGD/IGG/IGM EACH: CPT | Mod: 59 | Performed by: INTERNAL MEDICINE

## 2023-10-25 RX ORDER — FUROSEMIDE 10 MG/ML
40 INJECTION INTRAMUSCULAR; INTRAVENOUS DAILY
Status: DISCONTINUED | OUTPATIENT
Start: 2023-10-25 | End: 2023-10-25

## 2023-10-25 RX ORDER — SODIUM CHLORIDE 9 MG/ML
INJECTION, SOLUTION INTRAVENOUS CONTINUOUS
Status: ACTIVE | OUTPATIENT
Start: 2023-10-25 | End: 2023-10-26

## 2023-10-25 RX ORDER — PANTOPRAZOLE SODIUM 40 MG/10ML
40 INJECTION, POWDER, LYOPHILIZED, FOR SOLUTION INTRAVENOUS 2 TIMES DAILY
Status: DISCONTINUED | OUTPATIENT
Start: 2023-10-25 | End: 2023-11-03 | Stop reason: HOSPADM

## 2023-10-25 RX ORDER — LATANOPROST 50 UG/ML
1 SOLUTION/ DROPS OPHTHALMIC NIGHTLY
Status: DISCONTINUED | OUTPATIENT
Start: 2023-10-25 | End: 2023-11-03 | Stop reason: HOSPADM

## 2023-10-25 RX ORDER — SODIUM CHLORIDE 9 MG/ML
INJECTION, SOLUTION INTRAVENOUS CONTINUOUS
Status: ACTIVE | OUTPATIENT
Start: 2023-10-25 | End: 2023-10-25

## 2023-10-25 RX ADMIN — SODIUM CHLORIDE: 9 INJECTION, SOLUTION INTRAVENOUS at 08:10

## 2023-10-25 RX ADMIN — SODIUM CHLORIDE: 9 INJECTION, SOLUTION INTRAVENOUS at 02:10

## 2023-10-25 RX ADMIN — PANTOPRAZOLE SODIUM 40 MG: 40 INJECTION, POWDER, LYOPHILIZED, FOR SOLUTION INTRAVENOUS at 09:10

## 2023-10-25 RX ADMIN — HEPARIN SODIUM 5000 UNITS: 5000 INJECTION INTRAVENOUS; SUBCUTANEOUS at 09:10

## 2023-10-25 RX ADMIN — CEFEPIME 2 G: 2 INJECTION, POWDER, FOR SOLUTION INTRAVENOUS at 01:10

## 2023-10-25 RX ADMIN — CEFEPIME 2 G: 2 INJECTION, POWDER, FOR SOLUTION INTRAVENOUS at 11:10

## 2023-10-25 RX ADMIN — HEPARIN SODIUM 5000 UNITS: 5000 INJECTION INTRAVENOUS; SUBCUTANEOUS at 02:10

## 2023-10-25 RX ADMIN — VANCOMYCIN HYDROCHLORIDE 1750 MG: 1.25 INJECTION, POWDER, LYOPHILIZED, FOR SOLUTION INTRAVENOUS at 02:10

## 2023-10-25 RX ADMIN — COLLAGENASE SANTYL: 250 OINTMENT TOPICAL at 02:10

## 2023-10-25 RX ADMIN — PANTOPRAZOLE SODIUM 40 MG: 40 INJECTION, POWDER, LYOPHILIZED, FOR SOLUTION INTRAVENOUS at 10:10

## 2023-10-25 NOTE — ASSESSMENT & PLAN NOTE
This patient does have evidence of infective focus  My overall impression is sepsis.  Source: Skin and Soft Tissue (location right inguinal abscess)   -- UA pending to also assess for urinary source  Antibiotics given-   Antibiotics (72h ago, onward)    Start     Stop Route Frequency Ordered    10/25/23 0100  vancomycin (VANCOCIN) 1,750 mg in dextrose 5 % (D5W) 500 mL IVPB         10/25/23 1259 IV ED 1 Time 10/25/23 0006    10/25/23 0034  vancomycin - pharmacy to dose  (vancomycin IVPB (PEDS and ADULTS))        See Hyperspace for full Linked Orders Report.    -- IV pharmacy to manage frequency 10/24/23 2335    10/25/23 0030  ceFEPIme (MAXIPIME) 2 g in dextrose 5 % in water (D5W) 100 mL IVPB (MB+)         -- IV Every 12 hours (non-standard times) 10/24/23 2336        Latest lactate reviewed-  Recent Labs   Lab 10/24/23  2019   LACTATE 1.6     Organ dysfunction indicated by Acute kidney injury and Encephalopathy    Fluid challenge Actual Body weight- Patient will receive 30ml/kg actual body weight to calculate fluid bolus for treatment of septic shock.     Post- resuscitation assessment Yes Perfusion exam was performed within 6 hours of septic shock presentation after bolus shows Adequate tissue perfusion assessed by non-invasive monitoring       Will Not start Pressors- Levophed for MAP of 65  Source control achieved by: broad spectrum abx

## 2023-10-25 NOTE — PROGRESS NOTES
Surgery follow up    History of excision right lower leg squamous cell carcinoma  size 112 x 14 cm in September 2019.  Tight froin lesion possible metastatic squamous cell carcinoma with necrosis.  Consult oncology.

## 2023-10-25 NOTE — ED NOTES
Wife and son arrived at bedside. They report pt. Has had a mental/functional decline for approx. 2 weeks. He has not ambulated in over a week but has been able to stand with max. Assist to transfer. He did eat lunch today. He was taken to a doctors appointment and called back to come to ED for abnormal lab values. Family reports pt. Has been having intermittent periods of lethargy/declining loc.

## 2023-10-25 NOTE — ASSESSMENT & PLAN NOTE
· Stable  · H/H 12.3/39.8  · S/p blood transfusions during previous admission for GIB  · No continued reports of hematemesis, melena, hematochezia, or other signs of bleeding per family  · Transfuse for hgb <7  · Monitor

## 2023-10-25 NOTE — H&P
[facility] Medicine  History & Physical    Patient Name: Aquiles Montana  MRN: [id number]  Patient Class: IP- Inpatient  Admission Date: 10/24/2023  Attending Physician: Param Shepard,*   Primary Care Provider: Marty Hernandez MD         Patient information was obtained from spouse/SO, relative(s), past medical records and ER records.     Subjective:     Principal Problem:Sepsis    Chief Complaint:   Chief Complaint   Patient presents with    Altered Mental Status     Pt reports to ED via EMS with c/o AMS that began today after doctors appointment. Pt's family reports pt has been non verbal and aggressive. EMS unsure of pt's baseline. Family reports a decline in pt since 9/10/23 after admission to hospital for pneumonia.         HPI: Aquiles Montana is an 83-year-old male w/ PMH SCC, BPH, HTN, obesity, and left anterior fascicular block. He presented to the ED via EMS for evaluation due to worsening mental status since admission 5 weeks ago. He was previously admitted to the hospital for sepsis and then discharged to a SNF. Patient's family reports that he came home recently but has just not been himself since the previous admission. Patient was seen by PCP today and status deteriorated following the visit.  Chart review of lab work performed prior to arrival today shows hypercalcemia and leukocytosis.  Also evident of acute kidney injury.  Patient was reportedly agitated and difficult to direct earlier today and at time of presentation to our facility is nonverbal. Patient opens eyes to verbal stimuli and responds to painful stimuli. History obtained from patient's family at bedside. They state that patient has not been experiencing fever, N/V/D, CP, or SOB recently. He has had a decrease in PO intake and activity. They also report that patient started experiencing delirium and hallucinations during his previous hospitalization, which have continued. Of note, patient has a large abscess  "in his right inguinal area and family states that this has been causing him quite a bit of pain recently.       Past Medical History:   Diagnosis Date    Hypertension     Urinary tract infection        Past Surgical History:   Procedure Laterality Date    hand sugery      PROSTATE SURGERY      "Rotor rooter- opened up"       Review of patient's allergies indicates:  No Known Allergies    No current facility-administered medications on file prior to encounter.     Current Outpatient Medications on File Prior to Encounter   Medication Sig    digoxin (LANOXIN) 125 mcg tablet Take 0.125 mg by mouth once daily.    furosemide (LASIX) 40 MG tablet Take 1 tablet (40 mg total) by mouth once daily.    latanoprost 0.005 % ophthalmic solution Place 1 drop into both eyes every evening.    metoprolol succinate (TOPROL-XL) 50 MG 24 hr tablet Take 2 tablets (100 mg total) by mouth once daily.    pantoprazole (PROTONIX) 40 MG tablet Take 40 mg by mouth 2 (two) times daily.    potassium chloride SA (K-DUR,KLOR-CON) 20 MEQ tablet Take 20 mEq by mouth.    sulfamethoxazole-trimethoprim 800-160mg (BACTRIM DS) 800-160 mg Tab Take 1 tablet by mouth 2 (two) times daily. For infection    ascorbic acid, vitamin C, (VITAMIN C) 250 MG tablet Take 1 tablet by mouth every morning.    ketorolac 0.5% (ACULAR) 0.5 % Drop Place 1 drop into the left eye 4 (four) times daily.    melatonin (MELATIN) 3 mg tablet Take 2 tablets by mouth every evening.    multivitamin with folic acid 400 mcg Tab Take 1 tablet by mouth every morning.    OCUFLOX 0.3 % ophthalmic solution Place 1 drop into the left eye 4 (four) times daily.    prednisoLONE acetate (PRED FORTE) 1 % DrpS Place 1 drop into the left eye 4 (four) times daily.    SANTYL ointment SMARTSI.7 CENTIMETER(S) Topical Daily    triamcinolone acetonide 0.1% (KENALOG) 0.1 % cream APPLY TO LEGS DAILY BEFORE COMPRESSION DRESSINGS    zinc sulfate (ZINCATE) 50 mg zinc (220 mg) capsule Take " 1 capsule by mouth every morning.     Family History       Problem Relation (Age of Onset)    Arthritis Son    Hypertension Father, Mother    No Known Problems Sister          Tobacco Use    Smoking status: Never     Passive exposure: Never    Smokeless tobacco: Never   Substance and Sexual Activity    Alcohol use: No     Alcohol/week: 0.0 standard drinks of alcohol    Drug use: No    Sexual activity: Not Currently     Review of Systems   Unable to perform ROS: Acuity of condition     Objective:     Vital Signs (Most Recent):  Temp: 98.4 °F (36.9 °C) (10/25/23 0046)  Pulse: 66 (10/25/23 0046)  Resp: 18 (10/25/23 0046)  BP: (!) 142/70 (10/25/23 0046)  SpO2: 95 % (10/25/23 0046) Vital Signs (24h Range):  Temp:  [97.6 °F (36.4 °C)-98.4 °F (36.9 °C)] 98.4 °F (36.9 °C)  Pulse:  [66-78] 66  Resp:  [16-20] 18  SpO2:  [93 %-100 %] 95 %  BP: ()/(58-84) 142/70     Weight: 97.8 kg (215 lb 9.8 oz)  Body mass index is 29.24 kg/m².     Physical Exam  HENT:      Head: Normocephalic.      Mouth/Throat:      Mouth: Mucous membranes are dry.   Eyes:      Pupils: Pupils are equal, round, and reactive to light.   Cardiovascular:      Rate and Rhythm: Normal rate. Rhythm irregular.      Pulses:           Dorsalis pedis pulses are 1+ on the right side and 1+ on the left side.      Heart sounds: Normal heart sounds.   Pulmonary:      Effort: Pulmonary effort is normal. No respiratory distress.      Breath sounds: Decreased breath sounds present. No wheezing, rhonchi or rales.   Abdominal:      General: Bowel sounds are normal. There is no distension.      Palpations: Abdomen is soft.      Tenderness: There is no abdominal tenderness. There is no guarding or rebound.   Musculoskeletal:      Right lower le+ Edema present.      Left lower le+ Edema present.   Skin:     General: Skin is warm.      Findings: Wound present.      Comments: Healing wound right shin. Swollen, tender, discolored abscess right inguinal area.  Pressure ulcer buttocks.   Neurological:      Mental Status: He is lethargic.      Cranial Nerves: No facial asymmetry.      Comments: Arousable to verbal and painful stimuli. Nonverbal   Psychiatric:      Comments: Unable to assess              CRANIAL NERVES     CN III, IV, VI   Pupils are equal, round, and reactive to light.       Significant Labs: All pertinent labs within the past 24 hours have been reviewed.    Significant Imaging: I have reviewed all pertinent imaging results/findings within the past 24 hours.    Assessment/Plan:     * Sepsis  This patient does have evidence of infective focus  My overall impression is sepsis.  Source: Skin and Soft Tissue (location right inguinal abscess)   -- UA pending to also assess for urinary source  Antibiotics given-   Antibiotics (72h ago, onward)    Start     Stop Route Frequency Ordered    10/25/23 0100  vancomycin (VANCOCIN) 1,750 mg in dextrose 5 % (D5W) 500 mL IVPB         10/25/23 1259 IV ED 1 Time 10/25/23 0006    10/25/23 0034  vancomycin - pharmacy to dose  (vancomycin IVPB (PEDS and ADULTS))        See Hyperspace for full Linked Orders Report.    -- IV pharmacy to manage frequency 10/24/23 2335    10/25/23 0030  ceFEPIme (MAXIPIME) 2 g in dextrose 5 % in water (D5W) 100 mL IVPB (MB+)         -- IV Every 12 hours (non-standard times) 10/24/23 2336        Latest lactate reviewed-  Recent Labs   Lab 10/24/23  2019   LACTATE 1.6     Organ dysfunction indicated by Acute kidney injury and Encephalopathy    Fluid challenge Actual Body weight- Patient will receive 30ml/kg actual body weight to calculate fluid bolus for treatment of septic shock.     Post- resuscitation assessment Yes Perfusion exam was performed within 6 hours of septic shock presentation after bolus shows Adequate tissue perfusion assessed by non-invasive monitoring       Will Not start Pressors- Levophed for MAP of 65  Source control achieved by: broad spectrum abx        Anemia  · Stable  · H/H  "12.3/39.8  · S/p blood transfusions during previous admission for GIB  · No continued reports of hematemesis, melena, hematochezia, or other signs of bleeding per family  · Transfuse for hgb <7  · Monitor       ACP (advance care planning)  Advance Care Planning     Date: 10/25/2023    Code Status  In light of the patients advanced and life limiting illness,I engaged the the family in a voluntary conversation about the patient's preferences for care  at the very end of life. The patient wishes to have a natural, peaceful death.  Along those lines, the patient does not wish to have CPR or other invasive treatments performed when his heart and/or breathing stops. I communicated to the family that a DNR order would be placed in his medical record to reflect this preference.  I spent a total of 8 minutes engaging the patient in this advance care planning discussion.           Hypercalcemia  The patient has hypercalcemia that is currently uncontrolled. The patient has the following symptoms due to their hypercalcemia: encephalopathy. The hypercalcemia is likely due to unknown - suspected 2/2 malignancy. We will obtain the following labs to work up the hypercalcemia:   alkaline phosphatase   Alkaline Phosphatase   Date Value Ref Range Status   10/24/2023 108 55 - 135 U/L Final    . We will treat the hypercalcemia with: IV fluids ordered at a rate of 125/hr. Their latest calcium has been reviewed and is listed below.  No results found for: "CAION"     Ionized calcium ordered  PTH ordered    A-fib  Patient with Persistent (7 days or more) atrial fibrillation which is controlled currently with Beta Blocker and Digoxin. Patient is currently in atrial fibrillation.[id number] Score: 2. HASBLED Score: 3. Anticoagulation not indicated due to risk of GIB.  Continue subq heparin while inpatient w/ close monitoring    Abscess of right groin  · Previously noted on MRI and CT scan 9/2023   1.  Bulky lymphadenopathy versus mass in the " right inguinal region    2.  Enlarged right external iliac chain lymph nodes, likely metastatic    3.  No other findings of metastatic disease in the abdomen or pelvis    4.  Cholelithiasis   · Noted to be SCC on biopsy  · Per family, it is now more swollen, painful, and discolored since biopsy  · Hem/onc consult ordered regarding recommendations  · May benefit from IR vs general surgery evaluation  · CT pelvis pending      Encephalopathy, metabolic  · Likely 2/2 infectious etiology  · Treat as above  · Keep NPO until mental status improves  · Maintain safety precautions      Hypertension  · Patient mildly hypotensive on arrival but responded well to IVF  · Resume home BP meds in the am if awake enough to take        VTE Risk Mitigation (From admission, onward)         Ordered     heparin (porcine) injection 5,000 Units  Every 8 hours         10/24/23 2358     IP VTE HIGH RISK PATIENT  Once         10/24/23 2358     Place sequential compression device  Until discontinued         10/24/23 2358                               Jessica Neff NP  Department of Hospital Medicine  [place] - Telemetry    N/A  Family history is reviewed and has not changed   Pertinent information:

## 2023-10-25 NOTE — ED NOTES
Pt. Is lethargic, able to say his name with repeated stimulation. Pt. Has excoriation to buttocks and small stage 2 to (L) buttock. Photo taken and placed in media section of chart. He also has a large bulging abscess area to (R) groin.

## 2023-10-25 NOTE — CONSULTS
[facility] - Telemetry  Adult Nutrition  Consult Note    SUMMARY     Recommendations    Recommendation:  1. Initiate diet when medically acceptable.   2. Monitor weight/labs.   3. RD to follow to monitor nutrition status    Goals:  Diet will be started by RD follow up  Nutrition Goal Status: new  Communication of RD Recs: reviewed with RN    Assessment and Plan  Nutrition Problem  Inadequate energy intake    Related to (etiology):   AMS, dx/hx    Signs and Symptoms (as evidenced by):   Weight loss, NPO, recent decreased appetite     Interventions:  Collaboration with other providers    Nutrition Diagnosis Status:   New      Malnutrition Assessment  Weight Loss (Malnutrition):  (11% x 2 months)   Orbital Region (Subcutaneous Fat Loss): well nourished  Upper Arm Region (Subcutaneous Fat Loss): well nourished  Thoracic and Lumbar Region: well nourished   Amish Region (Muscle Loss): well nourished  Clavicle Bone Region (Muscle Loss): well nourished  Clavicle and Acromion Bone Region (Muscle Loss): well nourished  Scapular Bone Region (Muscle Loss): well nourished  Dorsal Hand (Muscle Loss): well nourished  Patellar Region (Muscle Loss): well nourished  Anterior Thigh Region (Muscle Loss): well nourished  Posterior Calf Region (Muscle Loss): well nourished       Reason for Assessment  Reason For Assessment: consult (altered mental status, decreased appetite at home)  Diagnosis:  (sepsis)  Relevant Medical History: HTN, UTI, prostate surgery  General Information Comments: Pt NPO at this time. Wife reports pt awaiting CAT scan. Wife reports very little at meals at home and was drinking a little Ensure. Fidel 15- skin intact. NFPE completed today 10/25-nourished. Noted 27b weight loss x 2 months.  Nutrition Discharge Planning: d/c diet to be determined    Nutrition Risk Screen  Nutrition Risk Screen: other (see comments) (altered mental status)    Nutrition/Diet History  Food Preferences: no Restoration or cultural food prefs  identified  Factors Affecting Nutritional Intake: impaired cognitive status/motor control    Anthropometrics  Temp: 98 °F (36.7 °C)  Height: 6' (182.9 cm)  Height (inches): 72 in  Weight Method: Bed Scale  Weight: 97.8 kg (215 lb 9.8 oz)  Weight (lb): 215.61 lb  Ideal Body Weight (IBW), Male: 178 lb  % Ideal Body Weight, Male (lb): 121.13 %  BMI (Calculated): 29.2  BMI Grade: 25 - 29.9 - overweight  Usual Body Weight (UBW), k.9 kg (10)  % Usual Body Weight: 89.18  % Weight Change From Usual Weight: -11.01 %     Lab/Procedures/Meds  Pertinent Labs Reviewed: reviewed  Pertinent Labs Comments: BUN 31H, Crea 1.7H, Glu 130H, CA 13,1H, Alb 3,0L  Pertinent Medications Reviewed: reviewed  Pertinent Medications Comments: Lasix, heparin, pantoprazole    Estimated/Assessed Needs  Weight Used For Calorie Calculations: 97.8 kg (215 lb 9.8 oz)  Energy Calorie Requirements (kcal):   Energy Need Method: [place]-St Jeor (x 1.2)  Protein Requirements: 78g (0.8g/kg)  Weight Used For Protein Calculations: 97.8 kg (215 lb 9.8 oz)  Estimated Fluid Requirement Method: RDA Method  RDA Method (mL):      Nutrition Prescription Ordered  Current Diet Order: NPO    Evaluation of Received Nutrient/Fluid Intake  I/O: 0/400  Energy Calories Required: not meeting needs  Protein Required: not meeting needs  Fluid Required: not meeting needs  Comments: LBM 10/24  % Intake of Estimated Energy Needs: Other: NPO  % Meal Intake: NPO    Nutrition Risk  Level of Risk/Frequency of Follow-up:  (2xweekly)     Monitor and Evaluation  Food and Nutrient Intake: food and beverage intake  Food and Nutrient Adminstration: diet order  Physical Activity and Function: nutrition-related ADLs and IADLs  Anthropometric Measurements: weight  Biochemical Data, Medical Tests and Procedures: electrolyte and renal panel  Nutrition-Focused Physical Findings: overall appearance     Nutrition Follow-Up  RD Follow-up?: Yes

## 2023-10-25 NOTE — PLAN OF CARE
10/25/23 0131   Admission   Initial VN Admission Questions Complete   Communication Issues? None   Shift   Virtual Nurse - Rounding Complete   Virtual Nurse - Patient Verbalized Approval Of Camera Use;VN Rounding   Type of Frequent Check   Type Patient Rounds;Telemetry Monitoring   Safety/Activity   Patient Rounds bed in low position;bed wheels locked;call light in patient/parent reach;clutter free environment maintained;ID band on;visualized patient;placement of personal items at bedside   Safety Promotion/Fall Prevention assistive device/personal item within reach;bed alarm set   Positioning   Body Position supine   Head of Bed (HOB) Positioning HOB elevated   Pain/Comfort/Sleep   Sleep/Rest/Relaxation appears asleep;no problem identified   Cardiac   Cardiac/Telemetry Monitor On Yes     Admission questions completed. Introduced patient to VIP model, patient verbalized understanding. Educated patient on fall prevention protocol, updated plan of care. Opportunity given for pt's questions. All questions answered.

## 2023-10-25 NOTE — CONSULTS
"Nephrology Consult  H&P      Consult Requested By: Giuliana Smith MD  Reason for Consult: FERNANDO      SUBJECTIVE:     History of Present Illness:  Aquiles Montana is a 83 y.o.   male who  has a past medical history of Hypertension and Urinary tract infection.. The patient presented to the [facility] in [place] on 10/24/2023 with a primary complaint of AMS per family. Recently diagnosed with lymphoma s/p biopsy of R groin lymph  node.   ?    Review of Systems   Unable to perform ROS: Acuity of condition       Past Medical History:   Diagnosis Date    Hypertension     Urinary tract infection      Past Surgical History:   Procedure Laterality Date    hand sugery      PROSTATE SURGERY      "Rotor rooter- opened up"     Family History   Problem Relation Age of Onset    Hypertension Father     Hypertension Mother     No Known Problems Sister     Arthritis Son     Prostate cancer Neg Hx     Kidney disease Neg Hx      Social History     Tobacco Use    Smoking status: Never     Passive exposure: Never    Smokeless tobacco: Never   Substance Use Topics    Alcohol use: No     Alcohol/week: 0.0 standard drinks of alcohol    Drug use: No       Review of patient's allergies indicates:  No Known Allergies         OBJECTIVE:     Vital Signs (Most Recent)  Vitals:    10/25/23 0539 10/25/23 0737 10/25/23 1149 10/25/23 1425   BP: 128/60 120/61 117/68    BP Location:  Left arm Left arm    Patient Position:  Lying Lying    Pulse: 72 69 69    Resp: 19 18 18    Temp: 97.6 °F (36.4 °C) 98.1 °F (36.7 °C) 98 °F (36.7 °C)    TempSrc:  Oral Oral    SpO2: (!) 93% (!) 94% 95%    Weight:    97.8 kg (215 lb 9.8 oz)   Height:    6' (1.829 m)         Date 10/25/23 0700 - 10/26/23 0659   Shift 0350-8163 4306-3186 3471-8322 24 Hour Total   INTAKE   P.O. 0   0   Shift Total(mL/kg) 0(0)   0(0)   OUTPUT   Shift Total(mL/kg)       Weight (kg) 97.8 97.8 97.8 97.8           Medications:   ceFEPime (MAXIPIME) IVPB  2 g Intravenous Q12H    collagenase   Topical " (Top) Daily    digoxin  0.125 mg Oral Daily    heparin (porcine)  5,000 Units Subcutaneous Q8H    latanoprost  1 drop Both Eyes QHS    metoprolol succinate  100 mg Oral Daily    pantoprazole  40 mg Intravenous BID           Physical Exam  Vitals and nursing note reviewed.   Constitutional:       General: He is not in acute distress.     Appearance: He is ill-appearing and toxic-appearing. He is not diaphoretic.   HENT:      Head: Normocephalic and atraumatic.      Mouth/Throat:      Pharynx: No oropharyngeal exudate.   Eyes:      General: No scleral icterus.     Conjunctiva/sclera: Conjunctivae normal.      Pupils: Pupils are equal, round, and reactive to light.   Cardiovascular:      Rate and Rhythm: Normal rate and regular rhythm.      Heart sounds: Normal heart sounds. No murmur heard.  Pulmonary:      Effort: Pulmonary effort is normal. No respiratory distress.      Breath sounds: Normal breath sounds.   Abdominal:      General: Bowel sounds are normal. There is no distension.      Palpations: Abdomen is soft.      Tenderness: There is no abdominal tenderness.   Musculoskeletal:         General: Normal range of motion.      Cervical back: Normal range of motion and neck supple.   Skin:     General: Skin is warm and dry.      Findings: No erythema.   Neurological:      Mental Status: He is disoriented.      Cranial Nerves: No cranial nerve deficit.         Laboratory:  Recent Labs   Lab 10/24/23  1541 10/24/23  2019 10/24/23  2021 10/25/23  0938   WBC 26.41* 25.67*  --  29.46*   HGB 12.1* 12.3*  --  11.2*   HCT 39.5* 39.8* 39.7 36.4*    274  --  244   MONO 4.8  1.3* 5.3  1.4*  --  5.7  1.7*   EOSINOPHIL 0.3 0.0  --  0.5     Recent Labs   Lab 10/24/23  1541 10/24/23  2019 10/24/23  2021 10/25/23  0938    143  --  143   K 4.4 4.5  --  3.8   CL 98 99  --  101   CO2 36* 32*  --  32*   BUN 31* 31*  --  29*   CREATININE 1.34 1.7*  --  1.4   CALCIUM 12.7* 13.1*  --  12.2*   PHOS  --   --  3.4 3.1        Diagnostic Results:  X-Ray: Reviewed  US: Reviewed  Echo: Reviewed  ASSESSMENT/PLAN:     1. FERNANDO -  at this point  Sepsis Hypercalcemia Volume depletion   Pending CT abd for abscess  - check for construction as well    -- Increase  cc/hr for Ca+ monitor Volume status with SpO2   -- Daily Renal Function Panel  -- Avoid Hypotension.  -- Renally dose all meds  -- Please avoid nephrotoxins, including NSAIDs, aminoglycosides, IV contrast (unless absolutely necessary), gadolinium, fleets and other phosphorous-based laxatives. Caution with antibiotics.    Sepsis   -- Has Inflamed R groin lymph node ? Abscess Surgery consulted needed CT to rule out necrosis   -- IV abx     2. HTN (I10) - controlled   3. Anemia    Recent Labs   Lab 10/24/23  1541 10/24/23  2019 10/24/23  2021 10/25/23  0938   HGB 12.1* 12.3*  --  11.2*   HCT 39.5* 39.8* 39.7 36.4*    274  --  244            4. Hypercalcemia  - Cancer Hx Volume depletion   -- IVF NS 200cc/hr     Lab Results   Component Value Date    PTH 6.5 (L) 10/25/2023    CALCIUM 12.2 (HH) 10/25/2023    CAION 1.60 (H) 10/25/2023    PHOS 3.1 10/25/2023       Alkalosis - suspect volume contraction   -- IVF  NS   Lab Results   Component Value Date    CO2 32 (H) 10/25/2023           Thank you for allowing me to participate in care of your patient  With any question please call   Delvis Karimi MD     [facility]  JAM Herrera MD,   OMD LUIS FELIPE Benjamin MD E. V. Harmon, NP  [address]   [place], [address] [phone]  After hours answering service: [phone]

## 2023-10-25 NOTE — ASSESSMENT & PLAN NOTE
· Previously noted on MRI and CT scan 9/2023   1.  Bulky lymphadenopathy versus mass in the right inguinal region    2.  Enlarged right external iliac chain lymph nodes, likely metastatic    3.  No other findings of metastatic disease in the abdomen or pelvis    4.  Cholelithiasis   · Noted to be SCC on biopsy  · Per family, it is now more swollen, painful, and discolored since biopsy  · Hem/onc consult ordered regarding recommendations  · May benefit from IR vs general surgery evaluation  · CT pelvis pending

## 2023-10-25 NOTE — PROGRESS NOTES
[facility] Progress Note      Assessment/Plan:     Aquiles Montana is a 83 y.o.male with    Suspected UTI      UA with RBC 27, WBC 21, Bacteria rare      F/u UCx, cover cefepime    Sepsis 2/2 R Inguinal lymphadenitis vs abscess  This patient does have evidence of infective focus  My overall impression is sepsis.  Source: Skin and Soft Tissue (location right inguinal adenitis/abscess)   - CT Pelvis 1. Large lobular masslike focus within the right inguinal regions suggesting lymph node conglomerate.  Correlation with history of malignancy advised.  Additional lymph node enlargement noted along the right iliac chain.  Please note, this likely correlates with finding on prior CT   - WBC 29,000<--25,670<--26,410  - BCx are no growth to date  -- UA pending to also assess for urinary source  Antibiotics given- vancomycin, cefepime    Anemia  Stable  H/H 12.3/39.8  S/p blood transfusions during previous admission for GIB  No continued reports of hematemesis, melena, hematochezia, or other signs of bleeding per family  Transfuse for hgb <7  Monitor       ACP (advance care planning)  Advance Care Planning   Date: 10/25/2023  Code Status  In light of the patients advanced and life limiting illness,I engaged the the family in a voluntary conversation about the patient's preferences for care  at the very end of life. The patient wishes to have a natural, peaceful death.  Along those lines, the patient does not wish to have CPR or other invasive treatments performed when his heart and/or breathing stops. I communicated to the family that a DNR order would be placed in his medical record to reflect this preference.  I spent a total of 8 minutes engaging the patient in this advance care planning discussion.     Hypercalcemia  The patient has hypercalcemia that is currently uncontrolled. The patient has the following symptoms due to their hypercalcemia: encephalopathy. The hypercalcemia is likely due to unknown - suspected  2/2 malignancy. We will obtain the following labs to work up the hypercalcemia:   alkaline phosphatase         Alkaline Phosphatase   Date Value Ref Range Status   10/24/2023 108 55 - 135 U/L Final    . We will treat the hypercalcemia with: IV fluids ordered at a rate of 125/hr. Their latest calcium has been reviewed and is listed below.  Corrected calcium is   Ionized calcium ordered  PTH 6.5 (L)     A-fib  Patient with Persistent (7 days or more) atrial fibrillation which is controlled currently with Beta Blocker and Digoxin. Patient is currently in atrial fibrillation.[id number] Score: 2. HASBLED Score: 3. Anticoagulation not indicated due to risk of GIB.  Continue subq heparin while inpatient w/ close monitoring     Swelling of right groin  Previously noted on MRI and CT scan 9/2023          1.  Bulky lymphadenopathy versus mass in the right inguinal region           2.  Enlarged right external iliac chain lymph nodes, likely metastatic           3.  No other findings of metastatic disease in the abdomen or pelvis           4.  Cholelithiasis   Noted to be SCC on biopsy  Per family, it is now more swollen, painful, and discolored since biopsy  Hem/onc consult ordered regarding recommendations  Huge lymphadenitis with fluctuant mass , r/o tumor necrosis lymphoma  CT pelvis 1. Large lobular masslike focus within the right inguinal regions suggesting lymph node conglomerate.  Correlation with history of malignancy advised.  Additional lymph node enlargement noted along the right iliac chain. T     Encephalopathy, Hallucinations  Likely 2/2 infectious etiology vs mets to brain?  Treat as above  Keep NPO until mental status improves  Bedside swallow  Maintain safety precautions  Ask family if prior brain MRI or CT head with contrast has been performed     Hypertension  Patient mildly hypotensive on arrival but responded well to IVF  Resume home BP meds in the am if awake enough to take     Heparin 5k  q8      Subjective:      Aquiles Montana is a 83 y.o.  male who is being followed by [facility] for sepsis 2/2 R inguinal lymphadenitis in the setting of SCC and hypercalcemia    Today patient is still disoriented. Ca++ is down to 12.2, albumin 2.6==>corrected 13.3    On fluids    Review of Systems   Constitutional:  Negative for chills and fever.   HENT:  Negative for congestion and sore throat.    Respiratory:  Negative for shortness of breath.    Cardiovascular:  Negative for chest pain and leg swelling.   Genitourinary:  Negative for dysuria and urgency.   Musculoskeletal:  Negative for back pain, myalgias and neck pain.   Skin:  Positive for rash. Negative for itching.   Neurological:  Positive for weakness. Negative for dizziness.   Psychiatric/Behavioral:  Positive for hallucinations.         Objective:   Last 24 Hour Vital Signs:  BP  Min: 96/68  Max: 176/84  Temp  Av °F (36.7 °C)  Min: 97.6 °F (36.4 °C)  Max: 98.4 °F (36.9 °C)  Pulse  Av.5  Min: 66  Max: 78  Resp  Av.6  Min: 16  Max: 20  SpO2  Av.7 %  Min: 93 %  Max: 100 %  Height  Av' (182.9 cm)  Min: 6' (182.9 cm)  Max: 6' (182.9 cm)  Weight  Av.9 kg (213 lb 8.8 oz)  Min: 95 kg (209 lb 7 oz)  Max: 97.8 kg (215 lb 9.8 oz)  I/O last 3 completed shifts:  In: -   Out: 400 [Urine:400]    Physical Exam  Constitutional:       Appearance: Normal appearance.   HENT:      Head: Normocephalic and atraumatic.   Eyes:      Extraocular Movements: Extraocular movements intact.      Pupils: Pupils are equal, round, and reactive to light.   Cardiovascular:      Rate and Rhythm: Normal rate and regular rhythm.   Pulmonary:      Effort: Pulmonary effort is normal. No respiratory distress.      Breath sounds: Normal breath sounds.   Abdominal:      General: Abdomen is flat. Bowel sounds are normal.      Palpations: Abdomen is soft.   Skin:     General: Skin is warm and dry.      Findings: Erythema present.      Comments: Swollen  R inguinal lymph nodes with erythema     Neurological:      Mental Status: He is alert. He is disoriented.           Laboratory:  Laboratory Data Reviewed: yes  Pertinent Findings:      Microbiology Data Reviewed: yes  Pertinent Findings:      Other Results:  EKG (my interpretation): .    Radiology Data Reviewed: yes  Pertinent Findings:      Current Medications:     Infusions:   sodium chloride 0.9% 200 mL/hr at 10/25/23 1404        Scheduled:   ceFEPime (MAXIPIME) IVPB  2 g Intravenous Q12H    collagenase   Topical (Top) Daily    digoxin  0.125 mg Oral Daily    heparin (porcine)  5,000 Units Subcutaneous Q8H    latanoprost  1 drop Both Eyes QHS    metoprolol succinate  100 mg Oral Daily    pantoprazole  40 mg Intravenous BID    [START ON 10/26/2023] vancomycin (VANCOCIN) IV (PEDS and ADULTS)  1,500 mg Intravenous Q24H        PRN:  dextrose 10%, dextrose 10%, glucagon (human recombinant), glucose, glucose, melatonin, naloxone, ondansetron, sodium chloride 0.9%, Pharmacy to dose Vancomycin consult **AND** vancomycin - pharmacy to dose    Antibiotics and Day Number of Therapy:       Lines and Day Number of Therapy:       Giuliana Smith MD  [facility]

## 2023-10-25 NOTE — PROGRESS NOTES
Pharmacokinetic Initial Assessment: IV Vancomycin    Assessment/Plan:    Initiate intravenous vancomycin with loading dose of 1750 mg once (reduced loading dose d/t age and dhara) with subsequent doses when random concentrations are less than 15 mcg/mL  Desired empiric serum trough concentration is 10 to 15 mcg/mL  Draw vancomycin random level on 10/26 at 0100.  Pharmacy will continue to follow and monitor vancomycin.      Please contact pharmacy at extension [phone] with any questions regarding this assessment.     Thank you for the consult,   Toma Murillo       Patient brief summary:  Aquiles Montana is a 83 y.o. male initiated on antimicrobial therapy with IV Vancomycin for treatment of suspected skin & soft tissue infection    Drug Allergies:   Review of patient's allergies indicates:  No Known Allergies    Actual Body Weight:   95 kg    Renal Function:   Estimated Creatinine Clearance: 39.4 mL/min (A) (based on SCr of 1.7 mg/dL (H)).,     Dialysis Method (if applicable):  N/A    CBC (last 72 hours):  Recent Labs   Lab Result Units 10/24/23  1541 10/24/23  2019   WBC K/uL 26.41* 25.67*   Hemoglobin g/dL 12.1* 12.3*   Hematocrit % 39.5* 39.8*   Platelets K/uL 311 274   Gran % % 89.5* 89.8*   Lymph % % 4.1* 3.9*   Mono % % 4.8 5.3   Eosinophil % % 0.3 0.0   Basophil % % 0.5 0.3   Differential Method  Automated Automated       Metabolic Panel (last 72 hours):  Recent Labs   Lab Result Units 10/24/23  1541 10/24/23  2019 10/24/23  2021   Sodium mmol/L 141 143  --    Potassium mmol/L 4.4 4.5  --    Chloride mmol/L 98 99  --    CO2 mmol/L 36* 32*  --    Glucose mg/dL 195* 130*  --    BUN mg/dL 31* 31*  --    Creatinine mg/dL 1.34 1.7*  --    Albumin g/dL 3.4* 3.0*  --    Total Bilirubin mg/dL 0.5 0.4  --    Alkaline Phosphatase U/L 126 108  --    AST U/L 37 20  --    ALT U/L 18 12  --    Magnesium mg/dL  --  2.1  --    Phosphorus mg/dL  --   --  3.4       Drug levels (last 3 results):  No results for input(s):  ""VANCOMYCINRA", "VANCORANDOM", "VANCOMYCINPE", "VANCOPEAK", "VANCOMYCINTR", "VANCOTROUGH" in the last 72 hours.    Microbiologic Results:  Microbiology Results (last 7 days)       Procedure Component Value Units Date/Time    Blood culture x two cultures. Draw prior to antibiotics. [[id number]] Collected: 10/24/23 2019    Order Status: Sent Specimen: Blood from Peripheral, Antecubital, Left Updated: 10/24/23 2019    Blood culture x two cultures. Draw prior to antibiotics. [[id number]] Collected: 10/24/23 2018    Order Status: Sent Specimen: Blood from Peripheral, Hand, Right Updated: 10/24/23 2019            "

## 2023-10-25 NOTE — ASSESSMENT & PLAN NOTE
· Likely 2/2 infectious etiology  · Treat as above  · Keep NPO until mental status improves  · Maintain safety precautions

## 2023-10-25 NOTE — ASSESSMENT & PLAN NOTE
"The patient has hypercalcemia that is currently uncontrolled. The patient has the following symptoms due to their hypercalcemia: encephalopathy. The hypercalcemia is likely due to unknown - suspected 2/2 malignancy. We will obtain the following labs to work up the hypercalcemia:   alkaline phosphatase   Alkaline Phosphatase   Date Value Ref Range Status   10/24/2023 108 55 - 135 U/L Final    . We will treat the hypercalcemia with: IV fluids ordered at a rate of 125/hr. Their latest calcium has been reviewed and is listed below.  No results found for: "CAION"     Ionized calcium ordered  PTH ordered  "

## 2023-10-25 NOTE — SUBJECTIVE & OBJECTIVE
"Past Medical History:   Diagnosis Date    Hypertension     Urinary tract infection        Past Surgical History:   Procedure Laterality Date    hand sugery      PROSTATE SURGERY      "Rotor rooter- opened up"       Review of patient's allergies indicates:  No Known Allergies    No current facility-administered medications on file prior to encounter.     Current Outpatient Medications on File Prior to Encounter   Medication Sig    digoxin (LANOXIN) 125 mcg tablet Take 0.125 mg by mouth once daily.    furosemide (LASIX) 40 MG tablet Take 1 tablet (40 mg total) by mouth once daily.    latanoprost 0.005 % ophthalmic solution Place 1 drop into both eyes every evening.    metoprolol succinate (TOPROL-XL) 50 MG 24 hr tablet Take 2 tablets (100 mg total) by mouth once daily.    pantoprazole (PROTONIX) 40 MG tablet Take 40 mg by mouth 2 (two) times daily.    potassium chloride SA (K-DUR,KLOR-CON) 20 MEQ tablet Take 20 mEq by mouth.    sulfamethoxazole-trimethoprim 800-160mg (BACTRIM DS) 800-160 mg Tab Take 1 tablet by mouth 2 (two) times daily. For infection    ascorbic acid, vitamin C, (VITAMIN C) 250 MG tablet Take 1 tablet by mouth every morning.    ketorolac 0.5% (ACULAR) 0.5 % Drop Place 1 drop into the left eye 4 (four) times daily.    melatonin (MELATIN) 3 mg tablet Take 2 tablets by mouth every evening.    multivitamin with folic acid 400 mcg Tab Take 1 tablet by mouth every morning.    OCUFLOX 0.3 % ophthalmic solution Place 1 drop into the left eye 4 (four) times daily.    prednisoLONE acetate (PRED FORTE) 1 % DrpS Place 1 drop into the left eye 4 (four) times daily.    SANTYL ointment SMARTSI.7 CENTIMETER(S) Topical Daily    triamcinolone acetonide 0.1% (KENALOG) 0.1 % cream APPLY TO LEGS DAILY BEFORE COMPRESSION DRESSINGS    zinc sulfate (ZINCATE) 50 mg zinc (220 mg) capsule Take 1 capsule by mouth every morning.     Family History       Problem Relation (Age of Onset)    Arthritis Son    Hypertension Father, " Mother    No Known Problems Sister          Tobacco Use    Smoking status: Never     Passive exposure: Never    Smokeless tobacco: Never   Substance and Sexual Activity    Alcohol use: No     Alcohol/week: 0.0 standard drinks of alcohol    Drug use: No    Sexual activity: Not Currently     Review of Systems   Unable to perform ROS: Acuity of condition     Objective:     Vital Signs (Most Recent):  Temp: 98.4 °F (36.9 °C) (10/25/23 0046)  Pulse: 66 (10/25/23 0046)  Resp: 18 (10/25/23 0046)  BP: (!) 142/70 (10/25/23 0046)  SpO2: 95 % (10/25/23 0046) Vital Signs (24h Range):  Temp:  [97.6 °F (36.4 °C)-98.4 °F (36.9 °C)] 98.4 °F (36.9 °C)  Pulse:  [66-78] 66  Resp:  [16-20] 18  SpO2:  [93 %-100 %] 95 %  BP: ()/(58-84) 142/70     Weight: 97.8 kg (215 lb 9.8 oz)  Body mass index is 29.24 kg/m².     Physical Exam  HENT:      Head: Normocephalic.      Mouth/Throat:      Mouth: Mucous membranes are dry.   Eyes:      Pupils: Pupils are equal, round, and reactive to light.   Cardiovascular:      Rate and Rhythm: Normal rate. Rhythm irregular.      Pulses:           Dorsalis pedis pulses are 1+ on the right side and 1+ on the left side.      Heart sounds: Normal heart sounds.   Pulmonary:      Effort: Pulmonary effort is normal. No respiratory distress.      Breath sounds: Decreased breath sounds present. No wheezing, rhonchi or rales.   Abdominal:      General: Bowel sounds are normal. There is no distension.      Palpations: Abdomen is soft.      Tenderness: There is no abdominal tenderness. There is no guarding or rebound.   Musculoskeletal:      Right lower le+ Edema present.      Left lower le+ Edema present.   Skin:     General: Skin is warm.      Findings: Wound present.      Comments: Healing wound right shin. Swollen, tender, discolored abscess right inguinal area. Pressure ulcer buttocks.   Neurological:      Mental Status: He is lethargic.      Cranial Nerves: No facial asymmetry.      Comments:  Arousable to verbal and painful stimuli. Nonverbal   Psychiatric:      Comments: Unable to assess              CRANIAL NERVES     CN III, IV, VI   Pupils are equal, round, and reactive to light.       Significant Labs: All pertinent labs within the past 24 hours have been reviewed.    Significant Imaging: I have reviewed all pertinent imaging results/findings within the past 24 hours.

## 2023-10-25 NOTE — ASSESSMENT & PLAN NOTE
· Patient mildly hypotensive on arrival but responded well to IVF  · Resume home BP meds in the am if awake enough to take

## 2023-10-25 NOTE — HPI
Aquiles Montana is an 83-year-old male w/ PMH SCC, BPH, HTN, obesity, and left anterior fascicular block. He presented to the ED via EMS for evaluation due to worsening mental status since admission 5 weeks ago. He was previously admitted to the hospital for sepsis and then discharged to a SNF. Patient's family reports that he came home recently but has just not been himself since the previous admission. Patient was seen by PCP today and status deteriorated following the visit.  Chart review of lab work performed prior to arrival today shows hypercalcemia and leukocytosis.  Also evident of acute kidney injury.  Patient was reportedly agitated and difficult to direct earlier today and at time of presentation to our facility is nonverbal. Patient opens eyes to verbal stimuli and responds to painful stimuli. History obtained from patient's family at bedside. They state that patient has not been experiencing fever, N/V/D, CP, or SOB recently. He has had a decrease in PO intake and activity. They also report that patient started experiencing delirium and hallucinations during his previous hospitalization, which have continued. Of note, patient has a large abscess in his right inguinal area and family states that this has been causing him quite a bit of pain recently.

## 2023-10-25 NOTE — CONSULTS
"Today`s Date: 10/25/2023     Admit Date: 10/24/2023    Admitting Physician: Giuliana Smith MD    Patient`s Name: Aquiles Montana , 83 y.o. male    Reason for consultation  Evaluate right grion mass , s/p biopsy , no fever or chills   Patient Active Problem List:     Benign non-nodular prostatic hyperplasia with lower urinary tract symptoms     Nocturia     Hypertension     Obesity     Arthritis     Psoriasis     SCCA (squamous cell carcinoma) of skin     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     Dermatitis     Tinea pedis of both feet     Open wound of right lower leg     Right leg swelling     Left anterior fascicular block     Class 2 severe obesity with serious comorbidity and body mass index (BMI) of 38.0 to 38.9 in adult     Sepsis     Encephalopathy, metabolic     Abscess of right groin     A-fib     Hypercalcemia     ACP (advance care planning)     Anemia      Past Medical History:  No date: Hypertension  No date: Urinary tract infection    Past Surgical History:  No date: hand sugery  No date: PROSTATE SURGERY      Comment:  "Rotor rooter- opened up"    Prior to Admission medications :  Medication digoxin (LANOXIN) 125 mcg tablet, Sig Take 0.125 mg by mouth once daily., Start Date 10/11/23, End Date , Taking? Yes, Authorizing Provider Provider, Historical    Medication furosemide (LASIX) 40 MG tablet, Sig Take 1 tablet (40 mg total) by mouth once daily., Start Date 6/29/22, End Date , Taking? Yes, Authorizing Provider Marty Hernandez MD    Medication latanoprost 0.005 % ophthalmic solution, Sig Place 1 drop into both eyes every evening., Start Date , End Date , Taking? Yes, Authorizing Provider Provider, Historical    Medication metoprolol succinate (TOPROL-XL) 50 MG 24 hr tablet, Sig Take 2 tablets (100 mg total) by mouth once daily., Start Date 10/24/23, End Date 10/23/24, Taking? Yes, Authorizing Provider Marty Hernandez MD    Medication pantoprazole (PROTONIX) 40 MG tablet, Sig Take 40 mg by " mouth 2 (two) times daily., Start Date 10/12/23, End Date , Taking? Yes, Authorizing Provider Provider, Historical    Medication potassium chloride SA (K-DUR,KLOR-CON) 20 MEQ tablet, Sig Take 20 mEq by mouth., Start Date 10/11/23, End Date , Taking? Yes, Authorizing Provider Provider, Historical    Medication sulfamethoxazole-trimethoprim 800-160mg (BACTRIM DS) 800-160 mg Tab, Sig Take 1 tablet by mouth 2 (two) times daily. For infection, Start Date 10/24/23, End Date , Taking? Yes, Authorizing Provider Marty Hernandez MD    Medication ascorbic acid, vitamin C, (VITAMIN C) 250 MG tablet, Sig Take 1 tablet by mouth every morning., Start Date 23, End Date 24, Taking? , Authorizing Provider Provider, Historical    Medication ketorolac 0.5% (ACULAR) 0.5 % Drop, Sig Place 1 drop into the left eye 4 (four) times daily., Start Date 23, End Date , Taking? , Authorizing Provider Provider, Historical    Medication melatonin (MELATIN) 3 mg tablet, Sig Take 2 tablets by mouth every evening., Start Date 23, End Date , Taking? , Authorizing Provider Provider, Historical    Medication multivitamin with folic acid 400 mcg Tab, Sig Take 1 tablet by mouth every morning., Start Date , End Date , Taking? , Authorizing Provider Provider, Historical    Medication OCUFLOX 0.3 % ophthalmic solution, Sig Place 1 drop into the left eye 4 (four) times daily., Start Date 23, End Date , Taking? , Authorizing Provider Provider, Historical    Medication prednisoLONE acetate (PRED FORTE) 1 % DrpS, Sig Place 1 drop into the left eye 4 (four) times daily., Start Date 23, End Date , Taking? , Authorizing Provider Provider, Historical    Medication SANTYL ointment, Sig SMARTSI.7 CENTIMETER(S) Topical Daily, Start Date 23, End Date , Taking? , Authorizing Provider Provider, Historical    Medication triamcinolone acetonide 0.1% (KENALOG) 0.1 % cream, Sig APPLY TO LEGS DAILY BEFORE COMPRESSION DRESSINGS, Start Date  22, End Date , Taking? , Authorizing Provider Provider, Historical    Medication zinc sulfate (ZINCATE) 50 mg zinc (220 mg) capsule, Sig Take 1 capsule by mouth every morning., Start Date 23, End Date 24, Taking? , Authorizing Provider Provider, Historical      No current facility-administered medications on file prior to encounter.  Current Outpatient Medications on File Prior to Encounter:  digoxin (LANOXIN) 125 mcg tablet, Take 0.125 mg by mouth once daily., Disp: , Rfl:   furosemide (LASIX) 40 MG tablet, Take 1 tablet (40 mg total) by mouth once daily., Disp: 30 tablet, Rfl: 11  latanoprost 0.005 % ophthalmic solution, Place 1 drop into both eyes every evening., Disp: , Rfl:   metoprolol succinate (TOPROL-XL) 50 MG 24 hr tablet, Take 2 tablets (100 mg total) by mouth once daily., Disp: 90 tablet, Rfl: 3  pantoprazole (PROTONIX) 40 MG tablet, Take 40 mg by mouth 2 (two) times daily., Disp: , Rfl:   potassium chloride SA (K-DUR,KLOR-CON) 20 MEQ tablet, Take 20 mEq by mouth., Disp: , Rfl:   sulfamethoxazole-trimethoprim 800-160mg (BACTRIM DS) 800-160 mg Tab, Take 1 tablet by mouth 2 (two) times daily. For infection, Disp: 30 tablet, Rfl: 1  ascorbic acid, vitamin C, (VITAMIN C) 250 MG tablet, Take 1 tablet by mouth every morning., Disp: , Rfl:   ketorolac 0.5% (ACULAR) 0.5 % Drop, Place 1 drop into the left eye 4 (four) times daily., Disp: , Rfl:   melatonin (MELATIN) 3 mg tablet, Take 2 tablets by mouth every evening., Disp: , Rfl:   multivitamin with folic acid 400 mcg Tab, Take 1 tablet by mouth every morning., Disp: , Rfl:   OCUFLOX 0.3 % ophthalmic solution, Place 1 drop into the left eye 4 (four) times daily., Disp: , Rfl:   prednisoLONE acetate (PRED FORTE) 1 % DrpS, Place 1 drop into the left eye 4 (four) times daily., Disp: , Rfl:   SANTYL ointment, SMARTSI.7 CENTIMETER(S) Topical Daily, Disp: , Rfl:   triamcinolone acetonide 0.1% (KENALOG) 0.1 % cream, APPLY TO LEGS DAILY BEFORE  COMPRESSION DRESSINGS, Disp: , Rfl:   zinc sulfate (ZINCATE) 50 mg zinc (220 mg) capsule, Take 1 capsule by mouth every morning., Disp: , Rfl:          Review of patient's allergies indicates:  No Known Allergies    Social History:   reports that he has never smoked. He has never been exposed to tobacco smoke. He has never used smokeless tobacco. He reports that he does not drink alcohol and does not use drugs.     Review of patient's family history indicates:  Problem: Hypertension      Relation: Father          Age of Onset: (Not Specified)  Problem: Hypertension      Relation: Mother          Age of Onset: (Not Specified)  Problem: No Known Problems      Relation: Sister          Age of Onset: (Not Specified)  Problem: Arthritis      Relation: Son          Age of Onset: (Not Specified)  Problem: Prostate cancer      Relation: Neg Hx          Age of Onset: (Not Specified)  Problem: Kidney disease      Relation: Neg Hx          Age of Onset: (Not Specified)      PHYSICAL EXAMINATION  Temp:  [97.6 °F (36.4 °C)-98.4 °F (36.9 °C)] 98 °F (36.7 °C)  Pulse:  [66-78] 69  Resp:  [16-20] 18  SpO2:  [93 %-100 %] 95 %  BP: ()/(58-84) 117/68    General Condition:   Alert x 3     Head & Neck  Anemia: None  Jaundice: None  Neck vein: Not distended  Carotid Bruits: none  Lymph nodes: none palpable  Thyroid: normal    Chest: normal    Heart: normal    Rt. Breast: not examined  Lt. Breast: not examined  Axillary lymph nodes: none    Abdomen: Soft,  None tender with no palpable mass or organ  Hernia: none    Rectal: Defered    Extremities: normal    Vascular: normal    Specific focus Examination     Imp: huge lymphadenitis with fluctuant mass , r/o tumor necrosis lymphoma.    Plan: awaiting us and ct scan of the abdomen and pelvis.

## 2023-10-25 NOTE — ED TRIAGE NOTES
Brought in by ems for evaluation of ams for unknown amount of time. Family reports deteriorating mental status since admission in September. Also reports pt has been aggressive and nonverbal since md appointment this afternoon. Baseline unknown

## 2023-10-25 NOTE — PLAN OF CARE
JANETT met with patient and pt spouse Jessica ([phone]) at bedside to discuss discharge planning. Pt resting at this time, Jessica confirmed all information on pt chart. Pt is independent in ADLs. Pt lives at home with Jessica. Pt has 2 adult sons who live in the area and are involved with assisting pt at home. Pt has another adult son who lives in [place] and assists pt during the weekends. Jessica confirmed pt uses a RW, wheelchair, and BSC at home. Pt is current with JANETT Farfan sent referral through [facility] for resumption of care. Jessica confirmed pt will have assistance from family with transportation home at discharge. JANETT informed pt that CM will continue to follow pt throughout his transitions of care and assist with any dc needs.    Future Appointments   Date Time Provider Department Center   11/7/2023  2:20 PM Delonte Robin MD [facility] HEM ONC [facility]   8/12/2024 11:00 AM Marty Hernandez MD [facility] MED [facility]        10/25/23 1124   Discharge Assessment   Assessment Type Discharge Planning Assessment   Confirmed/corrected address, phone number and insurance Yes   Confirmed Demographics Correct on Facesheet   Source of Information family   If unable to respond/provide information was family/caregiver contacted? Yes   Contact Name/Number Jessica Garcia (Spouse) [phone]   Facility Arrived From: Sepsis   Do you expect to return to your current living situation? Yes   Do you have help at home or someone to help you manage your care at home? Yes   Who are your caregiver(s) and their phone number(s)? Jessica Garcia (Spouse) [phone] and 3 adult sons   Prior to hospitilization cognitive status: Unable to Assess   Current cognitive status: Not Oriented to Person;Not Oriented to Place;Not Oriented to Time   Walking or Climbing Stairs ambulation difficulty, requires equipment   Mobility Management rolling walker and wheelchair   Dressing/Bathing bathing difficulty, requires equipment   Dressing/Bathing  Management bedside commode   Home Accessibility wheelchair accessible   Home Layout Able to live on 1st floor   Equipment Currently Used at Home wheelchair;walker, rolling;bedside commode   Do you currently have service(s) that help you manage your care at home? No   Do you take prescription medications? Yes   Do you have prescription coverage? Yes   Coverage MEDICARE - MEDICARE PART A & B   Do you have any problems affording any of your prescribed medications? No   Is the patient taking medications as prescribed? yes   Who is going to help you get home at discharge? Jessica Garcia (Spouse) [phone] or one of patient's 3 adult sons   How do you get to doctors appointments? family or friend will provide   Are you on dialysis? No   Do you take coumadin? No   DME Needed Upon Discharge  other (see comments)  (TBD)   Discharge Plan discussed with: Spouse/sig other   Name(s) and Number(s) Jessica Garcia (Spouse) [phone]   Transition of Care Barriers None   Discharge Plan A Home Health;Home   Discharge Plan B Home with family   Financial Resource Strain   How hard is it for you to pay for the very basics like food, housing, medical care, and heating? Not very   Housing Stability   In the last 12 months, was there a time when you were not able to pay the mortgage or rent on time? N   In the last 12 months, was there a time when you did not have a steady place to sleep or slept in a shelter (including now)? N   Transportation Needs   In the past 12 months, has lack of transportation kept you from medical appointments or from getting medications? no   In the past 12 months, has lack of transportation kept you from meetings, work, or from getting things needed for daily living? No   Food Insecurity   Within the past 12 months, you worried that your food would run out before you got the money to buy more. Never true   Within the past 12 months, the food you bought just didn't last and you didn't have money to get more.  Never true   Social Connections   In a typical week, how many times do you talk on the phone with family, friends, or neighbors? Twice a week   How often do you get together with friends or relatives? More than 3   Are you , , , , never , or living with a partner?

## 2023-10-25 NOTE — CONSULTS
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 Montana is a 83 y.o. 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 Has had chronic R leg wound since that time. More recently developed swelling in groin and recently had biopsy of enlarged lymph node in groin which was positive for metastatic squamous cell carcinoma. He was brought to ED yesterday after acute onset of AMS and agitation. In ED noted to have new leukocytosis up to 25K. Right groin lesion noted as suspected site of infection. He has been started on broad-spectrum antibiotics. Labs also notable for hypercalcemia and FERNANDO.    Heme/onc consulted for recommendations. Patient seen and examined in room 465 today; he is sleeping and only briefly awakens and falls back asleep. Pertinent history obtained by wife and son who are present with him in the room.       PTA Medications   Medication Sig    digoxin (LANOXIN) 125 mcg tablet Take 0.125 mg by mouth once daily.    furosemide (LASIX) 40 MG tablet Take 1 tablet (40 mg total) by mouth once daily.    latanoprost 0.005 % ophthalmic solution Place 1 drop into both eyes every evening.    metoprolol succinate (TOPROL-XL) 50 MG 24 hr tablet Take 2 tablets (100 mg total) by mouth once daily.    pantoprazole (PROTONIX) 40 MG tablet Take 40 mg by mouth 2 (two) times daily.    potassium chloride SA (K-DUR,KLOR-CON) 20 MEQ tablet Take 20 mEq by mouth.    sulfamethoxazole-trimethoprim 800-160mg (BACTRIM DS) 800-160 mg Tab Take 1 tablet by mouth 2 (two) times daily. For infection    ascorbic acid, vitamin C, (VITAMIN C) 250 MG tablet Take 1 tablet by mouth every morning.    ketorolac 0.5% (ACULAR) 0.5 % Drop Place 1 drop into the left eye 4 (four) times daily.    melatonin (MELATIN) 3 mg tablet Take 2 tablets by mouth every  "evening.    multivitamin with folic acid 400 mcg Tab Take 1 tablet by mouth every morning.    OCUFLOX 0.3 % ophthalmic solution Place 1 drop into the left eye 4 (four) times daily.    prednisoLONE acetate (PRED FORTE) 1 % DrpS Place 1 drop into the left eye 4 (four) times daily.    SANTYL ointment SMARTSI.7 CENTIMETER(S) Topical Daily    triamcinolone acetonide 0.1% (KENALOG) 0.1 % cream APPLY TO LEGS DAILY BEFORE COMPRESSION DRESSINGS    zinc sulfate (ZINCATE) 50 mg zinc (220 mg) capsule Take 1 capsule by mouth every morning.      Review of patient's allergies indicates:  No Known Allergies    Past Medical History:   Diagnosis Date    Hypertension     Urinary tract infection        Past Surgical History:   Procedure Laterality Date    hand sugery      PROSTATE SURGERY      "Rotor rooter- opened up"      Family History   Problem Relation Age of Onset    Hypertension Father     Hypertension Mother     No Known Problems Sister     Arthritis Son     Prostate cancer Neg Hx     Kidney disease Neg Hx        Social History     Tobacco Use    Smoking status: Never     Passive exposure: Never    Smokeless tobacco: Never   Substance Use Topics    Alcohol use: No     Alcohol/week: 0.0 standard drinks of alcohol    Drug use: No        OBJECTIVE:     Vital Signs (Most Recent)   Temp: 98 °F (36.7 °C) (10/25/23 1149)  Pulse: 69 (10/25/23 1149)  Resp: 18 (10/25/23 1149)  BP: 117/68 (10/25/23 1149)  SpO2: 95 % (10/25/23 1149)  Body mass index is 29.24 kg/m².      Physical Exam:  Physical Exam  Vitals and nursing note reviewed.   Constitutional:       General: He is not in acute distress.     Appearance: He is not toxic-appearing.      Comments: Sleeping, awakens to stimuli   HENT:      Head: Normocephalic and atraumatic.   Pulmonary:      Effort: Pulmonary effort is normal. No respiratory distress.   Abdominal:      General: Abdomen is flat. There is no distension.   Skin:     Coloration: Skin is not jaundiced.      Findings: No " "erythema.      Comments: Large, fluctuant abscess in right groin   Neurological:      Mental Status: He is lethargic.           Cardiac Enzymes: Ejection Fractions:    Recent Labs     10/24/23  2019   TROPONINI 0.133*    No results found for: "EF"     Chemistries:   Recent Labs   Lab 10/24/23  1541 10/24/23  2019 10/24/23  2021 10/25/23  0938    143  --  143   K 4.4 4.5  --  3.8   CL 98 99  --  101   CO2 36* 32*  --  32*   BUN 31* 31*  --  29*   CREATININE 1.34 1.7*  --  1.4   CALCIUM 12.7* 13.1*  --  12.2*   PROT 7.5 7.6  --  6.7   BILITOT 0.5 0.4  --  0.3   ALKPHOS 126 108  --  92   ALT 18 12  --  10   AST 37 20  --  18   MG  --  2.1  --  2.0   PHOS  --   --  3.4 3.1        CBC/Anemia Labs: Coags:    Recent Labs   Lab 10/24/23  1541 10/24/23  2019 10/24/23  2021 10/25/23  0938   WBC 26.41* 25.67*  --  29.46*   HGB 12.1* 12.3*  --  11.2*   HCT 39.5* 39.8* 39.7 36.4*    274  --  244   MCV 95 94  --  94   RDW 15.8* 16.0*  --  15.9*    Recent Labs   Lab 10/24/23  2021   INR 1.2   APTT 22.9        POCT Glucose: HbA1c:    No results for input(s): "POCTGLUCOSE" in the last 168 hours. Hemoglobin A1C   Date Value Ref Range Status   07/25/2023 6.0 (H) 4.0 - 5.6 % Final     Comment:     ADA Screening Guidelines:  5.7-6.4%  Consistent with prediabetes  >or=6.5%  Consistent with diabetes    High levels of fetal hemoglobin interfere with the HbA1C  assay. Heterozygous hemoglobin variants (HbS, HgC, etc)do  not significantly interfere with this assay.   However, presence of multiple variants may affect accuracy.     11/22/2022 6.2 (H) 4.0 - 5.6 % Final     Comment:     ADA Screening Guidelines:  5.7-6.4%  Consistent with prediabetes  >or=6.5%  Consistent with diabetes    High levels of fetal hemoglobin interfere with the HbA1C  assay. Heterozygous hemoglobin variants (HbS, HgC, etc)do  not significantly interfere with this assay.   However, presence of multiple variants may affect accuracy.     08/09/2022 6.6 (H) " 4.0 - 5.6 % Final     Comment:     ADA Screening Guidelines:  5.7-6.4%  Consistent with prediabetes  >or=6.5%  Consistent with diabetes    High levels of fetal hemoglobin interfere with the HbA1C  assay. Heterozygous hemoglobin variants (HbS, HgC, etc)do  not significantly interfere with this assay.   However, presence of multiple variants may affect accuracy.          Lines/Drains:       Peripheral IV - Single Lumen 10/24/23 2010 20 G Anterior;Right Forearm (Active)   Site Assessment Clean;Dry;Intact;No redness;No swelling 10/25/23 0100   Extremity Assessment Distal to IV No abnormal discoloration;No redness;No swelling;No warmth 10/25/23 0100   Line Status Flushed;Saline locked 10/25/23 0100   Dressing Status Clean;Dry;Intact 10/25/23 0100   Dressing Intervention Integrity maintained 10/25/23 0100   Dressing Change Due 10/28/23 10/25/23 0100   Site Change Due 10/28/23 10/25/23 0100   Reason Not Rotated Not due 10/25/23 0100   Number of days: 0            Peripheral IV - Single Lumen 10/24/23 20 G Left Forearm (Active)   Site Assessment Clean;Dry;Intact;No redness;No swelling 10/25/23 0100   Extremity Assessment Distal to IV No abnormal discoloration;No redness;No swelling;No warmth 10/25/23 0100   Line Status Flushed;Saline locked 10/25/23 0100   Dressing Status Clean;Dry;Intact 10/25/23 0100   Dressing Intervention Integrity maintained 10/25/23 0100   Dressing Change Due 10/28/23 10/25/23 0100   Site Change Due 10/28/23 10/25/23 0100   Reason Not Rotated Not due 10/25/23 0100   Number of days: 1     Biopsy/Surgical Pathology Request    Narrative  Performed by PATHOLOGY GROUP [facility]  SEE DETAILS   Clinical Data: History of squamous cell skin cancer; Evaluate for       recurrence; Evaluate for metastasis                                        FINAL PATHOLOGIC DIAGNOSIS        1. Right leg, biopsy: Negative for malignancy (dermal sclerosis and         reactive epithelial changes present)                         "            2. Right groin, core biopsy: Positive for squamous cell carcinoma                                              Adria Trent M.D.                                                    [facility] Pathologist, Electronic Signature     Intradepartmental consultation: Annamaria Dan M.D.                       SPECIMEN(S) SUBMITTED: GROSS DESCRIPTION                                    1. Right leg: In formalin labeled "right leg" are multiple skin             fragments aggregating to 1.1 x 1 x 0.5 cm. The specimen is               sectioned and entirely submitted in one cassette.                      2. Right groin: In formalin labeled "right groin" are multiple             tan-yellow core fragments aggregating to 0.9 cm; which are               submitted in total in one cassette. RCM/shiraz                              CPT CODE(S):           ICD-9/10 CODE(S):      FACILITY:                  [address] x2               [[id number]]               [facility] [address]       Interpretation by Pathology Group of [address]  [id number]    ASSESSMENT/PLAN:     Active Hospital Problems    Diagnosis  POA    *Sepsis [A41.9]  Yes    Encephalopathy, metabolic [G93.41]  Yes    Abscess of right groin [L02.214]  Yes    A-fib [I48.91]  Yes    Hypercalcemia [E83.52]  Yes    ACP (advance care planning) [Z71.89]  Not Applicable    Anemia [D64.9]  Yes    Hypertension [I10]  Yes      Resolved Hospital Problems   No resolved problems to display.       SUMMARY: 83 y.o. male here with Sepsis  H/o cutaneous Squamous cell carcinoma RLE, now with biopsy-proved recurrence in RLE groin/lymph nodes. Currently admitted with sepsis presumed secondary to abscess in groin. He is currently on broad spectrum antibiotics but ultimately source control appears to be needed. Surgery consult " pending.     Hypercalcemia:  -Improving with hydration--at this time I would recommend continued hydration as FERNANDO may be contributing and if so the hypercalcemia may clear up with return of renal function. If hypercalcemia persists despite hydration, we can administer zometa for hypercalcemia of malignancy.

## 2023-10-25 NOTE — PHARMACY MED REC
"  [facility]           Pharmacy  Admission Medication History     The home medication history was taken by Cammie Mckeon.      Medication history obtained from Medications listed below were obtained from: [employer] software- [employer]    Based on information gathered for medication list, you may go to "Admission" then "Reconcile Home Medications" tabs to review and/or act upon those items.     The home medication list has been updated by the Pharmacy department.   Please read ALL comments highlighted in yellow.   Please address this information as you see fit.    Feel free to contact us if you have any questions or require assistance.      No current facility-administered medications on file prior to encounter.     Current Outpatient Medications on File Prior to Encounter   Medication Sig Dispense Refill    digoxin (LANOXIN) 125 mcg tablet Take 0.125 mg by mouth once daily.      furosemide (LASIX) 40 MG tablet Take 1 tablet (40 mg total) by mouth once daily. 30 tablet 11    latanoprost 0.005 % ophthalmic solution Place 1 drop into both eyes every evening.      metoprolol succinate (TOPROL-XL) 50 MG 24 hr tablet Take 2 tablets (100 mg total) by mouth once daily. 90 tablet 3    pantoprazole (PROTONIX) 40 MG tablet Take 40 mg by mouth 2 (two) times daily.      potassium chloride SA (K-DUR,KLOR-CON) 20 MEQ tablet Take 20 mEq by mouth.      sulfamethoxazole-trimethoprim 800-160mg (BACTRIM DS) 800-160 mg Tab Take 1 tablet by mouth 2 (two) times daily. For infection 30 tablet 1    ascorbic acid, vitamin C, (VITAMIN C) 250 MG tablet Take 1 tablet by mouth every morning.      ketorolac 0.5% (ACULAR) 0.5 % Drop Place 1 drop into the left eye 4 (four) times daily.      melatonin (MELATIN) 3 mg tablet Take 2 tablets by mouth every evening.      multivitamin with folic acid 400 mcg Tab Take 1 tablet by mouth every morning.      OCUFLOX 0.3 % ophthalmic solution Place 1 drop into the left eye 4 (four) times " daily.      prednisoLONE acetate (PRED FORTE) 1 % DrpS Place 1 drop into the left eye 4 (four) times daily.      SANTYL ointment SMARTSI.7 CENTIMETER(S) Topical Daily      triamcinolone acetonide 0.1% (KENALOG) 0.1 % cream APPLY TO LEGS DAILY BEFORE COMPRESSION DRESSINGS      zinc sulfate (ZINCATE) 50 mg zinc (220 mg) capsule Take 1 capsule by mouth every morning.         Please address this information as you see fit.  Feel free to contact us if you have any questions or require assistance.    Cammie Mckeon  [phone]                .

## 2023-10-25 NOTE — PLAN OF CARE
Recommendation:  1. Initiate diet when medically acceptable.   2. Monitor weight/labs.   3. RD to follow to monitor nutrition status    Goals:  Diet will be started by RD follow up  Nutrition Goal Status: new

## 2023-10-25 NOTE — ASSESSMENT & PLAN NOTE
Patient with Persistent (7 days or more) atrial fibrillation which is controlled currently with Beta Blocker and Digoxin. Patient is currently in atrial fibrillation.[id number] Score: 2. HASBLED Score: 3. Anticoagulation not indicated due to risk of GIB.  Continue subq heparin while inpatient w/ close monitoring

## 2023-10-25 NOTE — ASSESSMENT & PLAN NOTE
Advance Care Planning     Date: 10/25/2023    Code Status  In light of the patients advanced and life limiting illness,I engaged the the family in a voluntary conversation about the patient's preferences for care  at the very end of life. The patient wishes to have a natural, peaceful death.  Along those lines, the patient does not wish to have CPR or other invasive treatments performed when his heart and/or breathing stops. I communicated to the family that a DNR order would be placed in his medical record to reflect this preference.  I spent a total of 8 minutes engaging the patient in this advance care planning discussion.

## 2023-10-25 NOTE — CONSULTS
[facility] - Telemetry  Wound Care    Patient Name:  Aquiles Montana   MRN:  [id number]  Date: 10/25/2023  Diagnosis: Sepsis    History:     Past Medical History:   Diagnosis Date    Hypertension     Urinary tract infection        Social History     Socioeconomic History    Marital status:    Tobacco Use    Smoking status: Never     Passive exposure: Never    Smokeless tobacco: Never   Substance and Sexual Activity    Alcohol use: No     Alcohol/week: 0.0 standard drinks of alcohol    Drug use: No    Sexual activity: Not Currently     Social Determinants of Health     Financial Resource Strain: Low Risk  (10/25/2023)    Overall Financial Resource Strain (CARDIA)     Difficulty of Paying Living Expenses: Not very hard   Food Insecurity: No Food Insecurity (10/25/2023)    Hunger Vital Sign     Worried About Running Out of Food in the Last Year: Never true     Ran Out of Food in the Last Year: Never true   Transportation Needs: No Transportation Needs (10/25/2023)    PRAPARE - Transportation     Lack of Transportation (Medical): No     Lack of Transportation (Non-Medical): No   Physical Activity: Inactive (8/10/2023)    Exercise Vital Sign     Days of Exercise per Week: 0 days     Minutes of Exercise per Session: 0 min   Stress: No Stress Concern Present (8/10/2023)    [employer] - Occupational Stress Questionnaire     Feeling of Stress : Not at all   Social Connections: Moderately Integrated (10/25/2023)    Social Connection and Isolation Panel [NHANES]     Frequency of Communication with Friends and Family: Twice a week     Frequency of Social Gatherings with Friends and Family: More than three times a week     Attends Buddhism Services: 1 to 4 times per year     Active Member of Clubs or Organizations: No     Attends Club or Organization Meetings: Never     Marital Status:    Housing Stability: Low Risk  (10/25/2023)    Housing Stability Vital Sign     Unable to Pay for Housing  in the Last Year: No     Number of Places Lived in the Last Year: 1     Unstable Housing in the Last Year: No       Precautions:     Allergies as of 10/24/2023    (No Known Allergies)       [facility] Assessment Details/Treatment       L sacrum- Stage 3 pressure injury- present on admit with non blanchable redness over sacrum and bilateral buttocks  1x0.5x0.2cm- slough/pink tissue- scant serosanguinous drainage        R heel- healing stage 3 pressure injury present on admit- 1.3x0.3x0.1cm- no drainage       R shin- healed ulcer- pt souse reports this wound resulted from skin cancer        L lateral lower leg- full thickness ulceration- pt spouse reports ulcer started as a blister that ruptured 2x1x0.1cm slough/granulation- scant tan drainage        R inguinal- raised nodule- no drainage- discolored skin tyree-lesion        R hand- ecchymosis- appears to be resolving      Recommendations discussed with pt, spouse, nurse and Dr. Smith:  - Nursing to apply waffle overlay to bed surface and heel boots to BLE to maintain pressure injury prevention interventions  - Triad ointment to L sacrum wound BID  - Santyl to L lateral leg wound daily  - Mepilex border to R shin and heel wounds to protect healing    10/25/2023

## 2023-10-25 NOTE — PROGRESS NOTES
Pharmacokinetic Initial Assessment: IV Vancomycin    Assessment/Plan:    Initiate intravenous vancomycin with loading dose of 1750 mg once followed by a maintenance dose of vancomycin 1500mg IV every 24 hours  Desired empiric serum trough concentration is 10 to 15 mcg/mL  Draw vancomycin trough level 60 min prior to third dose on 10/27 at approximately 0100  Pharmacy will continue to follow and monitor vancomycin.      Please contact pharmacy at extension [phone] with any questions regarding this assessment.     Thank you for the consult,   Pop Edwards       Patient brief summary:  Aquiles Montana is a 83 y.o. male initiated on antimicrobial therapy with IV Vancomycin for treatment of suspected skin & soft tissue infection    Drug Allergies:   Review of patient's allergies indicates:  No Known Allergies    Actual Body Weight:   97.8 kg     Renal Function:   Estimated Creatinine Clearance: 48.5 mL/min (based on SCr of 1.4 mg/dL).,     Dialysis Method (if applicable):  N/A    CBC (last 72 hours):  Recent Labs   Lab Result Units 10/24/23  1541 10/24/23  2019 10/25/23  0938   WBC K/uL 26.41* 25.67* 29.46*   Hemoglobin g/dL 12.1* 12.3* 11.2*   Hematocrit % 39.5* 39.8* 36.4*   Platelets K/uL 311 274 244   Gran % % 89.5* 89.8* 88.4*   Lymph % % 4.1* 3.9* 4.4*   Mono % % 4.8 5.3 5.7   Eosinophil % % 0.3 0.0 0.5   Basophil % % 0.5 0.3 0.4   Differential Method  Automated Automated Automated       Metabolic Panel (last 72 hours):  Recent Labs   Lab Result Units 10/24/23  1541 10/24/23  2019 10/24/23  2021 10/25/23  0616 10/25/23  0938   Sodium mmol/L 141 143  --   --  143   Potassium mmol/L 4.4 4.5  --   --  3.8   Chloride mmol/L 98 99  --   --  101   CO2 mmol/L 36* 32*  --   --  32*   Glucose mg/dL 195* 130*  --   --  103   Glucose, UA   --   --   --  Negative  --    BUN mg/dL 31* 31*  --   --  29*   Creatinine mg/dL 1.34 1.7*  --   --  1.4   Albumin g/dL 3.4* 3.0*  --   --  2.6*   Total Bilirubin mg/dL 0.5 0.4  --   --  0.3  "  Alkaline Phosphatase U/L 126 108  --   --  92   AST U/L 37 20  --   --  18   ALT U/L 18 12  --   --  10   Magnesium mg/dL  --  2.1  --   --  2.0   Phosphorus mg/dL  --   --  3.4  --  3.1       Drug levels (last 3 results):  No results for input(s): "VANCOMYCINRA", "VANCORANDOM", "VANCOMYCINPE", "VANCOPEAK", "VANCOMYCINTR", "VANCOTROUGH" in the last 72 hours.    Microbiologic Results:  Microbiology Results (last 7 days)       Procedure Component Value Units Date/Time    Blood culture x two cultures. Draw prior to antibiotics. [[id number]] Collected: 10/24/23 2019    Order Status: Completed Specimen: Blood from Peripheral, Antecubital, Left Updated: 10/25/23 0715     Blood Culture, Routine No Growth to date    Narrative:      Aerobic and anaerobic    Blood culture x two cultures. Draw prior to antibiotics. [[id number]] Collected: 10/24/23 2018    Order Status: Completed Specimen: Blood from Peripheral, Hand, Right Updated: 10/25/23 0715     Blood Culture, Routine No Growth to date    Narrative:      Aerobic and anaerobic    Urine culture [[id number]] Collected: 10/25/23 0616    Order Status: No result Specimen: Urine Updated: 10/25/23 0655            "

## 2023-10-25 NOTE — ED PROVIDER NOTES
"Encounter Date: 10/24/2023       History     Chief Complaint   Patient presents with    Altered Mental Status     Pt reports to ED via EMS with c/o AMS that began today after doctors appointment. Pt's family reports pt has been non verbal and aggressive. EMS unsure of pt's baseline. Family reports a decline in pt since 9/10/23 after admission to hospital for pneumonia.      83-year-old male presents via EMS for evaluation due to worsening mental status since admission 5 weeks ago.  Was seen by PCP today in status deteriorated following this visit.  Personal review of Lab work performed prior to arrival today shows hypercalcemia and leukocytosis.  Also evident of acute kidney injury.  Patient was reportedly agitated and difficult to direct earlier today and at time of presentation to our facility is nonverbal.  History is limited due to this.  Awaiting family arrival for further history taking.    The history is provided by the EMS personnel.     Review of patient's allergies indicates:  No Known Allergies  Past Medical History:   Diagnosis Date    Hypertension     Urinary tract infection      Past Surgical History:   Procedure Laterality Date    hand sugery      PROSTATE SURGERY      "Rotor rooter- opened up"     Family History   Problem Relation Age of Onset    Hypertension Father     Hypertension Mother     No Known Problems Sister     Arthritis Son     Prostate cancer Neg Hx     Kidney disease Neg Hx      Social History     Tobacco Use    Smoking status: Never     Passive exposure: Never    Smokeless tobacco: Never   Substance Use Topics    Alcohol use: No     Alcohol/week: 0.0 standard drinks of alcohol    Drug use: No     Review of Systems   All other systems reviewed and are negative.      Physical Exam     Initial Vitals [10/24/23 1945]   BP Pulse Resp Temp SpO2   96/68 78 20 97.6 °F (36.4 °C) 99 %      MAP       --         Physical Exam    Nursing note and vitals reviewed.  Constitutional: He appears " well-developed and well-nourished. He appears lethargic. He is Obese . He appears ill. No distress.   HENT:   Head: Normocephalic and atraumatic.   Right Ear: External ear normal.   Left Ear: External ear normal.   Nose: Nose normal.   Mouth/Throat: Oropharynx is clear and moist.   Eyes: Conjunctivae and EOM are normal. Pupils are equal, round, and reactive to light.   Neck: Neck supple.   Normal range of motion.  Cardiovascular:  Normal rate, regular rhythm and intact distal pulses.           No murmur heard.  Pulmonary/Chest: Breath sounds normal. No stridor. No respiratory distress. He has no wheezes. He has no rhonchi. He has no rales.   Abdominal: Abdomen is soft. Bowel sounds are normal. There is no abdominal tenderness.   Musculoskeletal:         General: No edema. Normal range of motion.      Cervical back: Normal range of motion and neck supple.     Neurological: He has normal strength. He appears lethargic. He displays no tremor. No cranial nerve deficit or sensory deficit. He displays no seizure activity. GCS eye subscore is 2. GCS verbal subscore is 4. GCS motor subscore is 5.   Skin: Skin is warm and dry. No rash noted.   Skin breakdown to sacrum has seen picture below.  Mass previously noted on CT of abdomen pelvis in right groin lymph node chain with erythema.   Psychiatric:   Unable to assess due to patient's nonverbal status.         ED Course   Critical Care    Date/Time: 10/24/2023 9:15 PM    Performed by: Octavio Wong MD  Authorized by: Octavio Wong MD  Direct patient critical care time: 17 minutes  Additional history critical care time: 7 minutes  Ordering / reviewing critical care time: 7 minutes  Documentation critical care time: 8 minutes  Consulting other physicians critical care time: 6 minutes  Total critical care time (exclusive of procedural time) : 45 minutes  Critical care time was exclusive of separately billable procedures and treating other patients and teaching  time.  Critical care was necessary to treat or prevent imminent or life-threatening deterioration of the following conditions: sepsis, metabolic crisis and renal failure.  Critical care was time spent personally by me on the following activities: blood draw for specimens, discussions with consultants, interpretation of cardiac output measurements, evaluation of patient's response to treatment, examination of patient, obtaining history from patient or surrogate, ordering and review of laboratory studies, ordering and performing treatments and interventions, ordering and review of radiographic studies, pulse oximetry, re-evaluation of patient's condition and review of old charts.        Labs Reviewed   CBC W/ AUTO DIFFERENTIAL - Abnormal; Notable for the following components:       Result Value    WBC 25.67 (*)     RBC 4.24 (*)     Hemoglobin 12.3 (*)     Hematocrit 39.8 (*)     MCHC 30.9 (*)     RDW 16.0 (*)     Immature Granulocytes 0.7 (*)     Gran # (ANC) 23.0 (*)     Immature Grans (Abs) 0.19 (*)     Mono # 1.4 (*)     Gran % 89.8 (*)     Lymph % 3.9 (*)     All other components within normal limits   COMPREHENSIVE METABOLIC PANEL - Abnormal; Notable for the following components:    CO2 32 (*)     Glucose 130 (*)     BUN 31 (*)     Creatinine 1.7 (*)     Calcium 13.1 (*)     Albumin 3.0 (*)     eGFR 40 (*)     All other components within normal limits    Narrative:     Calcium critical result(s) called and verbal readback obtained from   Julia Garcia RN by AAC2 10/24/2023 20:52   TROPONIN I - Abnormal; Notable for the following components:    Troponin I 0.133 (*)     All other components within normal limits   LIPASE - Abnormal; Notable for the following components:    Lipase 83 (*)     All other components within normal limits   PROTIME-INR - Abnormal; Notable for the following components:    Prothrombin Time 13.3 (*)     All other components within normal limits   CULTURE, BLOOD   CULTURE, BLOOD   LACTIC ACID,  PLASMA   MAGNESIUM   B-TYPE NATRIURETIC PEPTIDE   APTT   PHOSPHORUS   LACTIC ACID, PLASMA   CALCIUM, IONIZED   LIPASE   SARS-COV-2 RDRP GENE     EKG Readings: (Independently Interpreted)   Initial Reading: No STEMI. Rhythm: Atrial Fibrillation. Heart Rate: 81. Ectopy: No Ectopy. Axis: Right Axis Deviation.       Imaging Results              CT Head Without Contrast (Final result)  Result time 10/24/23 21:32:34      Final result by Ronald Torres DO (10/24/23 21:32:34)                   Impression:      No acute intracranial abnormality.    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    Chronic microvascular ischemic changes.      Electronically signed by: Ronald Torres  Date:    10/24/2023  Time:    21:32               Narrative:    EXAMINATION:  CT HEAD WITHOUT CONTRAST    CLINICAL HISTORY:  Mental status change, unknown cause;Metastatic disease evaluation;    TECHNIQUE:  Low dose axial CT images obtained throughout the head without intravenous contrast. Sagittal and coronal reconstructions were performed.    COMPARISON:  None available.    FINDINGS:  There is motion artifact.  Ventricles and sulci are normal in size for age without evidence of hydrocephalus. No extra-axial blood or fluid collections.  There is hypoattenuation in the supratentorial white matter compatible with chronic microvascular ischemic changes.  There is a small hypodensity in the right cerebellar hemisphere compatible with a remote lacunar infarction.  No parenchymal mass, hemorrhage, edema or major vascular distribution infarct.    No calvarial fracture.  The scalp is unremarkable.  Bilateral paranasal sinuses and mastoid air cells are clear.  There is a right-sided prosthetic lens.                                       X-Ray Chest AP Portable (Final result)  Result time 10/24/23 20:43:27      Final result by Ronald Torres DO (10/24/23 20:43:27)                   Impression:      No acute  abnormality.      Electronically signed by: Ronald Torres  Date:    10/24/2023  Time:    20:43               Narrative:    EXAMINATION:  XR CHEST AP PORTABLE    CLINICAL HISTORY:  Sepsis;    TECHNIQUE:  Single frontal view of the chest was performed.    COMPARISON:  08/29/2019.    FINDINGS:  There is nonspecific elevation of the right hemidiaphragm.  There is mild perihilar interstitial prominence, unchanged from prior, may be related to chronic interstitial changes/scarring or mild atelectasis.  There is no new focal consolidation.  The pleural spaces are clear the cardiac silhouette is unremarkable.  There are calcifications of the aortic arch.  Osseous structures demonstrate degenerative changes.                                    X-Rays:   Independently Interpreted Readings:   Chest X-Ray: No acute abnormalities.   Head CT: No hemorrhage.  No skull fracture.     Medications   vancomycin - pharmacy to dose (has no administration in time range)   ceFEPIme (MAXIPIME) 2 g in dextrose 5 % in water (D5W) 100 mL IVPB (MB+) (has no administration in time range)   digoxin tablet 0.125 mg (has no administration in time range)   furosemide tablet 40 mg (has no administration in time range)   metoprolol succinate (TOPROL-XL) 24 hr tablet 100 mg (has no administration in time range)   pantoprazole EC tablet 40 mg (has no administration in time range)   sodium chloride 0.9% flush 3 mL (has no administration in time range)   melatonin tablet 6 mg (has no administration in time range)   ondansetron injection 4 mg (has no administration in time range)   naloxone 0.4 mg/mL injection 0.02 mg (has no administration in time range)   glucose chewable tablet 16 g (has no administration in time range)   glucose chewable tablet 24 g (has no administration in time range)   glucagon (human recombinant) injection 1 mg (has no administration in time range)   heparin (porcine) injection 5,000 Units (has no administration in time range)    dextrose 10% bolus 125 mL 125 mL (has no administration in time range)   dextrose 10% bolus 250 mL 250 mL (has no administration in time range)   vancomycin (VANCOCIN) 1,750 mg in dextrose 5 % (D5W) 500 mL IVPB (has no administration in time range)   cefTRIAXone (ROCEPHIN) 1 g in dextrose 5 % in water (D5W) 100 mL IVPB (MB+) (0 g Intravenous Stopped 10/24/23 2050)     Medical Decision Making  83-year-old male presents for evaluation due to decline in mental status associated with abnormal lab findings.  Patient did exhibit ongoing FERNANDO, hypercalcemia, and leukocytosis repeat labs during this evening's visit.  Concern for possible sepsis with skin/soft tissue sores associated with right hip mass.  Antibiotic coverage started in the emergency department.  CT head and chest x-ray obtained and unremarkable.  Discussed all these findings with Hospital Medicine who will continue patient's evaluation and management    Amount and/or Complexity of Data Reviewed  Independent Historian: EMS  External Data Reviewed: labs and radiology.  Labs: ordered. Decision-making details documented in ED Course.  Radiology: ordered and independent interpretation performed.  ECG/medicine tests: ordered and independent interpretation performed.    Risk  Decision regarding hospitalization.    This patient does have evidence of infective focus  My overall impression is sepsis.  Source: Skin and Soft Tissue (location hip/buttocks)  Antibiotics given-   Antibiotics (72h ago, onward)      Start     Stop Route Frequency Ordered    10/25/23 0100  vancomycin (VANCOCIN) 1,750 mg in dextrose 5 % (D5W) 500 mL IVPB         10/25/23 1259 IV ED 1 Time 10/25/23 0006    10/25/23 0034  vancomycin - pharmacy to dose  (vancomycin IVPB (PEDS and ADULTS))        See Hyperspace for full Linked Orders Report.    -- IV pharmacy to manage frequency 10/24/23 2335    10/25/23 0030  ceFEPIme (MAXIPIME) 2 g in dextrose 5 % in water (D5W) 100 mL IVPB (MB+)         -- IV  Every 12 hours (non-standard times) 10/24/23 2336          Latest lactate reviewed-  Recent Labs   Lab 10/24/23  2019 10/24/23  2021   LACTATE 1.6  --    POCLAC  --  1.8     Organ dysfunction indicated by Acute kidney injury and Encephalopathy    Fluid challenge Ideal Body Weight- The patient's ideal body weight is Ideal body weight: 77.6 kg (171 lb 1.2 oz) which will be used to calculate fluid bolus of 30 ml/kg for treatment of septic shock.      Post- resuscitation assessment No - Post resuscitation assessment not needed       Will Not start Pressors- Levophed for MAP of 65  Source control achieved by: ABX             ED Course as of 10/25/23 0124   Tue Oct 24, 2023   2106 Troponin I(!): 0.133  Similar to prior values [KB]   2107 Calcium(!!): 13.1  hypercalcemia [KB]   2107 Creatinine(!): 1.7  FERNANDO [KB]      ED Course User Index  [KB] Octavio Wong MD                    Clinical Impression:   Final diagnoses:  [R41.82] AMS (altered mental status)  [A41.9] Sepsis, due to unspecified organism, unspecified whether acute organ dysfunction present (Primary)  [N17.9] FERNANDO (acute kidney injury)  [E83.52] Hypercalcemia        ED Disposition Condition    Admit Fair                Octavio Wong MD  10/25/23 0128

## 2023-10-26 LAB
ALBUMIN SERPL BCP-MCNC: 2.4 G/DL (ref 3.5–5.2)
ALBUMIN SERPL BCP-MCNC: 2.4 G/DL (ref 3.5–5.2)
ALP SERPL-CCNC: 99 U/L (ref 55–135)
ALT SERPL W/O P-5'-P-CCNC: 6 U/L (ref 10–44)
ANION GAP SERPL CALC-SCNC: 11 MMOL/L (ref 8–16)
ANION GAP SERPL CALC-SCNC: 9 MMOL/L (ref 8–16)
AST SERPL-CCNC: 18 U/L (ref 10–40)
BACTERIA UR CULT: NORMAL
BACTERIA UR CULT: NORMAL
BASOPHILS # BLD AUTO: 0.15 K/UL (ref 0–0.2)
BASOPHILS NFR BLD: 0.6 % (ref 0–1.9)
BILIRUB SERPL-MCNC: 0.3 MG/DL (ref 0.1–1)
BUN SERPL-MCNC: 25 MG/DL (ref 8–23)
BUN SERPL-MCNC: 25 MG/DL (ref 8–23)
CALCIUM SERPL-MCNC: 11.8 MG/DL (ref 8.7–10.5)
CALCIUM SERPL-MCNC: 11.9 MG/DL (ref 8.7–10.5)
CHLORIDE SERPL-SCNC: 105 MMOL/L (ref 95–110)
CHLORIDE SERPL-SCNC: 105 MMOL/L (ref 95–110)
CO2 SERPL-SCNC: 29 MMOL/L (ref 23–29)
CO2 SERPL-SCNC: 29 MMOL/L (ref 23–29)
CREAT SERPL-MCNC: 1.1 MG/DL (ref 0.5–1.4)
CREAT SERPL-MCNC: 1.2 MG/DL (ref 0.5–1.4)
DIFFERENTIAL METHOD: ABNORMAL
EOSINOPHIL # BLD AUTO: 0.2 K/UL (ref 0–0.5)
EOSINOPHIL NFR BLD: 0.8 % (ref 0–8)
ERYTHROCYTE [DISTWIDTH] IN BLOOD BY AUTOMATED COUNT: 16.1 % (ref 11.5–14.5)
EST. GFR  (NO RACE VARIABLE): >60 ML/MIN/1.73 M^2
EST. GFR  (NO RACE VARIABLE): >60 ML/MIN/1.73 M^2
GLUCOSE SERPL-MCNC: 106 MG/DL (ref 70–110)
GLUCOSE SERPL-MCNC: 109 MG/DL (ref 70–110)
HCT VFR BLD AUTO: 36.1 % (ref 40–54)
HGB BLD-MCNC: 10.8 G/DL (ref 14–18)
IMM GRANULOCYTES # BLD AUTO: 0.23 K/UL (ref 0–0.04)
IMM GRANULOCYTES NFR BLD AUTO: 0.9 % (ref 0–0.5)
INTERPRETATION SERPL IFE-IMP: NORMAL
KAPPA LC SER QL IA: 8.31 MG/DL (ref 0.33–1.94)
KAPPA LC/LAMBDA SER IA: 2.26 (ref 0.26–1.65)
LAMBDA LC SER QL IA: 3.68 MG/DL (ref 0.57–2.63)
LYMPHOCYTES # BLD AUTO: 1.2 K/UL (ref 1–4.8)
LYMPHOCYTES NFR BLD: 4.7 % (ref 18–48)
MAGNESIUM SERPL-MCNC: 1.8 MG/DL (ref 1.6–2.6)
MCH RBC QN AUTO: 28.9 PG (ref 27–31)
MCHC RBC AUTO-ENTMCNC: 29.9 G/DL (ref 32–36)
MCV RBC AUTO: 97 FL (ref 82–98)
MONOCYTES # BLD AUTO: 1.4 K/UL (ref 0.3–1)
MONOCYTES NFR BLD: 5.8 % (ref 4–15)
NEUTROPHILS # BLD AUTO: 21.6 K/UL (ref 1.8–7.7)
NEUTROPHILS NFR BLD: 87.2 % (ref 38–73)
NRBC BLD-RTO: 0 /100 WBC
PATHOLOGIST INTERPRETATION IFE: NORMAL
PHOSPHATE SERPL-MCNC: 2.9 MG/DL (ref 2.7–4.5)
PHOSPHATE SERPL-MCNC: 3.1 MG/DL (ref 2.7–4.5)
PLATELET # BLD AUTO: 198 K/UL (ref 150–450)
PMV BLD AUTO: 10.5 FL (ref 9.2–12.9)
POCT GLUCOSE: 109 MG/DL (ref 70–110)
POCT GLUCOSE: 111 MG/DL (ref 70–110)
POCT GLUCOSE: 91 MG/DL (ref 70–110)
POCT GLUCOSE: 95 MG/DL (ref 70–110)
POTASSIUM SERPL-SCNC: 3.7 MMOL/L (ref 3.5–5.1)
POTASSIUM SERPL-SCNC: 3.8 MMOL/L (ref 3.5–5.1)
PROT SERPL-MCNC: 6.4 G/DL (ref 6–8.4)
RBC # BLD AUTO: 3.74 M/UL (ref 4.6–6.2)
SODIUM SERPL-SCNC: 143 MMOL/L (ref 136–145)
SODIUM SERPL-SCNC: 145 MMOL/L (ref 136–145)
WBC # BLD AUTO: 24.77 K/UL (ref 3.9–12.7)

## 2023-10-26 PROCEDURE — C9113 INJ PANTOPRAZOLE SODIUM, VIA: HCPCS | Performed by: NURSE PRACTITIONER

## 2023-10-26 PROCEDURE — 97530 THERAPEUTIC ACTIVITIES: CPT

## 2023-10-26 PROCEDURE — 97166 OT EVAL MOD COMPLEX 45 MIN: CPT

## 2023-10-26 PROCEDURE — 97535 SELF CARE MNGMENT TRAINING: CPT

## 2023-10-26 PROCEDURE — 97110 THERAPEUTIC EXERCISES: CPT

## 2023-10-26 PROCEDURE — 94761 N-INVAS EAR/PLS OXIMETRY MLT: CPT

## 2023-10-26 PROCEDURE — 92610 EVALUATE SWALLOWING FUNCTION: CPT

## 2023-10-26 PROCEDURE — 84100 ASSAY OF PHOSPHORUS: CPT | Performed by: NURSE PRACTITIONER

## 2023-10-26 PROCEDURE — 11000001 HC ACUTE MED/SURG PRIVATE ROOM

## 2023-10-26 PROCEDURE — 97161 PT EVAL LOW COMPLEX 20 MIN: CPT

## 2023-10-26 PROCEDURE — 85025 COMPLETE CBC W/AUTO DIFF WBC: CPT | Performed by: NURSE PRACTITIONER

## 2023-10-26 PROCEDURE — 63600175 PHARM REV CODE 636 W HCPCS: Performed by: INTERNAL MEDICINE

## 2023-10-26 PROCEDURE — 25000003 PHARM REV CODE 250: Performed by: STUDENT IN AN ORGANIZED HEALTH CARE EDUCATION/TRAINING PROGRAM

## 2023-10-26 PROCEDURE — 27000221 HC OXYGEN, UP TO 24 HOURS

## 2023-10-26 PROCEDURE — 99900035 HC TECH TIME PER 15 MIN (STAT)

## 2023-10-26 PROCEDURE — 25000003 PHARM REV CODE 250: Performed by: NURSE PRACTITIONER

## 2023-10-26 PROCEDURE — 83735 ASSAY OF MAGNESIUM: CPT | Performed by: NURSE PRACTITIONER

## 2023-10-26 PROCEDURE — 25000003 PHARM REV CODE 250: Performed by: INTERNAL MEDICINE

## 2023-10-26 PROCEDURE — 80053 COMPREHEN METABOLIC PANEL: CPT | Performed by: NURSE PRACTITIONER

## 2023-10-26 PROCEDURE — 63600175 PHARM REV CODE 636 W HCPCS: Performed by: NURSE PRACTITIONER

## 2023-10-26 PROCEDURE — 80069 RENAL FUNCTION PANEL: CPT | Performed by: STUDENT IN AN ORGANIZED HEALTH CARE EDUCATION/TRAINING PROGRAM

## 2023-10-26 RX ORDER — SODIUM CHLORIDE 9 MG/ML
INJECTION, SOLUTION INTRAVENOUS CONTINUOUS
Status: DISCONTINUED | OUTPATIENT
Start: 2023-10-26 | End: 2023-10-28

## 2023-10-26 RX ADMIN — PANTOPRAZOLE SODIUM 40 MG: 40 INJECTION, POWDER, LYOPHILIZED, FOR SOLUTION INTRAVENOUS at 09:10

## 2023-10-26 RX ADMIN — HEPARIN SODIUM 5000 UNITS: 5000 INJECTION INTRAVENOUS; SUBCUTANEOUS at 01:10

## 2023-10-26 RX ADMIN — ZOLEDRONIC ACID 4 MG: 4 INJECTION, SOLUTION, CONCENTRATE INTRAVENOUS at 05:10

## 2023-10-26 RX ADMIN — VANCOMYCIN HYDROCHLORIDE 1500 MG: 1.5 INJECTION, POWDER, LYOPHILIZED, FOR SOLUTION INTRAVENOUS at 01:10

## 2023-10-26 RX ADMIN — COLLAGENASE SANTYL: 250 OINTMENT TOPICAL at 09:10

## 2023-10-26 RX ADMIN — CEFEPIME 2 G: 2 INJECTION, POWDER, FOR SOLUTION INTRAVENOUS at 12:10

## 2023-10-26 RX ADMIN — CEFTRIAXONE SODIUM 1 G: 1 INJECTION, POWDER, FOR SOLUTION INTRAMUSCULAR; INTRAVENOUS at 04:10

## 2023-10-26 RX ADMIN — SODIUM CHLORIDE: 9 INJECTION, SOLUTION INTRAVENOUS at 11:10

## 2023-10-26 RX ADMIN — LATANOPROST 1 DROP: 50 SOLUTION OPHTHALMIC at 09:10

## 2023-10-26 RX ADMIN — HEPARIN SODIUM 5000 UNITS: 5000 INJECTION INTRAVENOUS; SUBCUTANEOUS at 09:10

## 2023-10-26 RX ADMIN — SODIUM CHLORIDE: 9 INJECTION, SOLUTION INTRAVENOUS at 05:10

## 2023-10-26 RX ADMIN — DIGOXIN 0.12 MG: 125 TABLET ORAL at 11:10

## 2023-10-26 RX ADMIN — LATANOPROST 1 DROP: 50 SOLUTION OPHTHALMIC at 12:10

## 2023-10-26 RX ADMIN — CEFEPIME 2 G: 2 INJECTION, POWDER, FOR SOLUTION INTRAVENOUS at 11:10

## 2023-10-26 RX ADMIN — HEPARIN SODIUM 5000 UNITS: 5000 INJECTION INTRAVENOUS; SUBCUTANEOUS at 06:10

## 2023-10-26 NOTE — PLAN OF CARE
Problem: SLP  Goal: SLP Goal  Description: Short Term Goals:  1.Pt will participate in swallow eval to determine safest diet level.  2. Pt will tolerate mech soft and thin liquids with no outward dysphagia signs.   Outcome: Ongoing, Progressing   Diet to be advanced to mech soft and thin liquids for now.

## 2023-10-26 NOTE — PLAN OF CARE
Problem: Occupational Therapy  Goal: Occupational Therapy Goal  Description: Goals to be met by: 11/26     Patient will increase functional independence with ADLs by performing:    Grooming while EOB with Stand-by Assistance.  Toileting from bedside commode with Moderate Assistance for hygiene and clothing management.   Toilet transfer to bedside commode with Minimal Assistance.    Outcome: Ongoing, Progressing   Pt found in supine w/ son/spouse in room & agreeable to OT eval/tx this date.  Pt AO3 except for month, but required increased time & vc's for place  -sup-->EOB w/ Max A to achieve & mod-max A to maintain ~10 min; pt unable to maint UEs as support on EOB/bed rail 2/2 muscle jerking  -return to supine w/ Max A x 2  **pt w/ confusion & mild agitation throughout session  Edu/tx re: skin/jt protection, general safety techs & HEP. Pt's fly verbalized understanding.    Pt presents w/ decreased overall endurance/conditioning, balance/mobility & coordination/cognition w/ subsequent decline in (I)/safety w/ BADLs, fxnl mobility & fxnl t/f's.   OT 5x/wk to increase phys/fxnl status & maximize potential to achieve established goals for d/c-->Mod intensity tx w/ shower chair & Hosp bed.

## 2023-10-26 NOTE — PT/OT/SLP EVAL
Occupational Therapy   Evaluation/tx    Name: Aquiles Montana  MRN: [id number]  Admitting Diagnosis: Sepsis  Recent Surgery: * No surgery found *      Recommendations:     Discharge Recommendations: Moderate Intensity Therapy  Discharge Equipment Recommendations:  shower chair, hospital bed  Barriers to discharge:  Other (Comment) (signif increased burden of care)    Assessment:   Pt presents w/ decreased overall endurance/conditioning, balance/mobility & coordination/cognition w/ subsequent decline in (I)/safety w/ BADLs, fxnl mobility & fxnl t/f's.   OT 5x/wk to increase phys/fxnl status & maximize potential to achieve established goals for d/c-->Mod intensity tx w/ DME TBD pending progress.      Aquiles Montana is a 83 y.o. male with a medical diagnosis of Sepsis.  He presents with performance deficits affecting function: weakness, impaired endurance, impaired self care skills, impaired functional mobility, gait instability, decreased lower extremity function, decreased upper extremity function, impaired balance, decreased coordination, decreased safety awareness, decreased ROM, impaired fine motor, impaired skin, impaired cardiopulmonary response to activity.      Rehab Prognosis: Fair; patient would benefit from acute skilled OT services to address these deficits and reach maximum level of function.       Plan:     Patient to be seen 5 x/week to address the above listed problems via self-care/home management, therapeutic activities, therapeutic exercises  Plan of Care Expires: 11/26/23  Plan of Care Reviewed with: patient, spouse, son    Subjective     Chief Complaint: AMS  Patient/Family Comments/goals: return home    Occupational Profile:  Living Environment: w/ spouse in SSH w/ ramp; WIS  Previous level of function: prior to 1month ago, MI via SW  Roles and Routines: standing showers, light IADLs  Equipment Used at Home: walker, standard, wheelchair, bedside commode (transport WC)  Assistance upon Discharge:  will require 24/7 Sup/Asst    Pain/Comfort:  Pain Rating 1: 0/10  Pain Rating Post-Intervention 1: 0/10    Patients cultural, spiritual, Spiritism conflicts given the current situation:      Objective:     Communicated with: nsg prior to session.  Patient found HOB elevated with bed alarm, telemetry, peripheral IV, oxygen upon OT entry to room.    General Precautions: Standard, fall  Orthopedic Precautions: N/A  Braces: N/A  Respiratory Status: Nasal cannula, flow 3 L/min    Occupational Performance:    Bed Mobility:    Patient completed Supine to Sit with maximal assistance and with side rail  Patient completed Sit to Supine with maximal assistance and 2 persons    Functional Mobility/Transfers:    Functional Mobility:     Activities of Daily Living:  Lower Body Dressing: total assistance don socks    Cognitive/Visual Perceptual:  AO3 except for month; required mod vc's & increased time for place    Physical Exam:  AAROM B shldrs to ~90*; elb-->Ds WFL    Sit balance: Poor    R heel wound    AMPAC 6 Click ADL:  AMPAC Total Score: 9    Treatment & Education:  Pt found in supine w/ son/spouse in room & agreeable to OT eval/tx this date.  Pt AO3 except for month, but required increased time & vc's for place  -sup-->EOB w/ Max A to achieve & mod-max A to maintain ~10 min; pt unable to maint UEs as support on EOB/bed rail 2/2 muscle jerking  -return to supine w/ Max A x 2  **pt w/ confusion & mild agitation throughout session  Edu/tx re: skin/jt protection, general safety techs & HEP. Pt's fly verbalized understanding.        Patient left HOB elevated with all lines intact, call button in reach, bed alarm on, nsg notified, and fly & PCT present    GOALS:   Multidisciplinary Problems       Occupational Therapy Goals          Problem: Occupational Therapy    Goal Priority Disciplines Outcome Interventions   Occupational Therapy Goal     OT, PT/OT Ongoing, Progressing    Description: Goals to be met by: 11/26     Patient  "will increase functional independence with ADLs by performing:    Grooming while EOB with Stand-by Assistance.  Toileting from bedside commode with Moderate Assistance for hygiene and clothing management.   Toilet transfer to bedside commode with Minimal Assistance.                         History:     Past Medical History:   Diagnosis Date    Hypertension     Urinary tract infection          Past Surgical History:   Procedure Laterality Date    hand sugery      PROSTATE SURGERY      "Rotor rooter- opened up"       Time Tracking:     OT Date of Treatment: 10/26/23  OT Start Time: 1106  OT Stop Time: 1137  OT Total Time (min): 31 min    Billable Minutes:Evaluation 10  Therapeutic Activity 21  Total Time 31    10/26/2023  "

## 2023-10-26 NOTE — PLAN OF CARE
Problem: Physical Therapy  Goal: Physical Therapy Goal  Description: Goals to be met by: 2023     Patient will increase functional independence with mobility by performin. Supine to sit with Minimal Assistance  2. Sit to supine with Minimal Assistance  3. Rolling with Minimal Assistance.  4. Sit to stand transfer with Minimal Assistance using Rolling Walker  5. Bed to chair transfer with Minimal Assistance using Rolling Walker  6. Gait  x 25 feet with Minimal Assistance using Rolling Walker.   7. Lower extremity exercise program x10 reps with assistance as needed    Outcome: Ongoing, Progressing   Patient evaluated to tolerance; pt with confusion and mild agitation; oriented to self, year, hospital (says Kimberly); pt follows 50% of one step commands with repetition, tactile cues, visual cues; pt received BLE AA/mainly PROM to BLEs; will need to cotx for tomorrow's session; anticipate moderate intensity therapy, shower chair; wife and son report having hospital bed.

## 2023-10-26 NOTE — PT/OT/SLP EVAL
Physical Therapy Evaluation    Patient Name:  Aquiles Montana   MRN:  [id number]    Recommendations:     Discharge Recommendations: Moderate Intensity Therapy   Discharge Equipment Recommendations: shower chair   Barriers to discharge:  significant decline from baseline of function    Assessment:     Aquiles Montana is a 83 y.o. male admitted with a medical diagnosis of Sepsis.  He presents with the following impairments/functional limitations: weakness, impaired endurance, impaired self care skills, impaired functional mobility, gait instability, impaired balance, decreased coordination, decreased lower extremity function, decreased upper extremity function, decreased safety awareness, impaired fine motor, impaired cardiopulmonary response to activity Patient presents with confusion and mild agitation; provided BLE AA/PROM ex, answered questions from wife and son; will cont to progress as tolerated.    Rehab Prognosis: Fair; patient would benefit from acute skilled PT services to address these deficits and reach maximum level of function.    Recent Surgery: * No surgery found *      Plan:     During this hospitalization, patient to be seen 5 x/week to address the identified rehab impairments via therapeutic activities, therapeutic exercises, gait training, neuromuscular re-education and progress toward the following goals:    Plan of Care Expires:  11/26/23    Subjective     Chief Complaint: none verbalized; pt with confusion  Patient/Family Comments/goals: return home; family reports unable to care for patient leading up to this hospital admission  Pain/Comfort:  Pain Rating 1: 0/10  Pain Rating Post-Intervention 1: 0/10    Patients cultural, spiritual, Mandaen conflicts given the current situation: no    Living Environment:  Patient lives with wife in [facility] with ramp entrance; WIS  Prior to admission, patients level of function was Minh prior to a month ago while using standard walker.  Equipment used at home:  walker, standard, wheelchair, bedside commode, hospital bed, lift device (transport wheelchair).  Upon discharge, patient will have assistance from family; will need 24/7 care and assistance.    Objective:     Communicated with nurse prior to session.  Patient found HOB elevated with bed alarm, telemetry, peripheral IV  upon PT entry to room.    General Precautions: Standard, fall  Orthopedic Precautions:N/A   Braces: N/A  Respiratory Status: Nasal cannula, flow 3 L/min    Exams:  Cognitive Exam:  pt alert but with confusion and mild agitation; oriented to self, year, hospital (says Kimberly); pt follows 50% of one step commands with repetition, tactile cues, visual cues  BLE ROM: WFL  BLE Strength: unable to formally assess; appears at least~2 to 3+ from observed spontaneous movement and able to push on therapists hand with feet and flex LEs    Functional Mobility:  Deferred - will need to cotx patient for safety      AM-PAC 6 CLICK MOBILITY  Total Score:6       Treatment & Education:  Wife and son present and educated on role of PT/POC; patient evaluated to tolerance; pt with confusion and mild agitation; oriented to self, year, hospital (says Kimberly); pt follows 50% of one step commands with repetition, tactile cues, visual cues; pt received BLE AA/mainly PROM to BLEs in all ranges of available motion; obtained info from wife and son and answered wife and son's questions about what the pt's options are, can he still get therapy after discharge, equipment needed, will need to cotx for tomorrow's session; anticipate moderate intensity therapy, shower chair; wife and son report having hospital bed.     Patient left HOB elevated with all lines intact, call button in reach, bed alarm on, nurse notified and wife and son present.    GOALS:   Multidisciplinary Problems       Physical Therapy Goals          Problem: Physical Therapy    Goal Priority Disciplines Outcome Goal Variances Interventions   Physical Therapy Goal  "    PT, PT/OT Ongoing, Progressing     Description: Goals to be met by: 2023     Patient will increase functional independence with mobility by performin. Supine to sit with Minimal Assistance  2. Sit to supine with [id number] Assistance  3. Rolling with Minimal Assistance.  4. Sit to stand transfer with Minimal Assistance using Rolling Walker  5. Bed to chair transfer with Minimal Assistance using Rolling Walker  6. Gait  x 25 feet with Minimal Assistance using Rolling Walker.   7. Lower extremity exercise program x10 reps with assistance as needed                         History:     Past Medical History:   Diagnosis Date    Hypertension     Urinary tract infection        Past Surgical History:   Procedure Laterality Date    hand sugery      PROSTATE SURGERY      "Rotor rooter- opened up"       Time Tracking:     PT Received On: 10/26/23  PT Start Time: 1446     PT Stop Time: 1523  PT Total Time (min): 37 min     Billable Minutes: Evaluation 14 Self Care 12 Therapeutic Exercise 11      10/26/2023  "

## 2023-10-26 NOTE — PT/OT/SLP EVAL
Speech Language Pathology Evaluation  Bedside Swallow    Patient Name:  Aquiles Montana   MRN:  [id number]  Admitting Diagnosis: Sepsis    Recommendations:                 General Recommendations:  monitor PO intake and ensure safety with meal   Diet recommendations:  Minced & Moist Diet - IDDSI Level 5, Thin liquids - IDDSI Level 0   Aspiration Precautions: standard precautions, slow rate, encourage PO, minimize distractions   General Precautions: Standard, fall  Communication strategies:  redirect and repeat information     Assessment:     Aquiles Montana is a 83 y.o. male admitted with sepsis who presents with an SLP diagnosis of confusion and ability to start oral diet.    History per MD      Chief Complaint   Patient presents with    Altered Mental Status       Pt reports to ED via EMS with c/o AMS that began today after doctors appointment. Pt's family reports pt has been non verbal and aggressive. EMS unsure of pt's baseline. Family reports a decline in pt since 9/10/23 after admission to hospital for pneumonia.          HPI: Aquiles Montana is an 83-year-old male w/ PMH SCC, BPH, HTN, obesity, and left anterior fascicular block. He presented to the ED via EMS for evaluation due to worsening mental status since admission 5 weeks ago. He was previously admitted to the hospital for sepsis and then discharged to a SNF. Patient's family reports that he came home recently but has just not been himself since the previous admission. Patient was seen by PCP today and status deteriorated following the visit.  Chart review of lab work performed prior to arrival today shows hypercalcemia and leukocytosis.  Also evident of acute kidney injury.  Patient was reportedly agitated and difficult to direct earlier today and at time of presentation to our facility is nonverbal. Patient opens eyes to verbal stimuli and responds to painful stimuli. History obtained from patient's family at bedside. They state that patient has not  "been experiencing fever, N/V/D, CP, or SOB recently. He has had a decrease in PO intake and activity. They also report that patient started experiencing delirium and hallucinations during his previous hospitalization, which have continued. Of note, patient has a large abscess in his right inguinal area and family states that this has been causing him quite a bit of pain recently.     Past Medical History:   Diagnosis Date    Hypertension     Urinary tract infection        Past Surgical History:   Procedure Laterality Date    hand sugery      PROSTATE SURGERY      "Rotor rooter- opened up"       Social History: Patient lives with family at home    Prior Intubation HX:  n/a    Modified Barium Swallow: none per EMR    Chest X-Rays: There is no new focal consolidation.  The pleural spaces are clear the cardiac silhouette is unremarkable.     CT of the head: No acute intracranial abnormality.   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     Prior diet: reg/thin     Subjective     SLP consulted for swallow eval, RN concerned about pt's swallowing given his confusion.   Patient goals: "where are you taking me?"    Pain/Comfort:  Pain Rating 1: 0/10    Respiratory Status: NC    Objective:   Pt seen this date in room, family at bedside.   Pt asking for some water and reports that he is thirsty.   Pt awake, confused, disoriented to place, time,situation and family in room.     Oral Musculature Evaluation  Oral Musculature: WFL  Dentition: present and adequate  Secretion Management: adequate  Mandibular Strength and Mobility: WFL  Lingual Strength and Mobility: WFL  Velar Elevation: WFL  Buccal Strength and Mobility: WFL  Volitional Cough: elicited  Volitional Swallow: timely swallow  Voice Prior to PO Intake: clear voice    Bedside Swallow Eval:   Consistencies Assessed:  Thin liquids water by cup, juice by straw  Puree pudding   Solids cracker      Oral Phase:   Slow oral transit time  Increased " mastication     Pharyngeal Phase:   no overt clinical signs/symptoms of aspiration  no overt clinical signs/symptoms of pharyngeal dysphagia  multiple spontaneous swallows    Compensatory Strategies  Daily and frequent oral care  Encourage PO   Boost in between meals     Treatment: Skilled education including role of SLP, swallow precautions, s/sx of dysphagia were discussed and appropriate diet instructions were reviewed with family,no teach back by patient due to confusion. Family demo'd understanding of info provided and all questions were answered within SLP scope of practice.       Goals:   Multidisciplinary Problems       SLP Goals          Problem: SLP    Goal Priority Disciplines Outcome   SLP Goal     SLP Ongoing, Progressing   Description: Short Term Goals:  1.Pt will participate in swallow eval to determine safest diet level.  2. Pt will tolerate mech soft and thin liquids with no outward dysphagia signs.                        Plan:     Patient to be seen:  3 x/week   Plan of Care expires:  11/24/23  Plan of Care reviewed with:  patient, spouse, son   SLP Follow-Up:  Yes       Discharge recommendations:   (tbd)   Barriers to Discharge:  none    Time Tracking:     SLP Treatment Date:   10/26/23  Speech Start Time:  1102  Speech Stop Time:  1128     Speech Total Time (min):  26 min    Billable Minutes: Eval Swallow and Oral Function 14 and Self Care/Home Management Training 12    10/26/2023

## 2023-10-26 NOTE — PROGRESS NOTES
[facility] Progress Note      Assessment/Plan:     Aquiles Montana is a 83 y.o.male with    Suspected UTI      UA with RBC 27, WBC 21, Bacteria rare      F/u UCx multiple organisms, cover cefepime    Sepsis 2/2 R Inguinal lymphadenitis vs abscess  This patient does have evidence of infective focus  My overall impression is sepsis.  Source: Skin and Soft Tissue (location right inguinal adenitis/abscess)   - CT Pelvis 1. Large lobular masslike focus within the right inguinal regions suggesting lymph node conglomerate.  Correlation with history of malignancy advised.  Additional lymph node enlargement noted along the right iliac chain.  Please note, this likely correlates with finding on prior CT   - WBC 29,000<--25,670<--26,410  - BCx are no growth to date  -- UCx multiple organisms  Antibiotics given- vancomycin, cefepime    Anemia  Stable  H/H 12.3/39.8-->10.8/36.1 for now  S/p blood transfusions during previous admission for GIB  No continued reports of hematemesis, melena, hematochezia, or other signs of bleeding per family  Transfuse for hgb <7  Monitor       ACP (advance care planning)  Advance Care Planning   Date: 10/25/2023  Code Status  In light of the patients advanced and life limiting illness,I engaged the the family in a voluntary conversation about the patient's preferences for care  at the very end of life. The patient wishes to have a natural, peaceful death.  Along those lines, the patient does not wish to have CPR or other invasive treatments performed when his heart and/or breathing stops. I communicated to the family that a DNR order would be placed in his medical record to reflect this preference.  I spent a total of 8 minutes engaging the patient in this advance care planning discussion.     Hypercalcemia-Improving  The patient has hypercalcemia that is currently uncontrolled. The patient has the following symptoms due to their hypercalcemia: encephalopathy. The hypercalcemia is likely  due to unknown - suspected 2/2 malignancy. We will obtain the following labs to work up the hypercalcemia:   alkaline phosphatase         Alkaline Phosphatase   Date Value Ref Range Status   10/24/2023 108 55 - 135 U/L Final    . We will treat the hypercalcemia with: IV fluids ordered at a rate of 125/hr. Their latest calcium has been reviewed and is listed below.  Corrected calcium is   Ionized calcium ordered  PTH 6.5 (L)  Continue aggressive hydration, may need zometa if not improving     A-fib  Patient with Persistent (7 days or more) atrial fibrillation which is controlled currently with Beta Blocker and Digoxin. Patient is currently in atrial fibrillation.[id number] Score: 2. HASBLED Score: 3. Anticoagulation not indicated due to risk of GIB.  Continue subq heparin while inpatient w/ close monitoring     Swelling of right groin  Previously noted on MRI and CT scan 9/2023          1.  Bulky lymphadenopathy versus mass in the right inguinal region           2.  Enlarged right external iliac chain lymph nodes, likely metastatic           3.  No other findings of metastatic disease in the abdomen or pelvis           4.  Cholelithiasis   Noted to be SCC on biopsy  Per family, it is now more swollen, painful, and discolored since biopsy  Hem/onc consult ordered regarding recommendations  Huge lymphadenitis with fluctuant mass , r/o tumor necrosis lymphoma  CT pelvis 1. Large lobular masslike focus within the right inguinal regions suggesting lymph node conglomerate.  Correlation with history of malignancy advised.  Additional lymph node enlargement noted along the right iliac chain. T     Encephalopathy, Hallucinations  Likely 2/2 infectious etiology vs mets to brain?  Treat as above  Speech cleared for [facility] soft, thin liquids, crushed meds  Maintain safety precautions  At [facility] an MRI was performed in Sept 2023     Hypertension  Patient mildly hypotensive on arrival but responded well to IVF  Resume home BP meds in  the am if awake enough to take     Heparin 5k q8      Subjective:      Aquiles Montana is a 83 y.o.  male who is being followed by [facility] for sepsis 2/2 R inguinal lymphadenitis in the setting of SCC and hypercalcemia    Per patient's son and wife he is much more alert today although he is still confused    Cleared by Speech for a soft diet    WBC down to 24.77    Review of Systems   Constitutional:  Negative for chills and fever.   HENT:  Negative for congestion and sore throat.    Respiratory:  Negative for shortness of breath.    Cardiovascular:  Negative for chest pain and leg swelling.   Genitourinary:  Negative for dysuria and urgency.   Musculoskeletal:  Negative for back pain, myalgias and neck pain.   Skin:  Positive for rash. Negative for itching.   Neurological:  Positive for weakness. Negative for dizziness.   Psychiatric/Behavioral:  Positive for hallucinations.         Objective:   Last 24 Hour Vital Signs:  BP  Min: 114/60  Max: 160/74  Temp  Av.4 °F (36.3 °C)  Min: 96.2 °F (35.7 °C)  Max: 98.4 °F (36.9 °C)  Pulse  Av.9  Min: 60  Max: 91  Resp  Av  Min: 16  Max: 20  SpO2  Av.5 %  Min: 93 %  Max: 96 %  I/O last 3 completed shifts:  In: 0   Out: 1300 [Urine:1300]    Physical Exam  Constitutional:       Appearance: Normal appearance.   HENT:      Head: Normocephalic and atraumatic.   Eyes:      Extraocular Movements: Extraocular movements intact.      Pupils: Pupils are equal, round, and reactive to light.   Cardiovascular:      Rate and Rhythm: Normal rate and regular rhythm.   Pulmonary:      Effort: Pulmonary effort is normal. No respiratory distress.      Breath sounds: Normal breath sounds.   Abdominal:      General: Abdomen is flat. Bowel sounds are normal.      Palpations: Abdomen is soft.   Skin:     General: Skin is warm and dry.      Findings: Erythema present.      Comments: Swollen R inguinal lymph nodes with erythema     Neurological:      Mental  Status: He is alert. He is disoriented.           Laboratory:  Laboratory Data Reviewed: yes  Pertinent Findings:      Microbiology Data Reviewed: yes  Pertinent Findings:      Other Results:  EKG (my interpretation): .    Radiology Data Reviewed: yes  Pertinent Findings:      Current Medications:     Infusions:   sodium chloride 0.9% 200 mL/hr at 10/26/23 1102        Scheduled:   ceFEPime (MAXIPIME) IVPB  2 g Intravenous Q12H    collagenase   Topical (Top) Daily    digoxin  0.125 mg Oral Daily    heparin (porcine)  5,000 Units Subcutaneous Q8H    latanoprost  1 drop Both Eyes QHS    metoprolol succinate  100 mg Oral Daily    pantoprazole  40 mg Intravenous BID    vancomycin (VANCOCIN) IV (PEDS and ADULTS)  1,500 mg Intravenous Q24H        PRN:  dextrose 10%, dextrose 10%, glucagon (human recombinant), glucose, glucose, melatonin, naloxone, ondansetron, sodium chloride 0.9%, Pharmacy to dose Vancomycin consult **AND** vancomycin - pharmacy to dose    Antibiotics and Day Number of Therapy:       Lines and Day Number of Therapy:       Giuliana Smith MD  [facility]

## 2023-10-26 NOTE — PROGRESS NOTES
Nephrology Progress Note       Consult Requested By: Giuliana Smith MD  Reason for Consult: FERNANDO      SUBJECTIVE:        ?    Review of Systems   Unable to perform ROS: Acuity of condition       Past Medical History:   Diagnosis Date    Hypertension     Urinary tract infection           OBJECTIVE:     Vital Signs (Most Recent)  Vitals:    10/26/23 0000 10/26/23 0400 10/26/23 0445 10/26/23 0747   BP:   (!) 133/59 114/60   BP Location:   Left arm Left arm   Patient Position:   Lying Lying   Pulse: 72 72 70 74   Resp:   16 18   Temp:   96.2 °F (35.7 °C) 98.4 °F (36.9 °C)   TempSrc:   Axillary Oral   SpO2:   96% (!) 94%   Weight:       Height:                       Medications:   ceFEPime (MAXIPIME) IVPB  2 g Intravenous Q12H    collagenase   Topical (Top) Daily    digoxin  0.125 mg Oral Daily    heparin (porcine)  5,000 Units Subcutaneous Q8H    latanoprost  1 drop Both Eyes QHS    metoprolol succinate  100 mg Oral Daily    pantoprazole  40 mg Intravenous BID    vancomycin (VANCOCIN) IV (PEDS and ADULTS)  1,500 mg Intravenous Q24H           Physical Exam  Vitals and nursing note reviewed.   Constitutional:       General: He is not in acute distress.     Appearance: He is ill-appearing and toxic-appearing. He is not diaphoretic.   HENT:      Head: Normocephalic and atraumatic.      Mouth/Throat:      Pharynx: No oropharyngeal exudate.   Eyes:      General: No scleral icterus.     Conjunctiva/sclera: Conjunctivae normal.      Pupils: Pupils are equal, round, and reactive to light.   Cardiovascular:      Rate and Rhythm: Normal rate and regular rhythm.      Heart sounds: Normal heart sounds. No murmur heard.  Pulmonary:      Effort: Pulmonary effort is normal. No respiratory distress.      Breath sounds: Normal breath sounds.   Abdominal:      General: Bowel sounds are normal. There is no distension.      Palpations: Abdomen is soft.      Tenderness: There is no abdominal tenderness.   Musculoskeletal:          General: Normal range of motion.      Cervical back: Normal range of motion and neck supple.   Skin:     General: Skin is warm and dry.      Findings: No erythema.   Neurological:      Mental Status: He is disoriented.      Cranial Nerves: No cranial nerve deficit.      Comments: More awake today          Laboratory:  Recent Labs   Lab 10/24/23  2019 10/24/23  2021 10/25/23  0938 10/26/23  0322   WBC 25.67*  --  29.46* 24.77*   HGB 12.3*  --  11.2* 10.8*   HCT 39.8* 39.7 36.4* 36.1*     --  244 198   MONO 5.3  1.4*  --  5.7  1.7* 5.8  1.4*   EOSINOPHIL 0.0  --  0.5 0.8       Recent Labs   Lab 10/24/23  2019 10/24/23  2021 10/25/23  0938 10/26/23  0322     --  143 143  145   K 4.5  --  3.8 3.7  3.8   CL 99  --  101 105  105   CO2 32*  --  32* 29  29   BUN 31*  --  29* 25*  25*   CREATININE 1.7*  --  1.4 1.2  1.1   CALCIUM 13.1*  --  12.2* 11.9*  11.8*   PHOS  --  3.4 3.1 2.9  3.1         Diagnostic Results:  X-Ray: Reviewed  US: Reviewed  Echo: Reviewed  ASSESSMENT/PLAN:     1. FERNANDO -  at this point  Sepsis Hypercalcemia Volume depletion    CT abd for abscess  -   no hydro noticed     -- Resume  cc/hr for Ca+ and FERNANDO got better but IVF was stopped ay midnight,  monitor Volume status with SpO2   -- Daily Renal Function Panel  -- Avoid Hypotension.  -- Renally dose all meds  -- Please avoid nephrotoxins, including NSAIDs, aminoglycosides, IV contrast (unless absolutely necessary), gadolinium, fleets and other phosphorous-based laxatives. Caution with antibiotics.    Sepsis   -- Has Inflamed R groin lymph node ? Less likely Abscess  per surgery Surgery      -- IV abx     2. HTN (I10) - controlled   3. Anemia    Recent Labs   Lab 10/24/23  2019 10/24/23  2021 10/25/23  0938 10/26/23  0322   HGB 12.3*  --  11.2* 10.8*   HCT 39.8* 39.7 36.4* 36.1*     --  244 198              4. Hypercalcemia  - Cancer  related and  Volume depletion   -- IVF NS 200cc/hr   Check PTHr    Lab Results    Component Value Date    PTH 6.5 (L) 10/25/2023    CALCIUM 11.9 (H) 10/26/2023    CALCIUM 11.8 (H) 10/26/2023    CAION 1.60 (H) 10/25/2023    PHOS 2.9 10/26/2023    PHOS 3.1 10/26/2023       Alkalosis - suspect volume contraction   -- IVF  NS - better   Lab Results   Component Value Date    CO2 29 10/26/2023    CO2 29 10/26/2023           Thank you for allowing me to participate in care of your patient  With any question please call   Delvis Karimi MD     [facility]  JAM Herrera MD,   MD LUIS FELIPE Jaeger MD E. V. Harmon, NP  [address]   [place], [address] [phone]  After hours answering service: [phone]

## 2023-10-26 NOTE — PLAN OF CARE
Problem: Adult Inpatient Plan of Care  Goal: Plan of Care Review  Outcome: Ongoing, Progressing  Goal: Patient-Specific Goal (Individualized)  Outcome: Ongoing, Progressing  Goal: Absence of Hospital-Acquired Illness or Injury  Outcome: Ongoing, Progressing  Goal: Optimal Comfort and Wellbeing  Outcome: Ongoing, Progressing  Goal: Readiness for Transition of Care  Outcome: Ongoing, Progressing     Problem: Adjustment to Illness (Sepsis/Septic Shock)  Goal: Optimal Coping  Outcome: Ongoing, Progressing     Problem: Bleeding (Sepsis/Septic Shock)  Goal: Absence of Bleeding  Outcome: Ongoing, Progressing     Problem: Glycemic Control Impaired (Sepsis/Septic Shock)  Goal: Blood Glucose Level Within Desired Range  Outcome: Ongoing, Progressing     Problem: Infection Progression (Sepsis/Septic Shock)  Goal: Absence of Infection Signs and Symptoms  Outcome: Ongoing, Progressing     Problem: Nutrition Impaired (Sepsis/Septic Shock)  Goal: Optimal Nutrition Intake  Outcome: Ongoing, Progressing     Problem: Fall Injury Risk  Goal: Absence of Fall and Fall-Related Injury  Outcome: Ongoing, Progressing     Problem: Hypertension Acute  Goal: Blood Pressure Within Desired Range  Outcome: Ongoing, Progressing     Problem: Skin Injury Risk Increased  Goal: Skin Health and Integrity  Outcome: Ongoing, Progressing     Problem: Impaired Wound Healing  Goal: Optimal Wound Healing  Outcome: Ongoing, Progressing     Problem: Fluid and Electrolyte Imbalance (Acute Kidney Injury/Impairment)  Goal: Fluid and Electrolyte Balance  Outcome: Ongoing, Progressing     Problem: Oral Intake Inadequate (Acute Kidney Injury/Impairment)  Goal: Optimal Nutrition Intake  Outcome: Ongoing, Progressing     Problem: Renal Function Impairment (Acute Kidney Injury/Impairment)  Goal: Effective Renal Function  Outcome: Ongoing, Progressing     Problem: Hypertension Comorbidity  Goal: Blood Pressure in Desired Range  Outcome: Ongoing, Progressing

## 2023-10-27 LAB
ALBUMIN SERPL BCP-MCNC: 2.3 G/DL (ref 3.5–5.2)
ALBUMIN SERPL BCP-MCNC: 2.3 G/DL (ref 3.5–5.2)
ALP SERPL-CCNC: 92 U/L (ref 55–135)
ALT SERPL W/O P-5'-P-CCNC: 9 U/L (ref 10–44)
ANION GAP SERPL CALC-SCNC: 7 MMOL/L (ref 8–16)
ANION GAP SERPL CALC-SCNC: 7 MMOL/L (ref 8–16)
ANISOCYTOSIS BLD QL SMEAR: SLIGHT
AST SERPL-CCNC: 15 U/L (ref 10–40)
BASOPHILS # BLD AUTO: 0.12 K/UL (ref 0–0.2)
BASOPHILS NFR BLD: 0.5 % (ref 0–1.9)
BILIRUB SERPL-MCNC: 0.4 MG/DL (ref 0.1–1)
BUN SERPL-MCNC: 19 MG/DL (ref 8–23)
BUN SERPL-MCNC: 20 MG/DL (ref 8–23)
CALCIUM SERPL-MCNC: 11.5 MG/DL (ref 8.7–10.5)
CALCIUM SERPL-MCNC: 11.8 MG/DL (ref 8.7–10.5)
CHLORIDE SERPL-SCNC: 103 MMOL/L (ref 95–110)
CHLORIDE SERPL-SCNC: 103 MMOL/L (ref 95–110)
CO2 SERPL-SCNC: 31 MMOL/L (ref 23–29)
CO2 SERPL-SCNC: 31 MMOL/L (ref 23–29)
CREAT SERPL-MCNC: 0.9 MG/DL (ref 0.5–1.4)
CREAT SERPL-MCNC: 0.9 MG/DL (ref 0.5–1.4)
DIFFERENTIAL METHOD: ABNORMAL
EOSINOPHIL # BLD AUTO: 0.2 K/UL (ref 0–0.5)
EOSINOPHIL NFR BLD: 0.8 % (ref 0–8)
ERYTHROCYTE [DISTWIDTH] IN BLOOD BY AUTOMATED COUNT: 15.8 % (ref 11.5–14.5)
EST. GFR  (NO RACE VARIABLE): >60 ML/MIN/1.73 M^2
EST. GFR  (NO RACE VARIABLE): >60 ML/MIN/1.73 M^2
GLUCOSE SERPL-MCNC: 102 MG/DL (ref 70–110)
GLUCOSE SERPL-MCNC: 97 MG/DL (ref 70–110)
HCT VFR BLD AUTO: 32.8 % (ref 40–54)
HGB BLD-MCNC: 10 G/DL (ref 14–18)
HYPOCHROMIA BLD QL SMEAR: ABNORMAL
IMM GRANULOCYTES # BLD AUTO: 0.17 K/UL (ref 0–0.04)
IMM GRANULOCYTES NFR BLD AUTO: 0.7 % (ref 0–0.5)
LYMPHOCYTES # BLD AUTO: 1.1 K/UL (ref 1–4.8)
LYMPHOCYTES NFR BLD: 4.8 % (ref 18–48)
MAGNESIUM SERPL-MCNC: 1.6 MG/DL (ref 1.6–2.6)
MCH RBC QN AUTO: 28.7 PG (ref 27–31)
MCHC RBC AUTO-ENTMCNC: 30.5 G/DL (ref 32–36)
MCV RBC AUTO: 94 FL (ref 82–98)
MONOCYTES # BLD AUTO: 1.6 K/UL (ref 0.3–1)
MONOCYTES NFR BLD: 6.5 % (ref 4–15)
NEUTROPHILS # BLD AUTO: 20.6 K/UL (ref 1.8–7.7)
NEUTROPHILS NFR BLD: 86.7 % (ref 38–73)
NRBC BLD-RTO: 0 /100 WBC
OVALOCYTES BLD QL SMEAR: ABNORMAL
PHOSPHATE SERPL-MCNC: 1.8 MG/DL (ref 2.7–4.5)
PHOSPHATE SERPL-MCNC: 1.9 MG/DL (ref 2.7–4.5)
PLATELET # BLD AUTO: 202 K/UL (ref 150–450)
PLATELET BLD QL SMEAR: ABNORMAL
PMV BLD AUTO: 10.2 FL (ref 9.2–12.9)
POCT GLUCOSE: 81 MG/DL (ref 70–110)
POCT GLUCOSE: 82 MG/DL (ref 70–110)
POCT GLUCOSE: 91 MG/DL (ref 70–110)
POIKILOCYTOSIS BLD QL SMEAR: SLIGHT
POTASSIUM SERPL-SCNC: 3.2 MMOL/L (ref 3.5–5.1)
POTASSIUM SERPL-SCNC: 3.3 MMOL/L (ref 3.5–5.1)
PROT SERPL-MCNC: 6.1 G/DL (ref 6–8.4)
RBC # BLD AUTO: 3.49 M/UL (ref 4.6–6.2)
SODIUM SERPL-SCNC: 141 MMOL/L (ref 136–145)
SODIUM SERPL-SCNC: 141 MMOL/L (ref 136–145)
WBC # BLD AUTO: 23.71 K/UL (ref 3.9–12.7)

## 2023-10-27 PROCEDURE — 94761 N-INVAS EAR/PLS OXIMETRY MLT: CPT

## 2023-10-27 PROCEDURE — 97112 NEUROMUSCULAR REEDUCATION: CPT | Mod: CO

## 2023-10-27 PROCEDURE — 25000003 PHARM REV CODE 250: Performed by: STUDENT IN AN ORGANIZED HEALTH CARE EDUCATION/TRAINING PROGRAM

## 2023-10-27 PROCEDURE — 85025 COMPLETE CBC W/AUTO DIFF WBC: CPT | Performed by: NURSE PRACTITIONER

## 2023-10-27 PROCEDURE — 84100 ASSAY OF PHOSPHORUS: CPT | Performed by: NURSE PRACTITIONER

## 2023-10-27 PROCEDURE — 63600175 PHARM REV CODE 636 W HCPCS: Performed by: INTERNAL MEDICINE

## 2023-10-27 PROCEDURE — 97530 THERAPEUTIC ACTIVITIES: CPT

## 2023-10-27 PROCEDURE — 25500020 PHARM REV CODE 255: Performed by: INTERNAL MEDICINE

## 2023-10-27 PROCEDURE — C9113 INJ PANTOPRAZOLE SODIUM, VIA: HCPCS | Performed by: NURSE PRACTITIONER

## 2023-10-27 PROCEDURE — 36415 COLL VENOUS BLD VENIPUNCTURE: CPT | Performed by: STUDENT IN AN ORGANIZED HEALTH CARE EDUCATION/TRAINING PROGRAM

## 2023-10-27 PROCEDURE — 97530 THERAPEUTIC ACTIVITIES: CPT | Mod: CO

## 2023-10-27 PROCEDURE — 82397 CHEMILUMINESCENT ASSAY: CPT | Performed by: STUDENT IN AN ORGANIZED HEALTH CARE EDUCATION/TRAINING PROGRAM

## 2023-10-27 PROCEDURE — 27000221 HC OXYGEN, UP TO 24 HOURS

## 2023-10-27 PROCEDURE — 97535 SELF CARE MNGMENT TRAINING: CPT | Mod: CO

## 2023-10-27 PROCEDURE — 83735 ASSAY OF MAGNESIUM: CPT | Performed by: NURSE PRACTITIONER

## 2023-10-27 PROCEDURE — 25000003 PHARM REV CODE 250: Performed by: INTERNAL MEDICINE

## 2023-10-27 PROCEDURE — 80053 COMPREHEN METABOLIC PANEL: CPT | Performed by: NURSE PRACTITIONER

## 2023-10-27 PROCEDURE — 80069 RENAL FUNCTION PANEL: CPT | Performed by: STUDENT IN AN ORGANIZED HEALTH CARE EDUCATION/TRAINING PROGRAM

## 2023-10-27 PROCEDURE — 11000001 HC ACUTE MED/SURG PRIVATE ROOM

## 2023-10-27 PROCEDURE — 63600175 PHARM REV CODE 636 W HCPCS: Performed by: NURSE PRACTITIONER

## 2023-10-27 PROCEDURE — 99900035 HC TECH TIME PER 15 MIN (STAT)

## 2023-10-27 PROCEDURE — 25000003 PHARM REV CODE 250: Performed by: NURSE PRACTITIONER

## 2023-10-27 PROCEDURE — 97112 NEUROMUSCULAR REEDUCATION: CPT

## 2023-10-27 RX ORDER — TALC
3 POWDER (GRAM) TOPICAL NIGHTLY
Status: DISCONTINUED | OUTPATIENT
Start: 2023-10-27 | End: 2023-10-27

## 2023-10-27 RX ORDER — TALC
6 POWDER (GRAM) TOPICAL NIGHTLY
Status: DISCONTINUED | OUTPATIENT
Start: 2023-10-27 | End: 2023-11-03 | Stop reason: HOSPADM

## 2023-10-27 RX ORDER — METOPROLOL TARTRATE 25 MG/1
25 TABLET, FILM COATED ORAL 2 TIMES DAILY
Status: DISCONTINUED | OUTPATIENT
Start: 2023-10-27 | End: 2023-11-03 | Stop reason: HOSPADM

## 2023-10-27 RX ADMIN — LATANOPROST 1 DROP: 50 SOLUTION OPHTHALMIC at 08:10

## 2023-10-27 RX ADMIN — PANTOPRAZOLE SODIUM 40 MG: 40 INJECTION, POWDER, LYOPHILIZED, FOR SOLUTION INTRAVENOUS at 08:10

## 2023-10-27 RX ADMIN — VANCOMYCIN HYDROCHLORIDE 1500 MG: 1.5 INJECTION, POWDER, LYOPHILIZED, FOR SOLUTION INTRAVENOUS at 01:10

## 2023-10-27 RX ADMIN — SODIUM CHLORIDE: 9 INJECTION, SOLUTION INTRAVENOUS at 01:10

## 2023-10-27 RX ADMIN — METOPROLOL TARTRATE 25 MG: 25 TABLET, FILM COATED ORAL at 10:10

## 2023-10-27 RX ADMIN — POTASSIUM BICARBONATE 50 MEQ: 978 TABLET, EFFERVESCENT ORAL at 10:10

## 2023-10-27 RX ADMIN — SODIUM CHLORIDE: 9 INJECTION, SOLUTION INTRAVENOUS at 11:10

## 2023-10-27 RX ADMIN — CEFTRIAXONE SODIUM 1 G: 1 INJECTION, POWDER, FOR SOLUTION INTRAMUSCULAR; INTRAVENOUS at 04:10

## 2023-10-27 RX ADMIN — IOHEXOL 75 ML: 350 INJECTION, SOLUTION INTRAVENOUS at 05:10

## 2023-10-27 RX ADMIN — PANTOPRAZOLE SODIUM 40 MG: 40 INJECTION, POWDER, LYOPHILIZED, FOR SOLUTION INTRAVENOUS at 09:10

## 2023-10-27 RX ADMIN — DIGOXIN 0.12 MG: 125 TABLET ORAL at 09:10

## 2023-10-27 RX ADMIN — HEPARIN SODIUM 5000 UNITS: 5000 INJECTION INTRAVENOUS; SUBCUTANEOUS at 06:10

## 2023-10-27 RX ADMIN — HEPARIN SODIUM 5000 UNITS: 5000 INJECTION INTRAVENOUS; SUBCUTANEOUS at 03:10

## 2023-10-27 RX ADMIN — HEPARIN SODIUM 5000 UNITS: 5000 INJECTION INTRAVENOUS; SUBCUTANEOUS at 09:10

## 2023-10-27 NOTE — CONSULTS
Palliative Care Consult Note:    Consult received.  Medical record reviewed,discussed with referring provider, Dr. Smith . Physician's goal for PM consult is goals of care. Full consult with Palliative Medicine provider to follow.    Thank you for allowing the Palliative Medicine team  to be apart of this patient's care.    Benjamín Baxter MD  Director of Hospice and Palliative Medicine  [facility]

## 2023-10-27 NOTE — PT/OT/SLP PROGRESS
Speech Language Pathology      Aquiles Saeiddeliciataye  MRN: [id number]    1200pm  Attempted therapy but pt remains lethargic and son reports he did not sleep last night.         10/27/2023

## 2023-10-27 NOTE — PT/OT/SLP PROGRESS
"Occupational Therapy   Treatment    Name: Aquiles Montana  MRN: [id number]  Admitting Diagnosis:  Sepsis       Recommendations:     Discharge Recommendations: Moderate Intensity Therapy  Discharge Equipment Recommendations:  shower chair  Barriers to discharge:  Other (Comment) (significant increased burden of care)    Assessment:     Aquiles Montana is a 83 y.o. male with a medical diagnosis of Sepsis.  Performance deficits affecting function are weakness, impaired endurance, impaired self care skills, impaired functional mobility, gait instability, impaired balance, decreased coordination, decreased upper extremity function, decreased lower extremity function, decreased safety awareness, impaired fine motor, impaired cardiopulmonary response to activity.     Rehab Prognosis:  Fair; patient would benefit from acute skilled OT services to address these deficits and reach maximum level of function.       Plan:     Patient to be seen 5 x/week to address the above listed problems via self-care/home management, therapeutic activities, therapeutic exercises  Plan of Care Expires: 11/26/23  Plan of Care Reviewed with: patient    Subjective     Chief Complaint: "give me that walker"  Patient/Family Comments/goals: Return to PLOF  Pain/Comfort:  Pain Rating 1:  (no rating)  Location 1: back  Pain Addressed 1: Reposition, Distraction    Objective:     Communicated with: nsradha prior to session.  Patient found HOB elevated with bed alarm, telemetry, peripheral IV upon OT entry to room.    General Precautions: Standard, fall    Orthopedic Precautions:N/A  Braces: N/A  Respiratory Status: Nasal cannula, flow 3 L/min     Occupational Performance:     Bed Mobility:    Patient completed Rolling/Turning to Left with  total assistance and 2 persons  Patient completed Rolling/Turning to Right with total assistance and 2 persons  Patient completed Supine to Sit with total assistance and 2 persons  Patient completed Sit to Supine with total " assistance and 2 persons     Activities of Daily Living:  Toileting: total assistance Pt Incontinent B/B. Noted purewick suction off, requiring total assist for sheet change.     [facility] 6 Click ADL: 9    Treatment & Education:  Pt has poor static sitting balance, presenting w/intermittent jerking tremors throughout session.   Pt presents w/impaired cognition, dysarthric, and AOx0   Heme/Onc MD and family present  Pt was eager to use walker and stand, but activity would be unsafe and patient /c no insights/awareness to deficits   Required constant VC's to attend to task and maintain static sitting balance seated EOB  Able to follow only about ~10% of motor commands  All questions answered within OT scope of practice.    Patient left HOB elevated with all lines intact, call button in reach, bed alarm on, nsg notified, and family present    GOALS:   Multidisciplinary Problems       Occupational Therapy Goals          Problem: Occupational Therapy    Goal Priority Disciplines Outcome Interventions   Occupational Therapy Goal     OT, PT/OT Ongoing, Progressing    Description: Goals to be met by: 11/26     Patient will increase functional independence with ADLs by performing:    Grooming while EOB with Stand-by Assistance.  Toileting from bedside commode with Moderate Assistance for hygiene and clothing management.   Toilet transfer to bedside commode with Minimal Assistance.                         Time Tracking:     OT Date of Treatment: 10/27/23  OT Start Time: 1330  OT Stop Time: 1410  OT Total Time (min): 40 min Overlap with PT for portions of session due to complex nature of patient and for safety with mobility to decrease fall risk for patient and caregiver injury requiring two skilled therapists to provide different interventions.    Billable Minutes:Self Care/Home Management 9  Therapeutic Activity 23  Neuromuscular Re-education 8    OT/ROMÁN: ROMÁN (SOTA)     Number of ROMÁN visits since last OT visit:  1    10/27/2023

## 2023-10-27 NOTE — NURSING
RAPID RESPONSE NURSE PROACTIVE ROUNDING NOTE     Admit Date: 10/24/2023  LOS: 3  Code Status: DNR   Date of Visit: 10/27/2023  : 1940  Age: 83 y.o.  Sex: male  Race: White  Bed: K465/K465 A:   MRN: [id number]  Was the patient discharged from an ICU this admission? No   Was the patient discharged from a PACU within last 24 hours? No   Did the patient receive conscious sedation/general anesthesia in last 24 hours? No   Was the patient in the ED within the past 24 hours? No   Was the patient on NIPPV within the past 24 hours? No   Attending Physician: Giuliana Smith MD  Primary Service: Internal Medicine,Hospitalist   Time spent at the bedside: < 15 min    SITUATION    Notified by  Chart review  Reason for alert: hypokalemia    Diagnosis: Sepsis   has a past medical history of Hypertension and Urinary tract infection.    Last Vitals:  Temp: 97.5 °F (36.4 °C) (10/27 0729)  Pulse: 71 (10/27 0753)  Resp: 20 (10/27 0753)  BP: 134/78 (10/27 0729)  SpO2: 94 % (10/27 0753)    24 Hour Vitals Range:  Temp:  [97.4 °F (36.3 °C)-98.4 °F (36.9 °C)]   Pulse:  [64-91]   Resp:  [18-22]   BP: (118-173)/(63-86)   SpO2:  [90 %-97 %]     Clinical Issues:  hypokalemia    ASSESSMENT/INTERVENTIONS    Potassium of 3.2 on morning labs    RECOMMENDATIONS  Potassium replacement    Discussed plan of care with bedside RNMeme    PROVIDER ESCALATION    Physician escalation: Yes    Orders received and case discussed with Dr. Smith .    Disposition:Remain in room 465    FOLLOW UP    Call back the Rapid Response NurseAshlee at [phone] for additional questions or concerns.

## 2023-10-27 NOTE — PROGRESS NOTES
Surgery follow up  BP (!) 188/79   Pulse 82   Temp 98.5 °F (36.9 °C) (Axillary)   Resp 17   Ht 6' (1.829 m)   Wt 97.8 kg (215 lb 9.8 oz)   SpO2 95%   BMI 29.24 kg/m²   I/O last 3 completed shifts:  In: 180 [P.O.:180]  Out: 1350 [Urine:1350]  I/O this shift:  In: -   Out: 400 [Urine:400]    Recent Results (from the past 336 hour(s))   CBC with Automated Differential    Collection Time: 10/27/23  3:42 AM   Result Value Ref Range    WBC 23.71 (H) 3.90 - 12.70 K/uL    Hemoglobin 10.0 (L) 14.0 - 18.0 g/dL    Hematocrit 32.8 (L) 40.0 - 54.0 %    Platelets 202 150 - 450 K/uL   CBC with Automated Differential    Collection Time: 10/26/23  3:22 AM   Result Value Ref Range    WBC 24.77 (H) 3.90 - 12.70 K/uL    Hemoglobin 10.8 (L) 14.0 - 18.0 g/dL    Hematocrit 36.1 (L) 40.0 - 54.0 %    Platelets 198 150 - 450 K/uL   CBC with Automated Differential    Collection Time: 10/25/23  9:38 AM   Result Value Ref Range    WBC 29.46 (H) 3.90 - 12.70 K/uL    Hemoglobin 11.2 (L) 14.0 - 18.0 g/dL    Hematocrit 36.4 (L) 40.0 - 54.0 %    Platelets 244 150 - 450 K/uL   Notes reviewed , family wishes noted   Agree with the treatment plan.

## 2023-10-27 NOTE — PLAN OF CARE
CM met with pt, wife Jessica  [phone] and sons -- including Len  [phone]   CM shared with pt therapy recc for Mod therapy and their observations of pt's decline from baseline --   Asked family if they had considered SNF plcmt for pt post discharge -- Pt resides at home with wife.  Family lives nearby but they work.      Pt was at [facility] 9/20-10/11/23  -- pt was discharged with [facility] but status has declined.   Wife will likely not be able to care for pt at discharge.      SNF list left with pt --   Referrals had been sent to [facility] and other area facilties - per [place] - will review when PT notes are avail -- however pt is in his co-pay days - he has used all of his paid days.  Hospice was approached with family while [facility]  - they were not ready at that time.   Updated notes sent to facility.     At family request - pt info sent to [facility].    Palliative Care notes pending.       10/27/23 1613   Post-Acute Status   Post-Acute Authorization   (TBD)

## 2023-10-27 NOTE — NURSING
Per . Jessica Garcia please call son Lata Audiffred at [phone] with pt's CT results. Patient is back to his room from CT. Message sent to provider with pt's wife request.

## 2023-10-27 NOTE — PROGRESS NOTES
Nephrology Progress Note       Consult Requested By: Giuliana Smith MD  Reason for Consult: FERNANDO      SUBJECTIVE:        ?    Review of Systems   Unable to perform ROS: Acuity of condition       Past Medical History:   Diagnosis Date    Hypertension     Urinary tract infection           OBJECTIVE:     Vital Signs (Most Recent)  Vitals:    10/27/23 0630 10/27/23 0729 10/27/23 0753 10/27/23 1119   BP: (!) 155/79 134/78  133/61   BP Location: Right arm      Patient Position: Lying      Pulse: 75 79 71 74   Resp: 20 (!) 22 20 (!) 21   Temp: 97.4 °F (36.3 °C) 97.5 °F (36.4 °C)  99 °F (37.2 °C)   TempSrc: Oral      SpO2: (!) 93% (!) 91% (!) 94% (!) 92%   Weight:       Height:                       Medications:   cefTRIAXone (ROCEPHIN) IVPB  1 g Intravenous Q24H    collagenase   Topical (Top) Daily    digoxin  0.125 mg Oral Daily    heparin (porcine)  5,000 Units Subcutaneous Q8H    latanoprost  1 drop Both Eyes QHS    metoprolol tartrate  25 mg Oral BID    pantoprazole  40 mg Intravenous BID    vancomycin (VANCOCIN) IV (PEDS and ADULTS)  1,500 mg Intravenous Q24H           Physical Exam  Vitals and nursing note reviewed.   Constitutional:       General: He is not in acute distress.     Appearance: He is ill-appearing and toxic-appearing. He is not diaphoretic.   HENT:      Head: Normocephalic and atraumatic.      Mouth/Throat:      Pharynx: No oropharyngeal exudate.   Eyes:      General: No scleral icterus.     Conjunctiva/sclera: Conjunctivae normal.      Pupils: Pupils are equal, round, and reactive to light.   Cardiovascular:      Rate and Rhythm: Normal rate and regular rhythm.      Heart sounds: Normal heart sounds. No murmur heard.  Pulmonary:      Effort: Pulmonary effort is normal. No respiratory distress.      Breath sounds: Normal breath sounds.   Abdominal:      General: Bowel sounds are normal. There is no distension.      Palpations: Abdomen is soft.      Tenderness: There is no abdominal tenderness.    Musculoskeletal:         General: Normal range of motion.      Cervical back: Normal range of motion and neck supple.   Skin:     General: Skin is warm and dry.      Findings: No erythema.   Neurological:      Mental Status: He is disoriented.      Cranial Nerves: No cranial nerve deficit.      Comments: More awake today          Laboratory:  Recent Labs   Lab 10/25/23  0938 10/26/23  0322 10/27/23  0342   WBC 29.46* 24.77* 23.71*   HGB 11.2* 10.8* 10.0*   HCT 36.4* 36.1* 32.8*    198 202   MONO 5.7  1.7* 5.8  1.4* 6.5  1.6*   EOSINOPHIL 0.5 0.8 0.8       Recent Labs   Lab 10/25/23  0938 10/26/23  0322 10/27/23  0342    143  145 141  141   K 3.8 3.7  3.8 3.3*  3.2*    105  105 103  103   CO2 32* 29  29 31*  31*   BUN 29* 25*  25* 20  19   CREATININE 1.4 1.2  1.1 0.9  0.9   CALCIUM 12.2* 11.9*  11.8* 11.5*  11.8*   PHOS 3.1 2.9  3.1 1.9*  1.8*         Diagnostic Results:  X-Ray: Reviewed  US: Reviewed  Echo: Reviewed  ASSESSMENT/PLAN:     1. FERNANDO -  at this point  Sepsis Hypercalcemia Volume depletion    CT abd for abscess  -   no hydro noticed     -- Pending CT with and Without Contrast Resume  cc/hr priro and few Hr after for Ca+ and FERNANDO got better but IVF was stopped ay midnight,  monitor Volume status with SpO2   -- Daily Renal Function Panel  -- Avoid Hypotension.  -- Renally dose all meds  -- Please avoid nephrotoxins, including NSAIDs, aminoglycosides, IV contrast (unless absolutely necessary), gadolinium, fleets and other phosphorous-based laxatives. Caution with antibiotics.    Sepsis   -- Has Inflamed R groin lymph node ? Less likely Abscess  per surgery Surgery      -- IV abx     2. HTN (I10) - controlled   3. Anemia    Recent Labs   Lab 10/25/23  0938 10/26/23  0322 10/27/23  0342   HGB 11.2* 10.8* 10.0*   HCT 36.4* 36.1* 32.8*    198 202              4. Hypercalcemia  - Cancer  related and  Volume depletion PTH appropriately low 6.5   -- IVF NS  200cc/hr   Check PTHr  S/p Zometa       Lab Results   Component Value Date    PTH 6.5 (L) 10/25/2023    CALCIUM 11.8 (H) 10/27/2023    CALCIUM 11.5 (H) 10/27/2023    CAION 1.60 (H) 10/25/2023    PHOS 1.8 (L) 10/27/2023    PHOS 1.9 (L) 10/27/2023       Alkalosis - suspect volume contraction   -- IVF  NS - better   Lab Results   Component Value Date    CO2 31 (H) 10/27/2023    CO2 31 (H) 10/27/2023           Thank you for allowing me to participate in care of your patient  With any question please call   Delvis Karimi MD     [facility]  JAM Herrera MD,   MD LUIS FELIPE Jaeger MD E. V. Harmon, NP  [address]   [place], [address]  [phone]  After hours answering service: [phone]

## 2023-10-27 NOTE — PT/OT/SLP PROGRESS
"Physical Therapy Treatment    Patient Name:  Aquiles Montana   MRN:  [id number]    Recommendations:     Discharge Recommendations: Moderate Intensity Therapy  Discharge Equipment Recommendations: shower chair  Barriers to discharge:  significant decline from baseline of function; significant increased burden of care    Assessment:     Aquiles Montana is a 83 y.o. male admitted with a medical diagnosis of Sepsis.  He presents with the following impairments/functional limitations: weakness, impaired endurance, impaired self care skills, impaired functional mobility, gait instability, impaired balance, decreased coordination, decreased upper extremity function, decreased lower extremity function, decreased safety awareness, impaired fine motor, impaired cognition, impaired cardiopulmonary response to activity     Rehab Prognosis: Fair; patient would benefit from acute skilled PT services to address these deficits and reach maximum level of function.    Recent Surgery: * No surgery found *      Plan:     During this hospitalization, patient to be seen 5 x/week to address the identified rehab impairments via therapeutic activities, therapeutic exercises, gait training, neuromuscular re-education and progress toward the following goals:    Plan of Care Expires:  11/26/23    Subjective     Chief Complaint: " give me that walker"  Patient/Family Comments/goals: return to PLOF  Pain/Comfort:  Pain Rating 1:  (no rating)  Location 1: back  Pain Addressed 1: Reposition, Distraction  Pain Rating Post-Intervention 1: 0/10      Objective:     Communicated with nurse prior to session.  Patient found HOB elevated with bed alarm, telemetry, peripheral IV upon PT entry to room.     General Precautions: Standard, fall  Orthopedic Precautions: N/A  Braces: N/A  Respiratory Status: Nasal cannula, flow 3 L/min     Functional Mobility:  Bed Mobility:     Rolling Left:  total assistance of 2 persons  Rolling Right: total assistance of 2 " persons  Scooting: dependent assistance of 2 persons to HOB  Supine to Sit: total assistance of 2 persons  Sit to Supine: total assistance of 2 persons      AM-PAC 6 CLICK MOBILITY  Turning over in bed (including adjusting bedclothes, sheets and blankets)?: 2  Sitting down on and standing up from a chair with arms (e.g., wheelchair, bedside commode, etc.): 1  Moving from lying on back to sitting on the side of the bed?: 2  Moving to and from a bed to a chair (including a wheelchair)?: 1  Need to walk in hospital room?: 1  Climbing 3-5 steps with a railing?: 1  Basic Mobility Total Score: 8       Treatment & Education:  Patient required constant VCs to attend during all tasks; pt sat EOB with poor sit balance with OT in front giving targets to reach and PT giving TCs and assist from behind; pt requiring hands on assistance at all times 2/2 quick intermittent jerking tremors and movements throughout; pt with impaired cognition, A &O x 0; pt dysarthric throughout; pt wanted to use walker to stand, but this activity would be very unsafe at this point 2/2 pt without insight/awareness to deficits; pt only following about 10% of motor commands.    Patient left HOB elevated with all lines intact, call button in reach, bed alarm on, nurse notified, and family present..    GOALS:   Multidisciplinary Problems       Physical Therapy Goals          Problem: Physical Therapy    Goal Priority Disciplines Outcome Goal Variances Interventions   Physical Therapy Goal     PT, PT/OT Ongoing, Progressing     Description: Goals to be met by: 2023     Patient will increase functional independence with mobility by performin. Supine to sit with Minimal Assistance  2. Sit to supine with [id number] Assistance  3. Rolling with Minimal Assistance.  4. Sit to stand transfer with Minimal Assistance using Rolling Walker  5. Bed to chair transfer with Minimal Assistance using Rolling Walker  6. Gait  x 25 feet with Minimal Assistance  using Rolling Walker.   7. Lower extremity exercise program x10 reps with assistance as needed                         Time Tracking:     PT Received On: 10/27/23  PT Start Time: 1330     PT Stop Time: 1410  PT Total Time (min): 40 min cotx with OT for session due to complex nature of patient and for safety with mobility to decrease fall risk for patient and caregiver injury requiring two skilled therapists to provide different interventions.      Billable Minutes: Therapeutic Activity 32 and Neuromuscular Re-education 9    Treatment Type: Evaluation  PT/PTA: PT     Number of PTA visits since last PT visit: 0     10/27/2023

## 2023-10-28 LAB
ALBUMIN SERPL BCP-MCNC: 2.2 G/DL (ref 3.5–5.2)
ANION GAP SERPL CALC-SCNC: 13 MMOL/L (ref 8–16)
BUN SERPL-MCNC: 14 MG/DL (ref 8–23)
CALCIUM SERPL-MCNC: 10.6 MG/DL (ref 8.7–10.5)
CHLORIDE SERPL-SCNC: 102 MMOL/L (ref 95–110)
CO2 SERPL-SCNC: 26 MMOL/L (ref 23–29)
CREAT SERPL-MCNC: 0.8 MG/DL (ref 0.5–1.4)
EST. GFR  (NO RACE VARIABLE): >60 ML/MIN/1.73 M^2
GLUCOSE SERPL-MCNC: 90 MG/DL (ref 70–110)
PHOSPHATE SERPL-MCNC: 2 MG/DL (ref 2.7–4.5)
POCT GLUCOSE: 105 MG/DL (ref 70–110)
POCT GLUCOSE: 125 MG/DL (ref 70–110)
POCT GLUCOSE: 90 MG/DL (ref 70–110)
POCT GLUCOSE: 91 MG/DL (ref 70–110)
POTASSIUM SERPL-SCNC: 3.5 MMOL/L (ref 3.5–5.1)
SODIUM SERPL-SCNC: 141 MMOL/L (ref 136–145)
VANCOMYCIN TROUGH SERPL-MCNC: 12.3 UG/ML (ref 10–22)

## 2023-10-28 PROCEDURE — C1751 CATH, INF, PER/CENT/MIDLINE: HCPCS

## 2023-10-28 PROCEDURE — 36569 INSJ PICC 5 YR+ W/O IMAGING: CPT

## 2023-10-28 PROCEDURE — 80202 ASSAY OF VANCOMYCIN: CPT | Performed by: INTERNAL MEDICINE

## 2023-10-28 PROCEDURE — 63600175 PHARM REV CODE 636 W HCPCS: Performed by: INTERNAL MEDICINE

## 2023-10-28 PROCEDURE — 99900035 HC TECH TIME PER 15 MIN (STAT)

## 2023-10-28 PROCEDURE — 36415 COLL VENOUS BLD VENIPUNCTURE: CPT | Performed by: STUDENT IN AN ORGANIZED HEALTH CARE EDUCATION/TRAINING PROGRAM

## 2023-10-28 PROCEDURE — 25000003 PHARM REV CODE 250: Performed by: INTERNAL MEDICINE

## 2023-10-28 PROCEDURE — 25000003 PHARM REV CODE 250: Performed by: STUDENT IN AN ORGANIZED HEALTH CARE EDUCATION/TRAINING PROGRAM

## 2023-10-28 PROCEDURE — 94761 N-INVAS EAR/PLS OXIMETRY MLT: CPT

## 2023-10-28 PROCEDURE — 63600175 PHARM REV CODE 636 W HCPCS: Performed by: NURSE PRACTITIONER

## 2023-10-28 PROCEDURE — 36415 COLL VENOUS BLD VENIPUNCTURE: CPT | Performed by: INTERNAL MEDICINE

## 2023-10-28 PROCEDURE — C9113 INJ PANTOPRAZOLE SODIUM, VIA: HCPCS | Performed by: NURSE PRACTITIONER

## 2023-10-28 PROCEDURE — 80069 RENAL FUNCTION PANEL: CPT | Performed by: STUDENT IN AN ORGANIZED HEALTH CARE EDUCATION/TRAINING PROGRAM

## 2023-10-28 PROCEDURE — 25000003 PHARM REV CODE 250: Performed by: FAMILY MEDICINE

## 2023-10-28 PROCEDURE — A4216 STERILE WATER/SALINE, 10 ML: HCPCS | Performed by: FAMILY MEDICINE

## 2023-10-28 PROCEDURE — 25000003 PHARM REV CODE 250: Performed by: NURSE PRACTITIONER

## 2023-10-28 PROCEDURE — 27000221 HC OXYGEN, UP TO 24 HOURS

## 2023-10-28 PROCEDURE — 11000001 HC ACUTE MED/SURG PRIVATE ROOM

## 2023-10-28 RX ORDER — SODIUM CHLORIDE 0.9 % (FLUSH) 0.9 %
10 SYRINGE (ML) INJECTION
Status: DISCONTINUED | OUTPATIENT
Start: 2023-10-28 | End: 2023-11-03 | Stop reason: HOSPADM

## 2023-10-28 RX ORDER — SODIUM CHLORIDE 0.9 % (FLUSH) 0.9 %
10 SYRINGE (ML) INJECTION EVERY 6 HOURS
Status: DISCONTINUED | OUTPATIENT
Start: 2023-10-28 | End: 2023-11-03 | Stop reason: HOSPADM

## 2023-10-28 RX ORDER — SODIUM CHLORIDE AND POTASSIUM CHLORIDE 150; 900 MG/100ML; MG/100ML
INJECTION, SOLUTION INTRAVENOUS CONTINUOUS
Status: DISCONTINUED | OUTPATIENT
Start: 2023-10-28 | End: 2023-10-29

## 2023-10-28 RX ORDER — CALCITONIN SALMON 200 [USP'U]/ML
100 INJECTION, SOLUTION INTRAMUSCULAR; SUBCUTANEOUS ONCE
Status: DISCONTINUED | OUTPATIENT
Start: 2023-10-28 | End: 2023-10-28

## 2023-10-28 RX ADMIN — POTASSIUM PHOSPHATE, MONOBASIC POTASSIUM PHOSPHATE, DIBASIC 15 MMOL: 224; 236 INJECTION, SOLUTION, CONCENTRATE INTRAVENOUS at 04:10

## 2023-10-28 RX ADMIN — VANCOMYCIN HYDROCHLORIDE 1500 MG: 1.5 INJECTION, POWDER, LYOPHILIZED, FOR SOLUTION INTRAVENOUS at 02:10

## 2023-10-28 RX ADMIN — Medication 6 MG: at 10:10

## 2023-10-28 RX ADMIN — SODIUM CHLORIDE, PRESERVATIVE FREE 10 ML: 5 INJECTION INTRAVENOUS at 06:10

## 2023-10-28 RX ADMIN — SODIUM CHLORIDE AND POTASSIUM CHLORIDE: .9; .15 SOLUTION INTRAVENOUS at 05:10

## 2023-10-28 RX ADMIN — PANTOPRAZOLE SODIUM 40 MG: 40 INJECTION, POWDER, LYOPHILIZED, FOR SOLUTION INTRAVENOUS at 10:10

## 2023-10-28 RX ADMIN — PANTOPRAZOLE SODIUM 40 MG: 40 INJECTION, POWDER, LYOPHILIZED, FOR SOLUTION INTRAVENOUS at 09:10

## 2023-10-28 RX ADMIN — CEFTRIAXONE SODIUM 1 G: 1 INJECTION, POWDER, FOR SOLUTION INTRAMUSCULAR; INTRAVENOUS at 03:10

## 2023-10-28 RX ADMIN — LATANOPROST 1 DROP: 50 SOLUTION OPHTHALMIC at 10:10

## 2023-10-28 RX ADMIN — HEPARIN SODIUM 5000 UNITS: 5000 INJECTION INTRAVENOUS; SUBCUTANEOUS at 10:10

## 2023-10-28 RX ADMIN — SODIUM CHLORIDE: 9 INJECTION, SOLUTION INTRAVENOUS at 06:10

## 2023-10-28 RX ADMIN — METOPROLOL TARTRATE 25 MG: 25 TABLET, FILM COATED ORAL at 10:10

## 2023-10-28 RX ADMIN — HEPARIN SODIUM 5000 UNITS: 5000 INJECTION INTRAVENOUS; SUBCUTANEOUS at 02:10

## 2023-10-28 RX ADMIN — COLLAGENASE SANTYL: 250 OINTMENT TOPICAL at 09:10

## 2023-10-28 RX ADMIN — HEPARIN SODIUM 5000 UNITS: 5000 INJECTION INTRAVENOUS; SUBCUTANEOUS at 06:10

## 2023-10-28 RX ADMIN — TRAZODONE HYDROCHLORIDE 25 MG: 50 TABLET ORAL at 10:10

## 2023-10-28 NOTE — PLAN OF CARE
Problem: Adult Inpatient Plan of Care  Goal: Plan of Care Review  Outcome: Ongoing, Progressing     Problem: Glycemic Control Impaired (Sepsis/Septic Shock)  Goal: Blood Glucose Level Within Desired Range  Outcome: Ongoing, Progressing     Problem: Fall Injury Risk  Goal: Absence of Fall and Fall-Related Injury  Outcome: Ongoing, Progressing

## 2023-10-28 NOTE — PROCEDURES
Aquiles Montana is a 83 y.o. male patient.    Temp: 97.3 °F (36.3 °C) (10/28/23 1104)  Pulse: 89 (10/28/23 1148)  Resp: 18 (10/28/23 1104)  BP: 138/77 (10/28/23 1104)  SpO2: (!) 94 % (10/28/23 1104)  Weight: 105.3 kg (232 lb 2.3 oz) (10/28/23 0545)  Height: 6' (182.9 cm) (10/25/23 1425)    PICC  Date/Time: 10/28/2023 1:10 PM  Performed by: Nikita Russo RN  Consent Done: Yes  Time out: Immediately prior to procedure a time out was called to verify the correct patient, procedure, equipment, support staff and site/side marked as required  Indications: med administration and vascular access  Anesthesia: local infiltration  Local anesthetic: lidocaine 1% without epinephrine  Anesthetic Total (mL): 2  Preparation: skin prepped with chlorhexidine (without alcohol)  Skin prep agent dried: skin prep agent completely dried prior to procedure  Sterile barriers: all five maximum sterile barriers used - cap, mask, sterile gown, sterile gloves, and large sterile sheet  Hand hygiene: hand hygiene performed prior to central venous catheter insertion  Location details: right basilic  Catheter type: double lumen  Catheter size: 5 Fr  Catheter Length: 40cm    Ultrasound guidance: yes  Vessel Caliber: medium and patent, compressibility normal  Vascular Doppler: not done  Needle advanced into vessel with real time Ultrasound guidance.  Guidewire confirmed in vessel.  Sterile sheath used.  no esophageal manometryNumber of attempts: 1  Post-procedure: blood return through all ports, chlorhexidine patch and sterile dressing applied            Name TAVON SHARPE  10/28/2023

## 2023-10-28 NOTE — PLAN OF CARE
Will continue to monitor.       Griseofulvin Pregnancy And Lactation Text: This medication is Pregnancy Category X and is known to cause serious birth defects. It is unknown if this medication is excreted in breast milk but breast feeding should be avoided.

## 2023-10-28 NOTE — PROGRESS NOTES
Pharmacokinetic Assessment Follow Up: IV Vancomycin    Vancomycin serum concentration assessment(s):    The trough level was drawn correctly and can be used to guide therapy at this time. The measurement is within the desired definitive target range of 10 to 15 mcg/mL.    Vancomycin Regimen Plan:    Continue regimen to Vancomycin 1500 mg IV every 24 hours with next serum trough concentration measured at 10/30 prior to 3 dose on 0100    Drug levels (last 3 results):  Recent Labs   Lab Result Units 10/28/23  0054   Vancomycin-Trough ug/mL 12.3       Pharmacy will continue to follow and monitor vancomycin.    Please contact pharmacy at extension [phone] for questions regarding this assessment.    Thank you for the consult,   Michelle Eaton       Patient brief summary:  Aquiles Montana is a 83 y.o. male initiated on antimicrobial therapy with IV Vancomycin for treatment of skin & soft tissue infection    The patient's current regimen is vanco 1500mg q24    Drug Allergies:   Review of patient's allergies indicates:  No Known Allergies    Actual Body Weight:   97kg    Renal Function:   Estimated Creatinine Clearance: 75.4 mL/min (based on SCr of 0.9 mg/dL).,     Dialysis Method (if applicable):  N/A    CBC (last 72 hours):  Recent Labs   Lab Result Units 10/25/23  0938 10/26/23  0322 10/27/23  0342   WBC K/uL 29.46* 24.77* 23.71*   Hemoglobin g/dL 11.2* 10.8* 10.0*   Hematocrit % 36.4* 36.1* 32.8*   Platelets K/uL 244 198 202   Gran % % 88.4* 87.2* 86.7*   Lymph % % 4.4* 4.7* 4.8*   Mono % % 5.7 5.8 6.5   Eosinophil % % 0.5 0.8 0.8   Basophil % % 0.4 0.6 0.5   Differential Method  Automated Automated Automated       Metabolic Panel (last 72 hours):  Recent Labs   Lab Result Units 10/25/23  0616 10/25/23  0938 10/26/23  0322 10/27/23  0342   Sodium mmol/L  --  143 143  145 141  141   Potassium mmol/L  --  3.8 3.7  3.8 3.3*  3.2*   Chloride mmol/L  --  101 105  105 103  103   CO2 mmol/L  --  32* 29  29 31*  31*    Glucose mg/dL  --  103 109  106 97  102   Glucose, UA  Negative  --   --   --    BUN mg/dL  --  29* 25*  25* 20  19   Creatinine mg/dL  --  1.4 1.2  1.1 0.9  0.9   Albumin g/dL  --  2.6* 2.4*  2.4* 2.3*  2.3*   Total Bilirubin mg/dL  --  0.3 0.3 0.4   Alkaline Phosphatase U/L  --  92 99 92   AST U/L  --  18 18 15   ALT U/L  --  10 6* 9*   Magnesium mg/dL  --  2.0 1.8 1.6   Phosphorus mg/dL  --  3.1 2.9  3.1 1.9*  1.8*       Vancomycin Administrations:  vancomycin given in the last 96 hours                     vancomycin 1,500 mg in dextrose 5 % (D5W) 250 mL IVPB (Vial-Mate) (mg) 1,500 mg New Bag 10/27/23 0129     1,500 mg New Bag 10/26/23 0142    vancomycin (VANCOCIN) 1,750 mg in dextrose 5 % (D5W) 500 mL IVPB (mg) 1,750 mg New Bag 10/25/23 0201                    Microbiologic Results:  Microbiology Results (last 7 days)       Procedure Component Value Units Date/Time    Blood culture x two cultures. Draw prior to antibiotics. [[id number]] Collected: 10/24/23 2018    Order Status: Completed Specimen: Blood from Peripheral, Hand, Right Updated: 10/27/23 0612     Blood Culture, Routine No Growth to date      No Growth to date      No Growth to date    Narrative:      Aerobic and anaerobic    Blood culture x two cultures. Draw prior to antibiotics. [[id number]] Collected: 10/24/23 2019    Order Status: Completed Specimen: Blood from Peripheral, Antecubital, Left Updated: 10/27/23 0612     Blood Culture, Routine No Growth to date      No Growth to date      No Growth to date    Narrative:      Aerobic and anaerobic    Urine culture [[id number]] Collected: 10/25/23 0616    Order Status: Completed Specimen: Urine Updated: 10/26/23 1417     Urine Culture, Routine Multiple organisms isolated. None in predominance.  Repeat if      clinically necessary.    Narrative:      Specimen Source->Urine

## 2023-10-28 NOTE — PROGRESS NOTES
Progress Note  Nephrology      Consult Requested By: Terrance Majano MD      SUBJECTIVE:     Overnight events  Patient is a 83 y.o. 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    Dermatitis    Tinea pedis of both feet    Open wound of right lower leg    Right leg swelling    Left anterior fascicular block    Class 2 severe obesity with serious comorbidity and body mass index (BMI) of 38.0 to 38.9 in adult    Sepsis    Encephalopathy, metabolic    Abscess of right groin    A-fib    Hypercalcemia    ACP (advance care planning)    Anemia     Past Medical History:   Diagnosis Date    Hypertension     Urinary tract infection               OBJECTIVE:     Vitals:    10/28/23 0749 10/28/23 0912 10/28/23 1104 10/28/23 1148   BP: 122/60  138/77    BP Location: Left arm  Right arm    Patient Position: Lying  Lying    Pulse: 75 80 78 89   Resp: 17 18 18    Temp: 98.3 °F (36.8 °C)  97.3 °F (36.3 °C)    TempSrc: Axillary  Axillary    SpO2: 96% 96% (!) 94%    Weight:       Height:           Temp: 97.3 °F (36.3 °C) (10/28/23 1104)  Pulse: 89 (10/28/23 1148)  Resp: 18 (10/28/23 1104)  BP: 138/77 (10/28/23 1104)  SpO2: (!) 94 % (10/28/23 1104)    Date 10/28/23 0700 - 10/29/23 0659   Shift 9834-3444 7227-0307 8280-9632 24 Hour Total   INTAKE   P.O. 50   50   Shift Total(mL/kg) 50(0.5)   50(0.5)   OUTPUT   Shift Total(mL/kg)       Weight (kg) 105.3 105.3 105.3 105.3             Medications:   cefTRIAXone (ROCEPHIN) IVPB  1 g Intravenous Q24H    collagenase   Topical (Top) Daily    digoxin  0.125 mg Oral Daily    heparin (porcine)  5,000 Units Subcutaneous Q8H    latanoprost  1 drop Both Eyes QHS    melatonin  6 mg Oral Nightly    metoprolol tartrate  25 mg Oral BID    pantoprazole  40 mg Intravenous BID  "   sodium chloride 0.9%  10 mL Intravenous Q6H    sodium chloride 0.9%  10 mL Intravenous Q6H    traZODone  25 mg Oral QHS    vancomycin (VANCOCIN) IV (PEDS and ADULTS)  1,500 mg Intravenous Q24H      sodium chloride 0.9% 200 mL/hr at 10/28/23 0603   Physical Exam:  ROS not able to obtain  Lungs: diminished breath sounds  Heart: pulse 89  Abdomen: soft   Extremities: no edema  Skin: dry   Laboratory:  ABG  Labs reviewed  No results found for this or any previous visit (from the past 336 hour(s)).  Recent Results (from the past 336 hour(s))   CBC with Automated Differential    Collection Time: 10/27/23  3:42 AM   Result Value Ref Range    WBC 23.71 (H) 3.90 - 12.70 K/uL    Hemoglobin 10.0 (L) 14.0 - 18.0 g/dL    Hematocrit 32.8 (L) 40.0 - 54.0 %    Platelets 202 150 - 450 K/uL   CBC with Automated Differential    Collection Time: 10/26/23  3:22 AM   Result Value Ref Range    WBC 24.77 (H) 3.90 - 12.70 K/uL    Hemoglobin 10.8 (L) 14.0 - 18.0 g/dL    Hematocrit 36.1 (L) 40.0 - 54.0 %    Platelets 198 150 - 450 K/uL   CBC with Automated Differential    Collection Time: 10/25/23  9:38 AM   Result Value Ref Range    WBC 29.46 (H) 3.90 - 12.70 K/uL    Hemoglobin 11.2 (L) 14.0 - 18.0 g/dL    Hematocrit 36.4 (L) 40.0 - 54.0 %    Platelets 244 150 - 450 K/uL     Urinalysis  No results for input(s): "COLORU", "CLARITYU", "SPECGRAV", "PHUR", "PROTEINUA", "GLUCOSEU", "BILIRUBINCON", "BLOODU", "WBCU", "RBCU", "BACTERIA", "MUCUS", "NITRITE", "LEUKOCYTESUR", "UROBILINOGEN", "HYALINECASTS" in the last 24 hours.    Diagnostic Results:  X-Ray: Reviewed  US: Reviewed  Echo: Reviewed  ACCESS    ASSESSMENT/PLAN:     FERNANDO  UA protein trace, RBC 27, WBC 21  Creatinine  0.8   BUN 14  K 3.5  Replace prn  PTH 6.5  Hypercalcemia  Ca corrected approximately 12  Albumin 2.2  Phos 2  Anemia multifactorial  Hb 10  /77  Weight daily  I and O  Avoid nephrotoxic agents, hypotension, hypovolemia  Discussed with the family  SPIEP  Vit D  Replace " e-lytes prn  Calcitonin  IVF adjusted

## 2023-10-28 NOTE — PLAN OF CARE
10/28/23 1310   Pre-Procedure Time-out   Procedure to be Performed PICC   Correct Patient Yes   Correct Procedure Yes   Surgery consent verified N/A   Anesthesia consent verified N/A   Blood consent verified  N/A   Pre-Op/Pre-Procedure orders verified N/A   Other Documents Verified N/A   Site Marked by Whom Nikita Russo RN   Site Mike Visible No   Pre-Procedural Time-Out   Allergies Reviewed Done   Hold tube feeding N/A   Verify House Officer Privileges Done   Verify consent form completed & in chart Done   Perform patient ID X's [id number] Done   Announce the procedure to be performed Done   Confirm that all persons in room cleanse hands? (ASK, if unsure) Done   Prep Procedure site (N/A for intubations) N/A   Use large drape to cover patient? (N/A for intubations) Done   Set pulse & oximetry alarms to audible; If on ventilator, place on 100% FiO2 N/A   Is the ETCO2 set up at the bedside? N/A   RT Has Been Notified N/A   Procedure Provider:Wear sterile gloves, hat, mask with eye shield and sterile gown Done   Procedure Assistant:Wear sterile gloves, hat, mask with eye shield and sterile gown N/A   All persons in the room wearing a mask and hat Done   Ultrasound guidance Done   Trendelenberg position for upper torso line N/A   Pre-Incision Time-out   Procedure to be Performed PICC   Correct Patient Yes   Correct Procedure Yes   Correct Laterality Right   Allergies Reviewed Yes   Required Blood Products, Implants, Devices and /or Special Equipment Available No     CXR ordered for picc placment.

## 2023-10-28 NOTE — NURSING
RAPID RESPONSE NURSE PROACTIVE ROUNDING NOTE       Time of Visit: 0745    Admit Date: 10/24/2023  LOS: 4  Code Status: DNR   Date of Visit: 10/28/2023  : 1940  Age: 83 y.o.  Sex: male  Race: White  Bed: K465/K465 A:   MRN: [id number]  Was the patient discharged from an ICU this admission? No   Was the patient discharged from a PACU within last 24 hours? No   Did the patient receive conscious sedation/general anesthesia in last 24 hours? No   Was the patient in the ED within the past 24 hours? No   Was the patient on NIPPV within the past 24 hours? No   Attending Physician: Terrance Majano MD  Primary Service: Internal Medicine   Time spent at the bedside: < 15 min    SITUATION    Notified by bedside RN via phone call  Reason for alert: PIV insertion    ASSESSMENT/INTERVENTIONS    20 g PIV inserted to left forearm.    Discussed plan of care with bedside RNRebecca    Disposition:Remain in room 465    FOLLOW UP    Call back the Rapid Response NurseAshlee at [phone] for additional questions or concerns.

## 2023-10-28 NOTE — PROGRESS NOTES
[facility] Progress Note      Assessment/Plan:     Aquiles Montana is a 83 y.o.male with    Suspected UTI      UA with RBC 27, WBC 21, Bacteria rare      F/u UCx multiple organisms, cover cefepime    Sepsis 2/2 R Inguinal lymphadenitis vs abscess  This patient does have evidence of infective focus  My overall impression is sepsis.  Source: Skin and Soft Tissue (location right inguinal adenitis/abscess)   - CT Pelvis 1. Large lobular masslike focus within the right inguinal regions suggesting lymph node conglomerate.  Correlation with history of malignancy advised.  Additional lymph node enlargement noted along the right iliac chain.  Please note, this likely correlates with finding on prior CT   - WBC 29,000<--25,670<--26,410  - BCx are no growth to date  -- UCx multiple organisms  Antibiotics given- vancomycin, cefepime    Anemia  Stable  H/H 12.3/39.8-->10.8/36.1 for now  S/p blood transfusions during previous admission for GIB  No continued reports of hematemesis, melena, hematochezia, or other signs of bleeding per family  Transfuse for hgb <7  Monitor       ACP (advance care planning)  Advance Care Planning   Date: 10/25/2023  Code Status  In light of the patients advanced and life limiting illness,I engaged the the family in a voluntary conversation about the patient's preferences for care  at the very end of life. The patient wishes to have a natural, peaceful death.  Along those lines, the patient does not wish to have CPR or other invasive treatments performed when his heart and/or breathing stops. I communicated to the family that a DNR order would be placed in his medical record to reflect this preference.  I spent a total of 8 minutes engaging the patient in this advance care planning discussion.  Will consult Palliative Care     Hypercalcemia-Improving  The patient has hypercalcemia that is currently uncontrolled. The patient has the following symptoms due to their hypercalcemia:  encephalopathy. The hypercalcemia is likely due to unknown - suspected 2/2 malignancy. We will obtain the following labs to work up the hypercalcemia:   alkaline phosphatase         Alkaline Phosphatase   Date Value Ref Range Status   10/24/2023 108 55 - 135 U/L Final    . We will treat the hypercalcemia with: IV fluids ordered at a rate of 125/hr. Their latest calcium has been reviewed and is listed below.  Corrected calcium is   Ionized calcium ordered  PTH 6.5 (L)  Continue aggressive hydration, rec'd zomenta on 10/26  Consider Lasix with fluids if getting overloaded     A-fib  Patient with Persistent (7 days or more) atrial fibrillation which is controlled currently with Beta Blocker and Digoxin. Patient is currently in atrial fibrillation.[id number] Score: 2. HASBLED Score: 3. Anticoagulation not indicated due to risk of GIB.  Continue subq heparin while inpatient w/ close monitoring     Swelling of right groin  Previously noted on MRI and CT scan 9/2023          1.  Bulky lymphadenopathy versus mass in the right inguinal region           2.  Enlarged right external iliac chain lymph nodes, likely metastatic           3.  No other findings of metastatic disease in the abdomen or pelvis           4.  Cholelithiasis   Noted to be SCC on biopsy  Per family, it is now more swollen, painful, and discolored since biopsy  Hem/onc consult ordered regarding recommendations  Huge lymphadenitis with fluctuant mass , r/o tumor necrosis lymphoma  CT pelvis 1. Large lobular masslike focus within the right inguinal regions suggesting lymph node conglomerate.  Correlation with history of malignancy advised.  Additional lymph node enlargement noted along the right iliac chain. T     Encephalopathy, Hallucinations  Likely 2/2 infectious etiology vs mets to brain?  Treat as above  Speech cleared for [facility] soft, thin liquids, crushed meds  Maintain safety precautions  At [facility] an MRI was performed in Sept 2023  CT head w wo contrast  unremarkable     Hypertension  Patient mildly hypotensive on arrival but responded well to IVF  Resume home BP meds in the am if awake enough to take     Heparin 5k q8      Subjective:      Aquiles Montana is a 83 y.o.  male who is being followed by [facility] for sepsis 2/2 R inguinal lymphadenitis in the setting of SCC and hypercalcemia    Still confused. Mumbling responses to questions seems slightly agitated with eyes closed    Review of Systems   Constitutional:  Negative for chills and fever.   HENT:  Negative for congestion and sore throat.    Respiratory:  Negative for shortness of breath.    Cardiovascular:  Negative for chest pain and leg swelling.   Genitourinary:  Negative for dysuria and urgency.   Musculoskeletal:  Negative for back pain, myalgias and neck pain.   Skin:  Positive for rash. Negative for itching.   Neurological:  Positive for weakness. Negative for dizziness.   Psychiatric/Behavioral:  Positive for hallucinations.         Objective:   Last 24 Hour Vital Signs:  BP  Min: 133/61  Max: 188/79  Temp  Av.8 °F (36.6 °C)  Min: 97.3 °F (36.3 °C)  Max: 99 °F (37.2 °C)  Pulse  Av.5  Min: 68  Max: 82  Resp  Av.8  Min: 17  Max: 22  SpO2  Av.9 %  Min: 90 %  Max: 96 %  I/O last 3 completed shifts:  In: 310 [P.O.:310]  Out: 1300 [Urine:1300]    Physical Exam  Constitutional:       Appearance: Normal appearance.   HENT:      Head: Normocephalic and atraumatic.   Eyes:      Extraocular Movements: Extraocular movements intact.      Pupils: Pupils are equal, round, and reactive to light.   Cardiovascular:      Rate and Rhythm: Normal rate and regular rhythm.   Pulmonary:      Effort: Pulmonary effort is normal. No respiratory distress.      Breath sounds: Normal breath sounds.   Abdominal:      General: Abdomen is flat. Bowel sounds are normal.      Palpations: Abdomen is soft.   Skin:     General: Skin is warm and dry.      Findings: Erythema present.      Comments:  Swollen R inguinal lymph nodes with erythema     Neurological:      Mental Status: He is alert. He is disoriented.           Laboratory:  Laboratory Data Reviewed: yes  Pertinent Findings:      Microbiology Data Reviewed: yes  Pertinent Findings:      Other Results:  EKG (my interpretation): .    Radiology Data Reviewed: yes  Pertinent Findings:      Current Medications:     Infusions:   sodium chloride 0.9% 200 mL/hr at 10/27/23 8947        Scheduled:   cefTRIAXone (ROCEPHIN) IVPB  1 g Intravenous Q24H    collagenase   Topical (Top) Daily    digoxin  0.125 mg Oral Daily    heparin (porcine)  5,000 Units Subcutaneous Q8H    latanoprost  1 drop Both Eyes QHS    melatonin  6 mg Oral Nightly    metoprolol tartrate  25 mg Oral BID    pantoprazole  40 mg Intravenous BID    traZODone  25 mg Oral QHS    vancomycin (VANCOCIN) IV (PEDS and ADULTS)  1,500 mg Intravenous Q24H        PRN:  dextrose 10%, dextrose 10%, glucagon (human recombinant), glucose, glucose, melatonin, naloxone, ondansetron, sodium chloride 0.9%, Pharmacy to dose Vancomycin consult **AND** vancomycin - pharmacy to dose    Antibiotics and Day Number of Therapy:       Lines and Day Number of Therapy:       Giuliana Smith MD  [facility]

## 2023-10-28 NOTE — PLAN OF CARE
Care plan reviewed with patient, spouse and son at bedside. Patient oriented to his name, disoriented time, place and situation. Oxygen infusing as ordered, Afib per cardiac monitor, no red alarm noted. Denies any pain of discomfort, education provided on medication effect and side effect, spouse voices understanding. IV fluids infusing as ordered. Call light within his reach, no apparent distress noted, bed in low position, bed alarm on, educated on the importance of calling as needed, spouse and son, voices understanding, stable at this time.    Problem: Adult Inpatient Plan of Care  Goal: Plan of Care Review  Outcome: Ongoing, Progressing     Problem: Adult Inpatient Plan of Care  Goal: Patient-Specific Goal (Individualized)  Outcome: Ongoing, Progressing     Problem: Adjustment to Illness (Sepsis/Septic Shock)  Goal: Optimal Coping  Outcome: Ongoing, Progressing     Problem: Fall Injury Risk  Goal: Absence of Fall and Fall-Related Injury  Outcome: Ongoing, Progressing     Problem: Skin Injury Risk Increased  Goal: Skin Health and Integrity  Outcome: Ongoing, Progressing

## 2023-10-28 NOTE — PROGRESS NOTES
[facility]  Progress Note    Patient Name: Aquiles Montana  MRN: [id number]  Patient Class: IP- Inpatient   Admission Date: 10/24/2023  Length of Stay: 4 days  Attending Physician: Terrance Majano MD  Primary Care Provider: Marty Hernandez MD        Subjective:     Principal Problem:Sepsis      HPI: Aquiles Montana is an 83-year-old male w/ PMH SCC, BPH, HTN, obesity, and left anterior fascicular block. He presented to the ED via EMS for evaluation due to worsening mental status since admission 5 weeks ago. He was previously admitted to the hospital for sepsis and then discharged to a SNF. Patient's family reports that he came home recently but has just not been himself since the previous admission. Patient was seen by PCP today and status deteriorated following the visit.  Chart review of lab work performed prior to arrival today shows hypercalcemia and leukocytosis.  Also evident of acute kidney injury.  Patient was reportedly agitated and difficult to direct earlier today and at time of presentation to our facility is nonverbal. Patient opens eyes to verbal stimuli and responds to painful stimuli. History obtained from patient's family at bedside. They state that patient has not been experiencing fever, N/V/D, CP, or SOB recently. He has had a decrease in PO intake and activity. They also report that patient started experiencing delirium and hallucinations during his previous hospitalization, which have continued. Of note, patient has a large abscess in his right inguinal area and family states that this has been causing him quite a bit of pain recently.        Interval History: no acute event overnight. Confusion+, leukocytosis, on IV abx.     Review of Systems    Constitutional:  Negative for chills and fever.   HENT:  Negative for congestion and sore throat.    Respiratory:  Negative for shortness of breath.    Cardiovascular:  Negative for chest pain and leg swelling.    Genitourinary:  Negative for dysuria and urgency.   Musculoskeletal:  Negative for back pain, myalgias and neck pain.   Skin:  Positive for rash. Negative for itching.   Neurological:  Positive for weakness. Negative for dizziness.   Psychiatric/Behavioral:  Positive for hallucinations.        Objective:     Vital Signs (Most Recent):  Temp: 98.3 °F (36.8 °C) (10/28/23 0749)  Pulse: 80 (10/28/23 0912)  Resp: 18 (10/28/23 0912)  BP: 122/60 (10/28/23 0749)  SpO2: 96 % (10/28/23 0912) Vital Signs (24h Range):  Temp:  [97.3 °F (36.3 °C)-99 °F (37.2 °C)] 98.3 °F (36.8 °C)  Pulse:  [68-82] 80  Resp:  [17-21] 18  SpO2:  [92 %-96 %] 96 %  BP: (122-188)/(60-79) 122/60     Weight: 105.3 kg (232 lb 2.3 oz)  Body mass index is 31.48 kg/m².    Intake/Output Summary (Last 24 hours) at 10/28/2023 1011  Last data filed at 10/28/2023 0923  Gross per 24 hour   Intake 2589.79 ml   Output 800 ml   Net 1789.79 ml      Physical Exam      Constitutional:       Appearance: Normal appearance.   HENT:      Head: Normocephalic and atraumatic.   Eyes:      Extraocular Movements: Extraocular movements intact.      Pupils: Pupils are equal, round, and reactive to light.   Cardiovascular:      Rate and Rhythm: Normal rate and regular rhythm.   Pulmonary:      Effort: Pulmonary effort is normal. No respiratory distress.      Breath sounds: Normal breath sounds.   Abdominal:      General: Abdomen is flat. Bowel sounds are normal.      Palpations: Abdomen is soft.   Skin:     General: Skin is warm and dry.      Findings: Erythema present.      Comments: Swollen R inguinal lymph nodes with erythema     Neurological:      Mental Status: He is alert. He is disoriented.     Significant Labs: All pertinent labs within the past 24 hours have been reviewed.  CBC:   Recent Labs   Lab 10/27/23  0342   WBC 23.71*   HGB 10.0*   HCT 32.8*        CMP:   Recent Labs   Lab 10/27/23  0342 10/28/23  0315     141 141   K 3.3*  3.2* 3.5     103  102   CO2 31*  31* 26   GLU 97  102 90   BUN 20  19 14   CREATININE 0.9  0.9 0.8   CALCIUM 11.5*  11.8* 10.6*   PROT 6.1  --    ALBUMIN 2.3*  2.3* 2.2*   BILITOT 0.4  --    ALKPHOS 92  --    AST 15  --    ALT 9*  --    ANIONGAP 7*  7* 13       Significant Imaging: I have reviewed all pertinent imaging results/findings within the past 24 hours.  I have reviewed and interpreted all pertinent imaging results/findings within the past 24 hours.    Assessment/Plan:      Active Diagnoses:    Diagnosis Date Noted POA    PRINCIPAL PROBLEM:  Sepsis [A41.9] 10/24/2023 Yes    Encephalopathy, metabolic [G93.41] 10/25/2023 Yes    Abscess of right groin [L02.214] 10/25/2023 Yes    A-fib [I48.91] 10/25/2023 Yes    Hypercalcemia [E83.52] 10/25/2023 Yes    ACP (advance care planning) [Z71.89] 10/25/2023 Not Applicable    Anemia [D64.9] 10/25/2023 Yes    Hypertension [I10] 03/22/2017 Yes      Problems Resolved During this Admission:     VTE Risk Mitigation (From admission, onward)           Ordered     heparin (porcine) injection 5,000 Units  Every 8 hours         10/24/23 2358     IP VTE HIGH RISK PATIENT  Once         10/24/23 2358     Place sequential compression device  Until discontinued         10/24/23 2358                   Suspected UTI      UA with RBC 27, WBC 21, Bacteria rare      F/u UCx multiple organisms, cover Rocephin     Sepsis 2/2 R Inguinal lymphadenitis vs abscess  This patient does have evidence of infective focus  My overall impression is sepsis.  Source: Skin and Soft Tissue (location right inguinal adenitis/abscess)   - CT Pelvis 1. Large lobular masslike focus within the right inguinal regions suggesting lymph node conglomerate.  Correlation with history of malignancy advised.  Additional lymph node enlargement noted along the right iliac chain.  Please note, this likely correlates with finding on prior CT   - WBC 29,000<--25,670<--26,410  - BCx are no growth to date  -- UCx multiple  organisms  Antibiotics given- vancomycin, Rocephin     Anemia  Stable  H/H 12.3/39.8-->10.8/36.1 for now  S/p blood transfusions during previous admission for GIB  No continued reports of hematemesis, melena, hematochezia, or other signs of bleeding per family  Transfuse for hgb <7  Monitor          Hypercalcemia-Improving  The patient has hypercalcemia that is currently uncontrolled. The patient has the following symptoms due to their hypercalcemia: encephalopathy. The hypercalcemia is likely due to unknown - suspected 2/2 malignancy. We will obtain the following labs to work up the hypercalcemia:   alkaline phosphatase             Alkaline Phosphatase   Date Value Ref Range Status   10/24/2023 108 55 - 135 U/L Final    . We will treat the hypercalcemia with: IV fluids ordered at a rate of 125/hr. Their latest calcium has been reviewed and is listed below.    Ionized calcium 1.6  PTH 6.5 (L)  Continue aggressive hydration, rec'd zomenta on 10/26  Consider Lasix with fluids if getting overloaded     A-fib  Patient with Persistent (7 days or more) atrial fibrillation which is controlled currently with Beta Blocker and Digoxin. Patient is currently in atrial fibrillation.[id number] Score: 2. HASBLED Score: 3. Anticoagulation not indicated due to risk of GIB.  Continue subq heparin while inpatient w/ close monitoring     Swelling of right groin  Previously noted on MRI and CT scan 9/2023          1.  Bulky lymphadenopathy versus mass in the right inguinal region           2.  Enlarged right external iliac chain lymph nodes, likely metastatic           3.  No other findings of metastatic disease in the abdomen or pelvis           4.  Cholelithiasis   Noted to be SCC on biopsy  Per family, it is now more swollen, painful, and discolored since biopsy  Hem/onc consult ordered regarding recommendations  Huge lymphadenitis with fluctuant mass , r/o tumor necrosis lymphoma  CT pelvis 1. Large lobular masslike focus within the  right inguinal regions suggesting lymph node conglomerate.  Correlation with history of malignancy advised.  Additional lymph node enlargement noted along the right iliac chain. T     Encephalopathy, Hallucinations  Likely 2/2 infectious etiology vs mets to brain?  Treat as above  Speech cleared for Mech soft, thin liquids, crushed meds  Maintain safety precautions  At [facility] an MRI was performed in Sept 2023  CT head w wo contrast unremarkable     Hypertension  Patient mildly hypotensive on arrival but responded well to IVF  Resume home BP meds in the am if awake enough to take     Heparin 5k q8    Terrance Majano MD  Department of Hospital Medicine   [facility] - Telemetry

## 2023-10-28 NOTE — PLAN OF CARE
Problem: Adult Inpatient Plan of Care  Goal: Plan of Care Review  Outcome: Ongoing, Not Progressing  Goal: Patient-Specific Goal (Individualized)  Outcome: Ongoing, Not Progressing  Goal: Absence of Hospital-Acquired Illness or Injury  Outcome: Ongoing, Not Progressing  Goal: Optimal Comfort and Wellbeing  Outcome: Ongoing, Not Progressing  Goal: Readiness for Transition of Care  Outcome: Ongoing, Not Progressing     Problem: Adjustment to Illness (Sepsis/Septic Shock)  Goal: Optimal Coping  Outcome: Ongoing, Not Progressing     Problem: Bleeding (Sepsis/Septic Shock)  Goal: Absence of Bleeding  Outcome: Ongoing, Not Progressing     Problem: Glycemic Control Impaired (Sepsis/Septic Shock)  Goal: Blood Glucose Level Within Desired Range  Outcome: Ongoing, Not Progressing     Problem: Infection Progression (Sepsis/Septic Shock)  Goal: Absence of Infection Signs and Symptoms  Outcome: Ongoing, Not Progressing     Problem: Nutrition Impaired (Sepsis/Septic Shock)  Goal: Optimal Nutrition Intake  Outcome: Ongoing, Not Progressing     Problem: Fall Injury Risk  Goal: Absence of Fall and Fall-Related Injury  Outcome: Ongoing, Not Progressing     Problem: Hypertension Acute  Goal: Blood Pressure Within Desired Range  Outcome: Ongoing, Not Progressing     Problem: Skin Injury Risk Increased  Goal: Skin Health and Integrity  Outcome: Ongoing, Not Progressing     Problem: Impaired Wound Healing  Goal: Optimal Wound Healing  Outcome: Ongoing, Not Progressing     Problem: Fluid and Electrolyte Imbalance (Acute Kidney Injury/Impairment)  Goal: Fluid and Electrolyte Balance  Outcome: Ongoing, Not Progressing     Problem: Oral Intake Inadequate (Acute Kidney Injury/Impairment)  Goal: Optimal Nutrition Intake  Outcome: Ongoing, Not Progressing     Problem: Renal Function Impairment (Acute Kidney Injury/Impairment)  Goal: Effective Renal Function  Outcome: Ongoing, Not Progressing     Problem: Hypertension Comorbidity  Goal: Blood  Pressure in Desired Range  Outcome: Ongoing, Not Progressing     Problem: Coping Ineffective  Goal: Effective Coping  Outcome: Ongoing, Not Progressing     Problem: Confusion Acute  Goal: Optimal Cognitive Function  Outcome: Ongoing, Not Progressing     Problem: UTI (Urinary Tract Infection)  Goal: Improved Infection Symptoms  Outcome: Ongoing, Not Progressing     Problem: Coping Ineffective (Oncology Care)  Goal: Effective Coping  Outcome: Ongoing, Not Progressing     Problem: Fatigue (Oncology Care)  Goal: Improved Activity Tolerance  Outcome: Ongoing, Not Progressing     Problem: Oral Intake Altered (Oncology Care)  Goal: Optimal Oral Intake  Outcome: Ongoing, Not Progressing     Problem: Pain Acute (Oncology Care)  Goal: Optimal Pain Control  Outcome: Ongoing, Not Progressing     Problem: Thought Process Alteration  Goal: Optimal Thought Clarity  Outcome: Ongoing, Not Progressing     Problem: Infection  Goal: Absence of Infection Signs and Symptoms  Outcome: Ongoing, Not Progressing

## 2023-10-29 LAB
25(OH)D3+25(OH)D2 SERPL-MCNC: 27 NG/ML (ref 30–96)
ALBUMIN SERPL BCP-MCNC: 2 G/DL (ref 3.5–5.2)
ANION GAP SERPL CALC-SCNC: 8 MMOL/L (ref 8–16)
BASOPHILS # BLD AUTO: 0.07 K/UL (ref 0–0.2)
BASOPHILS NFR BLD: 0.3 % (ref 0–1.9)
BUN SERPL-MCNC: 12 MG/DL (ref 8–23)
CALCIUM SERPL-MCNC: 9.6 MG/DL (ref 8.7–10.5)
CHLORIDE SERPL-SCNC: 103 MMOL/L (ref 95–110)
CO2 SERPL-SCNC: 32 MMOL/L (ref 23–29)
CREAT SERPL-MCNC: 0.7 MG/DL (ref 0.5–1.4)
DIFFERENTIAL METHOD: ABNORMAL
EOSINOPHIL # BLD AUTO: 0.3 K/UL (ref 0–0.5)
EOSINOPHIL NFR BLD: 1.1 % (ref 0–8)
ERYTHROCYTE [DISTWIDTH] IN BLOOD BY AUTOMATED COUNT: 15.9 % (ref 11.5–14.5)
EST. GFR  (NO RACE VARIABLE): >60 ML/MIN/1.73 M^2
GLUCOSE SERPL-MCNC: 106 MG/DL (ref 70–110)
HCT VFR BLD AUTO: 28.1 % (ref 40–54)
HGB BLD-MCNC: 8.7 G/DL (ref 14–18)
IMM GRANULOCYTES # BLD AUTO: 0.15 K/UL (ref 0–0.04)
IMM GRANULOCYTES NFR BLD AUTO: 0.7 % (ref 0–0.5)
LYMPHOCYTES # BLD AUTO: 1 K/UL (ref 1–4.8)
LYMPHOCYTES NFR BLD: 4.4 % (ref 18–48)
MCH RBC QN AUTO: 29.2 PG (ref 27–31)
MCHC RBC AUTO-ENTMCNC: 31 G/DL (ref 32–36)
MCV RBC AUTO: 94 FL (ref 82–98)
MONOCYTES # BLD AUTO: 1.4 K/UL (ref 0.3–1)
MONOCYTES NFR BLD: 6.2 % (ref 4–15)
NEUTROPHILS # BLD AUTO: 19.4 K/UL (ref 1.8–7.7)
NEUTROPHILS NFR BLD: 88 % (ref 38–73)
NRBC BLD-RTO: 0 /100 WBC
PHOSPHATE SERPL-MCNC: 1.3 MG/DL (ref 2.7–4.5)
PLATELET # BLD AUTO: 170 K/UL (ref 150–450)
PMV BLD AUTO: 10 FL (ref 9.2–12.9)
POCT GLUCOSE: 125 MG/DL (ref 70–110)
POCT GLUCOSE: 160 MG/DL (ref 70–110)
POCT GLUCOSE: 168 MG/DL (ref 70–110)
POCT GLUCOSE: 99 MG/DL (ref 70–110)
POTASSIUM SERPL-SCNC: 3.2 MMOL/L (ref 3.5–5.1)
RBC # BLD AUTO: 2.98 M/UL (ref 4.6–6.2)
SODIUM SERPL-SCNC: 143 MMOL/L (ref 136–145)
WBC # BLD AUTO: 22.01 K/UL (ref 3.9–12.7)

## 2023-10-29 PROCEDURE — 92526 ORAL FUNCTION THERAPY: CPT

## 2023-10-29 PROCEDURE — 11000001 HC ACUTE MED/SURG PRIVATE ROOM

## 2023-10-29 PROCEDURE — 25000003 PHARM REV CODE 250: Performed by: INTERNAL MEDICINE

## 2023-10-29 PROCEDURE — C9113 INJ PANTOPRAZOLE SODIUM, VIA: HCPCS | Performed by: NURSE PRACTITIONER

## 2023-10-29 PROCEDURE — 63600175 PHARM REV CODE 636 W HCPCS: Performed by: INTERNAL MEDICINE

## 2023-10-29 PROCEDURE — 82306 VITAMIN D 25 HYDROXY: CPT | Performed by: INTERNAL MEDICINE

## 2023-10-29 PROCEDURE — 25500020 PHARM REV CODE 255: Performed by: FAMILY MEDICINE

## 2023-10-29 PROCEDURE — 25000003 PHARM REV CODE 250: Performed by: NURSE PRACTITIONER

## 2023-10-29 PROCEDURE — 63600175 PHARM REV CODE 636 W HCPCS: Performed by: FAMILY MEDICINE

## 2023-10-29 PROCEDURE — 63600175 PHARM REV CODE 636 W HCPCS: Performed by: NURSE PRACTITIONER

## 2023-10-29 PROCEDURE — 25000003 PHARM REV CODE 250: Performed by: FAMILY MEDICINE

## 2023-10-29 PROCEDURE — A4216 STERILE WATER/SALINE, 10 ML: HCPCS | Performed by: FAMILY MEDICINE

## 2023-10-29 PROCEDURE — 86334 PATHOLOGIST INTERPRETATION IFE: ICD-10-PCS | Mod: 26,,, | Performed by: PATHOLOGY

## 2023-10-29 PROCEDURE — 80069 RENAL FUNCTION PANEL: CPT | Performed by: STUDENT IN AN ORGANIZED HEALTH CARE EDUCATION/TRAINING PROGRAM

## 2023-10-29 PROCEDURE — 85025 COMPLETE CBC W/AUTO DIFF WBC: CPT | Performed by: FAMILY MEDICINE

## 2023-10-29 PROCEDURE — 86334 IMMUNOFIX E-PHORESIS SERUM: CPT | Mod: 26,,, | Performed by: PATHOLOGY

## 2023-10-29 PROCEDURE — 25000003 PHARM REV CODE 250: Performed by: STUDENT IN AN ORGANIZED HEALTH CARE EDUCATION/TRAINING PROGRAM

## 2023-10-29 PROCEDURE — 86334 IMMUNOFIX E-PHORESIS SERUM: CPT | Performed by: INTERNAL MEDICINE

## 2023-10-29 RX ORDER — POTASSIUM CHLORIDE 7.45 MG/ML
10 INJECTION INTRAVENOUS
Status: COMPLETED | OUTPATIENT
Start: 2023-10-29 | End: 2023-10-29

## 2023-10-29 RX ORDER — MUPIROCIN 20 MG/G
OINTMENT TOPICAL 2 TIMES DAILY
Status: DISPENSED | OUTPATIENT
Start: 2023-10-29 | End: 2023-11-03

## 2023-10-29 RX ORDER — HYDROCODONE BITARTRATE AND ACETAMINOPHEN 5; 325 MG/1; MG/1
1 TABLET ORAL EVERY 6 HOURS PRN
Status: DISCONTINUED | OUTPATIENT
Start: 2023-10-29 | End: 2023-11-03 | Stop reason: HOSPADM

## 2023-10-29 RX ORDER — POTASSIUM CHLORIDE 20 MEQ/1
40 TABLET, EXTENDED RELEASE ORAL ONCE
Status: DISCONTINUED | OUTPATIENT
Start: 2023-10-29 | End: 2023-10-29

## 2023-10-29 RX ORDER — MAGNESIUM SULFATE HEPTAHYDRATE 40 MG/ML
2 INJECTION, SOLUTION INTRAVENOUS ONCE
Status: COMPLETED | OUTPATIENT
Start: 2023-10-29 | End: 2023-10-29

## 2023-10-29 RX ADMIN — SODIUM CHLORIDE, PRESERVATIVE FREE 10 ML: 5 INJECTION INTRAVENOUS at 06:10

## 2023-10-29 RX ADMIN — LATANOPROST 1 DROP: 50 SOLUTION OPHTHALMIC at 09:10

## 2023-10-29 RX ADMIN — SODIUM CHLORIDE, PRESERVATIVE FREE 10 ML: 5 INJECTION INTRAVENOUS at 12:10

## 2023-10-29 RX ADMIN — DIGOXIN 0.12 MG: 125 TABLET ORAL at 08:10

## 2023-10-29 RX ADMIN — VANCOMYCIN HYDROCHLORIDE 1500 MG: 1.5 INJECTION, POWDER, LYOPHILIZED, FOR SOLUTION INTRAVENOUS at 01:10

## 2023-10-29 RX ADMIN — POTASSIUM CHLORIDE 75 ML/HR: 2 INJECTION, SOLUTION, CONCENTRATE INTRAVENOUS at 10:10

## 2023-10-29 RX ADMIN — HEPARIN SODIUM 5000 UNITS: 5000 INJECTION INTRAVENOUS; SUBCUTANEOUS at 01:10

## 2023-10-29 RX ADMIN — Medication 6 MG: at 09:10

## 2023-10-29 RX ADMIN — COLLAGENASE SANTYL: 250 OINTMENT TOPICAL at 08:10

## 2023-10-29 RX ADMIN — IOHEXOL 100 ML: 350 INJECTION, SOLUTION INTRAVENOUS at 11:10

## 2023-10-29 RX ADMIN — PANTOPRAZOLE SODIUM 40 MG: 40 INJECTION, POWDER, LYOPHILIZED, FOR SOLUTION INTRAVENOUS at 08:10

## 2023-10-29 RX ADMIN — MUPIROCIN: 20 OINTMENT TOPICAL at 09:10

## 2023-10-29 RX ADMIN — POTASSIUM CHLORIDE 10 MEQ: 7.46 INJECTION, SOLUTION INTRAVENOUS at 02:10

## 2023-10-29 RX ADMIN — SODIUM CHLORIDE AND POTASSIUM CHLORIDE: .9; .15 SOLUTION INTRAVENOUS at 01:10

## 2023-10-29 RX ADMIN — POTASSIUM PHOSPHATE, MONOBASIC POTASSIUM PHOSPHATE, DIBASIC 20 MMOL: 224; 236 INJECTION, SOLUTION, CONCENTRATE INTRAVENOUS at 04:10

## 2023-10-29 RX ADMIN — POTASSIUM CHLORIDE 10 MEQ: 7.46 INJECTION, SOLUTION INTRAVENOUS at 01:10

## 2023-10-29 RX ADMIN — MUPIROCIN: 20 OINTMENT TOPICAL at 11:10

## 2023-10-29 RX ADMIN — TRAZODONE HYDROCHLORIDE 25 MG: 50 TABLET ORAL at 09:10

## 2023-10-29 RX ADMIN — HEPARIN SODIUM 5000 UNITS: 5000 INJECTION INTRAVENOUS; SUBCUTANEOUS at 05:10

## 2023-10-29 RX ADMIN — PIPERACILLIN AND TAZOBACTAM 4.5 G: 4; .5 INJECTION, POWDER, LYOPHILIZED, FOR SOLUTION INTRAVENOUS; PARENTERAL at 11:10

## 2023-10-29 RX ADMIN — PIPERACILLIN AND TAZOBACTAM 4.5 G: 4; .5 INJECTION, POWDER, LYOPHILIZED, FOR SOLUTION INTRAVENOUS; PARENTERAL at 06:10

## 2023-10-29 RX ADMIN — MAGNESIUM SULFATE HEPTAHYDRATE 2 G: 40 INJECTION, SOLUTION INTRAVENOUS at 04:10

## 2023-10-29 RX ADMIN — HEPARIN SODIUM 5000 UNITS: 5000 INJECTION INTRAVENOUS; SUBCUTANEOUS at 09:10

## 2023-10-29 RX ADMIN — METOPROLOL TARTRATE 25 MG: 25 TABLET, FILM COATED ORAL at 09:10

## 2023-10-29 RX ADMIN — METOPROLOL TARTRATE 25 MG: 25 TABLET, FILM COATED ORAL at 08:10

## 2023-10-29 NOTE — PLAN OF CARE
Problem: Adult Inpatient Plan of Care  Goal: Plan of Care Review  Outcome: Ongoing, Progressing     Problem: Infection Progression (Sepsis/Septic Shock)  Goal: Absence of Infection Signs and Symptoms  Outcome: Ongoing, Progressing     Problem: Nutrition Impaired (Sepsis/Septic Shock)  Goal: Optimal Nutrition Intake  Outcome: Ongoing, Progressing     Problem: Hypertension Acute  Goal: Blood Pressure Within Desired Range  Outcome: Ongoing, Progressing

## 2023-10-29 NOTE — ASSESSMENT & PLAN NOTE
· Likely 2/2 infectious etiology  · Treat as above  · Seems to be improved  · Maintain safety precautions

## 2023-10-29 NOTE — ASSESSMENT & PLAN NOTE
The patient has hypercalcemia that is currently uncontrolled. The patient has the following symptoms due to their hypercalcemia: encephalopathy. The hypercalcemia is likely due to unknown - suspected 2/2 malignancy. We will obtain the following labs to work up the hypercalcemia:   alkaline phosphatase   Alkaline Phosphatase   Date Value Ref Range Status   10/27/2023 92 55 - 135 U/L Final    . We will treat the hypercalcemia with: IV fluids ordered at a rate of 125/hr. Their latest calcium has been reviewed and is listed below.  Ionized Calcium   Date Value Ref Range Status   10/25/2023 1.60 (H) 1.06 - 1.42 mmol/L Final        resolved

## 2023-10-29 NOTE — HOSPITAL COURSE
83-year-old male with PMH of squamous cell cancer, BPH, hypertension, obesity who presented to the ER with altered mental status found to have FERNANDO and hypercalcemia which improved with IV fluids and supportive care.  Also noted to have leukocytosis of more than 20,000. Imaging showed no acute signs of infection except for CT pelvis showing necrotic lymph node in the right groin.  Patient underwent I and D of the necrotic lymph node on 10/31.  Cultures remained negative at the time of discharge.  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  Patient's mentation improved but continued to have generalized weakness.  Infectious Disease was consulted and recommended observation off antibiotics.  Patient was seen by the palliative care team.  He is a DNR.  Per discussion with palliative care team, family wished for short-term rehab and outpatient oncology and palliative care follow-up.  Pending progress, patient and family agreeable to hospice if patient was not deemed a good candidate for therapy.  At this time, patient discharged to skilled nursing facility for short-term rehab.  Weekly CMP ordered to monitor for hypercalcemia.  If noted to have up trending calcium, consider restarting Lasix.  Patient's wife and children appropriately involved in his care.    BP (!) 115/56 (BP Location: Left arm, Patient Position: Lying)   Pulse 78   Temp 97.9 °F (36.6 °C) (Oral)   Resp 17   Ht 6' (1.829 m)   Wt 105.3 kg (232 lb 2.3 oz)   SpO2 (!) 91%   BMI 31.48 kg/m²     Physical Exam  Vitals and nursing note reviewed.   Constitutional:       General: He is not in acute distress.     Appearance: He is well-developed.      Comments: Elderly male   Cardiovascular:      Rate and Rhythm: Normal rate and  regular rhythm.      Heart sounds: Normal heart sounds. No murmur heard.  Pulmonary:      Effort: Pulmonary effort is normal.      Breath sounds: Normal breath sounds. No wheezing.   Abdominal:      Palpations: Abdomen is soft.      Tenderness: There is no abdominal tenderness.      Comments: R groin area dressed   Musculoskeletal:      Comments: Ulcer on the RLE , and pressure ulcer on the heel   Skin:     General: Skin is warm and dry.      Findings: No rash.   Neurological:      Mental Status: He is alert.

## 2023-10-29 NOTE — PROGRESS NOTES
[facility] Medicine  Progress Note    Patient Name: Aquiles Montana  MRN: [id number]  Patient Class: IP- Inpatient   Admission Date: 10/24/2023  Length of Stay: 5 days  Attending Physician: Veronica Clay MD  Primary Care Provider: Marty Hernandez MD        Subjective:     Principal Problem:Sepsis  Acute Condition: sepsis        HPI:  Aquiles Montana is an 83-year-old male w/ PMH SCC, BPH, HTN, obesity, and left anterior fascicular block. He presented to the ED via EMS for evaluation due to worsening mental status since admission 5 weeks ago. He was previously admitted to the hospital for sepsis and then discharged to a SNF. Patient's family reports that he came home recently but has just not been himself since the previous admission. Patient was seen by PCP today and status deteriorated following the visit.  Chart review of lab work performed prior to arrival today shows hypercalcemia and leukocytosis.  Also evident of acute kidney injury.  Patient was reportedly agitated and difficult to direct earlier today and at time of presentation to our facility is nonverbal. Patient opens eyes to verbal stimuli and responds to painful stimuli. History obtained from patient's family at bedside. They state that patient has not been experiencing fever, N/V/D, CP, or SOB recently. He has had a decrease in PO intake and activity. They also report that patient started experiencing delirium and hallucinations during his previous hospitalization, which have continued. Of note, patient has a large abscess in his right inguinal area and family states that this has been causing him quite a bit of pain recently.       Overview/Hospital Course:  10/29: wbcs from 23k to 22k. CT chest doesn't show any infection. Rocephin changed to zosyn. Cont vanc. Surgery following      Interval History: denies pain at inguinal lymph nodes site    Review of Systems   All other systems reviewed and are negative.    Objective:      Vital Signs (Most Recent):  Temp: 98.1 °F (36.7 °C) (10/29/23 1132)  Pulse: 62 (10/29/23 1132)  Resp: 18 (10/29/23 1132)  BP: (!) 122/59 (10/29/23 1132)  SpO2: 97 % (10/29/23 1132) Vital Signs (24h Range):  Temp:  [97.7 °F (36.5 °C)-98.3 °F (36.8 °C)] 98.1 °F (36.7 °C)  Pulse:  [57-80] 62  Resp:  [17-18] 18  SpO2:  [94 %-97 %] 97 %  BP: (122-137)/(59-77) 122/59     Weight: 105.3 kg (232 lb 2.3 oz)  Body mass index is 31.48 kg/m².    Intake/Output Summary (Last 24 hours) at 10/29/2023 1540  Last data filed at 10/29/2023 1405  Gross per 24 hour   Intake 428 ml   Output 950 ml   Net -522 ml         Physical Exam  Vitals and nursing note reviewed.   Constitutional:       General: He is not in acute distress.     Appearance: He is well-developed.   HENT:      Head: Normocephalic and atraumatic.      Nose: Nose normal.   Eyes:      Conjunctiva/sclera: Conjunctivae normal.   Cardiovascular:      Rate and Rhythm: Normal rate and regular rhythm.      Heart sounds: Normal heart sounds. No murmur heard.  Pulmonary:      Effort: Pulmonary effort is normal.      Breath sounds: Normal breath sounds. No wheezing.   Abdominal:      General: Bowel sounds are normal.      Palpations: Abdomen is soft. There is no mass.      Tenderness: There is no abdominal tenderness. There is no guarding or rebound.      Comments: Swollen R inguinal lymph nodes with erythema and edema   Musculoskeletal:         General: Normal range of motion.      Cervical back: Normal range of motion and neck supple.   Skin:     General: Skin is warm and dry.      Findings: No rash.   Neurological:      Mental Status: He is alert.   Psychiatric:         Behavior: Behavior normal.             Significant Labs: All pertinent labs within the past 24 hours have been reviewed.  BMP:   Recent Labs   Lab 10/29/23  0515         K 3.2*      CO2 32*   BUN 12   CREATININE 0.7   CALCIUM 9.6     CBC:   Recent Labs   Lab 10/29/23  0937   WBC 22.01*   HGB  8.7*   HCT 28.1*          Significant Imaging: I have reviewed all pertinent imaging results/findings within the past 24 hours.  I have reviewed and interpreted all pertinent imaging results/findings within the past 24 hours.      Assessment/Plan:      * Sepsis  This patient does have evidence of infective focus  My overall impression is sepsis.  Source: Skin and Soft Tissue (location right inguinal abscess)   -- UA pending to also assess for urinary source  Antibiotics given-   Antibiotics (72h ago, onward)    Start     Stop Route Frequency Ordered    10/25/23 0100  vancomycin (VANCOCIN) 1,750 mg in dextrose 5 % (D5W) 500 mL IVPB         10/25/23 1259 IV ED 1 Time 10/25/23 0006    10/25/23 0034  vancomycin - pharmacy to dose  (vancomycin IVPB (PEDS and ADULTS))        See Hyperspace for full Linked Orders Report.    -- IV pharmacy to manage frequency 10/24/23 2335    10/25/23 0030  ceFEPIme (MAXIPIME) 2 g in dextrose 5 % in water (D5W) 100 mL IVPB (MB+)         -- IV Every 12 hours (non-standard times) 10/24/23 2336        Latest lactate reviewed-  Recent Labs   Lab 10/24/23  2019   LACTATE 1.6     Organ dysfunction indicated by Acute kidney injury and Encephalopathy    Fluid challenge Actual Body weight- Patient will receive 30ml/kg actual body weight to calculate fluid bolus for treatment of septic shock.     Post- resuscitation assessment Yes Perfusion exam was performed within 6 hours of septic shock presentation after bolus shows Adequate tissue perfusion assessed by non-invasive monitoring       Will Not start Pressors- Levophed for MAP of 65  Source control achieved by: broad spectrum abx        Anemia  · Stable  · H/H 12.3/39.8  · S/p blood transfusions during previous admission for GIB  · No continued reports of hematemesis, melena, hematochezia, or other signs of bleeding per family  · Transfuse for hgb <7  · Monitor       ACP (advance care planning)  Advance Care Planning     Date:  10/25/2023    Code Status  In light of the patients advanced and life limiting illness,I engaged the the family in a voluntary conversation about the patient's preferences for care  at the very end of life. The patient wishes to have a natural, peaceful death.  Along those lines, the patient does not wish to have CPR or other invasive treatments performed when his heart and/or breathing stops. I communicated to the family that a DNR order would be placed in his medical record to reflect this preference.  I spent a total of 8 minutes engaging the patient in this advance care planning discussion.           Hypercalcemia  The patient has hypercalcemia that is currently uncontrolled. The patient has the following symptoms due to their hypercalcemia: encephalopathy. The hypercalcemia is likely due to unknown - suspected 2/2 malignancy. We will obtain the following labs to work up the hypercalcemia:   alkaline phosphatase   Alkaline Phosphatase   Date Value Ref Range Status   10/27/2023 92 55 - 135 U/L Final    . We will treat the hypercalcemia with: IV fluids ordered at a rate of 125/hr. Their latest calcium has been reviewed and is listed below.  Ionized Calcium   Date Value Ref Range Status   10/25/2023 1.60 (H) 1.06 - 1.42 mmol/L Final        resolved    A-fib  Patient with Persistent (7 days or more) atrial fibrillation which is controlled currently with Beta Blocker and Digoxin. Patient is currently in atrial fibrillation.[id number] Score: 2. HASBLED Score: 3. Anticoagulation not indicated due to risk of GIB.  Continue subq heparin while inpatient w/ close monitoring    Abscess of right groin  · Previously noted on MRI and CT scan 9/2023   1.  Bulky lymphadenopathy versus mass in the right inguinal region    2.  Enlarged right external iliac chain lymph nodes, likely metastatic    3.  No other findings of metastatic disease in the abdomen or pelvis    4.  Cholelithiasis   · Noted to be SCC on biopsy  · Per family,  it is now more swollen, painful, and discolored since biopsy  · Hem/onc consult ordered regarding recommendations  · May benefit from IR vs general surgery evaluation  · CT pelvis showing necrotic LN  · Surgery on board  · Cont on iv abx      Encephalopathy, metabolic  · Likely 2/2 infectious etiology  · Treat as above  · Seems to be improved  · Maintain safety precautions      Hypertension  · Patient mildly hypotensive on arrival but responded well to IVF  · Resume home BP meds in the am if awake enough to take      VTE Risk Mitigation (From admission, onward)         Ordered     heparin (porcine) injection 5,000 Units  Every 8 hours         10/24/23 2358     IP VTE HIGH RISK PATIENT  Once         10/24/23 2358     Place sequential compression device  Until discontinued         10/24/23 2358                Discharge Planning   NANCY:      Code Status: DNR   Is the patient medically ready for discharge?:     Reason for patient still in hospital (select all that apply): Treatment and Imaging  Discharge Plan A: Home Health, Home                  Veronica Clay MD  Department of Hospital Medicine   [facility], [place]

## 2023-10-29 NOTE — ASSESSMENT & PLAN NOTE
· Previously noted on MRI and CT scan 9/2023   1.  Bulky lymphadenopathy versus mass in the right inguinal region    2.  Enlarged right external iliac chain lymph nodes, likely metastatic    3.  No other findings of metastatic disease in the abdomen or pelvis    4.  Cholelithiasis   · Noted to be SCC on biopsy  · Per family, it is now more swollen, painful, and discolored since biopsy  · Hem/onc consult ordered regarding recommendations  · May benefit from IR vs general surgery evaluation  · CT pelvis showing necrotic LN  · Surgery on board  · Cont on iv abx

## 2023-10-29 NOTE — PLAN OF CARE
Recommendation:   1. Continue current diet as tolerated with texture/consistency modifications per SLP/MD.   2. Addition of Vince BID to promote wound healing.   3. Continue to provide Boost Plus BID.   4. Montior weight/labs.   5. RD to follow and monitor intake.    Goals:   Pt intake >/= 50% EEN/EPN by RD follow up    Nutrition Goal Status: new  Communication of RD Recs: other (comment) (POC)      Problem: Nutrition Impaired (Sepsis/Septic Shock)  Goal: Optimal Nutrition Intake  Outcome: Ongoing, Progressing

## 2023-10-29 NOTE — PROGRESS NOTES
Progress Note  Nephrology      Consult Requested By: Veronica Clay MD      SUBJECTIVE:     Overnight events  Patient is a 83 y.o. 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    Dermatitis    Tinea pedis of both feet    Open wound of right lower leg    Right leg swelling    Left anterior fascicular block    Class 2 severe obesity with serious comorbidity and body mass index (BMI) of 38.0 to 38.9 in adult    Sepsis    Encephalopathy, metabolic    Abscess of right groin    A-fib    Hypercalcemia    ACP (advance care planning)    Anemia     Past Medical History:   Diagnosis Date    Hypertension     Urinary tract infection               OBJECTIVE:     Vitals:    10/29/23 0442 10/29/23 0801 10/29/23 0813 10/29/23 1132   BP: 133/69 137/72  (!) 122/59   BP Location:  Right arm  Left arm   Patient Position: Lying Lying  Lying   Pulse: 74 78 65 62   Resp: 18 18  18   Temp: 98.1 °F (36.7 °C) 97.7 °F (36.5 °C)  98.1 °F (36.7 °C)   TempSrc: Axillary Axillary  Axillary   SpO2: 95% (!) 94%  97%   Weight:       Height:           Temp: 98.1 °F (36.7 °C) (10/29/23 1132)  Pulse: 62 (10/29/23 1132)  Resp: 18 (10/29/23 1132)  BP: (!) 122/59 (10/29/23 1132)  SpO2: 97 % (10/29/23 1132)    Date 10/29/23 0700 - 10/30/23 0659   Shift 4036-1725 5003-1877 3495-5820 24 Hour Total   INTAKE   P.O. 180   180   Shift Total(mL/kg) 180(1.7)   180(1.7)   OUTPUT   Shift Total(mL/kg)       Weight (kg) 105.3 105.3 105.3 105.3             Medications:   collagenase   Topical (Top) Daily    digoxin  0.125 mg Oral Daily    heparin (porcine)  5,000 Units Subcutaneous Q8H    latanoprost  1 drop Both Eyes QHS    melatonin  6 mg Oral Nightly    metoprolol tartrate  25 mg Oral BID    mupirocin   Nasal BID    pantoprazole   "40 mg Intravenous BID    piperacillin-tazobactam (Zosyn) IV (PEDS and ADULTS) (extended infusion is not appropriate)  4.5 g Intravenous Q8H    sodium chloride 0.9%  10 mL Intravenous Q6H    sodium chloride 0.9%  10 mL Intravenous Q6H    traZODone  25 mg Oral QHS    vancomycin (VANCOCIN) IV (PEDS and ADULTS)  1,500 mg Intravenous Q24H                 Physical Exam:  General appearance: awake  Lungs: diminished breath sounds  Heart: pulse 62  Abdomen: soft  Extremities: no edema  Skin: dry  Laboratory:  ABG  Labs reviewed  No results found for this or any previous visit (from the past 336 hour(s)).  Recent Results (from the past 336 hour(s))   CBC auto differential    Collection Time: 10/29/23  9:37 AM   Result Value Ref Range    WBC 22.01 (H) 3.90 - 12.70 K/uL    Hemoglobin 8.7 (L) 14.0 - 18.0 g/dL    Hematocrit 28.1 (L) 40.0 - 54.0 %    Platelets 170 150 - 450 K/uL   CBC with Automated Differential    Collection Time: 10/27/23  3:42 AM   Result Value Ref Range    WBC 23.71 (H) 3.90 - 12.70 K/uL    Hemoglobin 10.0 (L) 14.0 - 18.0 g/dL    Hematocrit 32.8 (L) 40.0 - 54.0 %    Platelets 202 150 - 450 K/uL   CBC with Automated Differential    Collection Time: 10/26/23  3:22 AM   Result Value Ref Range    WBC 24.77 (H) 3.90 - 12.70 K/uL    Hemoglobin 10.8 (L) 14.0 - 18.0 g/dL    Hematocrit 36.1 (L) 40.0 - 54.0 %    Platelets 198 150 - 450 K/uL     Urinalysis  No results for input(s): "COLORU", "CLARITYU", "SPECGRAV", "PHUR", "PROTEINUA", "GLUCOSEU", "BILIRUBINCON", "BLOODU", "WBCU", "RBCU", "BACTERIA", "MUCUS", "NITRITE", "LEUKOCYTESUR", "UROBILINOGEN", "HYALINECASTS" in the last 24 hours.    Diagnostic Results:  X-Ray: Reviewed  US: Reviewed  Echo: Reviewed  ACCESS    ASSESSMENT/PLAN:     FERNANDO  UA protein trace, RBC 27, WBC 21  Creatinine  0.8   BUN 14  K 3.5 - 3.2  Replace prn  Metabolic alkalosis  PTH 6.5  Hypercalcemia  Ca corrected approximately 12- 11.2  Vit D 27  CT  Regional osseous structures demonstrate no " aggressive appearing lytic or blastic lesions.   Albumin 2.2  Phos 2 - 1.3  Anemia multifactorial  Hb 10- 8.7  /77, 122/59  Weight daily  I and O  Avoid nephrotoxic agents, hypotension, hypovolemia  Replace e-lytes prn  IVF

## 2023-10-29 NOTE — PLAN OF CARE
Problem: SLP  Goal: SLP Goal  Description: Short Term Goals:  1.Pt will participate in swallow eval to determine safest diet level.  2. Pt will tolerate mech soft and thin liquids with no outward dysphagia signs. -- MET 10/29  3. Patient will successfully participate in [id number]-[id number] evaluation to further assess for any communication impairments.  4. Patient will tolerate regular (easy to chew) consistency/thin liquids po diet with no overt s/s of aspiration.   Outcome: Ongoing, Progressing     10/29/2023: Patient seen for ongoing therapy. The patient was able to consume trials without overt s/s of aspiration. ST recommending diet advancement to ETC/thin. Patient with ongoing confusion and decreased speech intelligibility. ST will follow.  Maxim Thurman M.S., CCC-SLP   Speech Language Pathologist

## 2023-10-29 NOTE — PLAN OF CARE
Problem: Adult Inpatient Plan of Care  Goal: Plan of Care Review  Outcome: Ongoing, Progressing  Goal: Patient-Specific Goal (Individualized)  Outcome: Ongoing, Progressing  Goal: Absence of Hospital-Acquired Illness or Injury  Outcome: Ongoing, Progressing  Goal: Optimal Comfort and Wellbeing  Outcome: Ongoing, Progressing  Goal: Readiness for Transition of Care  Outcome: Ongoing, Progressing     Problem: Adjustment to Illness (Sepsis/Septic Shock)  Goal: Optimal Coping  Outcome: Ongoing, Progressing     Problem: Bleeding (Sepsis/Septic Shock)  Goal: Absence of Bleeding  Outcome: Ongoing, Progressing     Problem: Glycemic Control Impaired (Sepsis/Septic Shock)  Goal: Blood Glucose Level Within Desired Range  Outcome: Ongoing, Progressing     Problem: Infection Progression (Sepsis/Septic Shock)  Goal: Absence of Infection Signs and Symptoms  Outcome: Ongoing, Progressing     Problem: Nutrition Impaired (Sepsis/Septic Shock)  Goal: Optimal Nutrition Intake  Outcome: Ongoing, Progressing     Problem: Fall Injury Risk  Goal: Absence of Fall and Fall-Related Injury  Outcome: Ongoing, Progressing     Problem: Hypertension Acute  Goal: Blood Pressure Within Desired Range  Outcome: Ongoing, Progressing     Problem: Skin Injury Risk Increased  Goal: Skin Health and Integrity  Outcome: Ongoing, Progressing     Problem: Impaired Wound Healing  Goal: Optimal Wound Healing  Outcome: Ongoing, Progressing     Problem: Fluid and Electrolyte Imbalance (Acute Kidney Injury/Impairment)  Goal: Fluid and Electrolyte Balance  Outcome: Ongoing, Progressing     Problem: Oral Intake Inadequate (Acute Kidney Injury/Impairment)  Goal: Optimal Nutrition Intake  Outcome: Ongoing, Progressing     Problem: Renal Function Impairment (Acute Kidney Injury/Impairment)  Goal: Effective Renal Function  Outcome: Ongoing, Progressing     Problem: Hypertension Comorbidity  Goal: Blood Pressure in Desired Range  Outcome: Ongoing, Progressing      Problem: Coping Ineffective  Goal: Effective Coping  Outcome: Ongoing, Progressing     Problem: Confusion Acute  Goal: Optimal Cognitive Function  Outcome: Ongoing, Progressing     Problem: UTI (Urinary Tract Infection)  Goal: Improved Infection Symptoms  Outcome: Ongoing, Progressing     Problem: Coping Ineffective (Oncology Care)  Goal: Effective Coping  Outcome: Ongoing, Progressing     Problem: Fatigue (Oncology Care)  Goal: Improved Activity Tolerance  Outcome: Ongoing, Progressing     Problem: Oral Intake Altered (Oncology Care)  Goal: Optimal Oral Intake  Outcome: Ongoing, Progressing     Problem: Pain Acute (Oncology Care)  Goal: Optimal Pain Control  Outcome: Ongoing, Progressing     Problem: Thought Process Alteration  Goal: Optimal Thought Clarity  Outcome: Ongoing, Progressing     Problem: Infection  Goal: Absence of Infection Signs and Symptoms  Outcome: Ongoing, Progressing

## 2023-10-29 NOTE — CONSULTS
[facility] - Telemetry  Adult Nutrition  Progress Note    SUMMARY       Recommendations    Recommendation:   1. Continue current diet as tolerated with texture/consistency modifications per SLP/MD.   2. Addition of Vince BID to promote wound healing.   3. Continue to provide Boost Plus BID.   4. Montior weight/labs.   5. RD to follow and monitor intake.    Goals:   Pt intake >/= 50% EEN/EPN by RD follow up    Nutrition Goal Status: new  Communication of RD Recs: other (comment) (POC)    Assessment and Plan    Nutrition Problem  Inadequate energy intake     Related to (etiology):   AMS, dx/hx     Signs and Symptoms (as evidenced by):   Weight loss, recent decreased appetite, 25% intake of texture/consistency modified diet recorded.     Interventions:  Collaboration with other providers  Commercial Beverage-Boost Plus BID  Modified Beverage- Vince BID     Nutrition Diagnosis Status:   Continues        Malnutrition Assessment             Weight Loss (Malnutrition):  (11% x 2 months)   Orbital Region (Subcutaneous Fat Loss): well nourished  Upper Arm Region (Subcutaneous Fat Loss): well nourished  Thoracic and Lumbar Region: well nourished   Religious Region (Muscle Loss): well nourished  Clavicle Bone Region (Muscle Loss): well nourished  Clavicle and Acromion Bone Region (Muscle Loss): well nourished  Scapular Bone Region (Muscle Loss): well nourished  Dorsal Hand (Muscle Loss): well nourished  Patellar Region (Muscle Loss): well nourished  Anterior Thigh Region (Muscle Loss): well nourished  Posterior Calf Region (Muscle Loss): well nourished                 Reason for Assessment    Reason For Assessment: consult (wounds)  Diagnosis:  (sepsis)  Relevant Medical History: HTN, UTI, prostate surgery  General Information Comments: Pt receiving soft and bite sized diet with Boost Plus BID ordered. 25% intake recored. Spoke with SLP and PCT would like Vince ordered for pt due to wound healing. PIV, PICC. Fidel Score: 12 altered  skin integrity left leg venous ulcer, sacral spine, right heel.  NFPE completed 10/25-nourished. Noted 27b weight loss x 2 months. Chart shows 8 kg weight gain accuracy in question  Nutrition Discharge Planning: d/c diet to be determined    Nutrition Risk Screen    Nutrition Risk Screen: other (see comments)    Nutrition/Diet History    Food Preferences: no Denominational or cultural food prefs identified  Spiritual, Cultural Beliefs, Zoroastrianism Practices, Values that Affect Care: no  Factors Affecting Nutritional Intake: impaired cognitive status/motor control    Anthropometrics    Temp: 98.1 °F (36.7 °C)  Height: 6' (182.9 cm)  Height (inches): 72 in  Weight Method: Bed Scale  Weight: 105.3 kg (232 lb 2.3 oz)  Weight (lb): 232.15 lb  Ideal Body Weight (IBW), Male: 178 lb  % Ideal Body Weight, Male (lb): 121.13 %  BMI (Calculated): 31.5  BMI Grade: 25 - 29.9 - overweight  Usual Body Weight (UBW), k.9 kg (8/10)  % Usual Body Weight: 89.18  % Weight Change From Usual Weight: -11.01 %       Lab/Procedures/Meds    Pertinent Labs Reviewed: reviewed  Pertinent Labs Comments: K 3.2, Phos 1.3, CO2 32, Alb 2.0  Pertinent Medications Reviewed: reviewed  Pertinent Medications Comments: digoxin, heparin, latanoprost, melatonin, lopressor, pantoprazole, piperacillin-tazobactam, NaCL, trazodone, vancomycin      Estimated/Assessed Needs    Weight Used For Calorie Calculations: 97.8 kg (215 lb 9.8 oz)  Energy Calorie Requirements (kcal):   Energy Need Method: [place] (x 1.2)  Protein Requirements: 78g (0.8g/kg)  Weight Used For Protein Calculations: 97.8 kg (215 lb 9.8 oz)     Estimated Fluid Requirement Method: RDA Method  RDA Method (mL):          Nutrition Prescription Ordered    Current Diet Order: Soft and bite sized  Oral Nutrition Supplement: Boost Plus BID    Evaluation of Received Nutrient/Fluid Intake    I/O: 298/950  Energy Calories Required: not meeting needs  Protein Required: not meeting needs  Fluid  Required: not meeting needs  Comments: LBM 10/28  % Intake of Estimated Energy Needs: 25 - 50 %  % Meal Intake: 25 - 50 %    Nutrition Risk    Level of Risk/Frequency of Follow-up:  (2xweekly)     Monitor and Evaluation    Food and Nutrient Intake: food and beverage intake  Food and Nutrient Adminstration: diet order  Physical Activity and Function: nutrition-related ADLs and IADLs  Anthropometric Measurements: weight  Biochemical Data, Medical Tests and Procedures: electrolyte and renal panel  Nutrition-Focused Physical Findings: overall appearance     Nutrition Follow-Up    RD Follow-up?: Yes

## 2023-10-29 NOTE — SUBJECTIVE & OBJECTIVE
Interval History: denies pain at inguinal lymph nodes site    Review of Systems   All other systems reviewed and are negative.    Objective:     Vital Signs (Most Recent):  Temp: 98.1 °F (36.7 °C) (10/29/23 1132)  Pulse: 62 (10/29/23 1132)  Resp: 18 (10/29/23 1132)  BP: (!) 122/59 (10/29/23 1132)  SpO2: 97 % (10/29/23 1132) Vital Signs (24h Range):  Temp:  [97.7 °F (36.5 °C)-98.3 °F (36.8 °C)] 98.1 °F (36.7 °C)  Pulse:  [57-80] 62  Resp:  [17-18] 18  SpO2:  [94 %-97 %] 97 %  BP: (122-137)/(59-77) 122/59     Weight: 105.3 kg (232 lb 2.3 oz)  Body mass index is 31.48 kg/m².    Intake/Output Summary (Last 24 hours) at 10/29/2023 1540  Last data filed at 10/29/2023 1405  Gross per 24 hour   Intake 428 ml   Output 950 ml   Net -522 ml         Physical Exam  Vitals and nursing note reviewed.   Constitutional:       General: He is not in acute distress.     Appearance: He is well-developed.   HENT:      Head: Normocephalic and atraumatic.      Nose: Nose normal.   Eyes:      Conjunctiva/sclera: Conjunctivae normal.   Cardiovascular:      Rate and Rhythm: Normal rate and regular rhythm.      Heart sounds: Normal heart sounds. No murmur heard.  Pulmonary:      Effort: Pulmonary effort is normal.      Breath sounds: Normal breath sounds. No wheezing.   Abdominal:      General: Bowel sounds are normal.      Palpations: Abdomen is soft. There is no mass.      Tenderness: There is no abdominal tenderness. There is no guarding or rebound.      Comments: Swollen R inguinal lymph nodes with erythema and edema   Musculoskeletal:         General: Normal range of motion.      Cervical back: Normal range of motion and neck supple.   Skin:     General: Skin is warm and dry.      Findings: No rash.   Neurological:      Mental Status: He is alert.   Psychiatric:         Behavior: Behavior normal.             Significant Labs: All pertinent labs within the past 24 hours have been reviewed.  BMP:   Recent Labs   Lab 10/29/23  0515          K 3.2*      CO2 32*   BUN 12   CREATININE 0.7   CALCIUM 9.6     CBC:   Recent Labs   Lab 10/29/23  0937   WBC 22.01*   HGB 8.7*   HCT 28.1*          Significant Imaging: I have reviewed all pertinent imaging results/findings within the past 24 hours.  I have reviewed and interpreted all pertinent imaging results/findings within the past 24 hours.

## 2023-10-29 NOTE — PT/OT/SLP PROGRESS
Speech Language Pathology Treatment    Patient Name:  Aquiles Montana   MRN:  [id number]  Admitting Diagnosis: Sepsis    Recommendations:                 General Recommendations:  Dysphagia therapy and Cognitive-linguistic therapy  Diet recommendations:  Easy to Chew Diet - IDDSI Level 7, Liquid Diet Level: Thin liquids - IDDSI Level 0   Aspiration Precautions: 1 bite/sip at a time, Alternating bites/sips, Assistance with meals, Frequent oral care, HOB to 90 degrees, Meds whole 1 at a time, Monitor for s/s of aspiration, Remain upright 30 minutes post meal, Small bites/sips, and Standard aspiration precautions   General Precautions: Standard, fall  Communication strategies:  none    Assessment:     Aquiles Montana is a 83 y.o. male admitted with sepsis who presents with an SLP diagnosis of confusion and dysphagia. Patient with ongoing cognitive deficits, decreased intelligibility and cleared for diet advancement. ST recommending ETC/thin. ST will follow.     Subjective     ST entered the patients room with the patient laying in bed, awake. The patient was able to greet ST and was agreeable to tx. Patients wife was present at the bedside.   Patient goals: none stated     Pain/Comfort:  Pain Rating 1: 0/10    Respiratory Status: Room air    Objective:     Has the patient been evaluated by SLP for swallowing?   Yes  Keep patient NPO? No     ST repositioned the patient in bed to a 90 degree angle. Patient stating he had just finished consuming breakfast without difficulties. Patient agreeable to only a small amount of po trials with ST. ST provided total A and offered the patient single straw sips of thin liquids and bite sized pieces of sarah beth crackers. The patient was able to consume trials without overt s/s of aspiration. ST recommending a diet advancement to regular (easy to chew) with thin liquids.     Patient presenting with decreased intelligibility - this did slightly improve with sips of water. Patients wife  stating speech is not currently at baseline. ST noted patient with ongoing confusion with is not baseline for this patient. ST will continue to follow.     Goals:   Multidisciplinary Problems       SLP Goals          Problem: SLP    Goal Priority Disciplines Outcome   SLP Goal     SLP Ongoing, Progressing   Description: Short Term Goals:  1.Pt will participate in swallow eval to determine safest diet level.  2. Pt will tolerate mech soft and thin liquids with no outward dysphagia signs. -- MET 10/29  3. Patient will successfully participate in uzdkyr-uwkzksso-ykpxqclnt evaluation to further assess for any communication impairments.  4. Patient will tolerate regular (easy to chew) consistency/thin liquids po diet with no overt s/s of aspiration.                        Plan:     Patient to be seen:  3 x/week   Plan of Care expires:  11/24/23  Plan of Care reviewed with:  patient, spouse   SLP Follow-Up:  Yes       Discharge recommendations:  Moderate Intensity Therapy   Barriers to Discharge:  None    Time Tracking:     SLP Treatment Date:   10/29/23  Speech Start Time:  1034  Speech Stop Time:  1048     Speech Total Time (min):  14 min    Billable Minutes: Treatment Swallowing Dysfunction 14    10/29/2023    Maxim Thurman M.S., CCC-SLP   Speech Language Pathologist

## 2023-10-30 LAB
ALBUMIN SERPL BCP-MCNC: 2.1 G/DL (ref 3.5–5.2)
ANION GAP SERPL CALC-SCNC: 6 MMOL/L (ref 8–16)
ANISOCYTOSIS BLD QL SMEAR: SLIGHT
BACTERIA #/AREA URNS HPF: ABNORMAL /HPF
BACTERIA BLD CULT: NORMAL
BACTERIA BLD CULT: NORMAL
BASOPHILS # BLD AUTO: 0.08 K/UL (ref 0–0.2)
BASOPHILS NFR BLD: 0.3 % (ref 0–1.9)
BILIRUB UR QL STRIP: NEGATIVE
BUN SERPL-MCNC: 13 MG/DL (ref 8–23)
CALCIUM SERPL-MCNC: 9.1 MG/DL (ref 8.7–10.5)
CHLORIDE SERPL-SCNC: 101 MMOL/L (ref 95–110)
CLARITY UR: ABNORMAL
CO2 SERPL-SCNC: 30 MMOL/L (ref 23–29)
COLOR UR: YELLOW
CREAT SERPL-MCNC: 0.8 MG/DL (ref 0.5–1.4)
DIFFERENTIAL METHOD: ABNORMAL
EOSINOPHIL # BLD AUTO: 0.2 K/UL (ref 0–0.5)
EOSINOPHIL NFR BLD: 0.9 % (ref 0–8)
ERYTHROCYTE [DISTWIDTH] IN BLOOD BY AUTOMATED COUNT: 15.9 % (ref 11.5–14.5)
EST. GFR  (NO RACE VARIABLE): >60 ML/MIN/1.73 M^2
GLUCOSE SERPL-MCNC: 132 MG/DL (ref 70–110)
GLUCOSE UR QL STRIP: ABNORMAL
HCT VFR BLD AUTO: 29.3 % (ref 40–54)
HGB BLD-MCNC: 9.3 G/DL (ref 14–18)
HGB UR QL STRIP: ABNORMAL
HYALINE CASTS #/AREA URNS LPF: 0 /LPF
HYPOCHROMIA BLD QL SMEAR: ABNORMAL
IMM GRANULOCYTES # BLD AUTO: 0.17 K/UL (ref 0–0.04)
IMM GRANULOCYTES NFR BLD AUTO: 0.7 % (ref 0–0.5)
INTERPRETATION SERPL IFE-IMP: NORMAL
KETONES UR QL STRIP: NEGATIVE
LEUKOCYTE ESTERASE UR QL STRIP: NEGATIVE
LYMPHOCYTES # BLD AUTO: 0.9 K/UL (ref 1–4.8)
LYMPHOCYTES NFR BLD: 3.7 % (ref 18–48)
MAGNESIUM SERPL-MCNC: 1.7 MG/DL (ref 1.6–2.6)
MCH RBC QN AUTO: 29.5 PG (ref 27–31)
MCHC RBC AUTO-ENTMCNC: 31.7 G/DL (ref 32–36)
MCV RBC AUTO: 93 FL (ref 82–98)
MICROSCOPIC COMMENT: ABNORMAL
MONOCYTES # BLD AUTO: 1.4 K/UL (ref 0.3–1)
MONOCYTES NFR BLD: 5.8 % (ref 4–15)
NEUTROPHILS # BLD AUTO: 20.9 K/UL (ref 1.8–7.7)
NEUTROPHILS NFR BLD: 89.3 % (ref 38–73)
NITRITE UR QL STRIP: NEGATIVE
NRBC BLD-RTO: 0 /100 WBC
OVALOCYTES BLD QL SMEAR: ABNORMAL
PH UR STRIP: 6 [PH] (ref 5–8)
PHOSPHATE SERPL-MCNC: 1.4 MG/DL (ref 2.7–4.5)
PHOSPHATE SERPL-MCNC: 1.4 MG/DL (ref 2.7–4.5)
PLATELET # BLD AUTO: 178 K/UL (ref 150–450)
PLATELET BLD QL SMEAR: ABNORMAL
PMV BLD AUTO: 10.6 FL (ref 9.2–12.9)
POCT GLUCOSE: 109 MG/DL (ref 70–110)
POCT GLUCOSE: 114 MG/DL (ref 70–110)
POCT GLUCOSE: 132 MG/DL (ref 70–110)
POCT GLUCOSE: 141 MG/DL (ref 70–110)
POIKILOCYTOSIS BLD QL SMEAR: SLIGHT
POTASSIUM SERPL-SCNC: 3.5 MMOL/L (ref 3.5–5.1)
PROT UR QL STRIP: ABNORMAL
RBC # BLD AUTO: 3.15 M/UL (ref 4.6–6.2)
RBC #/AREA URNS HPF: 7 /HPF (ref 0–4)
SODIUM SERPL-SCNC: 137 MMOL/L (ref 136–145)
SP GR UR STRIP: >1.03 (ref 1–1.03)
URATE CRY URNS QL MICRO: ABNORMAL
URN SPEC COLLECT METH UR: ABNORMAL
UROBILINOGEN UR STRIP-ACNC: NEGATIVE EU/DL
VANCOMYCIN TROUGH SERPL-MCNC: 12.7 UG/ML (ref 10–22)
WBC # BLD AUTO: 23.48 K/UL (ref 3.9–12.7)
WBC #/AREA URNS HPF: 0 /HPF (ref 0–5)

## 2023-10-30 PROCEDURE — 25000003 PHARM REV CODE 250: Performed by: FAMILY MEDICINE

## 2023-10-30 PROCEDURE — 99497 ADVNCD CARE PLAN 30 MIN: CPT | Mod: 25,,, | Performed by: STUDENT IN AN ORGANIZED HEALTH CARE EDUCATION/TRAINING PROGRAM

## 2023-10-30 PROCEDURE — 80069 RENAL FUNCTION PANEL: CPT | Performed by: STUDENT IN AN ORGANIZED HEALTH CARE EDUCATION/TRAINING PROGRAM

## 2023-10-30 PROCEDURE — 99497 PR ADVNCD CARE PLAN 30 MIN: ICD-10-PCS | Mod: 25,,, | Performed by: STUDENT IN AN ORGANIZED HEALTH CARE EDUCATION/TRAINING PROGRAM

## 2023-10-30 PROCEDURE — 97530 THERAPEUTIC ACTIVITIES: CPT | Mod: CO

## 2023-10-30 PROCEDURE — 99223 PR INITIAL HOSPITAL CARE,LEVL III: ICD-10-PCS | Mod: ,,, | Performed by: STUDENT IN AN ORGANIZED HEALTH CARE EDUCATION/TRAINING PROGRAM

## 2023-10-30 PROCEDURE — 25000003 PHARM REV CODE 250: Performed by: INTERNAL MEDICINE

## 2023-10-30 PROCEDURE — 99498 ADVNCD CARE PLAN ADDL 30 MIN: CPT | Mod: ,,, | Performed by: STUDENT IN AN ORGANIZED HEALTH CARE EDUCATION/TRAINING PROGRAM

## 2023-10-30 PROCEDURE — 63600175 PHARM REV CODE 636 W HCPCS: Performed by: INTERNAL MEDICINE

## 2023-10-30 PROCEDURE — 94761 N-INVAS EAR/PLS OXIMETRY MLT: CPT

## 2023-10-30 PROCEDURE — 92526 ORAL FUNCTION THERAPY: CPT

## 2023-10-30 PROCEDURE — 80202 ASSAY OF VANCOMYCIN: CPT | Performed by: INTERNAL MEDICINE

## 2023-10-30 PROCEDURE — 11000001 HC ACUTE MED/SURG PRIVATE ROOM

## 2023-10-30 PROCEDURE — 63600175 PHARM REV CODE 636 W HCPCS: Performed by: NURSE PRACTITIONER

## 2023-10-30 PROCEDURE — 81000 URINALYSIS NONAUTO W/SCOPE: CPT | Performed by: INTERNAL MEDICINE

## 2023-10-30 PROCEDURE — 99223 1ST HOSP IP/OBS HIGH 75: CPT | Mod: ,,, | Performed by: STUDENT IN AN ORGANIZED HEALTH CARE EDUCATION/TRAINING PROGRAM

## 2023-10-30 PROCEDURE — 87070 CULTURE OTHR SPECIMN AEROBIC: CPT | Performed by: FAMILY MEDICINE

## 2023-10-30 PROCEDURE — 63600175 PHARM REV CODE 636 W HCPCS: Performed by: FAMILY MEDICINE

## 2023-10-30 PROCEDURE — 99498 PR ADVNCD CARE PLAN ADDL 30 MIN: ICD-10-PCS | Mod: ,,, | Performed by: STUDENT IN AN ORGANIZED HEALTH CARE EDUCATION/TRAINING PROGRAM

## 2023-10-30 PROCEDURE — 25000003 PHARM REV CODE 250: Performed by: STUDENT IN AN ORGANIZED HEALTH CARE EDUCATION/TRAINING PROGRAM

## 2023-10-30 PROCEDURE — A4216 STERILE WATER/SALINE, 10 ML: HCPCS | Performed by: FAMILY MEDICINE

## 2023-10-30 PROCEDURE — 83735 ASSAY OF MAGNESIUM: CPT | Performed by: INTERNAL MEDICINE

## 2023-10-30 PROCEDURE — C9113 INJ PANTOPRAZOLE SODIUM, VIA: HCPCS | Performed by: NURSE PRACTITIONER

## 2023-10-30 PROCEDURE — 97112 NEUROMUSCULAR REEDUCATION: CPT | Mod: CO

## 2023-10-30 PROCEDURE — 85025 COMPLETE CBC W/AUTO DIFF WBC: CPT | Performed by: FAMILY MEDICINE

## 2023-10-30 PROCEDURE — 27000221 HC OXYGEN, UP TO 24 HOURS

## 2023-10-30 PROCEDURE — 99900035 HC TECH TIME PER 15 MIN (STAT)

## 2023-10-30 PROCEDURE — 25000003 PHARM REV CODE 250: Performed by: NURSE PRACTITIONER

## 2023-10-30 RX ORDER — SODIUM CHLORIDE 9 MG/ML
INJECTION, SOLUTION INTRAVENOUS CONTINUOUS
Status: DISCONTINUED | OUTPATIENT
Start: 2023-10-30 | End: 2023-11-03 | Stop reason: HOSPADM

## 2023-10-30 RX ORDER — MAGNESIUM SULFATE HEPTAHYDRATE 40 MG/ML
2 INJECTION, SOLUTION INTRAVENOUS ONCE
Status: COMPLETED | OUTPATIENT
Start: 2023-10-30 | End: 2023-10-30

## 2023-10-30 RX ORDER — MUPIROCIN 20 MG/G
OINTMENT TOPICAL
Status: DISCONTINUED | OUTPATIENT
Start: 2023-10-30 | End: 2023-11-03 | Stop reason: HOSPADM

## 2023-10-30 RX ORDER — SODIUM CHLORIDE AND POTASSIUM CHLORIDE 300; 900 MG/100ML; MG/100ML
INJECTION, SOLUTION INTRAVENOUS CONTINUOUS
Status: DISPENSED | OUTPATIENT
Start: 2023-10-30 | End: 2023-10-31

## 2023-10-30 RX ORDER — TRAZODONE HYDROCHLORIDE 50 MG/1
50 TABLET ORAL NIGHTLY
Status: DISCONTINUED | OUTPATIENT
Start: 2023-10-30 | End: 2023-11-03 | Stop reason: HOSPADM

## 2023-10-30 RX ADMIN — MUPIROCIN: 20 OINTMENT TOPICAL at 10:10

## 2023-10-30 RX ADMIN — MUPIROCIN: 20 OINTMENT TOPICAL at 09:10

## 2023-10-30 RX ADMIN — SODIUM CHLORIDE, PRESERVATIVE FREE 10 ML: 5 INJECTION INTRAVENOUS at 12:10

## 2023-10-30 RX ADMIN — SODIUM CHLORIDE, PRESERVATIVE FREE 10 ML: 5 INJECTION INTRAVENOUS at 06:10

## 2023-10-30 RX ADMIN — VANCOMYCIN HYDROCHLORIDE 1500 MG: 1.5 INJECTION, POWDER, LYOPHILIZED, FOR SOLUTION INTRAVENOUS at 01:10

## 2023-10-30 RX ADMIN — Medication 6 MG: at 01:10

## 2023-10-30 RX ADMIN — HEPARIN SODIUM 5000 UNITS: 5000 INJECTION INTRAVENOUS; SUBCUTANEOUS at 06:10

## 2023-10-30 RX ADMIN — PIPERACILLIN AND TAZOBACTAM 4.5 G: 4; .5 INJECTION, POWDER, LYOPHILIZED, FOR SOLUTION INTRAVENOUS; PARENTERAL at 10:10

## 2023-10-30 RX ADMIN — SODIUM CHLORIDE, PRESERVATIVE FREE 10 ML: 5 INJECTION INTRAVENOUS at 11:10

## 2023-10-30 RX ADMIN — LACTOBACILLUS TAB 1 TABLET: TAB at 05:10

## 2023-10-30 RX ADMIN — MAGNESIUM SULFATE HEPTAHYDRATE 2 G: 40 INJECTION, SOLUTION INTRAVENOUS at 11:10

## 2023-10-30 RX ADMIN — POTASSIUM PHOSPHATE, MONOBASIC POTASSIUM PHOSPHATE, DIBASIC 30 MMOL: 224; 236 INJECTION, SOLUTION, CONCENTRATE INTRAVENOUS at 11:10

## 2023-10-30 RX ADMIN — POTASSIUM CHLORIDE 75 ML/HR: 2 INJECTION, SOLUTION, CONCENTRATE INTRAVENOUS at 10:10

## 2023-10-30 RX ADMIN — PANTOPRAZOLE SODIUM 40 MG: 40 INJECTION, POWDER, LYOPHILIZED, FOR SOLUTION INTRAVENOUS at 09:10

## 2023-10-30 RX ADMIN — HEPARIN SODIUM 5000 UNITS: 5000 INJECTION INTRAVENOUS; SUBCUTANEOUS at 09:10

## 2023-10-30 RX ADMIN — LACTOBACILLUS TAB 1 TABLET: TAB at 12:10

## 2023-10-30 RX ADMIN — PIPERACILLIN AND TAZOBACTAM 4.5 G: 4; .5 INJECTION, POWDER, LYOPHILIZED, FOR SOLUTION INTRAVENOUS; PARENTERAL at 11:10

## 2023-10-30 RX ADMIN — POTASSIUM CHLORIDE 75 ML/HR: 2 INJECTION, SOLUTION, CONCENTRATE INTRAVENOUS at 11:10

## 2023-10-30 RX ADMIN — HEPARIN SODIUM 5000 UNITS: 5000 INJECTION INTRAVENOUS; SUBCUTANEOUS at 01:10

## 2023-10-30 RX ADMIN — PIPERACILLIN AND TAZOBACTAM 4.5 G: 4; .5 INJECTION, POWDER, LYOPHILIZED, FOR SOLUTION INTRAVENOUS; PARENTERAL at 03:10

## 2023-10-30 RX ADMIN — DIGOXIN 0.12 MG: 125 TABLET ORAL at 10:10

## 2023-10-30 RX ADMIN — LATANOPROST 1 DROP: 50 SOLUTION OPHTHALMIC at 09:10

## 2023-10-30 RX ADMIN — METOPROLOL TARTRATE 25 MG: 25 TABLET, FILM COATED ORAL at 10:10

## 2023-10-30 RX ADMIN — PANTOPRAZOLE SODIUM 40 MG: 40 INJECTION, POWDER, LYOPHILIZED, FOR SOLUTION INTRAVENOUS at 10:10

## 2023-10-30 NOTE — PROGRESS NOTES
Surgery note;  /63 (BP Location: Right arm, Patient Position: Lying)   Pulse 82   Temp 97.6 °F (36.4 °C) (Oral)   Resp 20   Ht 6' (1.829 m)   Wt 105.3 kg (232 lb 2.3 oz)   SpO2 (!) 94%   BMI 31.48 kg/m²   I/O last 3 completed shifts:  In: 180 [P.O.:180]  Out: 1350 [Urine:1350]  No intake/output data recorded.    Recent Results (from the past 336 hour(s))   CBC auto differential    Collection Time: 10/30/23  3:36 AM   Result Value Ref Range    WBC 23.48 (H) 3.90 - 12.70 K/uL    Hemoglobin 9.3 (L) 14.0 - 18.0 g/dL    Hematocrit 29.3 (L) 40.0 - 54.0 %    Platelets 178 150 - 450 K/uL   CBC auto differential    Collection Time: 10/29/23  9:37 AM   Result Value Ref Range    WBC 22.01 (H) 3.90 - 12.70 K/uL    Hemoglobin 8.7 (L) 14.0 - 18.0 g/dL    Hematocrit 28.1 (L) 40.0 - 54.0 %    Platelets 170 150 - 450 K/uL   CBC with Automated Differential    Collection Time: 10/27/23  3:42 AM   Result Value Ref Range    WBC 23.71 (H) 3.90 - 12.70 K/uL    Hemoglobin 10.0 (L) 14.0 - 18.0 g/dL    Hematocrit 32.8 (L) 40.0 - 54.0 %    Platelets 202 150 - 450 K/uL   Discussed with PMD / Oncologist.  Will do needle aspiration I&D in am.

## 2023-10-30 NOTE — PROGRESS NOTES
Pharmacokinetic Assessment Follow Up: IV Vancomycin    Vancomycin serum concentration assessment(s):    The trough level was drawn correctly and can be used to guide therapy at this time. The measurement is within the desired definitive target range of 10 to 15 mcg/mL.    Vancomycin Regimen Plan:    Continue regimen to Vancomycin 1500 mg IV every 24 hours with next serum trough concentration measured at 11/1 prior to 3 dose on 0100    Drug levels (last 3 results):  Recent Labs   Lab Result Units 10/28/23  0054 10/30/23  0044   Vancomycin-Trough ug/mL 12.3 12.7       Pharmacy will continue to follow and monitor vancomycin.    Please contact pharmacy at extension [phone] for questions regarding this assessment.    Thank you for the consult,   Michelle Eaton       Patient brief summary:  Aquiles Montana is a 83 y.o. male initiated on antimicrobial therapy with IV Vancomycin for treatment of skin & soft tissue infection    The patient's current regimen is vanco 1500mg q24    Drug Allergies:   Review of patient's allergies indicates:  No Known Allergies    Actual Body Weight:   105kg    Renal Function:   Estimated Creatinine Clearance: 100.3 mL/min (based on SCr of 0.7 mg/dL).,     Dialysis Method (if applicable):  N/A    CBC (last 72 hours):  Recent Labs   Lab Result Units 10/27/23  0342 10/29/23  0937   WBC K/uL 23.71* 22.01*   Hemoglobin g/dL 10.0* 8.7*   Hematocrit % 32.8* 28.1*   Platelets K/uL 202 170   Gran % % 86.7* 88.0*   Lymph % % 4.8* 4.4*   Mono % % 6.5 6.2   Eosinophil % % 0.8 1.1   Basophil % % 0.5 0.3   Differential Method  Automated Automated       Metabolic Panel (last 72 hours):  Recent Labs   Lab Result Units 10/27/23  0342 10/28/23  0315 10/29/23  0515   Sodium mmol/L 141  141 141 143   Potassium mmol/L 3.3*  3.2* 3.5 3.2*   Chloride mmol/L 103  103 102 103   CO2 mmol/L 31*  31* 26 32*   Glucose mg/dL 97  102 90 106   BUN mg/dL 20  19 14 12   Creatinine mg/dL 0.9  0.9 0.8 0.7   Albumin g/dL 2.3*   2.3* 2.2* 2.0*   Total Bilirubin mg/dL 0.4  --   --    Alkaline Phosphatase U/L 92  --   --    AST U/L 15  --   --    ALT U/L 9*  --   --    Magnesium mg/dL 1.6  --   --    Phosphorus mg/dL 1.9*  1.8* 2.0* 1.3*       Vancomycin Administrations:  vancomycin given in the last 96 hours                     vancomycin 1,500 mg in dextrose 5 % (D5W) 250 mL IVPB (Vial-Mate) (mg) 1,500 mg New Bag 10/29/23 0147     1,500 mg New Bag 10/28/23 0214     1,500 mg New Bag 10/27/23 0129     1,500 mg New Bag 10/26/23 0142                    Microbiologic Results:  Microbiology Results (last 7 days)       Procedure Component Value Units Date/Time    Blood culture x two cultures. Draw prior to antibiotics. [[id number]] Collected: 10/24/23 2018    Order Status: Completed Specimen: Blood from Peripheral, Hand, Right Updated: 10/29/23 0612     Blood Culture, Routine No Growth to date      No Growth to date      No Growth to date      No Growth to date      No Growth to date    Narrative:      Aerobic and anaerobic    Blood culture x two cultures. Draw prior to antibiotics. [[id number]] Collected: 10/24/23 2019    Order Status: Completed Specimen: Blood from Peripheral, Antecubital, Left Updated: 10/29/23 0612     Blood Culture, Routine No Growth to date      No Growth to date      No Growth to date      No Growth to date      No Growth to date    Narrative:      Aerobic and anaerobic    Urine culture [[id number]] Collected: 10/25/23 0616    Order Status: Completed Specimen: Urine Updated: 10/26/23 1417     Urine Culture, Routine Multiple organisms isolated. None in predominance.  Repeat if      clinically necessary.    Narrative:      Specimen Source->Urine

## 2023-10-30 NOTE — PLAN OF CARE
Problem: Adult Inpatient Plan of Care  Goal: Plan of Care Review  Outcome: Ongoing, Progressing  Goal: Patient-Specific Goal (Individualized)  Outcome: Ongoing, Progressing  Goal: Absence of Hospital-Acquired Illness or Injury  Outcome: Ongoing, Progressing  Goal: Optimal Comfort and Wellbeing  Outcome: Ongoing, Progressing  Goal: Readiness for Transition of Care  Outcome: Ongoing, Progressing     Problem: Adjustment to Illness (Sepsis/Septic Shock)  Goal: Optimal Coping  Outcome: Ongoing, Progressing     Problem: Bleeding (Sepsis/Septic Shock)  Goal: Absence of Bleeding  Outcome: Ongoing, Progressing     Problem: Fall Injury Risk  Goal: Absence of Fall and Fall-Related Injury  Outcome: Ongoing, Progressing     Problem: Skin Injury Risk Increased  Goal: Skin Health and Integrity  Outcome: Ongoing, Progressing     Problem: Impaired Wound Healing  Goal: Optimal Wound Healing  Outcome: Ongoing, Progressing     Problem: Coping Ineffective  Goal: Effective Coping  Outcome: Ongoing, Progressing

## 2023-10-30 NOTE — PHYSICIAN QUERY
PT Name: Aquiles Montana  MR #: [id number]     Documentation Clarification      CDS: Radha Whitt RN          Contact information:[email or web address] or (cell) [phone]     This form is a permanent document in the medical record.     Query Date: October 30, 2023    By submitting this query, we are merely seeking further clarification of documentation. Please utilize your independent clinical judgment when addressing the question(s) below.    The Medical Record reflects the following:    Clinical Findings Location in Medical Records   Sepsis  This patient does have evidence of infective focus  My overall impression is sepsis.  Source: Skin and Soft Tissue (location right inguinal abscess)   -- UA pending to also assess for urinary source    Organ dysfunction indicated by Acute kidney injury and Encephalopathy    Encephalopathy, metabolic- Likely 2/2 infectious etiology    Abscess of right groin   HM PN 10/29    10/24/23 20:19 10/25/23 01:55   Lactate 1.6 1.1      10/24/23 15:41 10/24/23 20:19 10/25/23 09:38 10/26/23 03:22 10/27/23 03:42   WBC 26.41 (H) 25.67 (H) 29.46 (H) 24.77 (H) 23.71 (H)     Blood Cx's 10/24- NGTD  Urine Cx 10/25- NGTD   Lab Results    10/24: Temp 97.6, HR 78, RR 20, BP 96/68  10/25: Temp 98.4, HR 66, RR 18, /70  10/26: Temp 97.4, HR 91, RR 18, /60  10/28: Temp 97.3, HR 68, RR 19, /61   Vital Signs     Due to the conflicting clinical picture, please clinically validate the diagnosis of Sepsis.      If validated, please provide additional clinical support for the diagnosis.       [x   ] Above stated diagnosis is not confirmed and/or it has been ruled out     [   ] Above stated diagnosis is not confirmed and/or it has been ruled out, other diagnosis ruled in (please          specify):_______________     [   ] Above stated diagnosis is confirmed. Please specify clinical support (signs, symptoms, and treatment) for  the confirmed diagnosis: ____________________     [   ]  Other clarification (please specify): ___________________         Form No. [id number]

## 2023-10-30 NOTE — ASSESSMENT & PLAN NOTE
· Previously noted on MRI and CT scan 9/2023   1.  Bulky lymphadenopathy versus mass in the right inguinal region    2.  Enlarged right external iliac chain lymph nodes, likely metastatic    3.  No other findings of metastatic disease in the abdomen or pelvis    4.  Cholelithiasis   · Noted to be SCC on biopsy  · Per family, it is now more swollen, painful, and discolored since biopsy  · Hem/onc consult ordered regarding recommendations  · May benefit from IR vs general surgery evaluation  · CT pelvis showing necrotic LN  · Surgery on board and plans for Aspiration I&D on 10/31  · Cont on iv abx  · -ID consulted since leukocytosis could be reactive. Have not trended down with iv abx

## 2023-10-30 NOTE — PT/OT/SLP PROGRESS
Speech Language Pathology Treatment    Patient Name:  Aquiles Montana   MRN:  [id number]  Admitting Diagnosis: Sepsis    Recommendations:                 General Recommendations:  Dysphagia therapy and Cognitive-linguistic therapy  Diet recommendations:  Easy to Chew Diet - IDDSI Level 7, Liquid Diet Level: Thin liquids - IDDSI Level 0   Aspiration Precautions: 1 bite/sip at a time, Alternating bites/sips, Assistance with meals, and Small bites/sips   General Precautions: Standard, aspiration  Communication strategies:  none    Assessment:     Aquiles Montana is a 83 y.o. male with an SLP diagnosis of  dysphagia .  Patient remains with dysphagia and cognitive-linguistic deficits. Patient was able to answer yes/no questions. Patient refused PO administration of food and liquids for continued assessment of swallow. Patient with overall poor oral health, requiring oral care from Speech Therapist to reduce risk of pneumonia from aspiration. Patient participated in oral care given moderate tactile assistance to self-administer oral care.     Subjective     Speech Therapist entered room with patient laying in bed awake and family present. Speech Therapist notes patient to have questionable orientation of time, place, self, and situation due to decreased speech intelligibility from suspected lethargy.  Pain/Comfort:  Pain Rating 1: 0/10    Respiratory Status: Nasal cannula, flow 3 L/min    Objective:   Further assessment of patient's swallow safety and efficiency was attempted. However, patient refused PO intake of any food and water from Speech Therapist. Patient required oral care to reduce risk of potential aspiration pneumonia from oral bacteria. Patient allowed Speech Therapist administer oral care. Patient attempted self-administration of oral care with tactile assistance from Speech Therapist.     Has the patient been evaluated by SLP for swallowing?   Yes   Keep patient NPO?     Current Respiratory Status:  Nasal  Cannula, flow 3 L/min      Patient to remain on current diet of thin liquids (IDDSI 0) and easy chew food (IDDSI 7).    Goals:   Multidisciplinary Problems       SLP Goals          Problem: SLP    Goal Priority Disciplines Outcome   SLP Goal     SLP Ongoing, Progressing   Description: Short Term Goals:  1.Pt will participate in swallow eval to determine safest diet level.  2. Pt will tolerate mech soft and thin liquids with no outward dysphagia signs. -- MET 10/29  3. Patient will successfully participate in wdourg-deklckyd-jxuvunmyf evaluation to further assess for any communication impairments.  4. Patient will tolerate regular (easy to chew) consistency/thin liquids po diet with no overt s/s of aspiration.                        Plan:     Patient to be seen:  3 x/week   Plan of Care expires:  11/24/23  Plan of Care reviewed with:  patient, spouse, family   SLP Follow-Up:  Yes       Discharge recommendations:  Moderate Intensity Therapy   Barriers to Discharge:  Level of Skilled Assistance Needed during meals and Safety Awareness due to lethargy    Time Tracking:     SLP Treatment Date:   10/30/23  Speech Start Time:  1050  Speech Stop Time:  1110     Speech Total Time (min):  20 min    Billable Minutes: Treatment Swallowing Dysfunction 20    10/30/2023

## 2023-10-30 NOTE — PHYSICIAN QUERY
PT Name: Aquiles Montana  MR #: [id number]     DOCUMENTATION CLARIFICATION     CDS: Radha Whitt RN        Contact information:[email or web address] or (cell) [phone]     This form is a permanent document in the medical record.     Query Date: October 30, 2023    By submitting this query, we are merely seeking further clarification of documentation.  Please utilize your independent clinical judgment when addressing the question(s) below.  Provider, please provide the integumentary diagnosis related to the documentation of the R Heel:   The Medical Record contains the following:    Wound Care Consult 10/25    R heel- healing stage 3 pressure injury present on admit- 1.3x0.3x0.1cm- no drainage        - Nursing to apply waffle overlay to bed surface and heel boots to BLE to maintain pressure injury prevention interventions  -- Mepilex border to R shin and heel wounds to protect healing         The clinical guidelines noted are only a system guideline. It does not replace the providers clinical judgment.    Per the National Pressure Injury Advisory Panel:   A pressure injury is localized damage to the skin and underlying soft tissue usually over a bony prominence or related to a medical or other device. The injury can present as intact skin or an open ulcer and may be painful. The injury occurs as a result of intense and/or prolonged pressure or pressure in combination with shear. The tolerance of soft tissue for pressure and shear may also be affected by microclimate, nutrition, perfusion, co-morbidities and condition of the soft tissue.       Stage 1 Pressure Injury:  Intact skin with a localized area of non-blanchable erythema, which may appear differently in darkly pigmented skin. Color changes do not include purple or maroon discoloration; these may indicate deep tissue pressure injury.    Stage 2 Pressure Injury:  Partial-thickness loss of skin with exposed dermis. The wound bed is viable, pink or red,  moist, and may also present as an intact or ruptured serum-filled blister.    Stage 3 Pressure Injury:  Full-thickness loss of skin, in which adipose (fat) is visible in the ulcer and granulation tissue and epibole (rolled wound edges) are often present. Slough and/or eschar may be visible. Undermining and tunneling may occur.    Stage 4 Pressure Injury:  Full-thickness skin and tissue loss with exposed or directly palpable fascia, muscle, tendon, ligament, cartilage or bone in the ulcer. Slough and/or eschar may be visible. Epibole (rolled edges), undermining and/or tunneling often occur.    Unstageable Pressure Injury:  Full-thickness skin and tissue loss in which the extent of tissue damage within the ulcer cannot be confirmed because it is obscured by slough or eschar. If slough or eschar is removed, a Stage 3 or Stage 4 pressure injury will be revealed.    Deep Tissue Pressure Injury:  Intact or non-intact skin with localized area of persistent non-blanchable deep red, maroon, purple discoloration or epidermal separation revealing a dark wound bed or blood filled blister. This injury results from intense and/or prolonged pressure and shear forces at the bone-muscle interface. The wound may evolve rapidly to reveal the actual extent of tissue injury, or may resolve without tissue loss. If necrotic tissue, subcutaneous tissue, granulation tissue, fascia, muscle or other underlying structures are visible, this indicates a full thickness pressure injury (Unstageable, Stage 3 or Stage 4). Do not use DTPI to describe vascular, traumatic, neuropathic, or dermatologic conditions.   Medical Device Related Pressure Injury: This describes an etiology. Medical device related pressure injuries result from the use of devices designed and applied for diagnostic or therapeutic purposes. The resultant pressure injury generally conforms to the pattern or shape of the device. The injury should be staged using the staging system.     Mucosal Membrane Pressure Injury: Mucosal membrane pressure injury is found on mucous membranes with a history of a medical device in use at the location of the injury. Due to the anatomy of the tissue these ulcers cannot be staged.       Provider, please provide the integumentary diagnosis related to the documentation of Right Heel:     [x   ] Pressure Injury/Decubitus Ulcer, Stage 3     [   ] Other Integumentary Diagnosis (please specify):______________         Please document in your progress notes daily for the duration of treatment until resolved and include in your discharge summary.    Reference:    KYLE Thao., TODD Urbano., Goldberg, M., MATHIEU Marsh., KYLE Purdy, & TONY Perez. (2016). Revised National Pressure Ulcer Advisory Panel Pressure Injury Staging System: Revised Pressure Injury Staging System. J Wound Ostomy Continence Nurs, 43(6), 585-597. doi:10.1097/won.[id number]    Form No.[id number]

## 2023-10-30 NOTE — CONSULTS
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 Post-op course was c/b residual lymphedema of the RLE and a pressure injury of the LLE which has been slow to heal and followed by wound care for 3 years.    Pt recovered well and had been in his USOH (ambulating with walker, making clinic appts) until 9/10/2023 when pt presented to OSH ([facility] in [place]) for CAP and cellulitis of the right groin with a firm underlying mass. 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 Family states they were informed that pathologic analysis indicated pt was a candidate for immunotherapy and they were referred to Dr. Robin for outpt oncology f/u at this facility.    Pt was due to met with Dr. Robin on 10/20/23 however he was unable to ambulate due to global weakness and RLE pain, both of which had been progressing over the preceding 4 weeks along with worsening edema and pain at the R groin c/f postprocedural abscess and a new stage 1 pressure injury of the sacrum. After the missed clinic appt pt had minimal oral intake and became progressively lethargic.    Chief Complaint Leading to Admission    Pt's family brought him to his PCP (Dr. Hernandez) on 10/24 due to worsening fatigue and confusion and outpt labs drawn on that date were notable for FERNANDO (Cr 1.3 / baseline 0.7), hypercalcemia (corrected to 13.8) and leukocytosis (26k).    After this appt pt became combative and nonverbal and was BIBEMS. Admitted to  service for sepsis 2/2 R groin cellulitis and  started on Zometa and IVF for HyperCa++. Continued on IVF over the weekend with normalization of Cr and Ca++. Pt is now interactive and verbal though not yet participatory with PT and speech remains garbled.    Palliative has been consulted for goals of care.    At time of interview pt is awake, alert, oriented to name only. He denies active pain and has a full affect, occasionally joking, with goal oriented thought processes but persistently garbled speech. Volume exam is significant for dry oral mucosa and poor skin turgor with tenting. Strength is grossly intact with poor coordination.    Multiple relatives are at bedside including pt's spouse, Jessica, and his son Len who is closely involved in his care. We discussed that despite the clean margins from pt's initial surgery it is clear that there was some residual disease leading to this unfortunate recurrence. Reassured loved ones that pt was a borderline candidate for chemo and 4 years of remission is a good surgical outcome that may well represent a better duration and quality of life than pt may have enjoyed if he were to have attempted chemo.    Family agree that pt is a poor candidate for chemo and state they were already advised that immunotherapy would be much more appropriate. Unfortunately pt's performance status is ECOG 4 at this time and he has yet to participate fully with PT due to lethargy despite his multiple metabolic dyscrasias responding well to IVF and bisphosphonate tx. He will need to regain some degree of ambulation to qualify for immunotherapy and is likely to warrant SNF placement for rehab to optimize pt for outpt oncology f/u and potentially palliative immunotherapy.    Family states pt has exhausted his SNF days for the year and are aware that pt will have a copay for rehab placement. He remains in hypoactive delirium and family reports he was much more lucid yesterday but did not sleep overnight and is now somnolent. We discussed the  "waxing and waning nature of hospital delirium, encouraged family presence at bedside for reorientation, and offered an increase in pt's qhs trazodone to maintain a regular day/night cycle; family agrees.    Confirmed DNR status and gave anticipatory guidance to family that pt is progressing with supportive care, albeit slowly, and is not yet medically optimized given he remains clinically hypovolemic which is implicated in both his weakness/lethargy and his dry mouth/slurred speech. Family plans to encourage oral fluid intake which is very appropriate.    Family remains hopeful for restorative care at this time and accepts that if pt cannot rehab sufficiently to qualify for immunotherapy then he is a candidate for home hospice care. TBD.    Past Medical History:   Diagnosis Date    Hypertension     Urinary tract infection      Past Surgical History:   Procedure Laterality Date    hand sugery      PROSTATE SURGERY      "Rotor rooter- opened up"     Family History   Problem Relation Age of Onset    Hypertension Father     Hypertension Mother     No Known Problems Sister     Arthritis Son     Prostate cancer Neg Hx     Kidney disease Neg Hx      Social History     Tobacco Use    Smoking status: Never     Passive exposure: Never    Smokeless tobacco: Never   Substance Use Topics    Alcohol use: No     Alcohol/week: 0.0 standard drinks of alcohol    Drug use: No     Mental Status: Disoriented, Dementia    ECOG Performance Status Grade: 4 - Completely disabled    Review of Systems:  Review of systems not obtained due to patient factors AMS.    Review of Symptoms      Symptom Assessment (ESAS 0-10 Scale)  Unable to complete assessment due to Mental status change     CAM / Delirium:  Positive  Constipation:  Positive  Diarrhea:  Negative      Pain Assessment in Advanced Demential Scale (PAINAD)   Breathing - Independent of vocalization:  0  Negative vocalization:  0  Facial expression:  0  Body language:  1  Consolability:  " 0  Total:  1    Living Arrangements:  Lives in home and Lives with family    Psychosocial/Cultural:   See Palliative Psychosocial Note: No  Social Issues Identified: Coping deficit pt/family and New Diagnosis/Trauma  Bereavement Risk: Yes: Close or dependent relationship to the  person  Caregiver Needs Discussed. Caregiver Distress: Yes: Need for respite  Cultural: No specific concerns  **Primary  to Follow**  Palliative Care  Consult: No    Spiritual:  F - Katie and Belief:  Uatsdin  I - Importance:  Low  C - Community:  Home Shinto  A - Address in Care:  Sacramental visits prn     Time-Based Charting:  Yes  Chart Review: 33 minutes  Face to Face: 17 minutes  Symptom Assessment: 12 minutes  Coordination of Care: 9 minutes  Discharge Plannin minutes  Advance Care Plannin minutes  Goals of Care: 40 minutes    Total Time Spent: 127 minutes      Advance Care Planning   Advance Directives:   Living Will: No        Oral Declaration: No    [place]POST: No    Do Not Resuscitate Status: Yes    Medical Power of : No    Agent's Name:  Jessica Montana (spouse)   Agent's Contact Number:  [phone]    Decision Making:  Family answered questions and Patient unable to communicate due to disease severity/cognitive impairment  Goals of Care: The family endorses that what is most important right now is to focus on spending time at home, avoiding the hospital, remaining as independent as possible, symptom/pain control, and improvement in condition but with limits to invasive therapies    Accordingly, we have decided that the best plan to meet the patient's goals includes continuing with treatment         OBJECTIVE:     Physical Exam  Constitutional:       General: He is not in acute distress.     Appearance: He is ill-appearing. He is not toxic-appearing.   HENT:      Head: Normocephalic and atraumatic.      Mouth/Throat:      Mouth: Mucous membranes are dry.      Pharynx: No  oropharyngeal exudate or posterior oropharyngeal erythema.   Eyes:      Extraocular Movements: Extraocular movements intact.      Pupils: Pupils are equal, round, and reactive to light.   Cardiovascular:      Rate and Rhythm: Normal rate and regular rhythm.      Pulses: Normal pulses.      Heart sounds: Normal heart sounds. No murmur heard.  Pulmonary:      Effort: Pulmonary effort is normal.      Breath sounds: Normal breath sounds. No wheezing or rales.   Abdominal:      General: Abdomen is flat. There is no distension.      Palpations: Abdomen is soft.      Tenderness: There is no abdominal tenderness.   Musculoskeletal:         General: No swelling, tenderness or deformity. Normal range of motion.      Right lower leg: No edema.      Left lower leg: No edema.   Skin:     Coloration: Skin is pale.      Findings: Bruising (Large iatrogenic ecchymoses) and lesion (old wound to R shin, small ulcer to LLE, bradley cellulitis to R inguinal crease overyling firm fixed mass) present.   Neurological:      Mental Status: He is alert. He is disoriented.      GCS: GCS eye subscore is 4. GCS verbal subscore is 4. GCS motor subscore is 6.      Cranial Nerves: Dysarthria present. No cranial nerve deficit or facial asymmetry.      Motor: Weakness present. No tremor, atrophy or abnormal muscle tone.   Psychiatric:         Attention and Perception: Attention normal.         Mood and Affect: Mood and affect normal.         Speech: Speech is delayed and slurred. Speech is not rapid and pressured or tangential.         Behavior: Behavior is slowed. Behavior is not agitated, aggressive, withdrawn, hyperactive or combative. Behavior is cooperative.         Cognition and Memory: Cognition is impaired. Memory is impaired.         Judgment: Judgment normal.       ASSESSMENT/PLAN:     83M with PMH of prostate CA and BCC both in remission; cutaneous SCCa of the RLE surgically excised in 2019 and now with recent recurrence as a large  malignant confluence of lymph nodes within the right groin confirmed recurrent SCCa on biopsy last month at OSH. Post-biopsy pt has had worsening pain and cellulitis at the R groin suspicious for underlying abscess and has had severely limited mobility leading to early pressure injuries and missed oncology f/u. Admitted to  service for AMS and FERNANDO 2/2 malignant hypercalcemia. Palliative consulted for goals of care.    Metabolic dyscrasias are much improved with IVF but pt remains in hypoactive delirium and will need substantial improvement to permit family wishes for palliative immunotherapy. Pt still appears clinically hypovolemic and we recommend continued IVF and daily attempts at oral rehydration. His speech and rehab potential are likely to remain compromised until euvolemia is restored. Family wishes for continued attempts to correct this reversible cause of lethargy and AMS before concluding pt is not a candidate for targeted cancer directed treatments. Overall prognosis appears poor but not yet unsalvageable. If pt remains at this poor plateau by the end of the week and/or pt is not a rehab candidate he is probably most appropriate for discharge to home with hospice.     Of note pt can maintain at least one visit with oncology while under hospice to discuss any treatment options, and withdraw from hospice if there is a compelling plan for treatment.    Recommendations:  Medical: continue IVF until volume status improves  Symptom Management:  # Hospital delirium  - ordered delirium precautions  - lights on in daytime / off at night please  - c/w melatonin 6mg qhs dayne with additional 6mg qhs prn  - increased trazodone from 25 -> 50mg qhs (ordered by writer)  Psychosocial: presently incapacitated 2/2 hospital delirium  Legal: spouse, Jessica holds default legal HCPOA; no ACP docs  Prognosis: < 6 months for recurrent SCCa with precipitous functional decline and qualified for hospice at any point    Benjamín Baxter,  MD  Hospice and Palliative Medicine  Palliative Care Pager: [phone]    Advance Care Planning     Date: 10/30/2023    Code Status  In light of the patients advanced and life limiting illness,I engaged the the family in a voluntary conversation about the patient's preferences for care  at the very end of life. The patient wishes to have a natural, peaceful death.  Along those lines, the patient does not wish to have CPR or other invasive treatments performed when his heart and/or breathing stops. I communicated to the family that a DNR order would be placed in his medical record to reflect this preference.  I spent a total of 49 minutes engaging the patient in this advance care planning discussion.       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  We did not specifically address the patient's likely prognosis, which is poor.  We explored the patient's values and preferences for future care.  The family endorses that what is most important right now is to focus on remaining as independent as possible, symptom/pain control, and improvement in condition but with limits to invasive therapies    Accordingly, we have decided that the best plan to meet the patient's goals includes continuing with treatment    I did explain the role for hospice care at this stage of the patient's illness, including its ability to help the patient live with the best quality of life possible.  We will not be making a hospice referral at this time.    I spent a total of 49 minutes engaging the patient in this advance care planning discussion.

## 2023-10-30 NOTE — PT/OT/SLP PROGRESS
Occupational Therapy   Treatment    Name: Aquiles Montana  MRN: [id number]  Admitting Diagnosis:  Sepsis       Recommendations:     Discharge Recommendations: Moderate Intensity Therapy -- 24/7 supervision/assistance  Discharge Equipment Recommendations:  shower chair  Barriers to discharge:   (significantly increased burden of care; increased falls risk)    Assessment:     Aquiles Montana is a 83 y.o. male with a medical diagnosis of Sepsis.  Performance deficits affecting function are weakness, impaired endurance, impaired self care skills, impaired functional mobility, gait instability, impaired balance, decreased coordination, decreased upper extremity function, decreased lower extremity function, decreased safety awareness, pain, impaired cognition, decreased ROM, impaired coordination, impaired skin, impaired cardiopulmonary response to activity.     Rehab Prognosis:  Poor; patient would benefit from acute skilled OT services to address these deficits and reach maximum level of function.       Plan:     Patient to be seen 3 x/week (spoke /c OTR re: reducing frequency to 3x/week) to address the above listed problems via self-care/home management, therapeutic activities, therapeutic exercises  Plan of Care Expires: 11/26/23  Plan of Care Reviewed with: patient, spouse, son    Subjective     Chief Complaint: difficulty understanding patient -- garbled, mumbled speech  Patient/Family Comments/goals: return to PLOF  Pain/Comfort:  Pain Rating 1:  (unable to rate)  Location - Side 1: Right  Location - Orientation 1: generalized  Location 1: groin  Pain Addressed 1: Reposition, Distraction, Cessation of Activity, Nurse notified  Pain Rating Post-Intervention 1:  (unable to rate)    Objective:     Communicated with: Christina phillips prior to session.  Patient found HOB elevated with bed alarm, telemetry, oxygen, PureWick upon OT entry to room.    General Precautions: Standard, aspiration, fall    Orthopedic  Precautions:N/A  Braces: N/A  Respiratory Status: Nasal cannula, flow 3 L/min    Bed Mobility:    Patient completed Rolling/Turning to Left with  dependent and 2 persons  Patient completed Rolling/Turning to Right with dependent and 2 persons  Patient completed Scooting/Bridging with dependent and 2 persons  Patient completed Supine to Sit with dependent and 2 persons  Patient completed Sit to Supine with dependent and 2 persons     Functional Mobility/Transfers:  Unable to safely attempt, as patient requiring DepA of 2 persons to maintain EOB sitting balance    Activities of Daily Living:  Toileting: hannah      [facility] 6 Click ADL: 7    Treatment & Education:  Educated on purpose/role of OT  Patient oftentimes resistive to therapist's guided movements to assist patient /c bed mobility  Occasional and brief outbursts of agitation during session  DepA of 2 persons for static sitting balance and to attempt R forearm WBing to reduce L posterolateral lean  Patient continues /c increased confusion, minimal cooperation, no command following, grabbing onto therapist's leg/hip for stability    Patient left HOB elevated with all lines intact, call button in reach, bed alarm on, nsg notified, and family present    GOALS:   Multidisciplinary Problems       Occupational Therapy Goals          Problem: Occupational Therapy    Goal Priority Disciplines Outcome Interventions   Occupational Therapy Goal     OT, PT/OT Ongoing, Progressing    Description: Goals to be met by: 11/26     Patient will increase functional independence with ADLs by performing:    Grooming while EOB with Stand-by Assistance.  Toileting from bedside commode with Moderate Assistance for hygiene and clothing management.   Toilet transfer to bedside commode with Minimal Assistance.                         Time Tracking:     OT Date of Treatment: 10/30/23  OT Start Time: 1438  OT Stop Time: 1512  OT Total Time (min): 34 min    Billable Minutes:Therapeutic  Activity 23  Neuromuscular Re-education 11    OT/ROMÁN: ROMÁN     Number of ROMÁN visits since last OT visit: 1    10/30/2023

## 2023-10-30 NOTE — CONSULTS
[facility] Infectious Diseases Consult Note    Primary Attending Physician: Veronica Clay MD     Reason for Consult:     Persistent leukocytosis despite broad-spectrum antibiotic therapy    Assessment (see problem list below):     Aquiles Montana is a 83 y.o. male with:  Persistent leukocytosis.  Patient with right groin lesion with draining pus.  Likely infected malignant lesion.  Potentially secondary to biopsy performed in September.  Currently on piperacillin tazobactam and vancomycin.  Right inguinal lesion with abscess formation.  According to family, this is developed after the biopsy  Chronic right lower extremity edema due to squamous cell carcinoma and metastatic lesions lymph nodes.  Patient has chronic lymphedema.  No evidence of cellulitis in right lower extremity.  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      Plan:     Can continue antibiotics until after surgery  Monitor superficial culture to see if we can adjust antibiotics  Monitor patient progress    Thank you for allowing us to participate in the care of this patient. Please contact me if you have any questions regarding this consult.    Asad Deluna  U ID  Pager: [phone]    Subjective:      History of Present Illness:  Aquiles Montana is a 83 y.o. male squamous cell skin cancer in his right leg status post excision and resultant lymphedema, hypertension, urinary tract infection in the past, BPH, prostate surgery, psoriasis, recent admission for cellulitis respiratory failure presents with altered mental status    Patient has been diagnosed with squamous cell carcinoma of the right leg approximately 4 years ago.  Patient had excision and resultant lymphedema in the right lower extremity with episodes of cellulitis that  were treated.    09/10/2023 patient [facility] upon transfer from [facility] for cellulitis of the right lower extremity.  Patient was treated with antibiotics including vancomycin and cefepime.  Patient a white count of 41 on transfer.  CT scan from [facility] showed lobulated soft tissue mass in the anterior aspect of the right thigh. Patient completed 14 day course of complicated cellulitis.  MRI of the right groin showed a necrotic mass in the right inguinal lymph node area.  Biopsy showed squamous cell carcinoma. Patient was also noted to have hypoxic respiratory failure with decreased lung volumes and new onset atrial fibrillation.  Patient was treated by Cardiology with metoprolol and amiodarone.  Patient also had melena and found to have esophagitis and duodenal ulcer on EGD.  Patient was treated for this as well.  Patient was discharged on 09/20/2023 with follow up.  Patient went to rehab and then discharged home.    After being discharged, the patient deteriorated with worsening mental status and weakness.  During that time, the patient was noted to have developed a right groin redness lesion at the biopsy site with tenderness. Patient came to [facility] for evaluation for hyper calcemia.    10/24/2023 patient was admitted for altered mental status described as nonverbal and aggressive behavior.  Upon evaluation, the patient was afebrile.  WBC 16.4.  Chest x-ray and head CT were unremarkable.  Blood cultures and urine cultures were unrevealing.  Patient was empirically started on vancomycin and cefepime after 1 dose of ceftriaxone.  10/25/2023 patient had abdominal CT that showed matted right inguinal adenopathy with right iliac lymph nodes.  Patient was evaluated by Oncology  10/26/2023 patient was changed to vancomycin and ceftriaxone.  10/29/2023 patient was changed to vancomycin and piperacillin tazobactam.  Patient is started having drainage from right  "groin lesion.    10/30/2023 patient Evaluated by surgery who is going to take him for incision and drainage on 10/31/2023.  Patient also noted to have foaming urine and urinalysis was sent which showed 0 white cells.  Superficial wound culture from right groin lesion sent.    Patient has had no fever during this admission but persistent leukocytosis    Infectious disease consulted for leukocytosis    Past Medical History:  Past Medical History:   Diagnosis Date    Hypertension     Urinary tract infection        Past Surgical History:  Past Surgical History:   Procedure Laterality Date    hand sugery      PROSTATE SURGERY      "Rotor rooter- opened up"       Allergies:  Review of patient's allergies indicates:  No Known Allergies    Medications:  Reviewed  Antibiotics  Vancomycin  Piperacillin tazobactam  Family History:  Family History   Problem Relation Age of Onset    Hypertension Father     Hypertension Mother     No Known Problems Sister     Arthritis Son     Prostate cancer Neg Hx     Kidney disease Neg Hx        Social History:  Social History     Tobacco Use    Smoking status: Never     Passive exposure: Never    Smokeless tobacco: Never   Substance Use Topics    Alcohol use: No     Alcohol/week: 0.0 standard drinks of alcohol    Drug use: No     Review of Systems   Unable to perform ROS: Mental status change       Objective:   Last 24 Hour Vital Signs:  BP  Min: 125/77  Max: 138/63  Temp  Av °F (36.7 °C)  Min: 97.5 °F (36.4 °C)  Max: 98.6 °F (37 °C)  Pulse  Av.8  Min: 65  Max: 88  Resp  Av  Min: 18  Max: 20  SpO2  Av.8 %  Min: 90 %  Max: 96 %  I/O last 3 completed shifts:  In: 180 [P.O.:180]  Out: 1350 [Urine:1350]    Physical Exam  Vitals and nursing note reviewed.   Constitutional:       Comments: Lethargic and garbled speech   HENT:      Head: Normocephalic and atraumatic.      Mouth/Throat:      Comments: Not cooperative with exam  Eyes:      Extraocular Movements: Extraocular " movements intact.      Conjunctiva/sclera: Conjunctivae normal.      Pupils: Pupils are equal, round, and reactive to light.   Cardiovascular:      Rate and Rhythm: Normal rate.      Heart sounds: No murmur heard.     No friction rub. No gallop.      Comments: Decreased pedal pulses  Pulmonary:      Comments: Decreased breath sounds at the bases  Abdominal:      Comments: Right lower quadrant tenderness near groin lesion.  Bowel sounds present.  Not otherwise tender   Genitourinary:     Comments: External catheter with foaming urine  Musculoskeletal:      Cervical back: Neck supple.      Right lower leg: Edema present.      Left lower leg: Edema present.      Comments: Right inguinal induration with fluctuant draining abscess.  No foul smell.  Erythema overlying.  Tender to palpation.   Lymphadenopathy:      Cervical: No cervical adenopathy.   Skin:     Findings: Erythema and lesion present.   Neurological:      Comments: Lethargic and garbled speech.  Not following commands.  Not cooperative.  Moves all extremities to stimulation.   Psychiatric:      Comments: Unable to assess     Devices:  PICC line  Peripheral IV  Laboratory Results: reviewed  Most Recent Data:  CBC:   Lab Results   Component Value Date    WBC 23.48 (H) 10/30/2023    HGB 9.3 (L) 10/30/2023    HCT 29.3 (L) 10/30/2023     10/30/2023    MCV 93 10/30/2023    RDW 15.9 (H) 10/30/2023     BMP:   Lab Results   Component Value Date     10/30/2023    K 3.5 10/30/2023     10/30/2023    CO2 30 (H) 10/30/2023    BUN 13 10/30/2023     (H) 10/30/2023    CALCIUM 9.1 10/30/2023    MG 1.7 10/30/2023    PHOS 1.4 (L) 10/30/2023    PHOS 1.4 (L) 10/30/2023     LFTs:   Lab Results   Component Value Date    PROT 6.1 10/27/2023    ALBUMIN 2.1 (L) 10/30/2023    BILITOT 0.4 10/27/2023    AST 15 10/27/2023    ALKPHOS 92 10/27/2023    ALT 9 (L) 10/27/2023     Urinalysis:   Lab Results   Component Value Date    LABURIN  10/25/2023     Multiple  organisms isolated. None in predominance.  Repeat if    LABURIN clinically necessary. 10/25/2023    COLORU Yellow 10/25/2023    SPECGRAV 1.020 10/25/2023    NITRITE Negative 10/25/2023    KETONESU Negative 10/25/2023    UROBILINOGEN Negative 10/25/2023       Trended Lab Data:  Recent Labs   Lab 10/25/23  0938 10/26/23  0322 10/27/23  0342 10/28/23  0315 10/29/23  0515 10/29/23  0937 10/30/23  0336   WBC 29.46* 24.77* 23.71*  --   --  22.01* 23.48*   HGB 11.2* 10.8* 10.0*  --   --  8.7* 9.3*   HCT 36.4* 36.1* 32.8*  --   --  28.1* 29.3*    198 202  --   --  170 178   MCV 94 97 94  --   --  94 93   RDW 15.9* 16.1* 15.8*  --   --  15.9* 15.9*    143  145 141  141 141 143  --  137   K 3.8 3.7  3.8 3.3*  3.2* 3.5 3.2*  --  3.5    105  105 103  103 102 103  --  101   CO2 32* 29  29 31*  31* 26 32*  --  30*   BUN 29* 25*  25* 20  19 14 12  --  13    109  106 97  102 90 106  --  132*   PROT 6.7 6.4 6.1  --   --   --   --    ALBUMIN 2.6* 2.4*  2.4* 2.3*  2.3* 2.2* 2.0*  --  2.1*   BILITOT 0.3 0.3 0.4  --   --   --   --    AST 18 18 15  --   --   --   --    ALKPHOS 92 99 92  --   --   --   --    ALT 10 6* 9*  --   --   --   --        Microbiology Data:  Blood cultures 10/24/2023 no growth  Urine culture 10/25/2023 multiple organisms    Other Results:    Radiology:  Reviewed  Chest CT 10/29/2023 with lower lobe scarring and bronchiectasis without consolidation.  Gallstones  Chest x-ray 10/28/2023 elevated right hemidiaphragm with PICC line  Head CT 10/27/2023 no acute abnormalities  Abdominal pelvis CT 10/25/2023 large lobulated masslike lesion in the right ankle area consistent with matted lymph nodes.  Right iliac lymph nodes also noted.  Head CT 10/24/2023 no acute changes  Chest x-ray 10/24/2023 right hemidiaphragm elevation    Problem List  Patient Active Problem List   Diagnosis    Benign non-nodular prostatic hyperplasia with lower urinary tract symptoms    Nocturia     Hypertension    Obesity    Arthritis    Psoriasis    SCCA (squamous cell carcinoma) of skin    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    Dermatitis    Tinea pedis of both feet    Open wound of right lower leg    Right leg swelling    Left anterior fascicular block    Class 2 severe obesity with serious comorbidity and body mass index (BMI) of 38.0 to 38.9 in adult    Sepsis    Encephalopathy, metabolic    Abscess of right groin    A-fib    Hypercalcemia    ACP (advance care planning)    Anemia     See above for impression and recommendations.

## 2023-10-30 NOTE — PROGRESS NOTES
[facility] Medicine  Progress Note    Patient Name: Aquiles Montana  MRN: [id number]  Patient Class: IP- Inpatient   Admission Date: 10/24/2023  Length of Stay: 6 days  Attending Physician: Veronica Clay MD  Primary Care Provider: Marty Hernandez MD        Subjective:     Principal Problem:Sepsis  Acute Condition: sepsis        HPI:  Aquiles Montana is an 83-year-old male w/ PMH SCC, BPH, HTN, obesity, and left anterior fascicular block. He presented to the ED via EMS for evaluation due to worsening mental status since admission 5 weeks ago. He was previously admitted to the hospital for sepsis and then discharged to a SNF. Patient's family reports that he came home recently but has just not been himself since the previous admission. Patient was seen by PCP today and status deteriorated following the visit.  Chart review of lab work performed prior to arrival today shows hypercalcemia and leukocytosis.  Also evident of acute kidney injury.  Patient was reportedly agitated and difficult to direct earlier today and at time of presentation to our facility is nonverbal. Patient opens eyes to verbal stimuli and responds to painful stimuli. History obtained from patient's family at bedside. They state that patient has not been experiencing fever, N/V/D, CP, or SOB recently. He has had a decrease in PO intake and activity. They also report that patient started experiencing delirium and hallucinations during his previous hospitalization, which have continued. Of note, patient has a large abscess in his right inguinal area and family states that this has been causing him quite a bit of pain recently.       Overview/Hospital Course:  10/29: wbcs from 23k to 22k. CT chest doesn't show any infection. Rocephin changed to zosyn. Cont vanc. Surgery following  10/30: discussed with heme/onc and surgery about plans of care. Surgery plans for aspiration I&D in am. ID is consulted since not sure where there is  an infection and if wbcs are just reactive. Wound culture is pending .       Interval History: denies pain at inguinal lymph nodes site but now have drainage    Review of Systems   All other systems reviewed and are negative.    Objective:     Vital Signs (Most Recent):  Temp: 98.6 °F (37 °C) (10/30/23 1230)  Pulse: 71 (10/30/23 1230)  Resp: 18 (10/30/23 1230)  BP: 135/80 (10/30/23 1230)  SpO2: (!) 90 % (10/30/23 1230) Vital Signs (24h Range):  Temp:  [97.5 °F (36.4 °C)-98.6 °F (37 °C)] 98.6 °F (37 °C)  Pulse:  [65-88] 71  Resp:  [18-20] 18  SpO2:  [90 %-96 %] 90 %  BP: (125-138)/(63-80) 135/80     Weight: 105.3 kg (232 lb 2.3 oz)  Body mass index is 31.48 kg/m².    Intake/Output Summary (Last 24 hours) at 10/30/2023 1454  Last data filed at 10/30/2023 0613  Gross per 24 hour   Intake --   Output 800 ml   Net -800 ml           Physical Exam  Vitals and nursing note reviewed.   Constitutional:       General: He is not in acute distress.     Appearance: He is well-developed.   HENT:      Head: Normocephalic and atraumatic.      Nose: Nose normal.   Eyes:      Conjunctiva/sclera: Conjunctivae normal.   Cardiovascular:      Rate and Rhythm: Normal rate and regular rhythm.      Heart sounds: Normal heart sounds. No murmur heard.  Pulmonary:      Effort: Pulmonary effort is normal.      Breath sounds: Normal breath sounds. No wheezing.   Abdominal:      General: Bowel sounds are normal.      Palpations: Abdomen is soft. There is no mass.      Tenderness: There is no abdominal tenderness. There is no guarding or rebound.      Comments: Visible protruding Swollen R inguinal lymph nodes with erythema and edema   Musculoskeletal:         General: Normal range of motion.      Cervical back: Normal range of motion and neck supple.      Comments: Ulcer on the RLE , and pressure ulcer on the heel   Skin:     General: Skin is warm and dry.      Findings: No rash.   Neurological:      Mental Status: He is alert.   Psychiatric:          Behavior: Behavior normal.             Significant Labs: All pertinent labs within the past 24 hours have been reviewed.  BMP:   Recent Labs   Lab 10/30/23  0336   *      K 3.5      CO2 30*   BUN 13   CREATININE 0.8   CALCIUM 9.1   MG 1.7       CBC:   Recent Labs   Lab 10/29/23  0937 10/30/23  0336   WBC 22.01* 23.48*   HGB 8.7* 9.3*   HCT 28.1* 29.3*    178         Significant Imaging: I have reviewed all pertinent imaging results/findings within the past 24 hours.  I have reviewed and interpreted all pertinent imaging results/findings within the past 24 hours.      Assessment/Plan:      * Sepsis  This patient does have evidence of infective focus  My overall impression is sepsis.  Source: Skin and Soft Tissue (location right inguinal abscess)   -- UA pending to also assess for urinary source  Antibiotics given-   Antibiotics (72h ago, onward)    Start     Stop Route Frequency Ordered    10/25/23 0100  vancomycin (VANCOCIN) 1,750 mg in dextrose 5 % (D5W) 500 mL IVPB         10/25/23 1259 IV ED 1 Time 10/25/23 0006    10/25/23 0034  vancomycin - pharmacy to dose  (vancomycin IVPB (PEDS and ADULTS))        See Hyperspace for full Linked Orders Report.    -- IV pharmacy to manage frequency 10/24/23 2335    10/25/23 0030  ceFEPIme (MAXIPIME) 2 g in dextrose 5 % in water (D5W) 100 mL IVPB (MB+)         -- IV Every 12 hours (non-standard times) 10/24/23 2336        Latest lactate reviewed-  Recent Labs   Lab 10/24/23  2019   LACTATE 1.6     Organ dysfunction indicated by Acute kidney injury and Encephalopathy    Fluid challenge Actual Body weight- Patient will receive 30ml/kg actual body weight to calculate fluid bolus for treatment of septic shock.     Post- resuscitation assessment Yes Perfusion exam was performed within 6 hours of septic shock presentation after bolus shows Adequate tissue perfusion assessed by non-invasive monitoring       Will Not start Pressors- Levophed for MAP of  65  Source control achieved by: broad spectrum abx        Anemia  · Stable  · H/H 12.3/39.8  · S/p blood transfusions during previous admission for GIB  · No continued reports of hematemesis, melena, hematochezia, or other signs of bleeding per family  · Transfuse for hgb <7  · Monitor       ACP (advance care planning)  Advance Care Planning     Date: 10/25/2023    Code Status  In light of the patients advanced and life limiting illness,I engaged the the family in a voluntary conversation about the patient's preferences for care  at the very end of life. The patient wishes to have a natural, peaceful death.  Along those lines, the patient does not wish to have CPR or other invasive treatments performed when his heart and/or breathing stops. I communicated to the family that a DNR order would be placed in his medical record to reflect this preference.  I spent a total of 8 minutes engaging the patient in this advance care planning discussion.           Hypercalcemia  The patient has hypercalcemia that is currently uncontrolled. The patient has the following symptoms due to their hypercalcemia: encephalopathy. The hypercalcemia is likely due to unknown - suspected 2/2 malignancy. We will obtain the following labs to work up the hypercalcemia:   alkaline phosphatase   Alkaline Phosphatase   Date Value Ref Range Status   10/27/2023 92 55 - 135 U/L Final    . We will treat the hypercalcemia with: IV fluids ordered at a rate of 125/hr. Their latest calcium has been reviewed and is listed below.  Ionized Calcium   Date Value Ref Range Status   10/25/2023 1.60 (H) 1.06 - 1.42 mmol/L Final        resolved    A-fib  Patient with Persistent (7 days or more) atrial fibrillation which is controlled currently with Beta Blocker and Digoxin. Patient is currently in atrial fibrillation.[id number] Score: 2. HASBLED Score: 3. Anticoagulation not indicated due to risk of GIB.  Continue subq heparin while inpatient w/ close  monitoring    Abscess of right groin  · Previously noted on MRI and CT scan 9/2023   1.  Bulky lymphadenopathy versus mass in the right inguinal region    2.  Enlarged right external iliac chain lymph nodes, likely metastatic    3.  No other findings of metastatic disease in the abdomen or pelvis    4.  Cholelithiasis   · Noted to be SCC on biopsy  · Per family, it is now more swollen, painful, and discolored since biopsy  · Hem/onc consult ordered regarding recommendations  · May benefit from IR vs general surgery evaluation  · CT pelvis showing necrotic LN  · Surgery on board and plans for Aspiration I&D on 10/31  · Cont on iv abx  · -ID consulted since leukocytosis could be reactive. Have not trended down with iv abx      Encephalopathy, metabolic  · Likely 2/2 infectious etiology  · Treat as above  · Seems to be improved  · Maintain safety precautions      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  Had a biopsy      Hypertension  · Patient mildly hypotensive on arrival but responded well to IVF  · Resume home BP meds in the am if awake enough to take      VTE Risk Mitigation (From admission, onward)         Ordered     heparin (porcine) injection 5,000 Units  Every 8 hours         10/24/23 2358     IP VTE HIGH RISK PATIENT  Once         10/24/23 2358     Place sequential compression device  Until discontinued         10/24/23 2358                Discharge Planning   NANCY:      Code Status: DNR   Is the patient medically ready for discharge?:     Reason for patient still in hospital (select all that apply): Treatment  Discharge Plan A: Skilled Nursing Facility                  Veronica Clay MD  Department of Hospital Medicine   [facility]

## 2023-10-30 NOTE — PT/OT/SLP PROGRESS
Occupational Therapy  Missed Visit    Patient Name:  Aquiles Montana   MRN:  [id number]    Patient not seen today secondary to Other (Comment) (10:15 - Palliative meeting taking place with patient and family members.).   10/30/2023

## 2023-10-30 NOTE — PROGRESS NOTES
Progress Note  Nephrology      Consult Requested By: Veronica Clay MD      SUBJECTIVE:     Overnight events  Patient is a 83 y.o. 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    Dermatitis    Tinea pedis of both feet    Open wound of right lower leg    Right leg swelling    Left anterior fascicular block    Class 2 severe obesity with serious comorbidity and body mass index (BMI) of 38.0 to 38.9 in adult    Sepsis    Encephalopathy, metabolic    Abscess of right groin    A-fib    Hypercalcemia    ACP (advance care planning)    Anemia     Past Medical History:   Diagnosis Date    Hypertension     Urinary tract infection               OBJECTIVE:     Vitals:    10/30/23 0400 10/30/23 0521 10/30/23 0756 10/30/23 0817   BP:  125/77  138/63   BP Location:    Right arm   Patient Position:  Lying  Lying   Pulse: 78 65  82   Resp:  20  20   Temp:  97.5 °F (36.4 °C)  97.6 °F (36.4 °C)   TempSrc:  Axillary  Oral   SpO2:  (!) 92% (!) 93% (!) 94%   Weight:       Height:           Temp: 97.6 °F (36.4 °C) (10/30/23 0817)  Pulse: 82 (10/30/23 0817)  Resp: 20 (10/30/23 0817)  BP: 138/63 (10/30/23 0817)  SpO2: (!) 94 % (10/30/23 0817)              Medications:   collagenase   Topical (Top) Daily    digoxin  0.125 mg Oral Daily    heparin (porcine)  5,000 Units Subcutaneous Q8H    latanoprost  1 drop Both Eyes QHS    melatonin  6 mg Oral Nightly    metoprolol tartrate  25 mg Oral BID    mupirocin   Nasal BID    pantoprazole  40 mg Intravenous BID    piperacillin-tazobactam (Zosyn) IV (PEDS and ADULTS) (extended infusion is not appropriate)  4.5 g Intravenous Q8H    potassium phosphate IVPB  30 mmol Intravenous Once    sodium chloride 0.9%  10 mL Intravenous Q6H    sodium chloride 0.9%  10 mL  "Intravenous Q6H    traZODone  50 mg Oral QHS    vancomycin (VANCOCIN) IV (PEDS and ADULTS)  1,500 mg Intravenous Q24H      0.45% NaCl with KCl 40 mEq infusion 75 mL/hr (10/30/23 1044)     Physical Exam:  General appearance: confused  Lungs: diminished breath sounds  Heart: pulse 82  Abdomen: soft  Extremities: no edema  Skin: dry    ABG  Labs reviewed  No results found for this or any previous visit (from the past 336 hour(s)).  Recent Results (from the past 336 hour(s))   CBC auto differential    Collection Time: 10/30/23  3:36 AM   Result Value Ref Range    WBC 23.48 (H) 3.90 - 12.70 K/uL    Hemoglobin 9.3 (L) 14.0 - 18.0 g/dL    Hematocrit 29.3 (L) 40.0 - 54.0 %    Platelets 178 150 - 450 K/uL   CBC auto differential    Collection Time: 10/29/23  9:37 AM   Result Value Ref Range    WBC 22.01 (H) 3.90 - 12.70 K/uL    Hemoglobin 8.7 (L) 14.0 - 18.0 g/dL    Hematocrit 28.1 (L) 40.0 - 54.0 %    Platelets 170 150 - 450 K/uL   CBC with Automated Differential    Collection Time: 10/27/23  3:42 AM   Result Value Ref Range    WBC 23.71 (H) 3.90 - 12.70 K/uL    Hemoglobin 10.0 (L) 14.0 - 18.0 g/dL    Hematocrit 32.8 (L) 40.0 - 54.0 %    Platelets 202 150 - 450 K/uL     Urinalysis  No results for input(s): "COLORU", "CLARITYU", "SPECGRAV", "PHUR", "PROTEINUA", "GLUCOSEU", "BILIRUBINCON", "BLOODU", "WBCU", "RBCU", "BACTERIA", "MUCUS", "NITRITE", "LEUKOCYTESUR", "UROBILINOGEN", "HYALINECASTS" in the last 24 hours.    Diagnostic Results:  X-Ray: Reviewed  US: Reviewed  Echo: Reviewed  ACCESS    ASSESSMENT/PLAN:     FERNANDO  UA protein trace, RBC 27, WBC 21  Creatinine  0.8   BUN 14- 13  K 3.5 - 3.2- 3.5  Mag 1.7  Replace prn  Metabolic alkalosis  PTH 6.5  Hypercalcemia  Ca corrected approximately 12- 11.2- 10.6  Vit D 27  CT  Regional osseous structures demonstrate no aggressive appearing lytic or blastic lesions.   Albumin 2.2- 2.1  Phos 2 - 1.3- 1.4  Anemia multifactorial  Hb 10- 8.7  /77, 122/59, 138/63  Weight daily  I " and O  Avoid nephrotoxic agents, hypotension, hypovolemia  Replace e-lytes prn  IVF

## 2023-10-30 NOTE — SUBJECTIVE & OBJECTIVE
Interval History: denies pain at inguinal lymph nodes site but now have drainage    Review of Systems   All other systems reviewed and are negative.    Objective:     Vital Signs (Most Recent):  Temp: 98.6 °F (37 °C) (10/30/23 1230)  Pulse: 71 (10/30/23 1230)  Resp: 18 (10/30/23 1230)  BP: 135/80 (10/30/23 1230)  SpO2: (!) 90 % (10/30/23 1230) Vital Signs (24h Range):  Temp:  [97.5 °F (36.4 °C)-98.6 °F (37 °C)] 98.6 °F (37 °C)  Pulse:  [65-88] 71  Resp:  [18-20] 18  SpO2:  [90 %-96 %] 90 %  BP: (125-138)/(63-80) 135/80     Weight: 105.3 kg (232 lb 2.3 oz)  Body mass index is 31.48 kg/m².    Intake/Output Summary (Last 24 hours) at 10/30/2023 1454  Last data filed at 10/30/2023 0613  Gross per 24 hour   Intake --   Output 800 ml   Net -800 ml           Physical Exam  Vitals and nursing note reviewed.   Constitutional:       General: He is not in acute distress.     Appearance: He is well-developed.   HENT:      Head: Normocephalic and atraumatic.      Nose: Nose normal.   Eyes:      Conjunctiva/sclera: Conjunctivae normal.   Cardiovascular:      Rate and Rhythm: Normal rate and regular rhythm.      Heart sounds: Normal heart sounds. No murmur heard.  Pulmonary:      Effort: Pulmonary effort is normal.      Breath sounds: Normal breath sounds. No wheezing.   Abdominal:      General: Bowel sounds are normal.      Palpations: Abdomen is soft. There is no mass.      Tenderness: There is no abdominal tenderness. There is no guarding or rebound.      Comments: Visible protruding Swollen R inguinal lymph nodes with erythema and edema   Musculoskeletal:         General: Normal range of motion.      Cervical back: Normal range of motion and neck supple.      Comments: Ulcer on the RLE , and pressure ulcer on the heel   Skin:     General: Skin is warm and dry.      Findings: No rash.   Neurological:      Mental Status: He is alert.   Psychiatric:         Behavior: Behavior normal.             Significant Labs: All pertinent  labs within the past 24 hours have been reviewed.  BMP:   Recent Labs   Lab 10/30/23  0336   *      K 3.5      CO2 30*   BUN 13   CREATININE 0.8   CALCIUM 9.1   MG 1.7       CBC:   Recent Labs   Lab 10/29/23  0937 10/30/23  0336   WBC 22.01* 23.48*   HGB 8.7* 9.3*   HCT 28.1* 29.3*    178         Significant Imaging: I have reviewed all pertinent imaging results/findings within the past 24 hours.  I have reviewed and interpreted all pertinent imaging results/findings within the past 24 hours.

## 2023-10-30 NOTE — PLAN OF CARE
10/30/23 1010   Rounds   Attendance Nurse ;Provider   Discharge Plan A Skilled Nursing Facility   Why the patient remains in the hospital Requires continued medical care     Pt not medically stable to discharge today. WBC 23.48 this AM.     1150  Patient resting quietly in bed with spouse, Jessica Garcia ([phone]), & son Len Garcia ([phone]), at the bedside when CM rounded. Pt not oriented to person, place, or time. Patient was admitted with sepsis & is being followed by gen surg, wound care, neph, & ID. RUE PICC secure with dressing intact. IV vancomycin & IV zosyn ordered. Dr Herrera (neph) rounded while CM present.     Family requested that a referral be sent to [facility] & [facility]. Referrals previously sent via [facility]. Pt declined by [facility] due to bed availability. Message sent to [facility] informing that the pt is not medically stable to dc today & that [facility] is the family's 1st choice. Awaiting response.     8570  Message left for Jennifer ([phone]) w/[facility] requesting a return call to discuss the pt's discharge status. Awaiting response.       Will continue to follow.

## 2023-10-31 ENCOUNTER — ANESTHESIA (OUTPATIENT)
Dept: SURGERY | Facility: HOSPITAL | Age: 83
DRG: 606 | End: 2023-10-31
Payer: MEDICARE

## 2023-10-31 ENCOUNTER — ANESTHESIA EVENT (OUTPATIENT)
Dept: SURGERY | Facility: HOSPITAL | Age: 83
DRG: 606 | End: 2023-10-31
Payer: MEDICARE

## 2023-10-31 LAB
ALBUMIN SERPL BCP-MCNC: 2 G/DL (ref 3.5–5.2)
ALP SERPL-CCNC: 84 U/L (ref 55–135)
ALT SERPL W/O P-5'-P-CCNC: 9 U/L (ref 10–44)
ANION GAP SERPL CALC-SCNC: 6 MMOL/L (ref 8–16)
ANISOCYTOSIS BLD QL SMEAR: SLIGHT
AST SERPL-CCNC: 10 U/L (ref 10–40)
BASOPHILS # BLD AUTO: 0.08 K/UL (ref 0–0.2)
BASOPHILS NFR BLD: 0.3 % (ref 0–1.9)
BILIRUB SERPL-MCNC: 0.3 MG/DL (ref 0.1–1)
BUN SERPL-MCNC: 9 MG/DL (ref 8–23)
CALCIUM SERPL-MCNC: 8 MG/DL (ref 8.7–10.5)
CHLORIDE SERPL-SCNC: 99 MMOL/L (ref 95–110)
CO2 SERPL-SCNC: 29 MMOL/L (ref 23–29)
CREAT SERPL-MCNC: 0.7 MG/DL (ref 0.5–1.4)
DIFFERENTIAL METHOD: ABNORMAL
EOSINOPHIL # BLD AUTO: 0.2 K/UL (ref 0–0.5)
EOSINOPHIL NFR BLD: 1 % (ref 0–8)
ERYTHROCYTE [DISTWIDTH] IN BLOOD BY AUTOMATED COUNT: 16.2 % (ref 11.5–14.5)
EST. GFR  (NO RACE VARIABLE): >60 ML/MIN/1.73 M^2
GLUCOSE SERPL-MCNC: 223 MG/DL (ref 70–110)
HCT VFR BLD AUTO: 26.6 % (ref 40–54)
HGB BLD-MCNC: 8.1 G/DL (ref 14–18)
HYPOCHROMIA BLD QL SMEAR: ABNORMAL
IMM GRANULOCYTES # BLD AUTO: 0.24 K/UL (ref 0–0.04)
IMM GRANULOCYTES NFR BLD AUTO: 1 % (ref 0–0.5)
LYMPHOCYTES # BLD AUTO: 1.1 K/UL (ref 1–4.8)
LYMPHOCYTES NFR BLD: 5 % (ref 18–48)
MCH RBC QN AUTO: 29.1 PG (ref 27–31)
MCHC RBC AUTO-ENTMCNC: 30.5 G/DL (ref 32–36)
MCV RBC AUTO: 96 FL (ref 82–98)
MONOCYTES # BLD AUTO: 1.5 K/UL (ref 0.3–1)
MONOCYTES NFR BLD: 6.3 % (ref 4–15)
NEUTROPHILS # BLD AUTO: 20.1 K/UL (ref 1.8–7.7)
NEUTROPHILS NFR BLD: 87.4 % (ref 38–73)
NRBC BLD-RTO: 0 /100 WBC
OVALOCYTES BLD QL SMEAR: ABNORMAL
PATHOLOGIST INTERPRETATION IFE: NORMAL
PLATELET # BLD AUTO: 154 K/UL (ref 150–450)
PLATELET BLD QL SMEAR: ABNORMAL
PMV BLD AUTO: 10.7 FL (ref 9.2–12.9)
POCT GLUCOSE: 118 MG/DL (ref 70–110)
POCT GLUCOSE: 130 MG/DL (ref 70–110)
POCT GLUCOSE: 131 MG/DL (ref 70–110)
POIKILOCYTOSIS BLD QL SMEAR: SLIGHT
POTASSIUM SERPL-SCNC: 3.9 MMOL/L (ref 3.5–5.1)
PROT SERPL-MCNC: 5.5 G/DL (ref 6–8.4)
RBC # BLD AUTO: 2.78 M/UL (ref 4.6–6.2)
SODIUM SERPL-SCNC: 134 MMOL/L (ref 136–145)
WBC # BLD AUTO: 23.02 K/UL (ref 3.9–12.7)

## 2023-10-31 PROCEDURE — 25000003 PHARM REV CODE 250: Performed by: NURSE PRACTITIONER

## 2023-10-31 PROCEDURE — 37000008 HC ANESTHESIA 1ST 15 MINUTES: Performed by: SURGERY

## 2023-10-31 PROCEDURE — D9220A PRA ANESTHESIA: ICD-10-PCS | Mod: CRNA,,, | Performed by: NURSE ANESTHETIST, CERTIFIED REGISTERED

## 2023-10-31 PROCEDURE — 25000003 PHARM REV CODE 250: Performed by: STUDENT IN AN ORGANIZED HEALTH CARE EDUCATION/TRAINING PROGRAM

## 2023-10-31 PROCEDURE — 25000003 PHARM REV CODE 250: Performed by: NURSE ANESTHETIST, CERTIFIED REGISTERED

## 2023-10-31 PROCEDURE — D9220A PRA ANESTHESIA: Mod: ANES,,, | Performed by: ANESTHESIOLOGY

## 2023-10-31 PROCEDURE — D9220A PRA ANESTHESIA: Mod: CRNA,,, | Performed by: NURSE ANESTHETIST, CERTIFIED REGISTERED

## 2023-10-31 PROCEDURE — 63600175 PHARM REV CODE 636 W HCPCS: Performed by: FAMILY MEDICINE

## 2023-10-31 PROCEDURE — 63600175 PHARM REV CODE 636 W HCPCS: Performed by: NURSE ANESTHETIST, CERTIFIED REGISTERED

## 2023-10-31 PROCEDURE — 99900035 HC TECH TIME PER 15 MIN (STAT)

## 2023-10-31 PROCEDURE — C9113 INJ PANTOPRAZOLE SODIUM, VIA: HCPCS | Performed by: NURSE PRACTITIONER

## 2023-10-31 PROCEDURE — 11000001 HC ACUTE MED/SURG PRIVATE ROOM

## 2023-10-31 PROCEDURE — 87205 SMEAR GRAM STAIN: CPT | Performed by: SURGERY

## 2023-10-31 PROCEDURE — 80053 COMPREHEN METABOLIC PANEL: CPT | Performed by: STUDENT IN AN ORGANIZED HEALTH CARE EDUCATION/TRAINING PROGRAM

## 2023-10-31 PROCEDURE — 88305 TISSUE EXAM BY PATHOLOGIST: CPT | Performed by: PATHOLOGY

## 2023-10-31 PROCEDURE — 36000705 HC OR TIME LEV I EA ADD 15 MIN: Performed by: SURGERY

## 2023-10-31 PROCEDURE — 37000009 HC ANESTHESIA EA ADD 15 MINS: Performed by: SURGERY

## 2023-10-31 PROCEDURE — 87075 CULTR BACTERIA EXCEPT BLOOD: CPT | Performed by: SURGERY

## 2023-10-31 PROCEDURE — 25000003 PHARM REV CODE 250: Performed by: INTERNAL MEDICINE

## 2023-10-31 PROCEDURE — 87116 MYCOBACTERIA CULTURE: CPT | Performed by: SURGERY

## 2023-10-31 PROCEDURE — 88305 TISSUE EXAM BY PATHOLOGIST: CPT | Mod: 26,,, | Performed by: PATHOLOGY

## 2023-10-31 PROCEDURE — D9220A PRA ANESTHESIA: ICD-10-PCS | Mod: ANES,,, | Performed by: ANESTHESIOLOGY

## 2023-10-31 PROCEDURE — 25000003 PHARM REV CODE 250: Performed by: SURGERY

## 2023-10-31 PROCEDURE — 27000221 HC OXYGEN, UP TO 24 HOURS

## 2023-10-31 PROCEDURE — 87070 CULTURE OTHR SPECIMN AEROBIC: CPT | Performed by: SURGERY

## 2023-10-31 PROCEDURE — 85025 COMPLETE CBC W/AUTO DIFF WBC: CPT | Performed by: FAMILY MEDICINE

## 2023-10-31 PROCEDURE — 63600175 PHARM REV CODE 636 W HCPCS: Performed by: INTERNAL MEDICINE

## 2023-10-31 PROCEDURE — 88305 TISSUE EXAM BY PATHOLOGIST: ICD-10-PCS | Mod: 26,,, | Performed by: PATHOLOGY

## 2023-10-31 PROCEDURE — 36000704 HC OR TIME LEV I 1ST 15 MIN: Performed by: SURGERY

## 2023-10-31 PROCEDURE — 87206 SMEAR FLUORESCENT/ACID STAI: CPT | Performed by: SURGERY

## 2023-10-31 PROCEDURE — 71000033 HC RECOVERY, INTIAL HOUR: Performed by: SURGERY

## 2023-10-31 PROCEDURE — A4216 STERILE WATER/SALINE, 10 ML: HCPCS | Performed by: FAMILY MEDICINE

## 2023-10-31 PROCEDURE — 94761 N-INVAS EAR/PLS OXIMETRY MLT: CPT

## 2023-10-31 PROCEDURE — 25000003 PHARM REV CODE 250: Performed by: FAMILY MEDICINE

## 2023-10-31 PROCEDURE — 63600175 PHARM REV CODE 636 W HCPCS: Performed by: NURSE PRACTITIONER

## 2023-10-31 PROCEDURE — 87102 FUNGUS ISOLATION CULTURE: CPT | Performed by: SURGERY

## 2023-10-31 RX ORDER — SODIUM CHLORIDE 0.9 % (FLUSH) 0.9 %
10 SYRINGE (ML) INJECTION
Status: DISCONTINUED | OUTPATIENT
Start: 2023-10-31 | End: 2023-11-03 | Stop reason: HOSPADM

## 2023-10-31 RX ORDER — LIDOCAINE HYDROCHLORIDE 20 MG/ML
INJECTION INTRAVENOUS
Status: DISCONTINUED | OUTPATIENT
Start: 2023-10-31 | End: 2023-10-31

## 2023-10-31 RX ORDER — PROPOFOL 10 MG/ML
VIAL (ML) INTRAVENOUS CONTINUOUS PRN
Status: DISCONTINUED | OUTPATIENT
Start: 2023-10-31 | End: 2023-10-31

## 2023-10-31 RX ORDER — FENTANYL CITRATE 50 UG/ML
INJECTION, SOLUTION INTRAMUSCULAR; INTRAVENOUS
Status: DISCONTINUED | OUTPATIENT
Start: 2023-10-31 | End: 2023-10-31

## 2023-10-31 RX ORDER — ETOMIDATE 2 MG/ML
INJECTION INTRAVENOUS
Status: DISCONTINUED | OUTPATIENT
Start: 2023-10-31 | End: 2023-10-31

## 2023-10-31 RX ORDER — ONDANSETRON 2 MG/ML
4 INJECTION INTRAMUSCULAR; INTRAVENOUS ONCE AS NEEDED
Status: DISCONTINUED | OUTPATIENT
Start: 2023-10-31 | End: 2023-11-03 | Stop reason: HOSPADM

## 2023-10-31 RX ORDER — CHOLECALCIFEROL (VITAMIN D3) 25 MCG
1000 TABLET ORAL DAILY
Status: DISCONTINUED | OUTPATIENT
Start: 2023-10-31 | End: 2023-11-03 | Stop reason: HOSPADM

## 2023-10-31 RX ORDER — HYDROMORPHONE HYDROCHLORIDE 2 MG/ML
0.5 INJECTION, SOLUTION INTRAMUSCULAR; INTRAVENOUS; SUBCUTANEOUS EVERY 5 MIN PRN
Status: DISCONTINUED | OUTPATIENT
Start: 2023-10-31 | End: 2023-11-03 | Stop reason: HOSPADM

## 2023-10-31 RX ORDER — ENOXAPARIN SODIUM 100 MG/ML
40 INJECTION SUBCUTANEOUS EVERY 24 HOURS
Status: DISCONTINUED | OUTPATIENT
Start: 2023-11-01 | End: 2023-11-03 | Stop reason: HOSPADM

## 2023-10-31 RX ORDER — SODIUM CHLORIDE AND POTASSIUM CHLORIDE 150; 900 MG/100ML; MG/100ML
INJECTION, SOLUTION INTRAVENOUS CONTINUOUS
Status: DISPENSED | OUTPATIENT
Start: 2023-10-31 | End: 2023-11-01

## 2023-10-31 RX ORDER — SODIUM CHLORIDE 9 MG/ML
INJECTION, SOLUTION INTRAVENOUS CONTINUOUS
Status: DISCONTINUED | OUTPATIENT
Start: 2023-10-31 | End: 2023-11-03 | Stop reason: HOSPADM

## 2023-10-31 RX ORDER — LIDOCAINE HYDROCHLORIDE 10 MG/ML
INJECTION, SOLUTION EPIDURAL; INFILTRATION; INTRACAUDAL; PERINEURAL
Status: DISCONTINUED | OUTPATIENT
Start: 2023-10-31 | End: 2023-10-31 | Stop reason: HOSPADM

## 2023-10-31 RX ORDER — ENOXAPARIN SODIUM 100 MG/ML
40 INJECTION SUBCUTANEOUS EVERY 24 HOURS
Status: DISCONTINUED | OUTPATIENT
Start: 2023-10-31 | End: 2023-10-31

## 2023-10-31 RX ADMIN — SODIUM CHLORIDE AND POTASSIUM CHLORIDE: 9; 2.98 INJECTION, SOLUTION INTRAVENOUS at 12:10

## 2023-10-31 RX ADMIN — DIGOXIN 0.12 MG: 125 TABLET ORAL at 02:10

## 2023-10-31 RX ADMIN — SODIUM CHLORIDE, PRESERVATIVE FREE 10 ML: 5 INJECTION INTRAVENOUS at 06:10

## 2023-10-31 RX ADMIN — LACTOBACILLUS TAB 1 TABLET: TAB at 05:10

## 2023-10-31 RX ADMIN — PIPERACILLIN AND TAZOBACTAM 4.5 G: 4; .5 INJECTION, POWDER, LYOPHILIZED, FOR SOLUTION INTRAVENOUS; PARENTERAL at 06:10

## 2023-10-31 RX ADMIN — SODIUM CHLORIDE: 0.9 INJECTION, SOLUTION INTRAVENOUS at 12:10

## 2023-10-31 RX ADMIN — METOPROLOL TARTRATE 25 MG: 25 TABLET, FILM COATED ORAL at 10:10

## 2023-10-31 RX ADMIN — Medication 1000 UNITS: at 02:10

## 2023-10-31 RX ADMIN — PANTOPRAZOLE SODIUM 40 MG: 40 INJECTION, POWDER, LYOPHILIZED, FOR SOLUTION INTRAVENOUS at 10:10

## 2023-10-31 RX ADMIN — PROPOFOL 25 MCG/KG/MIN: 10 INJECTION, EMULSION INTRAVENOUS at 12:10

## 2023-10-31 RX ADMIN — FENTANYL CITRATE 25 MCG: 50 INJECTION, SOLUTION INTRAMUSCULAR; INTRAVENOUS at 12:10

## 2023-10-31 RX ADMIN — ETOMIDATE 4 MG: 2 INJECTION, SOLUTION INTRAVENOUS at 12:10

## 2023-10-31 RX ADMIN — LIDOCAINE HYDROCHLORIDE 40 MG: 20 INJECTION, SOLUTION INTRAVENOUS at 12:10

## 2023-10-31 RX ADMIN — LATANOPROST 1 DROP: 50 SOLUTION OPHTHALMIC at 10:10

## 2023-10-31 RX ADMIN — PIPERACILLIN AND TAZOBACTAM 4.5 G: 4; .5 INJECTION, POWDER, LYOPHILIZED, FOR SOLUTION INTRAVENOUS; PARENTERAL at 10:10

## 2023-10-31 RX ADMIN — MUPIROCIN: 20 OINTMENT TOPICAL at 10:10

## 2023-10-31 RX ADMIN — PANTOPRAZOLE SODIUM 40 MG: 40 INJECTION, POWDER, LYOPHILIZED, FOR SOLUTION INTRAVENOUS at 02:10

## 2023-10-31 RX ADMIN — TRAZODONE HYDROCHLORIDE 50 MG: 50 TABLET ORAL at 10:10

## 2023-10-31 RX ADMIN — VANCOMYCIN HYDROCHLORIDE 1500 MG: 1.5 INJECTION, POWDER, LYOPHILIZED, FOR SOLUTION INTRAVENOUS at 02:10

## 2023-10-31 RX ADMIN — ETOMIDATE 2 MG: 2 INJECTION, SOLUTION INTRAVENOUS at 12:10

## 2023-10-31 RX ADMIN — METOPROLOL TARTRATE 25 MG: 25 TABLET, FILM COATED ORAL at 02:10

## 2023-10-31 RX ADMIN — SODIUM CHLORIDE AND POTASSIUM CHLORIDE: .9; .15 SOLUTION INTRAVENOUS at 04:10

## 2023-10-31 RX ADMIN — Medication 6 MG: at 10:10

## 2023-10-31 RX ADMIN — PIPERACILLIN AND TAZOBACTAM 4.5 G: 4; .5 INJECTION, POWDER, LYOPHILIZED, FOR SOLUTION INTRAVENOUS; PARENTERAL at 02:10

## 2023-10-31 NOTE — ASSESSMENT & PLAN NOTE
Advance Care Planning  - done by palliative care team     Date: 10/30/2023    Code Status  In light of the patients advanced and life limiting illness,I engaged the the family in a voluntary conversation about the patient's preferences for care at the very end of life. The patient wishes to have a natural, peaceful death.  Along those lines, the patient does not wish to have CPR or other invasive treatments performed when his heart and/or breathing stops. I communicated to the family that a DNR order would be placed in his medical record to reflect this preference.  I spent a total of 8 minutes engaging the patient in this advance care planning discussion.

## 2023-10-31 NOTE — PLAN OF CARE
Recommendation:  1. Resume diet when medically acceptable.   2. Encourage intake of meals & supplements when diet resumed. 3. Monitor weight/labs.   4. RD to continue to follow to monitor po intake    Goals:  Pt intake >/= 50% EEN/EPN by RD follow up  Nutrition Goal Status: progressing towards goal

## 2023-10-31 NOTE — PLAN OF CARE
Problem: Adult Inpatient Plan of Care  Goal: Plan of Care Review  Outcome: Ongoing, Progressing  Goal: Patient-Specific Goal (Individualized)  Outcome: Ongoing, Progressing  Goal: Optimal Comfort and Wellbeing  Outcome: Ongoing, Progressing  Goal: Readiness for Transition of Care  Outcome: Ongoing, Progressing     Problem: Adjustment to Illness (Sepsis/Septic Shock)  Goal: Optimal Coping  Outcome: Ongoing, Progressing     Problem: Fall Injury Risk  Goal: Absence of Fall and Fall-Related Injury  Outcome: Ongoing, Progressing     Problem: Hypertension Acute  Goal: Blood Pressure Within Desired Range  Outcome: Ongoing, Progressing     Problem: Skin Injury Risk Increased  Goal: Skin Health and Integrity  Outcome: Ongoing, Progressing     Problem: Impaired Wound Healing  Goal: Optimal Wound Healing  Outcome: Ongoing, Progressing     Problem: Renal Function Impairment (Acute Kidney Injury/Impairment)  Goal: Effective Renal Function  Outcome: Ongoing, Progressing     Problem: Coping Ineffective  Goal: Effective Coping  Outcome: Ongoing, Progressing     Problem: Confusion Acute  Goal: Optimal Cognitive Function  Outcome: Ongoing, Progressing     Problem: UTI (Urinary Tract Infection)  Goal: Improved Infection Symptoms  Outcome: Ongoing, Progressing     Problem: Thought Process Alteration  Goal: Optimal Thought Clarity  Outcome: Ongoing, Progressing     Problem: Infection  Goal: Absence of Infection Signs and Symptoms  Outcome: Ongoing, Progressing

## 2023-10-31 NOTE — ASSESSMENT & PLAN NOTE
The patient has hypercalcemia that is currently controlled. The patient has the following symptoms due to their hypercalcemia: encephalopathy. The hypercalcemia is likely due to unknown - suspected 2/2 malignancy. We will obtain the following labs to work up the hypercalcemia:   alkaline phosphatase   Alkaline Phosphatase   Date Value Ref Range Status   10/31/2023 84 55 - 135 U/L Final    . We will treat the hypercalcemia with: IV fluids ordered at a rate of 125/hr. Their latest calcium has been reviewed and is listed below.  Ionized Calcium   Date Value Ref Range Status   10/25/2023 1.60 (H) 1.06 - 1.42 mmol/L Final        resolved

## 2023-10-31 NOTE — OP NOTE
[facility] - Telemetry  Brief Operative Note    SUMMARY     Surgery Date: 10/31/2023     Surgeon(s) and Role:     * Emeka Barber MD - Primary    Assisting Surgeon: None    Pre-op Diagnosis:  AMS (altered mental status) [R41.82]  Sepsis, due to unspecified organism, unspecified whether acute organ dysfunction present [A41.9]  Hypercalcemia [E83.52]  Sepsis [A41.9]    Post-op Diagnosis:  Post-Op Diagnosis Codes:     * AMS (altered mental status) [R41.82]     * Sepsis, due to unspecified organism, unspecified whether acute organ dysfunction present [A41.9]     * Hypercalcemia [E83.52]     * Sepsis [A41.9]    Procedure(s) (LRB):  INCISION AND DRAINAGE, LOWER EXTREMITY (Right)Groin mass  With biopsy and packing  Anesthesia: Local MAC    Implants:  * No implants in log *    Operative Findings: After satisfactory IV sedation patient in supine position time-out was called patient identity confirmed site was confirmed right groin was prepped and draped in a sterile manner using ChloraPrep area of the mass infiltrated using 1% xylocaine solution elliptical incision was made at the base of the swelling biopsy was obtained about 1 x 2 cm also same size was excised and sent for culture it was consistent with a hematoma no pus drained out was tubular necrosis multiple tissues of the tumor were obtained and sent out for pathology hemostasis was maintained wound was irrigated antibiotic solution was then openly packed using iodoform packing Xeroform 4 x 4 ABD pressure bandage was applied instrument counts sponge count counts correct patient tolerated well no intra-op complication estimated blood loss 50 cc specimen removed abscess wall and tumor patient was sent to recovery room in stable condition.    Estimated Blood Loss: 50  Estimated Blood Loss has been documented.         Specimens:   Specimen (24h ago, onward)       Start     Ordered    10/31/23 1228  Specimen to Pathology, Surgery General Surgery  Once        Comments:  Pre-op Diagnosis: AMS (altered mental status) [R41.82]Sepsis, due to unspecified organism, unspecified whether acute organ dysfunction present [A41.9]Hypercalcemia [E83.52]Sepsis [A41.9]Procedure(s):INCISION AND DRAINAGE, LOWER EXTREMITY Number of specimens: 1Name of specimens: Right Groin Lymphoma     References:    Click here for ordering Quick Tip   Question Answer Comment   Procedure Type: General Surgery    Specimen Class: Routine/Screening    Which provider would you like to cc? CORBY DIXON    Release to patient Immediate        10/31/23 1227                    [id number]

## 2023-10-31 NOTE — ASSESSMENT & PLAN NOTE
"This patient does have evidence of infective focus  My overall impression is sepsis.  Source: Skin and Soft Tissue (location right inguinal abscess)     Antibiotics given-   Antibiotics (72h ago, onward)    Start     Stop Route Frequency Ordered    10/30/23 2329  mupirocin 2 % ointment         -- Nasl On Call Procedure 10/30/23 2329    10/29/23 1045  piperacillin-tazobactam (ZOSYN) 4.5 g in dextrose 5 % in water (D5W) 100 mL IVPB (MB+)         -- IV Every 8 hours (non-standard times) 10/29/23 0933    10/29/23 1045  mupirocin 2 % ointment         11/03/23 0859 Nasl 2 times daily 10/29/23 0937    10/26/23 0200  vancomycin 1,500 mg in dextrose 5 % (D5W) 250 mL IVPB (Vial-Mate)         -- IV Every 24 hours (non-standard times) 10/25/23 1547    10/25/23 0034  vancomycin - pharmacy to dose  (vancomycin IVPB (PEDS and ADULTS))        See Hyperspace for full Linked Orders Report.    -- IV pharmacy to manage frequency 10/24/23 2335        Latest lactate reviewed-  No results for input(s): "LACTATE" in the last 72 hours.  Organ dysfunction indicated by Acute kidney injury and Encephalopathy    Fluid challenge Actual Body weight- Patient will receive 30ml/kg actual body weight to calculate fluid bolus for treatment of septic shock.     Post- resuscitation assessment Yes Perfusion exam was performed within 6 hours of septic shock presentation after bolus shows Adequate tissue perfusion assessed by non-invasive monitoring       Will Not start Pressors- Levophed for MAP of 65  Source control achieved by: broad spectrum abx    Surgery consult with plan for I&D on R inguinal abscess on 10/31  "

## 2023-10-31 NOTE — TRANSFER OF CARE
Anesthesia Transfer of Care Note    Patient: Aquiles Audiffred    Procedure(s) Performed: Procedure(s) (LRB):  INCISION AND DRAINAGE, LOWER EXTREMITY (Right)    Patient location: GI    Anesthesia Type: general    Transport from OR: Transported from OR on room air with adequate spontaneous ventilation    Post pain: adequate analgesia    Post assessment: no apparent anesthetic complications and tolerated procedure well    Post vital signs: stable    Level of consciousness: awake    Nausea/Vomiting: no nausea/vomiting    Complications: none    Transfer of care protocol was followed      Last vitals: Visit Vitals  /68 (Patient Position: Lying)   Pulse 75   Temp 36.8 °C (98.2 °F) (Oral)   Resp 18   Ht 6' (1.829 m)   Wt 105.3 kg (232 lb 2.3 oz)   SpO2 97%   BMI 31.48 kg/m²

## 2023-10-31 NOTE — ANESTHESIA PREPROCEDURE EVALUATION
10/31/2023  Aquiles Montana is a 83 y.o., male.      Pre-op Assessment     I have reviewed the Nursing Notes.    I have reviewed the Medications.     Review of Systems  Anesthesia Hx:  No problems with previous Anesthesia             Denies Family Hx of Anesthesia complications.     Social:  Non-Smoker, No Alcohol Use       Hematology/Oncology:  Hematology Normal   Oncology Normal                                   EENT/Dental:  EENT/Dental Normal           Cardiovascular:  Exercise tolerance: good   Hypertension    Dysrhythmias                              Hypertension     Disorder of Cardiac Rhythm, Atrial Fibrillation     Pulmonary:  Pulmonary Normal                       Renal/:  Renal/ Normal                 Hepatic/GI:  Hepatic/GI Normal                 Musculoskeletal:  Musculoskeletal Normal                Neurological:  Neurology Normal                                      Endocrine:  Endocrine Normal                Physical Exam  General: Well nourished    Airway:  Mallampati: II / II  Mouth Opening: Normal  TM Distance: Normal  Tongue: Normal  Neck ROM: Normal ROM    Dental:  Intact        Anesthesia Plan  Type of Anesthesia, risks & benefits discussed:    Anesthesia Type: Gen Natural Airway  Intra-op Monitoring Plan: Standard ASA Monitors  Post Op Pain Control Plan: multimodal analgesia  Induction:  IV  Airway Plan: Direct, Post-Induction  Informed Consent: Informed consent signed with the Patient and all parties understand the risks and agree with anesthesia plan.  All questions answered.   ASA Score: 3    Ready For Surgery From Anesthesia Perspective.     .

## 2023-10-31 NOTE — SUBJECTIVE & OBJECTIVE
Interval History: sleepy this morning. Attempting to awaken once stimulated. Son and wife at bedside. Plan for I&D of groin abscess today.       Objective:     Vital Signs (Most Recent):  Temp: 98.1 °F (36.7 °C) (10/31/23 1245)  Pulse: 78 (10/31/23 1245)  Resp: 16 (10/31/23 1245)  BP: (!) 125/59 (10/31/23 1245)  SpO2: (!) 92 % (10/31/23 1245) Vital Signs (24h Range):  Temp:  [97.6 °F (36.4 °C)-98.2 °F (36.8 °C)] 98.1 °F (36.7 °C)  Pulse:  [62-89] 78  Resp:  [16-20] 16  SpO2:  [92 %-97 %] 92 %  BP: (125-156)/(59-82) 125/59     Weight: 105.3 kg (232 lb 2.3 oz)  Body mass index is 31.48 kg/m².    Intake/Output Summary (Last 24 hours) at 10/31/2023 1302  Last data filed at 10/31/2023 1228  Gross per 24 hour   Intake 2144.86 ml   Output 1225 ml   Net 919.86 ml           Physical Exam  Vitals and nursing note reviewed.   Constitutional:       General: He is not in acute distress.     Appearance: He is well-developed.      Comments: Elderly male, sleepy   Cardiovascular:      Rate and Rhythm: Normal rate and regular rhythm.      Heart sounds: Normal heart sounds. No murmur heard.  Pulmonary:      Effort: Pulmonary effort is normal.      Breath sounds: Normal breath sounds. No wheezing.   Abdominal:      General: Bowel sounds are normal.      Palpations: Abdomen is soft.      Tenderness: There is no abdominal tenderness.      Comments: Visible protruding Swollen R inguinal lymph nodes with erythema and edema   Musculoskeletal:      Comments: Ulcer on the RLE , and pressure ulcer on the heel   Skin:     General: Skin is warm and dry.      Findings: No rash.             Significant Labs: All pertinent labs within the past 24 hours have been reviewed.  BMP:   Recent Labs   Lab 10/30/23  0336 10/31/23  0628   * 223*    134*   K 3.5 3.9    99   CO2 30* 29   BUN 13 9   CREATININE 0.8 0.7   CALCIUM 9.1 8.0*   MG 1.7  --        CBC:   Recent Labs   Lab 10/30/23  0336 10/31/23  0428   WBC 23.48* 23.02*   HGB 9.3*  8.1*   HCT 29.3* 26.6*    154         Significant Imaging: I have reviewed all pertinent imaging results/findings within the past 24 hours.  I have reviewed and interpreted all pertinent imaging results/findings within the past 24 hours.

## 2023-10-31 NOTE — PROGRESS NOTES
[facility]  Progress Note    Patient Name: Aquiles Montana  MRN: [id number]  Patient Class: IP- Inpatient   Admission Date: 10/24/2023  Length of Stay: 7 days  Attending Physician: Gabriella Madrid MD  Primary Care Provider: Marty Hernandez MD        Subjective:     Principal Problem:Sepsis    HPI:  Aquiles Montana is an 83-year-old male w/ PMH SCC, BPH, HTN, obesity, and left anterior fascicular block. He presented to the ED via EMS for evaluation due to worsening mental status since admission 5 weeks ago. He was previously admitted to the hospital for sepsis and then discharged to a SNF. Patient's family reports that he came home recently but has just not been himself since the previous admission. Patient was seen by PCP today and status deteriorated following the visit.  Chart review of lab work performed prior to arrival today shows hypercalcemia and leukocytosis.  Also evident of acute kidney injury.  Patient was reportedly agitated and difficult to direct earlier today and at time of presentation to our facility is nonverbal. Patient opens eyes to verbal stimuli and responds to painful stimuli. History obtained from patient's family at bedside. They state that patient has not been experiencing fever, N/V/D, CP, or SOB recently. He has had a decrease in PO intake and activity. They also report that patient started experiencing delirium and hallucinations during his previous hospitalization, which have continued. Of note, patient has a large abscess in his right inguinal area and family states that this has been causing him quite a bit of pain recently.       Overview/Hospital Course:  10/29: wbcs from 23k to 22k. CT chest doesn't show any infection. CT pelvis showed necrotic lymph node.  Rocephin changed to zosyn. Cont vanc. Surgery following  10/30: discussed with heme/onc and surgery about plans of care. Surgery plans for aspiration I&D in am. ID is consulted since not sure where  there is an infection and if wbcs are just reactive. Wound culture is pending .       Interval History: sleepy this morning. Attempting to awaken once stimulated. Son and wife at bedside. Plan for I&D of groin abscess today.       Objective:     Vital Signs (Most Recent):  Temp: 98.1 °F (36.7 °C) (10/31/23 1245)  Pulse: 78 (10/31/23 1245)  Resp: 16 (10/31/23 1245)  BP: (!) 125/59 (10/31/23 1245)  SpO2: (!) 92 % (10/31/23 1245) Vital Signs (24h Range):  Temp:  [97.6 °F (36.4 °C)-98.2 °F (36.8 °C)] 98.1 °F (36.7 °C)  Pulse:  [62-89] 78  Resp:  [16-20] 16  SpO2:  [92 %-97 %] 92 %  BP: (125-156)/(59-82) 125/59     Weight: 105.3 kg (232 lb 2.3 oz)  Body mass index is 31.48 kg/m².    Intake/Output Summary (Last 24 hours) at 10/31/2023 1302  Last data filed at 10/31/2023 1228  Gross per 24 hour   Intake 2144.86 ml   Output 1225 ml   Net 919.86 ml           Physical Exam  Vitals and nursing note reviewed.   Constitutional:       General: He is not in acute distress.     Appearance: He is well-developed.      Comments: Elderly male, sleepy   Cardiovascular:      Rate and Rhythm: Normal rate and regular rhythm.      Heart sounds: Normal heart sounds. No murmur heard.  Pulmonary:      Effort: Pulmonary effort is normal.      Breath sounds: Normal breath sounds. No wheezing.   Abdominal:      General: Bowel sounds are normal.      Palpations: Abdomen is soft.      Tenderness: There is no abdominal tenderness.      Comments: Visible protruding Swollen R inguinal lymph nodes with erythema and edema   Musculoskeletal:      Comments: Ulcer on the RLE , and pressure ulcer on the heel   Skin:     General: Skin is warm and dry.      Findings: No rash.             Significant Labs: All pertinent labs within the past 24 hours have been reviewed.  BMP:   Recent Labs   Lab 10/30/23  0336 10/31/23  0628   * 223*    134*   K 3.5 3.9    99   CO2 30* 29   BUN 13 9   CREATININE 0.8 0.7   CALCIUM 9.1 8.0*   MG 1.7  --   "      CBC:   Recent Labs   Lab 10/30/23  0336 10/31/23  0428   WBC 23.48* 23.02*   HGB 9.3* 8.1*   HCT 29.3* 26.6*    154         Significant Imaging: I have reviewed all pertinent imaging results/findings within the past 24 hours.  I have reviewed and interpreted all pertinent imaging results/findings within the past 24 hours.      Assessment/Plan:      * Sepsis  This patient does have evidence of infective focus  My overall impression is sepsis.  Source: Skin and Soft Tissue (location right inguinal abscess)     Antibiotics given-   Antibiotics (72h ago, onward)    Start     Stop Route Frequency Ordered    10/30/23 2329  mupirocin 2 % ointment         -- Nasl On Call Procedure 10/30/23 2329    10/29/23 1045  piperacillin-tazobactam (ZOSYN) 4.5 g in dextrose 5 % in water (D5W) 100 mL IVPB (MB+)         -- IV Every 8 hours (non-standard times) 10/29/23 0933    10/29/23 1045  mupirocin 2 % ointment         11/03/23 0859 Nasl 2 times daily 10/29/23 0937    10/26/23 0200  vancomycin 1,500 mg in dextrose 5 % (D5W) 250 mL IVPB (Vial-Mate)         -- IV Every 24 hours (non-standard times) 10/25/23 1547    10/25/23 0034  vancomycin - pharmacy to dose  (vancomycin IVPB (PEDS and ADULTS))        See Hyperspace for full Linked Orders Report.    -- IV pharmacy to manage frequency 10/24/23 2335        Latest lactate reviewed-  No results for input(s): "LACTATE" in the last 72 hours.  Organ dysfunction indicated by Acute kidney injury and Encephalopathy    Fluid challenge Actual Body weight- Patient will receive 30ml/kg actual body weight to calculate fluid bolus for treatment of septic shock.     Post- resuscitation assessment Yes Perfusion exam was performed within 6 hours of septic shock presentation after bolus shows Adequate tissue perfusion assessed by non-invasive monitoring       Will Not start Pressors- Levophed for MAP of 65  Source control achieved by: broad spectrum abx    Surgery consult with plan for I&D on " R inguinal abscess on 10/31    Anemia  · Stable  · H/H 12.3/39.8  · S/p blood transfusions during previous admission for GIB  · No continued reports of hematemesis, melena, hematochezia, or other signs of bleeding per family  · Transfuse for hgb <7  · Monitor       ACP (advance care planning)  Advance Care Planning  - done by palliative care team     Date: 10/30/2023    Code Status  In light of the patients advanced and life limiting illness,I engaged the the family in a voluntary conversation about the patient's preferences for care at the very end of life. The patient wishes to have a natural, peaceful death.  Along those lines, the patient does not wish to have CPR or other invasive treatments performed when his heart and/or breathing stops. I communicated to the family that a DNR order would be placed in his medical record to reflect this preference.  I spent a total of 8 minutes engaging the patient in this advance care planning discussion.           Hypercalcemia  The patient has hypercalcemia that is currently controlled. The patient has the following symptoms due to their hypercalcemia: encephalopathy. The hypercalcemia is likely due to unknown - suspected 2/2 malignancy. We will obtain the following labs to work up the hypercalcemia:   alkaline phosphatase   Alkaline Phosphatase   Date Value Ref Range Status   10/31/2023 84 55 - 135 U/L Final    . We will treat the hypercalcemia with: IV fluids ordered at a rate of 125/hr. Their latest calcium has been reviewed and is listed below.  Ionized Calcium   Date Value Ref Range Status   10/25/2023 1.60 (H) 1.06 - 1.42 mmol/L Final        resolved    A-fib  Patient with Persistent (7 days or more) atrial fibrillation which is controlled currently with Beta Blocker and Digoxin. Patient is currently in atrial fibrillation.[id number] Score: 2. HASBLED Score: 3. Anticoagulation not indicated due to risk of GIB.  Continue prophylactic anticoagulation while inpatient w/  close monitoring    Abscess of right groin  · Previously noted on MRI and CT scan 9/2023   1.  Bulky lymphadenopathy versus mass in the right inguinal region    2.  Enlarged right external iliac chain lymph nodes, likely metastatic    3.  No other findings of metastatic disease in the abdomen or pelvis    4.  Cholelithiasis   · Noted to be SCC on biopsy  · Per family, it is now more swollen, painful, and discolored since biopsy  · Hem/onc consult ordered regarding recommendations  · CT pelvis showing necrotic LN  · Surgery on board and plans for I&D on 10/31  · Cont on iv abx  · -ID consult      Encephalopathy, metabolic  · Likely delirium 2/2 infectious etiology. Hypercalcemia/ FERNANDO have improved.   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  Had a biopsy      Hypertension  · Patient mildly hypotensive on arrival but responded well to IVF  · Resume home BP meds in the am if awake enough to take      VTE Risk Mitigation (From admission, onward)         Ordered     enoxaparin injection 40 mg  Every 24 hours         10/31/23 0942     IP VTE HIGH RISK PATIENT  Once         10/24/23 2358     Place sequential compression device  Until discontinued         10/24/23 5227                Discharge Planning   NANCY: 11/3/2023     Code Status: DNR   Is the patient medically ready for discharge?:     Reason for patient still in hospital (select all that apply): Patient trending condition, Treatment and Consult recommendations  Discharge Plan A: Skilled Nursing Facility          Gabriella Madrid MD  Department of Hospital Medicine   [facility]

## 2023-10-31 NOTE — ASSESSMENT & PLAN NOTE
Patient with Persistent (7 days or more) atrial fibrillation which is controlled currently with Beta Blocker and Digoxin. Patient is currently in atrial fibrillation.[id number] Score: 2. HASBLED Score: 3. Anticoagulation not indicated due to risk of GIB.  Continue prophylactic anticoagulation while inpatient w/ close monitoring

## 2023-10-31 NOTE — ASSESSMENT & PLAN NOTE
· Previously noted on MRI and CT scan 9/2023   1.  Bulky lymphadenopathy versus mass in the right inguinal region    2.  Enlarged right external iliac chain lymph nodes, likely metastatic    3.  No other findings of metastatic disease in the abdomen or pelvis    4.  Cholelithiasis   · Noted to be SCC on biopsy  · Per family, it is now more swollen, painful, and discolored since biopsy  · Hem/onc consult ordered regarding recommendations  · CT pelvis showing necrotic LN  · Surgery on board and plans for I&D on 10/31  · Cont on iv abx  · -ID consult

## 2023-10-31 NOTE — PLAN OF CARE
10/31/23 0930   Rounds   Attendance Provider;Nurse ;Nurse;Pharmacist   Discharge Plan A Skilled Nursing Facility   Why the patient remains in the hospital Requires continued medical care   Transition of Care Barriers Transportation     Patient resting quietly in bed with spouse, Jessica Montana ([phone]), & son, Lata Montana ([phone]), at the bedside when CM participated in SIBR with Dr Madrid, nurse Christina, & pharmacist Han. Pt scheduled to have an I&D of right groin mass done by Dr Barber today. DNR order noted.     Pox 96% on 3L, WBC 23.02, and H&H 81/26.6 this AM.     Previously scheduled appt with Dr Robin (hem/onc) on 11/7/2023 at 1420 noted. Information added to the pt's discharge paperwork.    CM completed LOCET & faxed PASRR to the state. Awaiting 142.    1600  PASRR/142 obtained from the [facility] & uploaded into [facility].       Will continue to follow.

## 2023-10-31 NOTE — PLAN OF CARE
VIRTUAL NURSE:  Labs, notes, orders, and careplan reviewed. VN to be available as needed.    Problem: Adult Inpatient Plan of Care  Goal: Plan of Care Review  Outcome: Ongoing, Progressing  Goal: Patient-Specific Goal (Individualized)  Outcome: Ongoing, Progressing  Goal: Absence of Hospital-Acquired Illness or Injury  Outcome: Ongoing, Progressing  Goal: Optimal Comfort and Wellbeing  Outcome: Ongoing, Progressing  Goal: Readiness for Transition of Care  Outcome: Ongoing, Progressing     Problem: Adjustment to Illness (Sepsis/Septic Shock)  Goal: Optimal Coping  Outcome: Ongoing, Progressing     Problem: Bleeding (Sepsis/Septic Shock)  Goal: Absence of Bleeding  Outcome: Ongoing, Progressing     Problem: Glycemic Control Impaired (Sepsis/Septic Shock)  Goal: Blood Glucose Level Within Desired Range  Outcome: Ongoing, Progressing     Problem: Infection Progression (Sepsis/Septic Shock)  Goal: Absence of Infection Signs and Symptoms  Outcome: Ongoing, Progressing     Problem: Nutrition Impaired (Sepsis/Septic Shock)  Goal: Optimal Nutrition Intake  Outcome: Ongoing, Progressing     Problem: Fall Injury Risk  Goal: Absence of Fall and Fall-Related Injury  Outcome: Ongoing, Progressing     Problem: Hypertension Acute  Goal: Blood Pressure Within Desired Range  Outcome: Ongoing, Progressing     Problem: Skin Injury Risk Increased  Goal: Skin Health and Integrity  Outcome: Ongoing, Progressing     Problem: Impaired Wound Healing  Goal: Optimal Wound Healing  Outcome: Ongoing, Progressing     Problem: Fluid and Electrolyte Imbalance (Acute Kidney Injury/Impairment)  Goal: Fluid and Electrolyte Balance  Outcome: Ongoing, Progressing     Problem: Oral Intake Inadequate (Acute Kidney Injury/Impairment)  Goal: Optimal Nutrition Intake  Outcome: Ongoing, Progressing     Problem: Renal Function Impairment (Acute Kidney Injury/Impairment)  Goal: Effective Renal Function  Outcome: Ongoing, Progressing     Problem: Hypertension  Comorbidity  Goal: Blood Pressure in Desired Range  Outcome: Ongoing, Progressing     Problem: Coping Ineffective  Goal: Effective Coping  Outcome: Ongoing, Progressing     Problem: Confusion Acute  Goal: Optimal Cognitive Function  Outcome: Ongoing, Progressing     Problem: UTI (Urinary Tract Infection)  Goal: Improved Infection Symptoms  Outcome: Ongoing, Progressing     Problem: Coping Ineffective (Oncology Care)  Goal: Effective Coping  Outcome: Ongoing, Progressing     Problem: Fatigue (Oncology Care)  Goal: Improved Activity Tolerance  Outcome: Ongoing, Progressing     Problem: Oral Intake Altered (Oncology Care)  Goal: Optimal Oral Intake  Outcome: Ongoing, Progressing     Problem: Pain Acute (Oncology Care)  Goal: Optimal Pain Control  Outcome: Ongoing, Progressing     Problem: Thought Process Alteration  Goal: Optimal Thought Clarity  Outcome: Ongoing, Progressing     Problem: Infection  Goal: Absence of Infection Signs and Symptoms  Outcome: Ongoing, Progressing

## 2023-10-31 NOTE — PT/OT/SLP PROGRESS
Physical Therapy      Patient Name:  Aquiles Montana   MRN:  [id number]    Patient not seen today secondary to Other (Comment) (12:30 - Patient SHARI in OR for I/D of groin abscess.).   10/31/2023

## 2023-10-31 NOTE — PT/OT/SLP PROGRESS
Occupational Therapy      Patient Name:  Aquiles Montana   MRN:  [id number]    Patient not seen today secondary to Off the floor for procedure/surgery (12:30 - Patient SHARI in OR for I/D of groin abscess.).     10/31/2023

## 2023-10-31 NOTE — PROGRESS NOTES
Progress Note  Nephrology      Consult Requested By: Gabriella Madrid MD      SUBJECTIVE:     Overnight events  Patient is a 83 y.o. 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    Dermatitis    Tinea pedis of both feet    Open wound of right lower leg    Right leg swelling    Left anterior fascicular block    Class 2 severe obesity with serious comorbidity and body mass index (BMI) of 38.0 to 38.9 in adult    Sepsis    Encephalopathy, metabolic    Abscess of right groin    A-fib    Hypercalcemia    ACP (advance care planning)    Anemia     Past Medical History:   Diagnosis Date    Hypertension     Urinary tract infection               OBJECTIVE:     Vitals:    10/31/23 0409 10/31/23 0744 10/31/23 0813 10/31/23 1142   BP:   128/68    BP Location:       Patient Position:   Lying    Pulse: 84 89 76    Resp:   18    Temp:   98.2 °F (36.8 °C)    TempSrc:   Oral    SpO2:  95% (!) 93% 97%   Weight:       Height:           Temp: 98.2 °F (36.8 °C) (10/31/23 0813)  Pulse: 76 (10/31/23 0813)  Resp: 18 (10/31/23 0813)  BP: 128/68 (10/31/23 0813)  SpO2: 97 % (10/31/23 1142)              Medications:   collagenase   Topical (Top) Daily    digoxin  0.125 mg Oral Daily    [START ON 11/1/2023] enoxparin  40 mg Subcutaneous Q24H (prophylaxis, 1700)    Lactobacillus acidoph-L.bulgar  1 tablet Oral TID WM    latanoprost  1 drop Both Eyes QHS    melatonin  6 mg Oral Nightly    metoprolol tartrate  25 mg Oral BID    mupirocin   Nasal BID    pantoprazole  40 mg Intravenous BID    piperacillin-tazobactam (Zosyn) IV (PEDS and ADULTS) (extended infusion is not appropriate)  4.5 g Intravenous Q8H    sodium chloride 0.9%  10 mL Intravenous Q6H    sodium chloride 0.9%  10 mL Intravenous Q6H    traZODone  50 mg  Oral QHS    vancomycin (VANCOCIN) IV (PEDS and ADULTS)  1,500 mg Intravenous Q24H    vitamin D  1,000 Units Oral Daily      sodium chloride 0.9%                 Physical Exam:  General appearance: NAD  Awake  Lungs: diminished breath sounds  Heart: pulse 78  Abdomen: soft  Extremities:edema   Skin: dry  Laboratory:  ABG  Labs reviewed  No results found for this or any previous visit (from the past 336 hour(s)).  Recent Results (from the past 336 hour(s))   CBC auto differential    Collection Time: 10/31/23  4:28 AM   Result Value Ref Range    WBC 23.02 (H) 3.90 - 12.70 K/uL    Hemoglobin 8.1 (L) 14.0 - 18.0 g/dL    Hematocrit 26.6 (L) 40.0 - 54.0 %    Platelets 154 150 - 450 K/uL   CBC auto differential    Collection Time: 10/30/23  3:36 AM   Result Value Ref Range    WBC 23.48 (H) 3.90 - 12.70 K/uL    Hemoglobin 9.3 (L) 14.0 - 18.0 g/dL    Hematocrit 29.3 (L) 40.0 - 54.0 %    Platelets 178 150 - 450 K/uL   CBC auto differential    Collection Time: 10/29/23  9:37 AM   Result Value Ref Range    WBC 22.01 (H) 3.90 - 12.70 K/uL    Hemoglobin 8.7 (L) 14.0 - 18.0 g/dL    Hematocrit 28.1 (L) 40.0 - 54.0 %    Platelets 170 150 - 450 K/uL     Urinalysis  Recent Labs   Lab 10/30/23  1225   COLORU Yellow   SPECGRAV >1.030*   PHUR 6.0   PROTEINUA 1+*   BACTERIA None   NITRITE Negative   LEUKOCYTESUR Negative   UROBILINOGEN Negative   HYALINECASTS 0       Diagnostic Results:  X-Ray: Reviewed  US: Reviewed  Echo: Reviewed  ACCESS    ASSESSMENT/PLAN:   FERNANDO  UA protein trace, RBC 27, WBC 21  Creatinine  0.8 - 0.7  BUN 14- 13- 9  K 3.5 - 3.2- 3.5- 3.9  Mag 1.7  Replace prn  Metabolic alkalosis  PTH 6.5  Hypercalcemia improving with IVF  Ca corrected approximately 12- 11.2- 10.6- 9.6  Vit D 27  CT  Regional osseous structures demonstrate no aggressive appearing lytic or blastic lesions.   Albumin 2.2- 2.1  Phos 2 - 1.3- 1.4  Anemia multifactorial  Hb 10- 8.7- 8.1  /77, 122/59, 138/63, 128/68  Weight daily  I and O  Avoid  nephrotoxic agents, hypotension, hypovolemia  Replace e-lytes prn  IVF

## 2023-10-31 NOTE — ASSESSMENT & PLAN NOTE
· Likely delirium 2/2 infectious etiology. Hypercalcemia/ FERNANDO have improved.   · Treat as above  · Maintain safety precautions  · Delirium precautions

## 2023-11-01 LAB
ALBUMIN SERPL BCP-MCNC: 1.9 G/DL (ref 3.5–5.2)
ANION GAP SERPL CALC-SCNC: 9 MMOL/L (ref 8–16)
BASOPHILS # BLD AUTO: 0.08 K/UL (ref 0–0.2)
BASOPHILS NFR BLD: 0.3 % (ref 0–1.9)
BUN SERPL-MCNC: 9 MG/DL (ref 8–23)
CALCIUM SERPL-MCNC: 7.9 MG/DL (ref 8.7–10.5)
CHLORIDE SERPL-SCNC: 101 MMOL/L (ref 95–110)
CO2 SERPL-SCNC: 29 MMOL/L (ref 23–29)
CREAT SERPL-MCNC: 0.8 MG/DL (ref 0.5–1.4)
DIFFERENTIAL METHOD: ABNORMAL
EOSINOPHIL # BLD AUTO: 0.3 K/UL (ref 0–0.5)
EOSINOPHIL NFR BLD: 1.1 % (ref 0–8)
ERYTHROCYTE [DISTWIDTH] IN BLOOD BY AUTOMATED COUNT: 16.2 % (ref 11.5–14.5)
EST. GFR  (NO RACE VARIABLE): >60 ML/MIN/1.73 M^2
GLUCOSE SERPL-MCNC: 146 MG/DL (ref 70–110)
GRAM STN SPEC: NORMAL
GRAM STN SPEC: NORMAL
HCT VFR BLD AUTO: 28.4 % (ref 40–54)
HGB BLD-MCNC: 8.8 G/DL (ref 14–18)
IMM GRANULOCYTES # BLD AUTO: 0.21 K/UL (ref 0–0.04)
IMM GRANULOCYTES NFR BLD AUTO: 0.9 % (ref 0–0.5)
LYMPHOCYTES # BLD AUTO: 1 K/UL (ref 1–4.8)
LYMPHOCYTES NFR BLD: 4.1 % (ref 18–48)
MCH RBC QN AUTO: 29.1 PG (ref 27–31)
MCHC RBC AUTO-ENTMCNC: 31 G/DL (ref 32–36)
MCV RBC AUTO: 94 FL (ref 82–98)
MONOCYTES # BLD AUTO: 1.5 K/UL (ref 0.3–1)
MONOCYTES NFR BLD: 6.2 % (ref 4–15)
NEUTROPHILS # BLD AUTO: 20.9 K/UL (ref 1.8–7.7)
NEUTROPHILS NFR BLD: 88.3 % (ref 38–73)
NRBC BLD-RTO: 0 /100 WBC
PHOSPHATE SERPL-MCNC: 1.4 MG/DL (ref 2.7–4.5)
PLATELET # BLD AUTO: 174 K/UL (ref 150–450)
PMV BLD AUTO: 10.7 FL (ref 9.2–12.9)
POCT GLUCOSE: 100 MG/DL (ref 70–110)
POCT GLUCOSE: 105 MG/DL (ref 70–110)
POCT GLUCOSE: 93 MG/DL (ref 70–110)
POTASSIUM SERPL-SCNC: 3.9 MMOL/L (ref 3.5–5.1)
PTH RELATED PROT SERPL-SCNC: 4.1 PMOL/L
RBC # BLD AUTO: 3.02 M/UL (ref 4.6–6.2)
SODIUM SERPL-SCNC: 139 MMOL/L (ref 136–145)
VANCOMYCIN TROUGH SERPL-MCNC: 14.5 UG/ML (ref 10–22)
WBC # BLD AUTO: 23.68 K/UL (ref 3.9–12.7)

## 2023-11-01 PROCEDURE — 63600175 PHARM REV CODE 636 W HCPCS: Performed by: STUDENT IN AN ORGANIZED HEALTH CARE EDUCATION/TRAINING PROGRAM

## 2023-11-01 PROCEDURE — 25000003 PHARM REV CODE 250: Performed by: STUDENT IN AN ORGANIZED HEALTH CARE EDUCATION/TRAINING PROGRAM

## 2023-11-01 PROCEDURE — 80069 RENAL FUNCTION PANEL: CPT | Performed by: STUDENT IN AN ORGANIZED HEALTH CARE EDUCATION/TRAINING PROGRAM

## 2023-11-01 PROCEDURE — 97112 NEUROMUSCULAR REEDUCATION: CPT

## 2023-11-01 PROCEDURE — 25000003 PHARM REV CODE 250: Performed by: INTERNAL MEDICINE

## 2023-11-01 PROCEDURE — 97530 THERAPEUTIC ACTIVITIES: CPT | Mod: CO

## 2023-11-01 PROCEDURE — 92526 ORAL FUNCTION THERAPY: CPT

## 2023-11-01 PROCEDURE — 85025 COMPLETE CBC W/AUTO DIFF WBC: CPT | Performed by: FAMILY MEDICINE

## 2023-11-01 PROCEDURE — 11000001 HC ACUTE MED/SURG PRIVATE ROOM

## 2023-11-01 PROCEDURE — 97535 SELF CARE MNGMENT TRAINING: CPT

## 2023-11-01 PROCEDURE — C1751 CATH, INF, PER/CENT/MIDLINE: HCPCS

## 2023-11-01 PROCEDURE — C9113 INJ PANTOPRAZOLE SODIUM, VIA: HCPCS | Performed by: NURSE PRACTITIONER

## 2023-11-01 PROCEDURE — 25000003 PHARM REV CODE 250: Performed by: NURSE PRACTITIONER

## 2023-11-01 PROCEDURE — 63600175 PHARM REV CODE 636 W HCPCS: Performed by: NURSE PRACTITIONER

## 2023-11-01 PROCEDURE — 92507 TX SP LANG VOICE COMM INDIV: CPT

## 2023-11-01 PROCEDURE — 25000003 PHARM REV CODE 250: Performed by: FAMILY MEDICINE

## 2023-11-01 PROCEDURE — 97530 THERAPEUTIC ACTIVITIES: CPT

## 2023-11-01 PROCEDURE — A4216 STERILE WATER/SALINE, 10 ML: HCPCS | Performed by: FAMILY MEDICINE

## 2023-11-01 PROCEDURE — 80202 ASSAY OF VANCOMYCIN: CPT | Performed by: FAMILY MEDICINE

## 2023-11-01 RX ORDER — SODIUM,POTASSIUM PHOSPHATES 280-250MG
1 POWDER IN PACKET (EA) ORAL
Status: COMPLETED | OUTPATIENT
Start: 2023-11-01 | End: 2023-11-01

## 2023-11-01 RX ORDER — LANOLIN ALCOHOL/MO/W.PET/CERES
400 CREAM (GRAM) TOPICAL ONCE
Status: COMPLETED | OUTPATIENT
Start: 2023-11-01 | End: 2023-11-01

## 2023-11-01 RX ADMIN — Medication 400 MG: at 05:11

## 2023-11-01 RX ADMIN — Medication 6 MG: at 09:11

## 2023-11-01 RX ADMIN — DIGOXIN 0.12 MG: 125 TABLET ORAL at 09:11

## 2023-11-01 RX ADMIN — SODIUM CHLORIDE, PRESERVATIVE FREE 10 ML: 5 INJECTION INTRAVENOUS at 12:11

## 2023-11-01 RX ADMIN — MUPIROCIN: 20 OINTMENT TOPICAL at 09:11

## 2023-11-01 RX ADMIN — LACTOBACILLUS TAB 1 TABLET: TAB at 04:11

## 2023-11-01 RX ADMIN — POTASSIUM & SODIUM PHOSPHATES POWDER PACK 280-160-250 MG 1 PACKET: 280-160-250 PACK at 01:11

## 2023-11-01 RX ADMIN — PANTOPRAZOLE SODIUM 40 MG: 40 INJECTION, POWDER, LYOPHILIZED, FOR SOLUTION INTRAVENOUS at 09:11

## 2023-11-01 RX ADMIN — COLLAGENASE SANTYL: 250 OINTMENT TOPICAL at 09:11

## 2023-11-01 RX ADMIN — METOPROLOL TARTRATE 25 MG: 25 TABLET, FILM COATED ORAL at 09:11

## 2023-11-01 RX ADMIN — POTASSIUM & SODIUM PHOSPHATES POWDER PACK 280-160-250 MG 1 PACKET: 280-160-250 PACK at 09:11

## 2023-11-01 RX ADMIN — POTASSIUM & SODIUM PHOSPHATES POWDER PACK 280-160-250 MG 1 PACKET: 280-160-250 PACK at 05:11

## 2023-11-01 RX ADMIN — SODIUM CHLORIDE, PRESERVATIVE FREE 10 ML: 5 INJECTION INTRAVENOUS at 06:11

## 2023-11-01 RX ADMIN — POTASSIUM PHOSPHATE, MONOBASIC POTASSIUM PHOSPHATE, DIBASIC 15 MMOL: 224; 236 INJECTION, SOLUTION, CONCENTRATE INTRAVENOUS at 09:11

## 2023-11-01 RX ADMIN — TRAZODONE HYDROCHLORIDE 50 MG: 50 TABLET ORAL at 09:11

## 2023-11-01 RX ADMIN — Medication 1000 UNITS: at 09:11

## 2023-11-01 RX ADMIN — LATANOPROST 1 DROP: 50 SOLUTION OPHTHALMIC at 09:11

## 2023-11-01 RX ADMIN — LACTOBACILLUS TAB 1 TABLET: TAB at 09:11

## 2023-11-01 RX ADMIN — POTASSIUM PHOSPHATE, MONOBASIC AND POTASSIUM PHOSPHATE, DIBASIC 15 MMOL: 224; 236 INJECTION, SOLUTION, CONCENTRATE INTRAVENOUS at 01:11

## 2023-11-01 RX ADMIN — LACTOBACILLUS TAB 1 TABLET: TAB at 01:11

## 2023-11-01 RX ADMIN — ENOXAPARIN SODIUM 40 MG: 40 INJECTION SUBCUTANEOUS at 04:11

## 2023-11-01 NOTE — PLAN OF CARE
Problem: Adult Inpatient Plan of Care  Goal: Plan of Care Review  Outcome: Ongoing, Progressing  Flowsheets (Taken 11/1/2023 0100)  Plan of Care Reviewed With:   patient   spouse     Problem: Adjustment to Illness (Sepsis/Septic Shock)  Goal: Optimal Coping  Outcome: Ongoing, Progressing  Intervention: Optimize Psychosocial Adjustment to Illness  Flowsheets (Taken 11/1/2023 0100)  Supportive Measures:   active listening utilized   positive reinforcement provided  Family/Support System Care:   caregiver stress acknowledged   support provided   Plan of care progressing.

## 2023-11-01 NOTE — ASSESSMENT & PLAN NOTE
· Likely delirium 2/2 infectious etiology. Hypercalcemia/ FERNANDO have improved.   · Treat as above  · Maintain safety precautions  · Delirium precautions    Mentation improving

## 2023-11-01 NOTE — PT/OT/SLP PROGRESS
"Occupational Therapy   Treatment    Name: Aquiles Montaan  MRN: [id number]  Admitting Diagnosis:  Sepsis  1 Day Post-Op    Recommendations:     Discharge Recommendations: Moderate Intensity Therapy  Discharge Equipment Recommendations:  shower chair  Barriers to discharge:   (significantly increased burden of care; increased falls risk)    Assessment:     Aquiles Montana is a 83 y.o. male with a medical diagnosis of Sepsis. Performance deficits affecting function are weakness, impaired endurance, impaired self care skills, impaired functional mobility, gait instability, impaired balance, impaired cognition, decreased coordination, decreased upper extremity function, decreased lower extremity function, decreased safety awareness, pain, impaired skin, edema, impaired coordination, decreased ROM, impaired cardiopulmonary response to activity, impaired fine motor.     Rehab Prognosis:  Fair; patient would benefit from acute skilled OT services to address these deficits and reach maximum level of function.       Plan:     Patient to be seen 3 x/week to address the above listed problems via self-care/home management, therapeutic activities, therapeutic exercises  Plan of Care Expires: 11/26/23  Plan of Care Reviewed with: patient, spouse    Subjective     Chief Complaint: "I'm okay"  Patient/Family Comments/goals: return to PLOF  Pain/Comfort:  Pain Rating 1:  (did not rate)  Location - Side 1: Right  Location - Orientation 1: generalized  Location 1: hip  Pain Addressed 1: Reposition, Distraction, Cessation of Activity, Nurse notified    Objective:     Communicated with: nurseRadha prior to session.  Patient found HOB elevated with bed alarm, telemetry, oxygen, PureWick upon OT entry to room.    General Precautions: Standard, fall, aspiration    Orthopedic Precautions:N/A  Braces: N/A  Respiratory Status: Nasal cannula, flow 2 L/min    Bed Mobility:    Patient completed Rolling/Turning to Left with  moderate assistance " and with side rail  Patient completed Rolling/Turning to Right with moderate assistance and with side rail  Patient completed Scooting/Bridging with MaxA to EOB; MaxA of 2 for lateral scoots along EOB  Patient completed Supine to Sit with moderate assistance, maximal assistance, and 2 persons  Patient completed Sit to Supine with maximal assistance and 2 persons     Functional Mobility/Transfers:  N/A    Activities of Daily Living:  N/A    AMPAC 6 Click ADL: 13    Treatment & Education:  Educated on purpose/role of OT  Patient alert and more conversant/appropriate this date; able to articulate more clearly than previous sessions  Bed mobility as noted above; increased time, effort and VCs for all transitions  Patient sat EOB /c CGA-ModA; brief bouts of MaxA d/t patient posterior leaning 2/2 fatigue  Instructed in and completed various balance activities to engage core/trunk for improved performance in functional activities; PT led and BYRNE providing cues and assist for balance    Patient left HOB elevated with all lines intact, call button in reach, bed alarm on, nsg notified, and wife present    GOALS:   Multidisciplinary Problems       Occupational Therapy Goals          Problem: Occupational Therapy    Goal Priority Disciplines Outcome Interventions   Occupational Therapy Goal     OT, PT/OT Ongoing, Progressing    Description: Goals to be met by: 11/26     Patient will increase functional independence with ADLs by performing:    Grooming while EOB with Stand-by Assistance.  Toileting from bedside commode with Moderate Assistance for hygiene and clothing management.   Toilet transfer to bedside commode with Minimal Assistance.                         Time Tracking:     OT Date of Treatment: 11/01/23  OT Start Time: 1001  OT Stop Time: 1048  OT Total Time (min): 47 min Overlap with PT for portions of session due to complex nature of patient and for safety with mobility to decrease fall risk for patient and  caregiver injury requiring two skilled therapists to provide different interventions.    Billable Minutes:Therapeutic Activity 47    OT/ROMÁN: ROMÁN     Number of ROMÁN visits since last OT visit: 2    11/1/2023

## 2023-11-01 NOTE — ASSESSMENT & PLAN NOTE
"This patient does have evidence of infective focus  My overall impression is sepsis.  Source: Skin and Soft Tissue (location right inguinal abscess)     Antibiotics given-   Antibiotics (72h ago, onward)    Start     Stop Route Frequency Ordered    10/30/23 2329  mupirocin 2 % ointment         -- Nasl On Call Procedure 10/30/23 2329    10/29/23 1045  mupirocin 2 % ointment         11/03/23 0859 Nasl 2 times daily 10/29/23 0937        Latest lactate reviewed-  No results for input(s): "LACTATE" in the last 72 hours.  Organ dysfunction indicated by Acute kidney injury and Encephalopathy    Fluid challenge Actual Body weight- Patient will receive 30ml/kg actual body weight to calculate fluid bolus for treatment of septic shock.     Post- resuscitation assessment Yes Perfusion exam was performed within 6 hours of septic shock presentation after bolus shows Adequate tissue perfusion assessed by non-invasive monitoring       Will Not start Pressors- Levophed for MAP of 65  Source control achieved by: monitoring off antibiotics per ID recommendation s/p I&D    Surgery consult  - s/p I&D on R inguinal abscess on 10/31. Cultures pending  "

## 2023-11-01 NOTE — PLAN OF CARE
11/01/23 0852   Post-Acute Status   Post-Acute Authorization Placement   Post-Acute Placement Status Pending medical clearance/testing  (Pending medical clearance. Additional referrals sent. No acceptances at present. Msg left with admissions at [facility]. PENDING ACCEPTING FACILITY.)   Discharge Plan   Discharge Plan A Skilled Nursing Facility       Future Appointments   Date Time Provider Department Center   11/7/2023  2:20 PM Delonte Robin MD [facility] HEM ONC [facility]   8/12/2024 11:00 AM Marty Hernandez MD [facility] FAM MED [facility]     /74 (BP Location: Left arm, Patient Position: Sitting)   Pulse 84   Temp 97.4 °F (36.3 °C) (Oral)   Resp 18   Ht 6' (1.829 m)   Wt 105.3 kg (232 lb 2.3 oz)   SpO2 (!) 92%   BMI 31.48 kg/m²      collagenase   Topical (Top) Daily    digoxin  0.125 mg Oral Daily    enoxparin  40 mg Subcutaneous Q24H (prophylaxis, 1700)    Lactobacillus acidoph-L.bulgar  1 tablet Oral TID WM    latanoprost  1 drop Both Eyes QHS    melatonin  6 mg Oral Nightly    metoprolol tartrate  25 mg Oral BID    mupirocin   Nasal BID    pantoprazole  40 mg Intravenous BID    sodium chloride 0.9%  10 mL Intravenous Q6H    sodium chloride 0.9%  10 mL Intravenous Q6H    traZODone  50 mg Oral QHS    vitamin D  1,000 Units Oral Daily     Consults (From admission, onward)          Status Ordering Provider     Inpatient consult to Infectious Diseases  Once        Provider:  (Not yet assigned)    Completed ROSSY MIX     Inpatient consult to Registered Dietitian/Nutritionist  Once        Provider:  (Not yet assigned)    Completed EDILBERTO NATION     Inpatient consult to Palliative Care  Once        Provider:  (Not yet assigned)    Completed EDILBERTO NATION     Inpatient consult to General Surgery  Once        Provider:  (Not yet assigned)    Acknowledged EDILBERTO NATION     Inpatient consult to [facility] (Javier Mcdaniel Nimkevych)  Once        Provider:  (Not yet  assigned)    Completed CRISTAL ABRAHAM     Inpatient consult to Registered Dietitian/Nutritionist  Once        Provider:  (Not yet assigned)    Completed CHRISTIAN CAMACHO     IP consult to case management  Once        Provider:  (Not yet assigned)    Completed CHRISTIAN CAMACHO     Inpatient consult to Hematology/Oncology  Once        Provider:  (Not yet assigned)    Completed CRISTAL ABRAHAM     Pharmacy to dose Vancomycin consult  Once        Provider:  (Not yet assigned)   See \[facility]\""melvin for full Linked Orders Report.    Acknowledged CRISTAL ABRAHAM

## 2023-11-01 NOTE — PROGRESS NOTES
[facility]                   Pharmacy  Pharmacy Vancomycin/AG Sign-off    Therapy with vancomycin completed and/or consult discontinued by provider.  Pharmacy will sign off, please re-consult as needed. Thank you for allowing us to participate in this patient's care.     Han Clay, PharmD    [phone]

## 2023-11-01 NOTE — PLAN OF CARE
VN note: Patient chart, labs, and vitals reviewed. VN to continue to be available as needed.     Problem: Adult Inpatient Plan of Care  Goal: Plan of Care Review  11/1/2023 1427 by Hallie Munoz RN  Outcome: Ongoing, Progressing  11/1/2023 1427 by Hallie Munoz RN  Outcome: Ongoing, Progressing  11/1/2023 1420 by Hallie Munoz RN  Outcome: Ongoing, Progressing  Goal: Patient-Specific Goal (Individualized)  11/1/2023 1427 by Hallie Munoz RN  Outcome: Ongoing, Progressing  11/1/2023 1427 by Hallie Munoz RN  Outcome: Ongoing, Progressing  11/1/2023 1420 by Hallie Munoz RN  Outcome: Ongoing, Progressing  Goal: Absence of Hospital-Acquired Illness or Injury  11/1/2023 1427 by Hallie Munoz RN  Outcome: Ongoing, Progressing  11/1/2023 1427 by Hallie Munoz RN  Outcome: Ongoing, Progressing  11/1/2023 1420 by Hallie Munoz RN  Outcome: Ongoing, Progressing  Goal: Optimal Comfort and Wellbeing  11/1/2023 1427 by Hallie Munoz RN  Outcome: Ongoing, Progressing  11/1/2023 1427 by Hallie Munoz RN  Outcome: Ongoing, Progressing  11/1/2023 1420 by Hallie Munoz RN  Outcome: Ongoing, Progressing  Goal: Readiness for Transition of Care  11/1/2023 1427 by Hallie Munoz RN  Outcome: Ongoing, Progressing  11/1/2023 1427 by Hallie Munoz RN  Outcome: Ongoing, Progressing  11/1/2023 1420 by Hallie Munoz RN  Outcome: Ongoing, Progressing     Problem: Adjustment to Illness (Sepsis/Septic Shock)  Goal: Optimal Coping  11/1/2023 1427 by Hallie Munoz RN  Outcome: Ongoing, Progressing  11/1/2023 1427 by Hallie Munoz RN  Outcome: Ongoing, Progressing  11/1/2023 1420 by Hallie Munoz RN  Outcome: Ongoing, Progressing     Problem: Bleeding (Sepsis/Septic Shock)  Goal: Absence of Bleeding  11/1/2023 1427 by Hallie Munoz RN  Outcome: Ongoing, Progressing  11/1/2023 1427 by Hallie Munoz RN  Outcome: Ongoing, Progressing  11/1/2023 1420 by Hallie Munoz RN  Outcome: Ongoing, Progressing     Problem: Glycemic Control  Impaired (Sepsis/Septic Shock)  Goal: Blood Glucose Level Within Desired Range  11/1/2023 1427 by Hallie Munoz RN  Outcome: Ongoing, Progressing  11/1/2023 1427 by Hallie Munoz RN  Outcome: Ongoing, Progressing  11/1/2023 1420 by Hallie Munoz RN  Outcome: Ongoing, Progressing     Problem: Infection Progression (Sepsis/Septic Shock)  Goal: Absence of Infection Signs and Symptoms  11/1/2023 1427 by Hallie Munoz RN  Outcome: Ongoing, Progressing  11/1/2023 1427 by Hallie Munoz RN  Outcome: Ongoing, Progressing  11/1/2023 1420 by Hallie Munoz RN  Outcome: Ongoing, Progressing     Problem: Nutrition Impaired (Sepsis/Septic Shock)  Goal: Optimal Nutrition Intake  11/1/2023 1427 by Hallie Munoz RN  Outcome: Ongoing, Progressing  11/1/2023 1427 by Hallie Munoz RN  Outcome: Ongoing, Progressing  11/1/2023 1420 by Hallie Munoz RN  Outcome: Ongoing, Progressing     Problem: Fall Injury Risk  Goal: Absence of Fall and Fall-Related Injury  11/1/2023 1427 by Hallie Munoz RN  Outcome: Ongoing, Progressing  11/1/2023 1427 by Hallie Munoz RN  Outcome: Ongoing, Progressing  11/1/2023 1420 by Hallie Munoz RN  Outcome: Ongoing, Progressing     Problem: Hypertension Acute  Goal: Blood Pressure Within Desired Range  11/1/2023 1427 by Hallie Munoz RN  Outcome: Ongoing, Progressing  11/1/2023 1427 by Hallie Munoz, RN  Outcome: Ongoing, Progressing  11/1/2023 1420 by Hallie Munoz RN  Outcome: Ongoing, Progressing     Problem: Skin Injury Risk Increased  Goal: Skin Health and Integrity  11/1/2023 1427 by Hallie Munoz, ANNEMARIE  Outcome: Ongoing, Progressing  11/1/2023 1427 by Hallie Munoz, RN  Outcome: Ongoing, Progressing  11/1/2023 1420 by Hallie Munoz RN  Outcome: Ongoing, Progressing     Problem: Impaired Wound Healing  Goal: Optimal Wound Healing  11/1/2023 1427 by Hallie Munoz, RN  Outcome: Ongoing, Progressing  11/1/2023 1427 by Hallie Munoz RN  Outcome: Ongoing, Progressing  11/1/2023 1420 by Hallie Munoz  TONY RN  Outcome: Ongoing, Progressing     Problem: Fluid and Electrolyte Imbalance (Acute Kidney Injury/Impairment)  Goal: Fluid and Electrolyte Balance  11/1/2023 1427 by Hallie Munoz RN  Outcome: Ongoing, Progressing  11/1/2023 1427 by Hallie Munoz RN  Outcome: Ongoing, Progressing  11/1/2023 1420 by Hallie Munoz, RN  Outcome: Ongoing, Progressing     Problem: Oral Intake Inadequate (Acute Kidney Injury/Impairment)  Goal: Optimal Nutrition Intake  11/1/2023 1427 by Hallie Munoz RN  Outcome: Ongoing, Progressing  11/1/2023 1427 by Hallie Munoz RN  Outcome: Ongoing, Progressing  11/1/2023 1420 by Hallie Munoz RN  Outcome: Ongoing, Progressing     Problem: Renal Function Impairment (Acute Kidney Injury/Impairment)  Goal: Effective Renal Function  11/1/2023 1427 by Hallie Munoz RN  Outcome: Ongoing, Progressing  11/1/2023 1427 by Hallie Munoz RN  Outcome: Ongoing, Progressing  11/1/2023 1420 by Hallie Munoz RN  Outcome: Ongoing, Progressing     Problem: Hypertension Comorbidity  Goal: Blood Pressure in Desired Range  11/1/2023 1427 by Hallie Munoz RN  Outcome: Ongoing, Progressing  11/1/2023 1427 by Hallie Munoz RN  Outcome: Ongoing, Progressing  11/1/2023 1420 by Hallie Munoz, RN  Outcome: Ongoing, Progressing     Problem: Coping Ineffective  Goal: Effective Coping  11/1/2023 1427 by Hallie Munoz RN  Outcome: Ongoing, Progressing  11/1/2023 1427 by Hallie Munoz, RN  Outcome: Ongoing, Progressing  11/1/2023 1420 by Hallie Munoz RN  Outcome: Ongoing, Progressing     Problem: Confusion Acute  Goal: Optimal Cognitive Function  11/1/2023 1427 by Hallie Munoz RN  Outcome: Ongoing, Progressing  11/1/2023 1427 by Hallie Munoz, RN  Outcome: Ongoing, Progressing  11/1/2023 1420 by Hallie Munoz RN  Outcome: Ongoing, Progressing     Problem: UTI (Urinary Tract Infection)  Goal: Improved Infection Symptoms  11/1/2023 1427 by Hallie Munoz RN  Outcome: Ongoing, Progressing  11/1/2023 1427 by Tammy,  Hallie JIMENEZ RN  Outcome: Ongoing, Progressing  11/1/2023 1420 by Hallie Munoz RN  Outcome: Ongoing, Progressing     Problem: Coping Ineffective (Oncology Care)  Goal: Effective Coping  11/1/2023 1427 by Hallie Munoz RN  Outcome: Ongoing, Progressing  11/1/2023 1427 by Hallie Munoz RN  Outcome: Ongoing, Progressing  11/1/2023 1420 by Hallie Munoz RN  Outcome: Ongoing, Progressing     Problem: Fatigue (Oncology Care)  Goal: Improved Activity Tolerance  11/1/2023 1427 by Hallie Munoz RN  Outcome: Ongoing, Progressing  11/1/2023 1427 by Hallie Munoz RN  Outcome: Ongoing, Progressing  11/1/2023 1420 by Halile Munoz RN  Outcome: Ongoing, Progressing     Problem: Oral Intake Altered (Oncology Care)  Goal: Optimal Oral Intake  11/1/2023 1427 by Hallie Munoz RN  Outcome: Ongoing, Progressing  11/1/2023 1427 by Hallie Munoz RN  Outcome: Ongoing, Progressing  11/1/2023 1420 by Hallie Munoz RN  Outcome: Ongoing, Progressing     Problem: Pain Acute (Oncology Care)  Goal: Optimal Pain Control  11/1/2023 1427 by Hallie Munoz RN  Outcome: Ongoing, Progressing  11/1/2023 1427 by Hallie Munoz, RN  Outcome: Ongoing, Progressing  11/1/2023 1420 by Hallie Munoz RN  Outcome: Ongoing, Progressing     Problem: Thought Process Alteration  Goal: Optimal Thought Clarity  11/1/2023 1427 by Hallie Munoz, RN  Outcome: Ongoing, Progressing  11/1/2023 1427 by Hallie Munoz, RN  Outcome: Ongoing, Progressing  11/1/2023 1420 by Hallie Munoz RN  Outcome: Ongoing, Progressing     Problem: Infection  Goal: Absence of Infection Signs and Symptoms  11/1/2023 1427 by Hallie Munoz RN  Outcome: Ongoing, Progressing  11/1/2023 1427 by Hallie Munoz RN  Outcome: Ongoing, Progressing  11/1/2023 1420 by Hallie Munoz, RN  Outcome: Ongoing, Progressing     Problem: Bleeding (Surgery Nonspecified)  Goal: Absence of Bleeding  11/1/2023 1427 by Hallie Munoz, RN  Outcome: Ongoing, Progressing  11/1/2023 1427 by Hallie Munoz, RN  Outcome:  Ongoing, Progressing  11/1/2023 1420 by Hallie Munoz RN  Outcome: Ongoing, Progressing     Problem: Bowel Motility Impaired (Surgery Nonspecified)  Goal: Effective Bowel Elimination  11/1/2023 1427 by Hallie Munoz RN  Outcome: Ongoing, Progressing  11/1/2023 1427 by Hallie Munoz RN  Outcome: Ongoing, Progressing  11/1/2023 1420 by Hallie Munoz RN  Outcome: Ongoing, Progressing     Problem: Fluid and Electrolyte Imbalance (Surgery Nonspecified)  Goal: Fluid and Electrolyte Balance  11/1/2023 1427 by Hallie Munoz RN  Outcome: Ongoing, Progressing  11/1/2023 1427 by Hallie Munoz RN  Outcome: Ongoing, Progressing  11/1/2023 1420 by Hallie Munoz RN  Outcome: Ongoing, Progressing     Problem: Glycemic Control Impaired (Surgery Nonspecified)  Goal: Blood Glucose Level Within Targeted Range  11/1/2023 1427 by Hallie Munoz RN  Outcome: Ongoing, Progressing  11/1/2023 1427 by Hallie Munoz RN  Outcome: Ongoing, Progressing  11/1/2023 1420 by Hallie Munoz RN  Outcome: Ongoing, Progressing     Problem: Infection (Surgery Nonspecified)  Goal: Absence of Infection Signs and Symptoms  11/1/2023 1427 by Hallie Munoz RN  Outcome: Ongoing, Progressing  11/1/2023 1427 by Hallie Munoz RN  Outcome: Ongoing, Progressing  11/1/2023 1420 by Hallie Munoz RN  Outcome: Ongoing, Progressing     Problem: Pain (Surgery Nonspecified)  Goal: Acceptable Pain Control  11/1/2023 1427 by Hallie Munoz RN  Outcome: Ongoing, Progressing  11/1/2023 1427 by Hallie Munoz RN  Outcome: Ongoing, Progressing  11/1/2023 1420 by Hallie Munoz RN  Outcome: Ongoing, Progressing     Problem: Postoperative Nausea and Vomiting (Surgery Nonspecified)  Goal: Nausea and Vomiting Relief  11/1/2023 1427 by Hallie Munoz RN  Outcome: Ongoing, Progressing  11/1/2023 1427 by Hallie Munoz, RN  Outcome: Ongoing, Progressing  11/1/2023 1420 by Hallie Munoz, RN  Outcome: Ongoing, Progressing     Problem: Postoperative Urinary Retention (Surgery  Nonspecified)  Goal: Effective Urinary Elimination  11/1/2023 1427 by Hallie Munoz RN  Outcome: Ongoing, Progressing  11/1/2023 1427 by Hallie Munoz RN  Outcome: Ongoing, Progressing  11/1/2023 1420 by Hallie Munoz RN  Outcome: Ongoing, Progressing     Problem: Respiratory Compromise (Surgery Nonspecified)  Goal: Effective Oxygenation and Ventilation  11/1/2023 1427 by Hallie Munoz RN  Outcome: Ongoing, Progressing  11/1/2023 1427 by Hallie Munoz RN  Outcome: Ongoing, Progressing  11/1/2023 1420 by Hallie Munoz RN  Outcome: Ongoing, Progressing

## 2023-11-01 NOTE — PROGRESS NOTES
[facility] ID note    Patient afebrile.  WBC 23.7.  Off antibiotics    Cultures from surgery in process  Superficial wound culture with no growth    We will continue to monitor off antibiotics now that he has had surgical drainage and debridement    Please call if any questions   Asad Deluna  [facility] ID  [phone]

## 2023-11-01 NOTE — PROGRESS NOTES
[facility] Medicine  Progress Note    Patient Name: Aquiles Montana  MRN: [id number]  Patient Class: IP- Inpatient   Admission Date: 10/24/2023  Length of Stay: 8 days  Attending Physician: Gabriella Madrid MD  Primary Care Provider: Marty Hernandez MD        Subjective:     Principal Problem:Sepsis    HPI:  Aquiles Montana is an 83-year-old male w/ PMH SCC, BPH, HTN, obesity, and left anterior fascicular block. He presented to the ED via EMS for evaluation due to worsening mental status since admission 5 weeks ago. He was previously admitted to the hospital for sepsis and then discharged to a SNF. Patient's family reports that he came home recently but has just not been himself since the previous admission. Patient was seen by PCP today and status deteriorated following the visit.  Chart review of lab work performed prior to arrival today shows hypercalcemia and leukocytosis.  Also evident of acute kidney injury.  Patient was reportedly agitated and difficult to direct earlier today and at time of presentation to our facility is nonverbal. Patient opens eyes to verbal stimuli and responds to painful stimuli. History obtained from patient's family at bedside. They state that patient has not been experiencing fever, N/V/D, CP, or SOB recently. He has had a decrease in PO intake and activity. They also report that patient started experiencing delirium and hallucinations during his previous hospitalization, which have continued. Of note, patient has a large abscess in his right inguinal area and family states that this has been causing him quite a bit of pain recently.       Overview/Hospital Course:  10/29: wbcs from 23k to 22k. CT chest doesn't show any infection. CT pelvis showed necrotic lymph node.  Rocephin changed to zosyn. Cont vanc. Surgery following  10/30: discussed with heme/onc and surgery about plans of care. Surgery plans for aspiration I&D in am. ID is consulted since not sure where  there is an infection and if wbcs are just reactive. Wound culture is pending .       Interval History: more alert today. Answering questions appropriately. Antibiotics discontinued. Monitor off antibiotics. Afebrile.       Objective:     Vital Signs (Most Recent):  Temp: 98.1 °F (36.7 °C) (11/01/23 1136)  Pulse: 77 (11/01/23 1214)  Resp: 18 (11/01/23 1136)  BP: 132/75 (11/01/23 1136)  SpO2: (!) 93 % (11/01/23 1136) Vital Signs (24h Range):  Temp:  [97.4 °F (36.3 °C)-98.1 °F (36.7 °C)] 98.1 °F (36.7 °C)  Pulse:  [59-84] 77  Resp:  [18-20] 18  SpO2:  [86 %-94 %] 93 %  BP: (117-136)/(63-75) 132/75     Weight: 105.3 kg (232 lb 2.3 oz)  Body mass index is 31.48 kg/m².    Intake/Output Summary (Last 24 hours) at 11/1/2023 1554  Last data filed at 11/1/2023 0533  Gross per 24 hour   Intake 301.1 ml   Output 1500 ml   Net -1198.9 ml           Physical Exam  Vitals and nursing note reviewed.   Constitutional:       General: He is not in acute distress.     Appearance: He is well-developed.      Comments: Elderly male   Cardiovascular:      Rate and Rhythm: Normal rate and regular rhythm.      Heart sounds: Normal heart sounds. No murmur heard.  Pulmonary:      Effort: Pulmonary effort is normal.      Breath sounds: Normal breath sounds. No wheezing.   Abdominal:      Palpations: Abdomen is soft.      Tenderness: There is no abdominal tenderness.      Comments: R groin area dressed   Musculoskeletal:      Comments: Ulcer on the RLE , and pressure ulcer on the heel   Skin:     General: Skin is warm and dry.      Findings: No rash.   Neurological:      Mental Status: He is alert.             Significant Labs: All pertinent labs within the past 24 hours have been reviewed.  BMP:   Recent Labs   Lab 11/01/23  0144   *      K 3.9      CO2 29   BUN 9   CREATININE 0.8   CALCIUM 7.9*       CBC:   Recent Labs   Lab 10/31/23  0428 11/01/23  0144   WBC 23.02* 23.68*   HGB 8.1* 8.8*   HCT 26.6* 28.4*    174  "        Significant Imaging: I have reviewed all pertinent imaging results/findings within the past 24 hours.  I have reviewed and interpreted all pertinent imaging results/findings within the past 24 hours.  Review of Systems      Assessment/Plan:      * Sepsis  This patient does have evidence of infective focus  My overall impression is sepsis.  Source: Skin and Soft Tissue (location right inguinal abscess)     Antibiotics given-   Antibiotics (72h ago, onward)    Start     Stop Route Frequency Ordered    10/30/23 2329  mupirocin 2 % ointment         -- Nasl On Call Procedure 10/30/23 2329    10/29/23 1045  mupirocin 2 % ointment         11/03/23 0859 Nasl 2 times daily 10/29/23 0937        Latest lactate reviewed-  No results for input(s): "LACTATE" in the last 72 hours.  Organ dysfunction indicated by Acute kidney injury and Encephalopathy    Fluid challenge Actual Body weight- Patient will receive 30ml/kg actual body weight to calculate fluid bolus for treatment of septic shock.     Post- resuscitation assessment Yes Perfusion exam was performed within 6 hours of septic shock presentation after bolus shows Adequate tissue perfusion assessed by non-invasive monitoring       Will Not start Pressors- Levophed for MAP of 65  Source control achieved by: monitoring off antibiotics per ID recommendation s/p I&D    Surgery consult  - s/p I&D on R inguinal abscess on 10/31. Cultures pending    Anemia  · Stable  · H/H 12.3/39.8  · S/p blood transfusions during previous admission for GIB  · No continued reports of hematemesis, melena, hematochezia, or other signs of bleeding per family  · Transfuse for hgb <7  · Monitor       ACP (advance care planning)  Advance Care Planning  - done by palliative care team     Date: 10/30/2023    Code Status  In light of the patients advanced and life limiting illness,I engaged the the family in a voluntary conversation about the patient's preferences for care at the very end of life. " The patient wishes to have a natural, peaceful death.  Along those lines, the patient does not wish to have CPR or other invasive treatments performed when his heart and/or breathing stops. I communicated to the family that a DNR order would be placed in his medical record to reflect this preference.  I spent a total of 8 minutes engaging the patient in this advance care planning discussion.           Hypercalcemia  The patient has hypercalcemia that is currently controlled. The patient has the following symptoms due to their hypercalcemia: encephalopathy. The hypercalcemia is likely due to unknown - suspected 2/2 malignancy. We will obtain the following labs to work up the hypercalcemia:   alkaline phosphatase   Alkaline Phosphatase   Date Value Ref Range Status   10/31/2023 84 55 - 135 U/L Final    . We will treat the hypercalcemia with: IV fluids ordered at a rate of 125/hr. Their latest calcium has been reviewed and is listed below.  Ionized Calcium   Date Value Ref Range Status   10/25/2023 1.60 (H) 1.06 - 1.42 mmol/L Final        resolved    A-fib  Patient with Persistent (7 days or more) atrial fibrillation which is controlled currently with Beta Blocker and Digoxin. Patient is currently in atrial fibrillation.[id number] Score: 2. HASBLED Score: 3. Anticoagulation not indicated due to risk of GIB.  Continue prophylactic anticoagulation while inpatient w/ close monitoring    Abscess of right groin  · Previously noted on MRI and CT scan 9/2023   1.  Bulky lymphadenopathy versus mass in the right inguinal region    2.  Enlarged right external iliac chain lymph nodes, likely metastatic    3.  No other findings of metastatic disease in the abdomen or pelvis    4.  Cholelithiasis   · Noted to be SCC on biopsy  · Per family, it is now more swollen, painful, and discolored since biopsy  · Hem/onc consult ordered regarding recommendations  · CT pelvis showing necrotic LN  · Surgery on board and plans for I&D on  10/31  · Cont on iv abx  · -ID consult      Encephalopathy, metabolic  · Likely delirium 2/2 infectious etiology. Hypercalcemia/ FERNANDO have improved.   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 Resume antihypertensives as needed      VTE Risk Mitigation (From admission, onward)         Ordered     enoxaparin injection 40 mg  Every 24 hours         10/31/23 0942     IP VTE HIGH RISK PATIENT  Once         10/24/23 2358     Place sequential compression device  Until discontinued         10/24/23 2358                Discharge Planning   NANCY: 11/3/2023     Code Status: DNR   Is the patient medically ready for discharge?:     Reason for patient still in hospital (select all that apply): Patient trending condition, Treatment and Consult recommendations  Discharge Plan A: Skilled Nursing Facility          Gabriella Madrid MD  Department of Hospital Medicine   [place] [facility]

## 2023-11-01 NOTE — PROGRESS NOTES
Progress Note  Nephrology      Consult Requested By: Gabriella Madrid MD      SUBJECTIVE:     Overnight events  Patient is a 83 y.o. 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    Dermatitis    Tinea pedis of both feet    Open wound of right lower leg    Right leg swelling    Left anterior fascicular block    Class 2 severe obesity with serious comorbidity and body mass index (BMI) of 38.0 to 38.9 in adult    Sepsis    Encephalopathy, metabolic    Abscess of right groin    A-fib    Hypercalcemia    ACP (advance care planning)    Anemia     Past Medical History:   Diagnosis Date    Hypertension     Urinary tract infection               OBJECTIVE:     Vitals:    11/01/23 1136 11/01/23 1214 11/01/23 1558 11/01/23 1626   BP: 132/75  135/65    BP Location: Left arm  Left arm    Patient Position: Lying  Lying    Pulse: 78 77 77 77   Resp: 18  (!) 26    Temp: 98.1 °F (36.7 °C)  97.9 °F (36.6 °C)    TempSrc: Oral  Oral    SpO2: (!) 93%  (!) 92%    Weight:       Height:           Temp: 97.9 °F (36.6 °C) (11/01/23 1558)  Pulse: 77 (11/01/23 1626)  Resp: (!) 26 (11/01/23 1558)  BP: 135/65 (11/01/23 1558)  SpO2: (!) 92 % (11/01/23 1558)              Medications:   collagenase   Topical (Top) Daily    digoxin  0.125 mg Oral Daily    enoxparin  40 mg Subcutaneous Q24H (prophylaxis, 1700)    Lactobacillus acidoph-L.bulgar  1 tablet Oral TID WM    latanoprost  1 drop Both Eyes QHS    melatonin  6 mg Oral Nightly    metoprolol tartrate  25 mg Oral BID    mupirocin   Nasal BID    pantoprazole  40 mg Intravenous BID    potassium phosphate IVPB  15 mmol Intravenous Once    potassium phosphate IVPB  15 mmol Intravenous Once    potassium, sodium phosphates  1 packet Oral QID (AC & HS)    sodium chloride  "0.9%  10 mL Intravenous Q6H    sodium chloride 0.9%  10 mL Intravenous Q6H    traZODone  50 mg Oral QHS    vitamin D  1,000 Units Oral Daily      sodium chloride 0.9%      sodium chloride 0.9%       Physical Exam:  General appearance:NAD  Lungs: diminished breath sounds  Heart: pulse 77  Abdomen: soft  Extremities: edema  Skin:dry  Laboratory:  ABG  Labs reviewed  No results found for this or any previous visit (from the past 336 hour(s)).  Recent Results (from the past 336 hour(s))   CBC auto differential    Collection Time: 11/01/23  1:44 AM   Result Value Ref Range    WBC 23.68 (H) 3.90 - 12.70 K/uL    Hemoglobin 8.8 (L) 14.0 - 18.0 g/dL    Hematocrit 28.4 (L) 40.0 - 54.0 %    Platelets 174 150 - 450 K/uL   CBC auto differential    Collection Time: 10/31/23  4:28 AM   Result Value Ref Range    WBC 23.02 (H) 3.90 - 12.70 K/uL    Hemoglobin 8.1 (L) 14.0 - 18.0 g/dL    Hematocrit 26.6 (L) 40.0 - 54.0 %    Platelets 154 150 - 450 K/uL   CBC auto differential    Collection Time: 10/30/23  3:36 AM   Result Value Ref Range    WBC 23.48 (H) 3.90 - 12.70 K/uL    Hemoglobin 9.3 (L) 14.0 - 18.0 g/dL    Hematocrit 29.3 (L) 40.0 - 54.0 %    Platelets 178 150 - 450 K/uL     Urinalysis  No results for input(s): "COLORU", "CLARITYU", "SPECGRAV", "PHUR", "PROTEINUA", "GLUCOSEU", "BILIRUBINCON", "BLOODU", "WBCU", "RBCU", "BACTERIA", "MUCUS", "NITRITE", "LEUKOCYTESUR", "UROBILINOGEN", "HYALINECASTS" in the last 24 hours.    Diagnostic Results:  X-Ray: Reviewed  US: Reviewed  Echo: Reviewed  ACCESS    ASSESSMENT/PLAN:     FERNANDO  UA protein trace, RBC 27, WBC 21  Creatinine  0.8 - 0.7- 0.8  BUN 14- 13- 9  K 3.5 - 3.2- 3.5- 3.9  Mag 1.7  Replace prn  Metabolic alkalosis  PTH 6.5  Hypercalcemia improved with IVF  Ca corrected approximately 12- 11.2- 10.6- 9.6- 9.5  Vit D 27  CT  Regional osseous structures demonstrate no aggressive appearing lytic or blastic lesions.   SPIEP- no monoclonal peaks  Albumin 2.2- 2.1  Phos 2 - 1.3- 1.4- " 1.9  Anemia multifactorial  Hb 10- 8.7- 8.1- 8.8  /77, 122/59, 138/63, 128/68  Weight daily  I and O  Avoid nephrotoxic agents, hypotension, hypovolemia  Replace e-lytes prn  Supplements

## 2023-11-01 NOTE — SUBJECTIVE & OBJECTIVE
Interval History: more alert today. Answering questions appropriately. Antibiotics discontinued. Monitor off antibiotics. Afebrile.       Objective:     Vital Signs (Most Recent):  Temp: 98.1 °F (36.7 °C) (11/01/23 1136)  Pulse: 77 (11/01/23 1214)  Resp: 18 (11/01/23 1136)  BP: 132/75 (11/01/23 1136)  SpO2: (!) 93 % (11/01/23 1136) Vital Signs (24h Range):  Temp:  [97.4 °F (36.3 °C)-98.1 °F (36.7 °C)] 98.1 °F (36.7 °C)  Pulse:  [59-84] 77  Resp:  [18-20] 18  SpO2:  [86 %-94 %] 93 %  BP: (117-136)/(63-75) 132/75     Weight: 105.3 kg (232 lb 2.3 oz)  Body mass index is 31.48 kg/m².    Intake/Output Summary (Last 24 hours) at 11/1/2023 1554  Last data filed at 11/1/2023 0533  Gross per 24 hour   Intake 301.1 ml   Output 1500 ml   Net -1198.9 ml           Physical Exam  Vitals and nursing note reviewed.   Constitutional:       General: He is not in acute distress.     Appearance: He is well-developed.      Comments: Elderly male   Cardiovascular:      Rate and Rhythm: Normal rate and regular rhythm.      Heart sounds: Normal heart sounds. No murmur heard.  Pulmonary:      Effort: Pulmonary effort is normal.      Breath sounds: Normal breath sounds. No wheezing.   Abdominal:      Palpations: Abdomen is soft.      Tenderness: There is no abdominal tenderness.      Comments: R groin area dressed   Musculoskeletal:      Comments: Ulcer on the RLE , and pressure ulcer on the heel   Skin:     General: Skin is warm and dry.      Findings: No rash.   Neurological:      Mental Status: He is alert.             Significant Labs: All pertinent labs within the past 24 hours have been reviewed.  BMP:   Recent Labs   Lab 11/01/23 0144   *      K 3.9      CO2 29   BUN 9   CREATININE 0.8   CALCIUM 7.9*       CBC:   Recent Labs   Lab 10/31/23  0428 11/01/23 0144   WBC 23.02* 23.68*   HGB 8.1* 8.8*   HCT 26.6* 28.4*    174         Significant Imaging: I have reviewed all pertinent imaging results/findings  within the past 24 hours.  I have reviewed and interpreted all pertinent imaging results/findings within the past 24 hours.  Review of Systems

## 2023-11-01 NOTE — PT/OT/SLP PROGRESS
Speech Language Pathology Treatment    Patient Name:  Aquiles Montana   MRN:  [id number]  Admitting Diagnosis: Sepsis    Recommendations:                 General Recommendations:  Dysphagia therapy and Cognitive-linguistic therapy  Diet recommendations:  Easy to Chew Diet - IDDSI Level 7, Liquid Diet Level: Thin liquids - IDDSI Level 0   Aspiration Precautions: 1 bite/sip at a time, Alternating bites/sips, Assistance with meals, and Small bites/sips   General Precautions: Standard, fall, aspiration  Communication strategies:  none    Assessment:     Aquiles Montana is a 83 y.o. male with an SLP diagnosis of Dysphagia and Cognitive-Linguistic Impairment.  Pt presents with improved alertness and participation in therapeutic care provided moderate, frequent encouragement. Cognitive-linguistic deficits including impaired attention/orientation persist, though the pt is receptive to redirection given mod verbal/min tactile cues. Impaired attention will negatively impact swallow safety during meal time should the pt consume items without supervision/encouragement to implement safe swallow precautions.        Subjective     SLP communicated with RN prior to entry and received clearance for therapy this date.   Pt awake and alert upon ST entry. Spouse present at bedside. Pt agreeable to participate with ST.    Pain/Comfort:  Pain Rating 1:  (did not rate)  Location - Orientation 1: generalized  Location 1: hip  Pain Addressed 1: Reposition, Distraction    Respiratory Status: Nasal cannula, flow 3 L/min        Objective:     Has the patient been evaluated by SLP for swallowing?   YesYes  Keep patient NPO? No     Pt seen bedside for ongoing SLP service. Confusion/disorientation persists. The pt appeared more alert during our session this date when compared to documentation from previous sessions. He was oriented to self only. He responded to simple YNQs with 100% acc ind'ly, however accuracy decreased as YNQs became more  complex/abstract. Provided moderate encouragement, the pt was agreeable to consume po trials with SLP. He consumed x2 whole saltine crackers and ~6 oz water via straw sips without overt s/s airway threat; however, impulsivity negatively impacted adherence to safe swallow precautions. Initially, the pt consumed cyclical sips of thin liquids and talked during mastication of saltine cracker. He was redirected with moderate verbal/min tactile cues to take single bites/sips and avoid talking while eating. SLP rec: cautious continuation of Regular-ETC diet with Thin liquids w/ supervision during meal time to ensure the pt follows safe swallow precautions.    Education provided re: ST educated pt re: role of SLP, risk factors for aspiration, current po rec, safe swallow precautions, and POC moving forward to which pt and spouse verbalized agreement. Pt would benefit from reinforcement.        Goals:   Multidisciplinary Problems       SLP Goals          Problem: SLP    Goal Priority Disciplines Outcome   SLP Goal     SLP Ongoing, Progressing   Description: Short Term Goals:  1.Pt will participate in swallow eval to determine safest diet level.  2. Pt will tolerate mech soft and thin liquids with no outward dysphagia signs. -- MET 10/29  3. Patient will successfully participate in zyevqk-[id number]-[id number] evaluation to further assess for any communication impairments.  4. Patient will tolerate regular (easy to chew) consistency/thin liquids po diet with no overt s/s of aspiration.                        Plan:     Patient to be seen:  3 x/week   Plan of Care expires:  11/24/23  Plan of Care reviewed with:  patient, spouse   SLP Follow-Up:  Yes       Discharge recommendations:  Moderate Intensity Therapy   Barriers to Discharge:  Level of Skilled Assistance Needed during meals    Time Tracking:     SLP Treatment Date:   11/01/23  Speech Start Time:  0838  Speech Stop Time:  0906     Speech Total Time (min):  28  min    Billable Minutes: Speech Therapy Individual 8, Treatment Swallowing Dysfunction 12, and Self Care/Home Management Training 8    11/01/2023

## 2023-11-01 NOTE — ANESTHESIA POSTPROCEDURE EVALUATION
Anesthesia Post Evaluation    Patient: Aquiles Audiffred    Procedure(s) Performed: Procedure(s) (LRB):  INCISION AND DRAINAGE, LOWER EXTREMITY (Right)    Final Anesthesia Type: general      Patient location during evaluation: PACU  Patient participation: Yes- Able to Participate  Level of consciousness: awake and alert  Post-procedure vital signs: reviewed and stable  Pain management: adequate  Airway patency: patent    PONV status at discharge: No PONV  Anesthetic complications: no      Cardiovascular status: blood pressure returned to baseline and hemodynamically stable  Respiratory status: unassisted  Hydration status: euvolemic  Follow-up not needed.          Vitals Value Taken Time   /68 11/01/23 0435   Temp 36.3 °C (97.4 °F) 11/01/23 0435   Pulse 82 11/01/23 0435   Resp 20 11/01/23 0435   SpO2 91 % 11/01/23 0435         Event Time   Out of Recovery 10/31/2023 13:37:00         Pain/Ruddy Score: Ruddy Score: 9 (10/31/2023  1:15 PM)

## 2023-11-01 NOTE — PLAN OF CARE
VN note: Patient chart, labs, and vitals reviewed. VN to continue to be available as needed.     Problem: Adult Inpatient Plan of Care  Goal: Plan of Care Review  Outcome: Ongoing, Progressing  Goal: Patient-Specific Goal (Individualized)  Outcome: Ongoing, Progressing  Goal: Absence of Hospital-Acquired Illness or Injury  Outcome: Ongoing, Progressing  Goal: Optimal Comfort and Wellbeing  Outcome: Ongoing, Progressing  Goal: Readiness for Transition of Care  Outcome: Ongoing, Progressing     Problem: Adjustment to Illness (Sepsis/Septic Shock)  Goal: Optimal Coping  Outcome: Ongoing, Progressing     Problem: Bleeding (Sepsis/Septic Shock)  Goal: Absence of Bleeding  Outcome: Ongoing, Progressing     Problem: Glycemic Control Impaired (Sepsis/Septic Shock)  Goal: Blood Glucose Level Within Desired Range  Outcome: Ongoing, Progressing     Problem: Infection Progression (Sepsis/Septic Shock)  Goal: Absence of Infection Signs and Symptoms  Outcome: Ongoing, Progressing     Problem: Nutrition Impaired (Sepsis/Septic Shock)  Goal: Optimal Nutrition Intake  Outcome: Ongoing, Progressing     Problem: Fall Injury Risk  Goal: Absence of Fall and Fall-Related Injury  Outcome: Ongoing, Progressing     Problem: Hypertension Acute  Goal: Blood Pressure Within Desired Range  Outcome: Ongoing, Progressing     Problem: Skin Injury Risk Increased  Goal: Skin Health and Integrity  Outcome: Ongoing, Progressing     Problem: Impaired Wound Healing  Goal: Optimal Wound Healing  Outcome: Ongoing, Progressing     Problem: Fluid and Electrolyte Imbalance (Acute Kidney Injury/Impairment)  Goal: Fluid and Electrolyte Balance  Outcome: Ongoing, Progressing     Problem: Oral Intake Inadequate (Acute Kidney Injury/Impairment)  Goal: Optimal Nutrition Intake  Outcome: Ongoing, Progressing     Problem: Renal Function Impairment (Acute Kidney Injury/Impairment)  Goal: Effective Renal Function  Outcome: Ongoing, Progressing     Problem:  Hypertension Comorbidity  Goal: Blood Pressure in Desired Range  Outcome: Ongoing, Progressing     Problem: Coping Ineffective  Goal: Effective Coping  Outcome: Ongoing, Progressing     Problem: Confusion Acute  Goal: Optimal Cognitive Function  Outcome: Ongoing, Progressing     Problem: UTI (Urinary Tract Infection)  Goal: Improved Infection Symptoms  Outcome: Ongoing, Progressing     Problem: Coping Ineffective (Oncology Care)  Goal: Effective Coping  Outcome: Ongoing, Progressing     Problem: Fatigue (Oncology Care)  Goal: Improved Activity Tolerance  Outcome: Ongoing, Progressing     Problem: Oral Intake Altered (Oncology Care)  Goal: Optimal Oral Intake  Outcome: Ongoing, Progressing     Problem: Pain Acute (Oncology Care)  Goal: Optimal Pain Control  Outcome: Ongoing, Progressing     Problem: Thought Process Alteration  Goal: Optimal Thought Clarity  Outcome: Ongoing, Progressing     Problem: Infection  Goal: Absence of Infection Signs and Symptoms  Outcome: Ongoing, Progressing     Problem: Bleeding (Surgery Nonspecified)  Goal: Absence of Bleeding  Outcome: Ongoing, Progressing     Problem: Bowel Motility Impaired (Surgery Nonspecified)  Goal: Effective Bowel Elimination  Outcome: Ongoing, Progressing     Problem: Fluid and Electrolyte Imbalance (Surgery Nonspecified)  Goal: Fluid and Electrolyte Balance  Outcome: Ongoing, Progressing     Problem: Glycemic Control Impaired (Surgery Nonspecified)  Goal: Blood Glucose Level Within Targeted Range  Outcome: Ongoing, Progressing     Problem: Infection (Surgery Nonspecified)  Goal: Absence of Infection Signs and Symptoms  Outcome: Ongoing, Progressing     Problem: Pain (Surgery Nonspecified)  Goal: Acceptable Pain Control  Outcome: Ongoing, Progressing     Problem: Postoperative Nausea and Vomiting (Surgery Nonspecified)  Goal: Nausea and Vomiting Relief  Outcome: Ongoing, Progressing     Problem: Postoperative Urinary Retention (Surgery Nonspecified)  Goal:  Effective Urinary Elimination  Outcome: Ongoing, Progressing     Problem: Respiratory Compromise (Surgery Nonspecified)  Goal: Effective Oxygenation and Ventilation  Outcome: Ongoing, Progressing

## 2023-11-01 NOTE — PROGRESS NOTES
Surgery follow up  /74 (BP Location: Left arm, Patient Position: Sitting)   Pulse 84   Temp 97.4 °F (36.3 °C) (Oral)   Resp 18   Ht 6' (1.829 m)   Wt 105.3 kg (232 lb 2.3 oz)   SpO2 (!) 92%   BMI 31.48 kg/m²   I/O last 3 completed shifts:  In: 501.1 [IV Piggyback:501.1]  Out: 2325 [Urine:2325]  No intake/output data recorded.    Recent Results (from the past 336 hour(s))   CBC auto differential    Collection Time: 11/01/23  1:44 AM   Result Value Ref Range    WBC 23.68 (H) 3.90 - 12.70 K/uL    Hemoglobin 8.8 (L) 14.0 - 18.0 g/dL    Hematocrit 28.4 (L) 40.0 - 54.0 %    Platelets 174 150 - 450 K/uL   CBC auto differential    Collection Time: 10/31/23  4:28 AM   Result Value Ref Range    WBC 23.02 (H) 3.90 - 12.70 K/uL    Hemoglobin 8.1 (L) 14.0 - 18.0 g/dL    Hematocrit 26.6 (L) 40.0 - 54.0 %    Platelets 154 150 - 450 K/uL   CBC auto differential    Collection Time: 10/30/23  3:36 AM   Result Value Ref Range    WBC 23.48 (H) 3.90 - 12.70 K/uL    Hemoglobin 9.3 (L) 14.0 - 18.0 g/dL    Hematocrit 29.3 (L) 40.0 - 54.0 %    Platelets 178 150 - 450 K/uL     Mild drainage   Gram stain negative.

## 2023-11-02 LAB
ALBUMIN SERPL BCP-MCNC: 2 G/DL (ref 3.5–5.2)
ANION GAP SERPL CALC-SCNC: 10 MMOL/L (ref 8–16)
ANISOCYTOSIS BLD QL SMEAR: SLIGHT
BACTERIA SPEC AEROBE CULT: NO GROWTH
BASOPHILS NFR BLD: 0 % (ref 0–1.9)
BUN SERPL-MCNC: 7 MG/DL (ref 8–23)
CALCIUM SERPL-MCNC: 8 MG/DL (ref 8.7–10.5)
CHLORIDE SERPL-SCNC: 101 MMOL/L (ref 95–110)
CO2 SERPL-SCNC: 29 MMOL/L (ref 23–29)
CREAT SERPL-MCNC: 0.7 MG/DL (ref 0.5–1.4)
DIFFERENTIAL METHOD: ABNORMAL
EOSINOPHIL NFR BLD: 0 % (ref 0–8)
ERYTHROCYTE [DISTWIDTH] IN BLOOD BY AUTOMATED COUNT: 16.3 % (ref 11.5–14.5)
EST. GFR  (NO RACE VARIABLE): >60 ML/MIN/1.73 M^2
FINAL PATHOLOGIC DIAGNOSIS: NORMAL
GLUCOSE SERPL-MCNC: 109 MG/DL (ref 70–110)
GROSS: NORMAL
HCT VFR BLD AUTO: 30.5 % (ref 40–54)
HGB BLD-MCNC: 9.6 G/DL (ref 14–18)
HYPOCHROMIA BLD QL SMEAR: ABNORMAL
IMM GRANULOCYTES # BLD AUTO: ABNORMAL K/UL (ref 0–0.04)
IMM GRANULOCYTES NFR BLD AUTO: ABNORMAL % (ref 0–0.5)
LYMPHOCYTES NFR BLD: 2 % (ref 18–48)
Lab: NORMAL
MAGNESIUM SERPL-MCNC: 1.5 MG/DL (ref 1.6–2.6)
MCH RBC QN AUTO: 29.4 PG (ref 27–31)
MCHC RBC AUTO-ENTMCNC: 31.5 G/DL (ref 32–36)
MCV RBC AUTO: 93 FL (ref 82–98)
MICROSCOPIC EXAM: NORMAL
MONOCYTES NFR BLD: 2 % (ref 4–15)
NEUTROPHILS NFR BLD: 96 % (ref 38–73)
NRBC BLD-RTO: 0 /100 WBC
OVALOCYTES BLD QL SMEAR: ABNORMAL
PHOSPHATE SERPL-MCNC: 2.7 MG/DL (ref 2.7–4.5)
PLATELET # BLD AUTO: 201 K/UL (ref 150–450)
PLATELET BLD QL SMEAR: ABNORMAL
PMV BLD AUTO: 10.1 FL (ref 9.2–12.9)
POCT GLUCOSE: 101 MG/DL (ref 70–110)
POCT GLUCOSE: 107 MG/DL (ref 70–110)
POCT GLUCOSE: 119 MG/DL (ref 70–110)
POCT GLUCOSE: 124 MG/DL (ref 70–110)
POIKILOCYTOSIS BLD QL SMEAR: SLIGHT
POLYCHROMASIA BLD QL SMEAR: ABNORMAL
POTASSIUM SERPL-SCNC: 3.9 MMOL/L (ref 3.5–5.1)
RBC # BLD AUTO: 3.27 M/UL (ref 4.6–6.2)
SODIUM SERPL-SCNC: 140 MMOL/L (ref 136–145)
WBC # BLD AUTO: 29.98 K/UL (ref 3.9–12.7)

## 2023-11-02 PROCEDURE — 27000221 HC OXYGEN, UP TO 24 HOURS

## 2023-11-02 PROCEDURE — 25000003 PHARM REV CODE 250: Performed by: INTERNAL MEDICINE

## 2023-11-02 PROCEDURE — 97110 THERAPEUTIC EXERCISES: CPT

## 2023-11-02 PROCEDURE — A4216 STERILE WATER/SALINE, 10 ML: HCPCS | Performed by: FAMILY MEDICINE

## 2023-11-02 PROCEDURE — A4216 STERILE WATER/SALINE, 10 ML: HCPCS | Performed by: ANESTHESIOLOGY

## 2023-11-02 PROCEDURE — 25000003 PHARM REV CODE 250: Performed by: FAMILY MEDICINE

## 2023-11-02 PROCEDURE — 63600175 PHARM REV CODE 636 W HCPCS: Performed by: NURSE PRACTITIONER

## 2023-11-02 PROCEDURE — 80069 RENAL FUNCTION PANEL: CPT | Performed by: STUDENT IN AN ORGANIZED HEALTH CARE EDUCATION/TRAINING PROGRAM

## 2023-11-02 PROCEDURE — 97530 THERAPEUTIC ACTIVITIES: CPT

## 2023-11-02 PROCEDURE — 63600175 PHARM REV CODE 636 W HCPCS: Performed by: INTERNAL MEDICINE

## 2023-11-02 PROCEDURE — 99233 SBSQ HOSP IP/OBS HIGH 50: CPT | Mod: ,,, | Performed by: STUDENT IN AN ORGANIZED HEALTH CARE EDUCATION/TRAINING PROGRAM

## 2023-11-02 PROCEDURE — 25000003 PHARM REV CODE 250: Performed by: STUDENT IN AN ORGANIZED HEALTH CARE EDUCATION/TRAINING PROGRAM

## 2023-11-02 PROCEDURE — 85027 COMPLETE CBC AUTOMATED: CPT | Performed by: STUDENT IN AN ORGANIZED HEALTH CARE EDUCATION/TRAINING PROGRAM

## 2023-11-02 PROCEDURE — 25000003 PHARM REV CODE 250: Performed by: NURSE PRACTITIONER

## 2023-11-02 PROCEDURE — 99233 PR SUBSEQUENT HOSPITAL CARE,LEVL III: ICD-10-PCS | Mod: ,,, | Performed by: STUDENT IN AN ORGANIZED HEALTH CARE EDUCATION/TRAINING PROGRAM

## 2023-11-02 PROCEDURE — 97112 NEUROMUSCULAR REEDUCATION: CPT

## 2023-11-02 PROCEDURE — 11000001 HC ACUTE MED/SURG PRIVATE ROOM

## 2023-11-02 PROCEDURE — 85007 BL SMEAR W/DIFF WBC COUNT: CPT | Performed by: STUDENT IN AN ORGANIZED HEALTH CARE EDUCATION/TRAINING PROGRAM

## 2023-11-02 PROCEDURE — 83735 ASSAY OF MAGNESIUM: CPT | Performed by: INTERNAL MEDICINE

## 2023-11-02 PROCEDURE — 25000003 PHARM REV CODE 250: Performed by: ANESTHESIOLOGY

## 2023-11-02 PROCEDURE — 63600175 PHARM REV CODE 636 W HCPCS: Performed by: STUDENT IN AN ORGANIZED HEALTH CARE EDUCATION/TRAINING PROGRAM

## 2023-11-02 PROCEDURE — 97535 SELF CARE MNGMENT TRAINING: CPT

## 2023-11-02 PROCEDURE — 99900035 HC TECH TIME PER 15 MIN (STAT)

## 2023-11-02 PROCEDURE — 99233 SBSQ HOSP IP/OBS HIGH 50: CPT | Mod: ,,, | Performed by: INTERNAL MEDICINE

## 2023-11-02 PROCEDURE — C9113 INJ PANTOPRAZOLE SODIUM, VIA: HCPCS | Performed by: NURSE PRACTITIONER

## 2023-11-02 PROCEDURE — 94761 N-INVAS EAR/PLS OXIMETRY MLT: CPT

## 2023-11-02 PROCEDURE — 99233 PR SUBSEQUENT HOSPITAL CARE,LEVL III: ICD-10-PCS | Mod: ,,, | Performed by: INTERNAL MEDICINE

## 2023-11-02 RX ORDER — MAGNESIUM SULFATE HEPTAHYDRATE 40 MG/ML
2 INJECTION, SOLUTION INTRAVENOUS ONCE
Status: COMPLETED | OUTPATIENT
Start: 2023-11-02 | End: 2023-11-02

## 2023-11-02 RX ORDER — ACETAMINOPHEN 325 MG/1
650 TABLET ORAL
Status: DISCONTINUED | OUTPATIENT
Start: 2023-11-02 | End: 2023-11-03 | Stop reason: HOSPADM

## 2023-11-02 RX ADMIN — METOPROLOL TARTRATE 25 MG: 25 TABLET, FILM COATED ORAL at 10:11

## 2023-11-02 RX ADMIN — Medication 6 MG: at 08:11

## 2023-11-02 RX ADMIN — PANTOPRAZOLE SODIUM 40 MG: 40 INJECTION, POWDER, LYOPHILIZED, FOR SOLUTION INTRAVENOUS at 10:11

## 2023-11-02 RX ADMIN — DIGOXIN 0.12 MG: 125 TABLET ORAL at 10:11

## 2023-11-02 RX ADMIN — COLLAGENASE SANTYL: 250 OINTMENT TOPICAL at 01:11

## 2023-11-02 RX ADMIN — PANTOPRAZOLE SODIUM 40 MG: 40 INJECTION, POWDER, LYOPHILIZED, FOR SOLUTION INTRAVENOUS at 08:11

## 2023-11-02 RX ADMIN — LACTOBACILLUS TAB 1 TABLET: TAB at 10:11

## 2023-11-02 RX ADMIN — SODIUM CHLORIDE, PRESERVATIVE FREE 10 ML: 5 INJECTION INTRAVENOUS at 05:11

## 2023-11-02 RX ADMIN — LATANOPROST 1 DROP: 50 SOLUTION OPHTHALMIC at 08:11

## 2023-11-02 RX ADMIN — LACTOBACILLUS TAB 1 TABLET: TAB at 05:11

## 2023-11-02 RX ADMIN — MAGNESIUM SULFATE HEPTAHYDRATE 2 G: 40 INJECTION, SOLUTION INTRAVENOUS at 10:11

## 2023-11-02 RX ADMIN — METOPROLOL TARTRATE 25 MG: 25 TABLET, FILM COATED ORAL at 08:11

## 2023-11-02 RX ADMIN — ACETAMINOPHEN 650 MG: 325 TABLET ORAL at 08:11

## 2023-11-02 RX ADMIN — SODIUM CHLORIDE, PRESERVATIVE FREE 10 ML: 5 INJECTION INTRAVENOUS at 12:11

## 2023-11-02 RX ADMIN — SODIUM CHLORIDE, PRESERVATIVE FREE 10 ML: 5 INJECTION INTRAVENOUS at 01:11

## 2023-11-02 RX ADMIN — MUPIROCIN: 20 OINTMENT TOPICAL at 08:11

## 2023-11-02 RX ADMIN — MAGNESIUM SULFATE HEPTAHYDRATE 2 G: 40 INJECTION, SOLUTION INTRAVENOUS at 06:11

## 2023-11-02 RX ADMIN — Medication 10 ML: at 01:11

## 2023-11-02 RX ADMIN — ENOXAPARIN SODIUM 40 MG: 40 INJECTION SUBCUTANEOUS at 05:11

## 2023-11-02 RX ADMIN — Medication 1000 UNITS: at 10:11

## 2023-11-02 RX ADMIN — POTASSIUM PHOSPHATE, MONOBASIC 500 MG: 500 TABLET, SOLUBLE ORAL at 06:11

## 2023-11-02 RX ADMIN — TRAZODONE HYDROCHLORIDE 50 MG: 50 TABLET ORAL at 08:11

## 2023-11-02 RX ADMIN — SODIUM CHLORIDE, PRESERVATIVE FREE 10 ML: 5 INJECTION INTRAVENOUS at 06:11

## 2023-11-02 RX ADMIN — MUPIROCIN: 20 OINTMENT TOPICAL at 10:11

## 2023-11-02 NOTE — ASSESSMENT & PLAN NOTE
"This patient does have evidence of infective focus  My overall impression is sepsis.  Source: Skin and Soft Tissue (location right inguinal abscess)     Antibiotics given-   Antibiotics (72h ago, onward)    Start     Stop Route Frequency Ordered    10/30/23 2329  mupirocin 2 % ointment         -- Nasl On Call Procedure 10/30/23 2329    10/29/23 1045  mupirocin 2 % ointment         11/03/23 0859 Nasl 2 times daily 10/29/23 0937        Latest lactate reviewed-  No results for input(s): "LACTATE" in the last 72 hours.  Organ dysfunction indicated by Acute kidney injury and Encephalopathy    Fluid challenge Actual Body weight- Patient will receive 30ml/kg actual body weight to calculate fluid bolus for treatment of septic shock.     Post- resuscitation assessment Yes Perfusion exam was performed within 6 hours of septic shock presentation after bolus shows Adequate tissue perfusion assessed by non-invasive monitoring       Will Not start Pressors- Levophed for MAP of 65  Source control achieved by: monitoring off antibiotics per ID recommendation s/p I&D    Surgery consult  - s/p I&D on R inguinal abscess on 10/31. Cultures showing no growth so far.   "

## 2023-11-02 NOTE — SUBJECTIVE & OBJECTIVE
Interval History: alert, no new complains. Wants to take a shower, offered a bath instead due to safety reasons. Afebrile.     Family at bedside requesting to speak with oncology to discuss possible treatment options.       Objective:     Vital Signs (Most Recent):  Temp: 98.5 °F (36.9 °C) (11/02/23 1114)  Pulse: 71 (11/02/23 1114)  Resp: 20 (11/02/23 1114)  BP: 129/63 (11/02/23 1114)  SpO2: (!) 92 % (11/02/23 1114) Vital Signs (24h Range):  Temp:  [97.5 °F (36.4 °C)-98.6 °F (37 °C)] 98.5 °F (36.9 °C)  Pulse:  [71-88] 71  Resp:  [17-26] 20  SpO2:  [91 %-94 %] 92 %  BP: (129-135)/(63-80) 129/63     Weight: 105.3 kg (232 lb 2.3 oz)  Body mass index is 31.48 kg/m².    Intake/Output Summary (Last 24 hours) at 11/2/2023 1121  Last data filed at 11/2/2023 1016  Gross per 24 hour   Intake 90 ml   Output 1300 ml   Net -1210 ml           Physical Exam  Vitals and nursing note reviewed.   Constitutional:       General: He is not in acute distress.     Appearance: He is well-developed.      Comments: Elderly male   Cardiovascular:      Rate and Rhythm: Normal rate and regular rhythm.      Heart sounds: Normal heart sounds. No murmur heard.  Pulmonary:      Effort: Pulmonary effort is normal.      Breath sounds: Normal breath sounds. No wheezing.   Abdominal:      Palpations: Abdomen is soft.      Tenderness: There is no abdominal tenderness.      Comments: R groin area dressed   Musculoskeletal:      Comments: Ulcer on the RLE , and pressure ulcer on the heel   Skin:     General: Skin is warm and dry.      Findings: No rash.   Neurological:      Mental Status: He is alert.           Significant Labs: All pertinent labs within the past 24 hours have been reviewed.  BMP:   Recent Labs   Lab 11/02/23  0436         K 3.9      CO2 29   BUN 7*   CREATININE 0.7   CALCIUM 8.0*   MG 1.5*       CBC:   Recent Labs   Lab 11/01/23  0144 11/02/23  0902   WBC 23.68* 29.98*   HGB 8.8* 9.6*   HCT 28.4* 30.5*    201          Significant Imaging: I have reviewed all pertinent imaging results/findings within the past 24 hours.  I have reviewed and interpreted all pertinent imaging results/findings within the past 24 hours.  Review of Systems

## 2023-11-02 NOTE — ASSESSMENT & PLAN NOTE
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

## 2023-11-02 NOTE — PROGRESS NOTES
[facility] - Telemetry  Hematology/Oncology  Progress Note    Patient Name: Aquiles [place]  Admission Date: 10/24/2023  Hospital Length of Stay: 9 days  Code Status: DNR     Subjective:     HPI:  See initial consult note    Interval History:   - he is feeling better today. Right leg pain has improved. He endorses weakness, fatigue.    Oncology Treatment Plan:   [No matching plan found]    Medications:  Continuous Infusions:   sodium chloride 0.9%      sodium chloride 0.9%       Scheduled Meds:   acetaminophen  650 mg Oral Q6H WAKE    collagenase   Topical (Top) Daily    digoxin  0.125 mg Oral Daily    enoxparin  40 mg Subcutaneous Q24H (prophylaxis, 1700)    Lactobacillus acidoph-L.bulgar  1 tablet Oral TID WM    latanoprost  1 drop Both Eyes QHS    melatonin  6 mg Oral Nightly    metoprolol tartrate  25 mg Oral BID    mupirocin   Nasal BID    pantoprazole  40 mg Intravenous BID    sodium chloride 0.9%  10 mL Intravenous Q6H    sodium chloride 0.9%  10 mL Intravenous Q6H    traZODone  50 mg Oral QHS    vitamin D  1,000 Units Oral Daily     PRN Meds:dextrose 10%, dextrose 10%, glucagon (human recombinant), glucose, glucose, HYDROcodone-acetaminophen, HYDROmorphone, HYDROmorphone, melatonin, mupirocin, naloxone, ondansetron, ondansetron, Flushing PICC/Midline Protocol **AND** sodium chloride 0.9% **AND** sodium chloride 0.9%, Flushing PICC/Midline Protocol **AND** sodium chloride 0.9% **AND** sodium chloride 0.9%, sodium chloride 0.9%, sodium chloride 0.9%     Review of Systems   Constitutional:  Positive for fatigue.   HENT:  Negative for sore throat.    Eyes:  Negative for visual disturbance.   Respiratory:  Positive for shortness of breath.    Cardiovascular:  Negative for chest pain.   Gastrointestinal:  Negative for abdominal pain.   Genitourinary:  Negative for dysuria.   Musculoskeletal:  Negative for back pain.        Leg pain   Skin:  Negative for rash.   Neurological:  Positive for weakness. Negative for  headaches.   Hematological:  Negative for adenopathy.   Psychiatric/Behavioral:  The patient is not nervous/anxious.      Objective:     Vital Signs (Most Recent):  Temp: 98.5 °F (36.9 °C) (11/02/23 1114)  Pulse: 71 (11/02/23 1114)  Resp: 20 (11/02/23 1114)  BP: 129/63 (11/02/23 1114)  SpO2: (!) 92 % (11/02/23 1114) Vital Signs (24h Range):  Temp:  [97.5 °F (36.4 °C)-98.6 °F (37 °C)] 98.5 °F (36.9 °C)  Pulse:  [71-88] 71  Resp:  [17-26] 20  SpO2:  [91 %-94 %] 92 %  BP: (129-135)/(63-80) 129/63     Weight: 105.3 kg (232 lb 2.3 oz)  Body mass index is 31.48 kg/m².  Body surface area is 2.31 meters squared.      Intake/Output Summary (Last 24 hours) at 11/2/2023 1407  Last data filed at 11/2/2023 1016  Gross per 24 hour   Intake 90 ml   Output 1300 ml   Net -1210 ml        Physical Exam  Vitals and nursing note reviewed.   Constitutional:       Appearance: He is well-developed.      Comments: fatigued   HENT:      Head: Normocephalic and atraumatic.   Eyes:      Pupils: Pupils are equal, round, and reactive to light.   Cardiovascular:      Rate and Rhythm: Normal rate and regular rhythm.   Pulmonary:      Effort: Pulmonary effort is normal.      Breath sounds: Normal breath sounds.   Abdominal:      General: Bowel sounds are normal.      Palpations: Abdomen is soft.   Musculoskeletal:         General: Normal range of motion.      Cervical back: Normal range of motion and neck supple.   Skin:     General: Skin is warm and dry.   Neurological:      Mental Status: He is alert and oriented to person, place, and time.   Psychiatric:         Behavior: Behavior normal.         Thought Content: Thought content normal.         Judgment: Judgment normal.          Significant Labs:   CBC:   Recent Labs   Lab 11/01/23 0144 11/02/23  0902   WBC 23.68* 29.98*   HGB 8.8* 9.6*   HCT 28.4* 30.5*    201    and CMP:   Recent Labs   Lab 11/01/23  0144 11/02/23  0436    140   K 3.9 3.9    101   CO2 29 29   * 109  "  BUN 9 7*   CREATININE 0.8 0.7   CALCIUM 7.9* 8.0*   ALBUMIN 1.9* 2.0*   ANIONGAP 9 10       Diagnostic Results:  CT chest (10/29/23):    Bilateral lower lobe atelectasis/scarring with some traction bronchiectasis.  No consolidation.     Multi-vessel coronary artery calcification.     Cholelithiasis.      CT pelvis (10/25/23): I have personally reviewed the images  1. Large lobular masslike focus within the right inguinal regions suggesting lymph node conglomerate.  Correlation with history of malignancy advised.  Additional lymph node enlargement noted along the right iliac chain.  Please note, this likely correlates with finding on prior CT 09/13/2023 although images from that exam are not available for review.  2. No acute displaced fracture or dislocation of the pelvis.  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 Follow up pathology.  - I discussed various treatment options, including palliative radiation, immunotherapy.  - will arrange for follow-up appointment to review pathology and finalize treatment plan.         Delonte Robin MD  Hematology/Oncology  [facility] - Telemetry      "

## 2023-11-02 NOTE — PROGRESS NOTES
[facility] ID note    Patient afebrile.  WBC 30.  Off antibiotics.    Cultures from surgical drainage negative so far.    Would continue to monitor off antibiotics.  We will sign off at this time.    Please call if any questions   Asad Deluna  [facility] ID  [phone]

## 2023-11-02 NOTE — PT/OT/SLP PROGRESS
"Occupational Therapy   Treatment    Name: Aquiles Montana  MRN: [id number]  Admitting Diagnosis:  Sepsis  2 Days Post-Op    Recommendations:     Discharge Recommendations: Moderate Intensity Therapy  Discharge Equipment Recommendations:  shower chair (Spouse states family seeking post acute recovery continuation Inpatient "at [facility]".)  Barriers to discharge:   (Significant increase in caregiver assist with all routine self management s/p extended hospitalization.)    Assessment:     Aquiles Montana is a 83 y.o. male with a medical diagnosis of Sepsis. Max extra time for re-orienting interventions this am. Spouse and Daughter present. Edu provided re: role of therapy, intro standard aspiration precautions and discussed treatment rationales. Performance deficits affecting function are weakness, impaired endurance, impaired self care skills, impaired balance, decreased coordination, decreased upper extremity function, decreased lower extremity function, impaired cognition, gait instability, impaired functional mobility, decreased safety awareness, pain, decreased ROM, impaired fine motor, impaired skin, impaired joint extensibility, impaired cardiopulmonary response to activity.     Rehab Prognosis:  Fair (+); patient would benefit from acute skilled OT services to address these deficits and reach maximum level of function.       Plan:     Patient to be seen 3 x/week to address the above listed problems via self-care/home management, therapeutic activities, therapeutic exercises  Plan of Care Expires: 11/26/23  Plan of Care Reviewed with: patient, spouse, daughter    Subjective     Chief Complaint: (-) effects of extended  hospitalization.   Patient/Family Comments/goals: Transfer "to [facility]"  Pain/Comfort:  Pain Rating 1:  (R shoulder, unclarified, unquantified.)  Pain Addressed 1: Reposition, Distraction, Nurse notified  Pain Rating Post-Intervention 1:  (no pain at rest or EOB seated neutral)    Objective: "     Communicated with: RN prior to session.  Patient found supine > left diagonal with bed alarm, PureWick, telemetry, oxygen, peripheral IV upon OT entry to room.    General Precautions: Standard, aspiration, fall    Orthopedic Precautions:N/A  Braces: N/A  Respiratory Status: Nasal cannula, flow 4 L/min     Occupational Performance:     Bed Mobility:    Patient completed Rolling/Turning to Left <> with  total assistance and 2 persons  Patient completed Scooting/Bridging with max x2  Patient completed Supine to Sit with max x2  Patient completed Sit to Supine with dep x2.     Functional Mobility/Transfers:  Patient unable to complete Sit <> Stand Transfer with multiple trials, and  with noteworthy V/T cues.   Functional Mobility: Poor task initiation, Fair task tolerance.     Activities of Daily Living:  Feeding:  refused x2  Grooming: maximal assistance HOB elevated, long sitting  Upper Body Dressing: maximal assistance gown  Lower Body Dressing: dependence non skid socks and adult brief.   Toileting: Purewick      [facility] 6 Click ADL: 11    Treatment & Education:  Patient agreeable to this therapy session. Lights on / shade open with encouragement.  Orienting interventions provided at outset: all needs (ADL/leisure occupational) within reach, blinds opened, tv on low stress channel selection, lights on, and door left open.      Role of OT and POC  Gen in room safety edu  ADL retraining / Functional mobility training  Importance of EOB/OOB activity  Neuro: Pt performed dynamic sitting and pre-standing balance activities w/ inclusion of func reach component and visual scan cues to support recovery of ADL volition/independence.  Post acute discharge outlook discussed  DME use/safety, post discharge resources ID, etc.   All questions answered within OT scope of practice      TE:   *Patient engaged in edu for acute level ex program for: P/AA (B) shoulders/AROM there ex to remainder BUE  10 reps, (x3/day recommended),  all joints/all planes in sit as indicated for recovery of stamina, proper respiration in functional tasks, and to enhance joint capsule nourishment and ease of circulation while in current recovery setting.BUE AROM (I)ExProg provided. Pt performed x 10 reps seated EOB with HO provided for: Edu for (B) digits1 through 5 (B) flex/ext, wrist flex/ext, forearm pronation/supination, elbow flex/ext, forward punches, shoulder shrugs, shoulder flex/ext, and shoulder horizontal abd/add.  Further: core strengthening ex for pull/push OOB sitting with therapist for resistant also undertaken. MMT 4-/5 grossly UB.        Safe swallow  supported, with bed positioned on mid/High fowlers recommended/instructed to Patient / caregiver/s       Patient left HOB elevated (bed as chair approximated, pillow support provided) with all lines intact, call button in reach, bed alarm on, RN notified, and Spouse and daughter present.    GOALS:   Multidisciplinary Problems       Occupational Therapy Goals          Problem: Occupational Therapy    Goal Priority Disciplines Outcome Interventions   Occupational Therapy Goal     OT, PT/OT Ongoing, Progressing    Description: Goals to be met by: 11/26     Patient will increase functional independence with ADLs by performing:    Grooming while EOB with Stand-by Assistance.  Toileting from bedside commode with Moderate Assistance for hygiene and clothing management.   Toilet transfer to bedside commode with Minimal Assistance.                         Time Tracking:     OT Date of Treatment: 11/02/23  OT Start Time: 1134  OT Stop Time: 1223  OT Total Time (min): 49 min    Billable Minutes:Self Care/Self Management 18  Therapeutic Exercise 12  Neuromuscular Re-education 19          Number of ROMÁN visits since last OT visit: 2    11/2/2023

## 2023-11-02 NOTE — PLAN OF CARE
0840  Voicemail message left for [facility] questioning acceptance status. Awaiting response.        11/02/23 0930   Rounds   Attendance Provider;Nurse ;Pharmacist   Discharge Plan A Skilled Nursing Facility   Why the patient remains in the hospital Requires continued medical care   Transition of Care Barriers Transportation     0930  Patient resting quietly in bed with spouse, son, & GD, at the bedside when CM participated in SIBR with Dr Madrid & pharmacist Han. Pt more alert today. Pox 91% on 1L O2 via NC this AM. Pt not medically stable to discharge today. Awaiting final ID abigail PÉREZ to consult hem/onc.    1055  Spouse stated that she would like the pt to be admitted to [facility] or [facility]. Updated notes sent to SNFs via [employer]. Awaiting response.    1130  CM was informed by [facility] that the pt has been accepted. Awaiting response from [facility].     1230  CM was informed by Yolonda w/[facility] that the pt looks appropriate & will call this CM back with a decision. Dr Robin (hem/onc) rounded on the pt. CM informed spouse & GD of above.     1440  CM informed the pt's spouse via [employer] that the pt was declined by [facility] but accepted by [facility]. Spouse verbalized understanding & agreement. CM informed [place] ([phone]) of above & provided spouse's contact information.       Will continue to follow.

## 2023-11-02 NOTE — PROGRESS NOTES
Palliative Care Daily Progress Note     S: Medical record reviewed, followed up with patient and family regarding patient's condition. Pt's energy and clarity of speech continue to improve daily and he was able to sit up on the side of the bed with PT but postural stability was poor and pt remains under a bed rest order.    Discussed at length with family at bedside that pt qualifies for subacute rehab and given his progress to date I do expect he will meaningfully benefit vs home PT. He has cooperated with subacute rehab in the past and is motivated to regain some degree of mobility. He should have lab checks and medications to suppress malignant hypercalcemia otherwise oncology followup is non-urgent and two weeks of rehab will not adversely impact his care. If pt develops delirium while in rehab however I would recommend bringing pt home ASAP and transitioning to home PT/OT/wound care.    Will plan for outpt palliative f/u soon after pt has his long awaited initial visit with med/onc Dr. Robin to discuss final pathology and treatment options. Pt has appropriate ACP already and family indicates they will accept a transition to home hospice if pt is still not a treatment candidate after his rehab course.    B: Code Status: DNR   Advanced Directives:   Spouse, Jessica holds HCPOA  Family/Support: At bedside, supportive and well involved   Physician's Plan of Care Goal is proceed with rehab placement, oncology and palliative outpt f/u.   Labs: Cr and Ca stable, persistent neutrophilia  Diagnostics: none new    Physical Exam  Vitals and nursing note reviewed.   Constitutional:       General: He is not in acute distress.     Appearance: He is ill-appearing (chronically). He is not toxic-appearing.   HENT:      Head: Normocephalic and atraumatic.      Mouth/Throat:      Mouth: Mucous membranes are moist.      Pharynx: No oropharyngeal exudate.   Eyes:      Extraocular Movements: Extraocular movements intact.      Pupils:  Pupils are equal, round, and reactive to light.   Cardiovascular:      Rate and Rhythm: Normal rate and regular rhythm.      Pulses: Normal pulses.      Heart sounds: Normal heart sounds. No murmur heard.     No gallop.   Pulmonary:      Effort: Pulmonary effort is normal.      Breath sounds: Normal breath sounds. No wheezing or rales.   Abdominal:      General: Abdomen is flat. There is no distension.      Palpations: Abdomen is soft.      Tenderness: There is no abdominal tenderness.   Genitourinary:     Comments: Inflammatory nodular lesion to R inguinal crease  Musculoskeletal:         General: No swelling. Normal range of motion.      Cervical back: Normal range of motion and neck supple.   Skin:     Coloration: Skin is pale.      Findings: Erythema (stasis changes of lower legs bilaterally) and lesion (right shin scar) present.   Neurological:      Mental Status: He is alert.      Cranial Nerves: Dysarthria (variable) present. No facial asymmetry.      Motor: Weakness (4/5 strength globally) present. No tremor, atrophy or abnormal muscle tone.      Coordination: Coordination abnormal.   Psychiatric:         Attention and Perception: Attention and perception normal.         Mood and Affect: Mood and affect normal.         Speech: Speech normal.         Behavior: Behavior normal. Behavior is not agitated. Behavior is cooperative.         Thought Content: Thought content normal.         Cognition and Memory: Cognition and memory normal.         Judgment: Judgment normal.       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 Post-biopsy pt has had worsening pain and cellulitis at the R groin suspicious for underlying abscess and has had severely limited mobility leading to early pressure injuries and  missed oncology f/u. Admitted to  service for AMS and FERNANDO 2/2 malignant hypercalcemia now both resolved with aggressive supportive care x one week. Palliative following for family support and GOC.    Pt is improving daily, would not qualify for cancer directed treatment today in my assessment but his recent progress justifies a good fabián attempt at subacute rehab with oncology and palliative followup ASAP after pt returns home.    Please place a referral for palliative clinic followup.    A: Patient's current condition fair.   Patient Symptom Assessment Flowsheet has been completed.   Family is supportive and appropriately involved.  Discharge Planning: ideally subacute rehab within the next 24h     R: Support offered at this time.   Palliative Care Team will continue to follow patient.     # Malignant hypercalcemia  - weekly CMP  - continue to encourage oral hydration  - continue monthly bisphosphonate tx  - can add daily Lasix if Ca is creeping up as outpt    Benjamín Baxter MD  Hospice and Palliative Medicine  Palliative Care Pager: [phone]    Total time spent: 53 minutes  > 50% of 53 minute visit spent in chart review, face to face discussion and assessment of symptoms, coordination of care with other specialties, and DC planning.

## 2023-11-02 NOTE — PROGRESS NOTES
[facility] Medicine  Progress Note    Patient Name: Aquiles Montana  MRN: [id number]  Patient Class: IP- Inpatient   Admission Date: 10/24/2023  Length of Stay: 9 days  Attending Physician: Gabriella Madrid MD  Primary Care Provider: Marty Hernandez MD        Subjective:     Principal Problem:Sepsis        HPI:  Aquiles Montana is an 83-year-old male w/ PMH SCC, BPH, HTN, obesity, and left anterior fascicular block. He presented to the ED via EMS for evaluation due to worsening mental status since admission 5 weeks ago. He was previously admitted to the hospital for sepsis and then discharged to a SNF. Patient's family reports that he came home recently but has just not been himself since the previous admission. Patient was seen by PCP today and status deteriorated following the visit.  Chart review of lab work performed prior to arrival today shows hypercalcemia and leukocytosis.  Also evident of acute kidney injury.  Patient was reportedly agitated and difficult to direct earlier today and at time of presentation to our facility is nonverbal. Patient opens eyes to verbal stimuli and responds to painful stimuli. History obtained from patient's family at bedside. They state that patient has not been experiencing fever, N/V/D, CP, or SOB recently. He has had a decrease in PO intake and activity. They also report that patient started experiencing delirium and hallucinations during his previous hospitalization, which have continued. Of note, patient has a large abscess in his right inguinal area and family states that this has been causing him quite a bit of pain recently.       Overview/Hospital Course:  10/29: wbcs from 23k to 22k. CT chest doesn't show any infection. CT pelvis showed necrotic lymph node.  Rocephin changed to zosyn. Cont vanc. Surgery following  10/30: discussed with heme/onc and surgery about plans of care. Surgery plans for aspiration I&D in am. ID is consulted since not sure where  there is an infection and if wbcs are just reactive. Wound culture is pending .       Interval History: alert, no new complains. Wants to take a shower, offered a bath instead due to safety reasons. Afebrile.     Family at bedside requesting to speak with oncology to discuss possible treatment options.       Objective:     Vital Signs (Most Recent):  Temp: 98.5 °F (36.9 °C) (11/02/23 1114)  Pulse: 71 (11/02/23 1114)  Resp: 20 (11/02/23 1114)  BP: 129/63 (11/02/23 1114)  SpO2: (!) 92 % (11/02/23 1114) Vital Signs (24h Range):  Temp:  [97.5 °F (36.4 °C)-98.6 °F (37 °C)] 98.5 °F (36.9 °C)  Pulse:  [71-88] 71  Resp:  [17-26] 20  SpO2:  [91 %-94 %] 92 %  BP: (129-135)/(63-80) 129/63     Weight: 105.3 kg (232 lb 2.3 oz)  Body mass index is 31.48 kg/m².    Intake/Output Summary (Last 24 hours) at 11/2/2023 1121  Last data filed at 11/2/2023 1016  Gross per 24 hour   Intake 90 ml   Output 1300 ml   Net -1210 ml           Physical Exam  Vitals and nursing note reviewed.   Constitutional:       General: He is not in acute distress.     Appearance: He is well-developed.      Comments: Elderly male   Cardiovascular:      Rate and Rhythm: Normal rate and regular rhythm.      Heart sounds: Normal heart sounds. No murmur heard.  Pulmonary:      Effort: Pulmonary effort is normal.      Breath sounds: Normal breath sounds. No wheezing.   Abdominal:      Palpations: Abdomen is soft.      Tenderness: There is no abdominal tenderness.      Comments: R groin area dressed   Musculoskeletal:      Comments: Ulcer on the RLE , and pressure ulcer on the heel   Skin:     General: Skin is warm and dry.      Findings: No rash.   Neurological:      Mental Status: He is alert.           Significant Labs: All pertinent labs within the past 24 hours have been reviewed.  BMP:   Recent Labs   Lab 11/02/23  0436         K 3.9      CO2 29   BUN 7*   CREATININE 0.7   CALCIUM 8.0*   MG 1.5*       CBC:   Recent Labs   Lab 11/01/23  0144  "11/02/23  0902   WBC 23.68* 29.98*   HGB 8.8* 9.6*   HCT 28.4* 30.5*    201         Significant Imaging: I have reviewed all pertinent imaging results/findings within the past 24 hours.  I have reviewed and interpreted all pertinent imaging results/findings within the past 24 hours.  Review of Systems      Assessment/Plan:      * Sepsis  This patient does have evidence of infective focus  My overall impression is sepsis.  Source: Skin and Soft Tissue (location right inguinal abscess)     Antibiotics given-   Antibiotics (72h ago, onward)    Start     Stop Route Frequency Ordered    10/30/23 2329  mupirocin 2 % ointment         -- Nasl On Call Procedure 10/30/23 2329    10/29/23 1045  mupirocin 2 % ointment         11/03/23 0859 Nasl 2 times daily 10/29/23 0937        Latest lactate reviewed-  No results for input(s): "LACTATE" in the last 72 hours.  Organ dysfunction indicated by Acute kidney injury and Encephalopathy    Fluid challenge Actual Body weight- Patient will receive 30ml/kg actual body weight to calculate fluid bolus for treatment of septic shock.     Post- resuscitation assessment Yes Perfusion exam was performed within 6 hours of septic shock presentation after bolus shows Adequate tissue perfusion assessed by non-invasive monitoring       Will Not start Pressors- Levophed for MAP of 65  Source control achieved by: monitoring off antibiotics per ID recommendation s/p I&D    Surgery consult  - s/p I&D on R inguinal abscess on 10/31. Cultures showing no growth so far.     Anemia  · Stable  · H/H 12.3/39.8  · S/p blood transfusions during previous admission for GIB  · No continued reports of hematemesis, melena, hematochezia, or other signs of bleeding per family  · Transfuse for hgb <7  · Monitor       ACP (advance care planning)  Advance Care Planning  - done by palliative care team     Date: 10/30/2023    Code Status  In light of the patients advanced and life limiting illness,I engaged the the " family in a voluntary conversation about the patient's preferences for care at the very end of life. The patient wishes to have a natural, peaceful death.  Along those lines, the patient does not wish to have CPR or other invasive treatments performed when his heart and/or breathing stops. I communicated to the family that a DNR order would be placed in his medical record to reflect this preference.  I spent a total of 8 minutes engaging the patient in this advance care planning discussion.           Hypercalcemia  The patient has hypercalcemia that is currently controlled. The patient has the following symptoms due to their hypercalcemia: encephalopathy. The hypercalcemia is likely due to unknown - suspected 2/2 malignancy. We will obtain the following labs to work up the hypercalcemia:   alkaline phosphatase   Alkaline Phosphatase   Date Value Ref Range Status   10/31/2023 84 55 - 135 U/L Final    . We will treat the hypercalcemia with: IV fluids ordered at a rate of 125/hr. Their latest calcium has been reviewed and is listed below.  Ionized Calcium   Date Value Ref Range Status   10/25/2023 1.60 (H) 1.06 - 1.42 mmol/L Final        resolved    A-fib  Patient with Persistent (7 days or more) atrial fibrillation which is controlled currently with Beta Blocker and Digoxin. Patient is currently in atrial fibrillation.[id number] Score: 2. HASBLED Score: 3. Anticoagulation not indicated due to risk of GIB.  Continue prophylactic anticoagulation while inpatient w/ close monitoring    Abscess of right groin  · Previously noted on MRI and CT scan 9/2023   1.  Bulky lymphadenopathy versus mass in the right inguinal region    2.  Enlarged right external iliac chain lymph nodes, likely metastatic    3.  No other findings of metastatic disease in the abdomen or pelvis    4.  Cholelithiasis   · Noted to be SCC on biopsy  · Per family, it is now more swollen, painful, and discolored since biopsy  · Hem/onc consult ordered  regarding recommendations  · CT pelvis showing necrotic LN  · Surgery on board and plans for I&D on 10/31  · Cont on iv abx  · -ID consult      Encephalopathy, metabolic  · Likely delirium 2/2 infectious etiology. Hypercalcemia/ FERNANDO have improved.   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 Resume antihypertensives as needed        VTE Risk Mitigation (From admission, onward)         Ordered     enoxaparin injection 40 mg  Every 24 hours         10/31/23 0942     IP VTE HIGH RISK PATIENT  Once         10/24/23 2358     Place sequential compression device  Until discontinued         10/24/23 2358                Discharge Planning   NANCY: 11/3/2023     Code Status: DNR   Is the patient medically ready for discharge?:     Reason for patient still in hospital (select all that apply): Treatment, Consult recommendations and Pending disposition  Discharge Plan A: Skilled Nursing Facility                  Gabriella Madrid MD  Department of Hospital Medicine   [facility]

## 2023-11-02 NOTE — ASSESSMENT & PLAN NOTE
"- CT pelvis (10/25/23) revealed a "large lobular masslike focus within the right inguinal regions suggesting lymph node conglomerate."  - s/p procedure on 10/31/23. Follow up pathology.  - I discussed various treatment options, including palliative radiation, immunotherapy.  - will arrange for follow-up appointment to review pathology and finalize treatment plan.  "

## 2023-11-02 NOTE — PT/OT/SLP PROGRESS
Physical Therapy Treatment    Patient Name:  Aquiles Montana   MRN:  [id number]    Recommendations:     Discharge Recommendations: Moderate Intensity Therapy  Discharge Equipment Recommendations: shower chair  Barriers to discharge:  significant increase in caregiver burden    Assessment:     Aquiles Montana is a 83 y.o. male admitted with a medical diagnosis of Sepsis.  He presents with the following impairments/functional limitations: weakness, impaired endurance, impaired self care skills, impaired functional mobility, gait instability, impaired balance, pain, impaired skin, impaired cardiopulmonary response to activity, decreased ROM, decreased safety awareness, decreased lower extremity function, decreased upper extremity function, decreased coordination, impaired cognition Patient more fatigued today but tolerated EOB sitting and performing trunk static and dynamic stability ex; unable scoot laterally or perform partial buttock lifts; will cont with POC. .    Rehab Prognosis: Fair; patient would benefit from acute skilled PT services to address these deficits and reach maximum level of function.    Recent Surgery: Procedure(s) (LRB):  INCISION AND DRAINAGE, LOWER EXTREMITY (Right) 2 Days Post-Op    Plan:     During this hospitalization, patient to be seen 5 x/week to address the identified rehab impairments via gait training, therapeutic activities, therapeutic exercises, neuromuscular re-education and progress toward the following goals:    Plan of Care Expires:  11/26/23    Subjective     Chief Complaint: fatigue  Patient/Family Comments/goals: transfer to [place]  Pain/Comfort:  Pain Rating 1:  (not rated)  Location - Side 1: Right  Location - Orientation 1: generalized  Location 1: shoulder  Pain Addressed 1: Reposition, Distraction, Nurse notified  Pain Rating Post-Intervention 1:  (no pain at rest or sitting EOB)      Objective:     Communicated with nurse prior to session.  Patient found HOB elevated with  bed alarm, PureWick, telemetry, oxygen, peripheral IV upon PT entry to room.     General Precautions: Standard, aspiration, fall  Orthopedic Precautions: N/A  Braces: N/A  Respiratory Status: Nasal cannula, flow 4 L/min     Functional Mobility:  Bed Mobility:     Rolling Left:  total assistance and of 2 persons  Scooting: maximal assistance and of 2 persons  Supine to Sit: maximal assistance and of 2 persons  Sit to Supine: dependence and of 2 persons  Sit to stand: multiple trials with with VCs/TCs however, pt unable to initiate lifting buttocks off EOB      AM-PAC 6 CLICK MOBILITY  Turning over in bed (including adjusting bedclothes, sheets and blankets)?: 2  Sitting down on and standing up from a chair with arms (e.g., wheelchair, bedside commode, etc.): 1  Moving from lying on back to sitting on the side of the bed?: 2  Moving to and from a bed to a chair (including a wheelchair)?: 1  Need to walk in hospital room?: 1  Climbing 3-5 steps with a railing?: 1  Basic Mobility Total Score: 8       Treatment & Education:  Patient appears sleepy/fatigued but able to participate; pt transitioned to EOB with maxA x 2, increased time/effort and VCs for effective technique; pt performed dynamic sitting and pre standing balance activities with reaching activities/controlled mobility in standing for core strengthening and balance; attempts at lifting buttocks off the EOB unsuccessful.    Patient left HOB elevated with all lines intact, call button in reach, bed alarm on, nurse notified, and family present..    GOALS:   Multidisciplinary Problems       Physical Therapy Goals          Problem: Physical Therapy    Goal Priority Disciplines Outcome Goal Variances Interventions   Physical Therapy Goal     PT, PT/OT Ongoing, Progressing     Description: Goals to be met by: 2023     Patient will increase functional independence with mobility by performin. Supine to sit with Minimal Assistance  2. Sit to supine with  [id number] Assistance  3. Rolling with Minimal Assistance.  4. Sit to stand transfer with Minimal Assistance using Rolling Walker  5. Bed to chair transfer with Minimal Assistance using Rolling Walker  6. Gait  x 25 feet with Minimal Assistance using Rolling Walker.   7. Lower extremity exercise program x10 reps with assistance as needed                         Time Tracking:     PT Received On: 11/02/23  PT Start Time: 1148     PT Stop Time: 1223  PT Total Time (min): 35 min with OT for safety with transitions and outcomes    Billable Minutes: Therapeutic Activity 16 and Neuromuscular Re-education 19    Treatment Type: Evaluation  PT/PTA: PT     Number of PTA visits since last PT visit: 0     11/02/2023

## 2023-11-02 NOTE — PLAN OF CARE
Problem: Physical Therapy  Goal: Physical Therapy Goal  Description: Goals to be met by: 2023     Patient will increase functional independence with mobility by performin. Supine to sit with Minimal Assistance  2. Sit to supine with [id number] Assistance  3. Rolling with Minimal Assistance.  4. Sit to stand transfer with Minimal Assistance using Rolling Walker  5. Bed to chair transfer with Minimal Assistance using Rolling Walker  6. Gait  x 25 feet with Minimal Assistance using Rolling Walker.   7. Lower extremity exercise program x10 reps with assistance as needed    Outcome: Ongoing, Progressing   Patient more fatigued today but tolerated EOB sitting and performing trunk static and dynamic stability ex; unable scoot laterally or perform partial buttock lifts; will cont with POC.

## 2023-11-02 NOTE — PT/OT/SLP PROGRESS
Physical Therapy Treatment    Patient Name:  Aquiles Montana   MRN:  [id number]    Recommendations:     Discharge Recommendations: Moderate Intensity Therapy  Discharge Equipment Recommendations: shower chair  Barriers to discharge:  significantly decreased level of functional, increased burden of care, increased fall risk    Assessment:     Aquiles Montana is a 83 y.o. male admitted with a medical diagnosis of Sepsis.  He presents with the following impairments/functional limitations: weakness, impaired endurance, impaired self care skills, impaired functional mobility, gait instability, impaired balance, decreased lower extremity function, decreased upper extremity function, decreased coordination, impaired cognition, pain, decreased safety awareness, decreased ROM, impaired cardiopulmonary response to activity, impaired skin, edema, impaired coordination Patient with improved alertness, appropriate conversation; increased sitting balance; will cont with POC.    Rehab Prognosis: Fair; patient would benefit from acute skilled PT services to address these deficits and reach maximum level of function.    Recent Surgery: Procedure(s) (LRB):  INCISION AND DRAINAGE, LOWER EXTREMITY (Right) 1 Day Post-Op    Plan:     During this hospitalization, patient to be seen 5 x/week to address the identified rehab impairments via gait training, therapeutic activities, therapeutic exercises, neuromuscular re-education and progress toward the following goals:    Plan of Care Expires:  11/26/23    Subjective     Chief Complaint: pain  Patient/Family Comments/goals: return to PLOF  Pain/Comfort:  Pain Rating 1:  (did not rate)  Location - Side 1: Right  Location - Orientation 1: generalized  Location 1: hip  Pain Addressed 1: Reposition, Distraction, Cessation of Activity, Nurse notified      Objective:     Communicated with nurse prior to session.  Patient found HOB elevated with bed alarm, telemetry, oxygen, PureWick upon PT entry to  room.     General Precautions: Standard, fall, aspiration  Orthopedic Precautions: N/A  Braces: N/A  Respiratory Status: Nasal cannula, flow 2 L/min     Functional Mobility:  Bed Mobility:     Rolling Left:  moderate assistance and use of side rail  Rolling Right: moderate assistance and use of side rail  Scooting: maximal assistance to EOB; maxA x 2 for lateral scoots along EOB  Supine to Sit: moderate assistance, maximal assistance, and of 2 persons  Sit to Supine: maximal assistance and of 2 persons    AM-PAC 6 CLICK MOBILITY  Total Score: 8     Treatment & Education:  Patient with improved alertness and appropriateness of conversation; performed bed mobility with increased time/effort and VCs for all transitions/activities; pt sat at EOB with CG/modA with brief bouts of posterior leaning 2/2 fatigue; pt instructed in sit static and dynamic activities with PT leading and BYRNE providing assist for balance and additional cues; utilized visual and tactile feedback for midline posture, approximation through trunk, laterals with WB through forearm, controlled forward fl->sitting midline, reaches in various quadrants, lateral scoots; returned to supine with max A x 2    Patient left HOB elevated with all lines intact, call button in reach, bed alarm on, nurse notified, and wife present..    GOALS:   Multidisciplinary Problems       Physical Therapy Goals          Problem: Physical Therapy    Goal Priority Disciplines Outcome Goal Variances Interventions   Physical Therapy Goal     PT, PT/OT Ongoing, Progressing     Description: Goals to be met by: 2023     Patient will increase functional independence with mobility by performin. Supine to sit with Minimal Assistance  2. Sit to supine with [id number] Assistance  3. Rolling with Minimal Assistance.  4. Sit to stand transfer with Minimal Assistance using Rolling Walker  5. Bed to chair transfer with Minimal Assistance using Rolling Walker  6. Gait  x 25 feet  with Minimal Assistance using Rolling Walker.   7. Lower extremity exercise program x10 reps with assistance as needed                         Time Tracking:     PT Received On: 11/01/23  PT Start Time: 1001     PT Stop Time: 1048  PT Total Time (min): 47 min     Billable Minutes: Therapeutic Activity 27 and Neuromuscular Re-education 20    Treatment Type: Evaluation  PT/PTA: PT     Number of PTA visits since last PT visit: 0     11/01/2023

## 2023-11-02 NOTE — PLAN OF CARE
Problem: Adult Inpatient Plan of Care  Goal: Plan of Care Review  Outcome: Ongoing, Progressing  Flowsheets (Taken 11/2/2023 0100)  Plan of Care Reviewed With:   patient   son     Problem: Fall Injury Risk  Goal: Absence of Fall and Fall-Related Injury  Outcome: Ongoing, Progressing   Plan of care progressing.

## 2023-11-02 NOTE — SUBJECTIVE & OBJECTIVE
Interval History:   - he is feeling better today. Right leg pain has improved. He endorses weakness, fatigue.    Oncology Treatment Plan:   [No matching plan found]    Medications:  Continuous Infusions:   sodium chloride 0.9%      sodium chloride 0.9%       Scheduled Meds:   acetaminophen  650 mg Oral Q6H WAKE    collagenase   Topical (Top) Daily    digoxin  0.125 mg Oral Daily    enoxparin  40 mg Subcutaneous Q24H (prophylaxis, 1700)    Lactobacillus acidoph-L.bulgar  1 tablet Oral TID WM    latanoprost  1 drop Both Eyes QHS    melatonin  6 mg Oral Nightly    metoprolol tartrate  25 mg Oral BID    mupirocin   Nasal BID    pantoprazole  40 mg Intravenous BID    sodium chloride 0.9%  10 mL Intravenous Q6H    sodium chloride 0.9%  10 mL Intravenous Q6H    traZODone  50 mg Oral QHS    vitamin D  1,000 Units Oral Daily     PRN Meds:dextrose 10%, dextrose 10%, glucagon (human recombinant), glucose, glucose, HYDROcodone-acetaminophen, HYDROmorphone, HYDROmorphone, melatonin, mupirocin, naloxone, ondansetron, ondansetron, Flushing PICC/Midline Protocol **AND** sodium chloride 0.9% **AND** sodium chloride 0.9%, Flushing PICC/Midline Protocol **AND** sodium chloride 0.9% **AND** sodium chloride 0.9%, sodium chloride 0.9%, sodium chloride 0.9%     Review of Systems   Constitutional:  Positive for fatigue.   HENT:  Negative for sore throat.    Eyes:  Negative for visual disturbance.   Respiratory:  Positive for shortness of breath.    Cardiovascular:  Negative for chest pain.   Gastrointestinal:  Negative for abdominal pain.   Genitourinary:  Negative for dysuria.   Musculoskeletal:  Negative for back pain.        Leg pain   Skin:  Negative for rash.   Neurological:  Positive for weakness. Negative for headaches.   Hematological:  Negative for adenopathy.   Psychiatric/Behavioral:  The patient is not nervous/anxious.      Objective:     Vital Signs (Most Recent):  Temp: 98.5 °F (36.9 °C) (11/02/23 1114)  Pulse: 71 (11/02/23  1114)  Resp: 20 (11/02/23 1114)  BP: 129/63 (11/02/23 1114)  SpO2: (!) 92 % (11/02/23 1114) Vital Signs (24h Range):  Temp:  [97.5 °F (36.4 °C)-98.6 °F (37 °C)] 98.5 °F (36.9 °C)  Pulse:  [71-88] 71  Resp:  [17-26] 20  SpO2:  [91 %-94 %] 92 %  BP: (129-135)/(63-80) 129/63     Weight: 105.3 kg (232 lb 2.3 oz)  Body mass index is 31.48 kg/m².  Body surface area is 2.31 meters squared.      Intake/Output Summary (Last 24 hours) at 11/2/2023 1407  Last data filed at 11/2/2023 1016  Gross per 24 hour   Intake 90 ml   Output 1300 ml   Net -1210 ml        Physical Exam  Vitals and nursing note reviewed.   Constitutional:       Appearance: He is well-developed.      Comments: fatigued   HENT:      Head: Normocephalic and atraumatic.   Eyes:      Pupils: Pupils are equal, round, and reactive to light.   Cardiovascular:      Rate and Rhythm: Normal rate and regular rhythm.   Pulmonary:      Effort: Pulmonary effort is normal.      Breath sounds: Normal breath sounds.   Abdominal:      General: Bowel sounds are normal.      Palpations: Abdomen is soft.   Musculoskeletal:         General: Normal range of motion.      Cervical back: Normal range of motion and neck supple.   Skin:     General: Skin is warm and dry.   Neurological:      Mental Status: He is alert and oriented to person, place, and time.   Psychiatric:         Behavior: Behavior normal.         Thought Content: Thought content normal.         Judgment: Judgment normal.          Significant Labs:   CBC:   Recent Labs   Lab 11/01/23  0144 11/02/23  0902   WBC 23.68* 29.98*   HGB 8.8* 9.6*   HCT 28.4* 30.5*    201    and CMP:   Recent Labs   Lab 11/01/23  0144 11/02/23  0436    140   K 3.9 3.9    101   CO2 29 29   * 109   BUN 9 7*   CREATININE 0.8 0.7   CALCIUM 7.9* 8.0*   ALBUMIN 1.9* 2.0*   ANIONGAP 9 10       Diagnostic Results:  CT chest (10/29/23):    Bilateral lower lobe atelectasis/scarring with some traction bronchiectasis.  No  consolidation.     Multi-vessel coronary artery calcification.     Cholelithiasis.      CT pelvis (10/25/23): I have personally reviewed the images  1. Large lobular masslike focus within the right inguinal regions suggesting lymph node conglomerate.  Correlation with history of malignancy advised.  Additional lymph node enlargement noted along the right iliac chain.  Please note, this likely correlates with finding on prior CT 09/13/2023 although images from that exam are not available for review.  2. No acute displaced fracture or dislocation of the pelvis.

## 2023-11-02 NOTE — PROGRESS NOTES
Progress Note  Nephrology      Consult Requested By: Gabriella Madrid MD      SUBJECTIVE:     Overnight events  Patient is a 83 y.o. 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    Dermatitis    Tinea pedis of both feet    Open wound of right lower leg    Right leg swelling    Left anterior fascicular block    Class 2 severe obesity with serious comorbidity and body mass index (BMI) of 38.0 to 38.9 in adult    Sepsis    Encephalopathy, metabolic    Abscess of right groin    A-fib    Hypercalcemia    ACP (advance care planning)    Anemia     Past Medical History:   Diagnosis Date    Hypertension     Urinary tract infection               OBJECTIVE:     Vitals:    11/02/23 0449 11/02/23 0727 11/02/23 1114 11/02/23 1522   BP: 134/80 133/63 129/63 (!) 159/87   BP Location: Left arm      Patient Position: Lying      Pulse: 88 78 71 83   Resp: 17 20 20 20   Temp: 97.7 °F (36.5 °C) 98.6 °F (37 °C) 98.5 °F (36.9 °C) 97.6 °F (36.4 °C)   TempSrc: Oral      SpO2: (!) 92% (!) 91% (!) 92% (!) 92%   Weight:       Height:           Temp: 97.6 °F (36.4 °C) (11/02/23 1522)  Pulse: 83 (11/02/23 1522)  Resp: 20 (11/02/23 1522)  BP: (!) 159/87 (11/02/23 1522)  SpO2: (!) 92 % (11/02/23 1522)    Date 11/02/23 0700 - 11/03/23 0659   Shift 2551-3374 9175-7934 3685-7252 24 Hour Total   INTAKE   P.O. 90 0  90   Shift Total(mL/kg) 90(0.9) 0(0)  90(0.9)   OUTPUT   Shift Total(mL/kg)       Weight (kg) 105.3 105.3 105.3 105.3             Medications:   acetaminophen  650 mg Oral Q6H WAKE    collagenase   Topical (Top) Daily    digoxin  0.125 mg Oral Daily    enoxparin  40 mg Subcutaneous Q24H (prophylaxis, 1700)    Lactobacillus acidoph-L.bulgar  1 tablet Oral TID WM    latanoprost  1 drop Both Eyes QHS    melatonin  " 6 mg Oral Nightly    metoprolol tartrate  25 mg Oral BID    mupirocin   Nasal BID    pantoprazole  40 mg Intravenous BID    sodium chloride 0.9%  10 mL Intravenous Q6H    sodium chloride 0.9%  10 mL Intravenous Q6H    traZODone  50 mg Oral QHS    vitamin D  1,000 Units Oral Daily      sodium chloride 0.9%      sodium chloride 0.9%                 Physical Exam:  General appearance:NAD  Lungs: diminished breath sounds  Heart: pulse 83  Abdomen: soft  Extremities: edema  Skin: dry    Laboratory:  ABG  Labs reviewed  No results found for this or any previous visit (from the past 336 hour(s)).  Recent Results (from the past 336 hour(s))   CBC auto differential    Collection Time: 11/02/23  9:02 AM   Result Value Ref Range    WBC 29.98 (H) 3.90 - 12.70 K/uL    Hemoglobin 9.6 (L) 14.0 - 18.0 g/dL    Hematocrit 30.5 (L) 40.0 - 54.0 %    Platelets 201 150 - 450 K/uL   CBC auto differential    Collection Time: 11/01/23  1:44 AM   Result Value Ref Range    WBC 23.68 (H) 3.90 - 12.70 K/uL    Hemoglobin 8.8 (L) 14.0 - 18.0 g/dL    Hematocrit 28.4 (L) 40.0 - 54.0 %    Platelets 174 150 - 450 K/uL   CBC auto differential    Collection Time: 10/31/23  4:28 AM   Result Value Ref Range    WBC 23.02 (H) 3.90 - 12.70 K/uL    Hemoglobin 8.1 (L) 14.0 - 18.0 g/dL    Hematocrit 26.6 (L) 40.0 - 54.0 %    Platelets 154 150 - 450 K/uL     Urinalysis  No results for input(s): "COLORU", "CLARITYU", "SPECGRAV", "PHUR", "PROTEINUA", "GLUCOSEU", "BILIRUBINCON", "BLOODU", "WBCU", "RBCU", "BACTERIA", "MUCUS", "NITRITE", "LEUKOCYTESUR", "UROBILINOGEN", "HYALINECASTS" in the last 24 hours.    Diagnostic Results:  X-Ray: Reviewed  US: Reviewed  Echo: Reviewed  ACCESS    ASSESSMENT/PLAN:     FERNANDO  UA protein trace, RBC 27, WBC 21  Creatinine  0.8 - 0.7- 0.8- 0.7  BUN 14- 13- 9- 7  K 3.5 - 3.2- 3.5- 3.9  Mag 1.7- 1.5   Replace prn  Metabolic alkalosis  PTH 6.5  Hypercalcemia improved with IVF  Ca corrected approximately 12- 11.2- 10.6- 9.6- 9.5- " 9.6  Vit D 27  CT  Regional osseous structures demonstrate no aggressive appearing lytic or blastic lesions.   SPIEP- no monoclonal peaks  Albumin 2.2- 2.1- 2  Phos 2 - 1.3- 1.4- 1.9  Anemia multifactorial  Hb 10- 8.7- 8.1- 8.8- 9.6  /77, 122/59, 138/63, 128/68, 157/87  Weight daily  I and O  Avoid nephrotoxic agents, hypotension, hypovolemia  Replace e-lytes prn  Supplements

## 2023-11-02 NOTE — PLAN OF CARE
Problem: Occupational Therapy  Goal: Occupational Therapy Goal  Description: Goals to be met by: 11/26     Patient will increase functional independence with ADLs by performing:    Grooming while EOB with Stand-by Assistance.  Toileting from bedside commode with Moderate Assistance for hygiene and clothing management.   Toilet transfer to bedside commode with Minimal Assistance.    Outcome: Ongoing, Progressing

## 2023-11-03 VITALS
WEIGHT: 232.13 LBS | OXYGEN SATURATION: 91 % | DIASTOLIC BLOOD PRESSURE: 56 MMHG | SYSTOLIC BLOOD PRESSURE: 115 MMHG | BODY MASS INDEX: 31.44 KG/M2 | HEART RATE: 78 BPM | RESPIRATION RATE: 17 BRPM | TEMPERATURE: 98 F | HEIGHT: 72 IN

## 2023-11-03 PROBLEM — G93.41 ENCEPHALOPATHY, METABOLIC: Status: RESOLVED | Noted: 2023-10-25 | Resolved: 2023-11-03

## 2023-11-03 PROBLEM — E83.52 HYPERCALCEMIA: Status: RESOLVED | Noted: 2023-10-25 | Resolved: 2023-11-03

## 2023-11-03 LAB
ALBUMIN SERPL BCP-MCNC: 2 G/DL (ref 3.5–5.2)
ANION GAP SERPL CALC-SCNC: 9 MMOL/L (ref 8–16)
BASOPHILS # BLD AUTO: 0.07 K/UL (ref 0–0.2)
BASOPHILS NFR BLD: 0.3 % (ref 0–1.9)
BUN SERPL-MCNC: 8 MG/DL (ref 8–23)
CALCIUM SERPL-MCNC: 7.8 MG/DL (ref 8.7–10.5)
CHLORIDE SERPL-SCNC: 101 MMOL/L (ref 95–110)
CO2 SERPL-SCNC: 31 MMOL/L (ref 23–29)
CREAT SERPL-MCNC: 0.7 MG/DL (ref 0.5–1.4)
DIFFERENTIAL METHOD: ABNORMAL
EOSINOPHIL # BLD AUTO: 0.3 K/UL (ref 0–0.5)
EOSINOPHIL NFR BLD: 0.9 % (ref 0–8)
ERYTHROCYTE [DISTWIDTH] IN BLOOD BY AUTOMATED COUNT: 16.2 % (ref 11.5–14.5)
EST. GFR  (NO RACE VARIABLE): >60 ML/MIN/1.73 M^2
GLUCOSE SERPL-MCNC: 93 MG/DL (ref 70–110)
HCT VFR BLD AUTO: 29.3 % (ref 40–54)
HGB BLD-MCNC: 9.3 G/DL (ref 14–18)
IMM GRANULOCYTES # BLD AUTO: 0.2 K/UL (ref 0–0.04)
IMM GRANULOCYTES NFR BLD AUTO: 0.7 % (ref 0–0.5)
LYMPHOCYTES # BLD AUTO: 1 K/UL (ref 1–4.8)
LYMPHOCYTES NFR BLD: 3.5 % (ref 18–48)
MCH RBC QN AUTO: 29.3 PG (ref 27–31)
MCHC RBC AUTO-ENTMCNC: 31.7 G/DL (ref 32–36)
MCV RBC AUTO: 92 FL (ref 82–98)
MONOCYTES # BLD AUTO: 1.6 K/UL (ref 0.3–1)
MONOCYTES NFR BLD: 5.7 % (ref 4–15)
NEUTROPHILS # BLD AUTO: 24.8 K/UL (ref 1.8–7.7)
NEUTROPHILS NFR BLD: 89.6 % (ref 38–73)
NRBC BLD-RTO: 0 /100 WBC
PHOSPHATE SERPL-MCNC: 2.4 MG/DL (ref 2.7–4.5)
PLATELET # BLD AUTO: 184 K/UL (ref 150–450)
PMV BLD AUTO: 9.8 FL (ref 9.2–12.9)
POCT GLUCOSE: 102 MG/DL (ref 70–110)
POCT GLUCOSE: 187 MG/DL (ref 70–110)
POCT GLUCOSE: 93 MG/DL (ref 70–110)
POTASSIUM SERPL-SCNC: 3.7 MMOL/L (ref 3.5–5.1)
RBC # BLD AUTO: 3.17 M/UL (ref 4.6–6.2)
SARS-COV-2 RDRP RESP QL NAA+PROBE: NEGATIVE
SODIUM SERPL-SCNC: 141 MMOL/L (ref 136–145)
WBC # BLD AUTO: 27.69 K/UL (ref 3.9–12.7)

## 2023-11-03 PROCEDURE — 97530 THERAPEUTIC ACTIVITIES: CPT

## 2023-11-03 PROCEDURE — 25000003 PHARM REV CODE 250: Performed by: INTERNAL MEDICINE

## 2023-11-03 PROCEDURE — 80069 RENAL FUNCTION PANEL: CPT | Performed by: STUDENT IN AN ORGANIZED HEALTH CARE EDUCATION/TRAINING PROGRAM

## 2023-11-03 PROCEDURE — A4216 STERILE WATER/SALINE, 10 ML: HCPCS | Performed by: FAMILY MEDICINE

## 2023-11-03 PROCEDURE — 85025 COMPLETE CBC W/AUTO DIFF WBC: CPT | Performed by: STUDENT IN AN ORGANIZED HEALTH CARE EDUCATION/TRAINING PROGRAM

## 2023-11-03 PROCEDURE — 99900035 HC TECH TIME PER 15 MIN (STAT)

## 2023-11-03 PROCEDURE — C9113 INJ PANTOPRAZOLE SODIUM, VIA: HCPCS | Performed by: NURSE PRACTITIONER

## 2023-11-03 PROCEDURE — 25000003 PHARM REV CODE 250: Performed by: STUDENT IN AN ORGANIZED HEALTH CARE EDUCATION/TRAINING PROGRAM

## 2023-11-03 PROCEDURE — 25000003 PHARM REV CODE 250: Performed by: FAMILY MEDICINE

## 2023-11-03 PROCEDURE — 63600175 PHARM REV CODE 636 W HCPCS: Performed by: STUDENT IN AN ORGANIZED HEALTH CARE EDUCATION/TRAINING PROGRAM

## 2023-11-03 PROCEDURE — 94761 N-INVAS EAR/PLS OXIMETRY MLT: CPT

## 2023-11-03 PROCEDURE — 25000003 PHARM REV CODE 250: Performed by: NURSE PRACTITIONER

## 2023-11-03 PROCEDURE — U0002 COVID-19 LAB TEST NON-CDC: HCPCS | Performed by: STUDENT IN AN ORGANIZED HEALTH CARE EDUCATION/TRAINING PROGRAM

## 2023-11-03 PROCEDURE — 63600175 PHARM REV CODE 636 W HCPCS: Performed by: INTERNAL MEDICINE

## 2023-11-03 PROCEDURE — 97535 SELF CARE MNGMENT TRAINING: CPT

## 2023-11-03 PROCEDURE — 63600175 PHARM REV CODE 636 W HCPCS: Performed by: NURSE PRACTITIONER

## 2023-11-03 RX ORDER — TRAZODONE HYDROCHLORIDE 50 MG/1
50 TABLET ORAL NIGHTLY
Qty: 30 TABLET | Refills: 2 | Status: SHIPPED | OUTPATIENT
Start: 2023-11-03 | End: 2024-01-04

## 2023-11-03 RX ORDER — SODIUM,POTASSIUM PHOSPHATES 280-250MG
1 POWDER IN PACKET (EA) ORAL ONCE
Status: DISCONTINUED | OUTPATIENT
Start: 2023-11-03 | End: 2023-11-03 | Stop reason: HOSPADM

## 2023-11-03 RX ORDER — METOPROLOL TARTRATE 25 MG/1
25 TABLET, FILM COATED ORAL 2 TIMES DAILY
Qty: 60 TABLET | Refills: 3
Start: 2023-11-03

## 2023-11-03 RX ORDER — CHOLECALCIFEROL (VITAMIN D3) 25 MCG
1000 TABLET ORAL DAILY
Start: 2023-11-03

## 2023-11-03 RX ORDER — MAGNESIUM SULFATE HEPTAHYDRATE 40 MG/ML
2 INJECTION, SOLUTION INTRAVENOUS ONCE
Status: COMPLETED | OUTPATIENT
Start: 2023-11-03 | End: 2023-11-03

## 2023-11-03 RX ORDER — TRAZODONE HYDROCHLORIDE 50 MG/1
50 TABLET ORAL NIGHTLY
Qty: 30 TABLET | Refills: 3
Start: 2023-11-03 | End: 2023-11-03 | Stop reason: SDUPTHER

## 2023-11-03 RX ORDER — COLLAGENASE SANTYL 250 [ARB'U]/G
OINTMENT TOPICAL
Refills: 0
Start: 2023-11-03 | End: 2023-12-14

## 2023-11-03 RX ADMIN — ENOXAPARIN SODIUM 40 MG: 40 INJECTION SUBCUTANEOUS at 05:11

## 2023-11-03 RX ADMIN — SODIUM CHLORIDE, PRESERVATIVE FREE 10 ML: 5 INJECTION INTRAVENOUS at 01:11

## 2023-11-03 RX ADMIN — DIGOXIN 0.12 MG: 125 TABLET ORAL at 08:11

## 2023-11-03 RX ADMIN — Medication 1000 UNITS: at 08:11

## 2023-11-03 RX ADMIN — ACETAMINOPHEN 650 MG: 325 TABLET ORAL at 08:11

## 2023-11-03 RX ADMIN — SODIUM CHLORIDE, PRESERVATIVE FREE 10 ML: 5 INJECTION INTRAVENOUS at 05:11

## 2023-11-03 RX ADMIN — LACTOBACILLUS TAB 1 TABLET: TAB at 12:11

## 2023-11-03 RX ADMIN — LACTOBACILLUS TAB 1 TABLET: TAB at 08:11

## 2023-11-03 RX ADMIN — SODIUM CHLORIDE, PRESERVATIVE FREE 10 ML: 5 INJECTION INTRAVENOUS at 12:11

## 2023-11-03 RX ADMIN — MAGNESIUM SULFATE 2 G: 2 INJECTION INTRAVENOUS at 12:11

## 2023-11-03 RX ADMIN — METOPROLOL TARTRATE 25 MG: 25 TABLET, FILM COATED ORAL at 08:11

## 2023-11-03 RX ADMIN — POTASSIUM PHOSPHATE, MONOBASIC AND POTASSIUM PHOSPHATE, DIBASIC 15 MMOL: 224; 236 INJECTION, SOLUTION INTRAVENOUS at 01:11

## 2023-11-03 RX ADMIN — PANTOPRAZOLE SODIUM 40 MG: 40 INJECTION, POWDER, LYOPHILIZED, FOR SOLUTION INTRAVENOUS at 08:11

## 2023-11-03 NOTE — PLAN OF CARE
Problem: Adult Inpatient Plan of Care  Goal: Plan of Care Review  Outcome: Ongoing, Progressing  Flowsheets (Taken 11/3/2023 0600)  Plan of Care Reviewed With:   patient   son     Problem: Adjustment to Illness (Sepsis/Septic Shock)  Goal: Optimal Coping  Outcome: Ongoing, Progressing  Intervention: Optimize Psychosocial Adjustment to Illness  Flowsheets (Taken 11/3/2023 0600)  Supportive Measures:   active listening utilized   positive reinforcement provided  Family/Support System Care:   caregiver stress acknowledged   presence promoted   support provided     Problem: Glycemic Control Impaired (Sepsis/Septic Shock)  Goal: Blood Glucose Level Within Desired Range  Outcome: Ongoing, Progressing  Intervention: Optimize Glycemic Control  Flowsheets (Taken 11/3/2023 0600)  Glycemic Management: blood glucose monitored     Problem: Fall Injury Risk  Goal: Absence of Fall and Fall-Related Injury  Outcome: Ongoing, Progressing  Intervention: Identify and Manage Contributors  Flowsheets (Taken 11/3/2023 0600)  Self-Care Promotion:   independence encouraged   BADL personal objects within reach   BADL personal routines maintained  Medication Review/Management:   medications reviewed   high-risk medications identified     Problem: Skin Injury Risk Increased  Goal: Skin Health and Integrity  Outcome: Ongoing, Progressing     Problem: Impaired Wound Healing  Goal: Optimal Wound Healing  Outcome: Ongoing, Progressing     Problem: Confusion Acute  Goal: Optimal Cognitive Function  Outcome: Ongoing, Progressing  Intervention: Minimize Contributing Factors  Flowsheets (Taken 11/3/2023 0600)  Environment Familiarity/Consistency: familiar objects from home provided  Reorientation Measures:   clock in view   reorientation provided   Plan of care progressing.

## 2023-11-03 NOTE — NURSING
Attempted to call report again and speak to Sorin RUBIO. No answer. Will continue plan of care. Awaiting ambulance to pick patient up to go to [place].  #[phone]  room 12A

## 2023-11-03 NOTE — PT/OT/SLP PROGRESS
"Physical Therapy Treatment    Patient Name:  Aquiles Montana   MRN:  [id number]    Recommendations:     Discharge Recommendations: Moderate Intensity Therapy  Discharge Equipment Recommendations: shower chair, to be determined by next level of care  Barriers to discharge:  pt requires increased level of assistance    Assessment:     Aquiles Montana is a 83 y.o. male admitted with a medical diagnosis of Sepsis.  He presents with the following impairments/functional limitations: weakness, impaired endurance, impaired self care skills, impaired functional mobility, gait instability, impaired balance, decreased coordination, decreased upper extremity function, decreased lower extremity function, decreased safety awareness, impaired coordination, impaired joint extensibility, impaired cardiopulmonary response to activity, impaired skin Improved participation today- slept well last night; progressed to partial  michael stedy; cont with POC.    Rehab Prognosis: Good and Fair; patient would benefit from acute skilled PT services to address these deficits and reach maximum level of function.    Recent Surgery: Procedure(s) (LRB):  INCISION AND DRAINAGE, LOWER EXTREMITY (Right) 3 Days Post-Op    Plan:     During this hospitalization, patient to be seen 3 x/week to address the identified rehab impairments via gait training, therapeutic activities, neuromuscular re-education, therapeutic exercises and progress toward the following goals:    Plan of Care Expires:  11/26/23    Subjective     Chief Complaint: "I'm embarrassed" with regard to BM  Patient/Family Comments/goals: agreeable to therapy  Pain/Comfort:  Pain Rating 1:  (none stated)      Objective:     Communicated with nurse prior to session.  Patient found HOB elevated with bed alarm, oxygen, PureWick, telemetry, peripheral IV upon PT entry to room.     General Precautions: Standard, fall  Orthopedic Precautions: N/A  Braces: N/A  Respiratory Status: Nasal cannula, " flow 0.5 L/min     Functional Mobility:  Bed Mobility:     Rolling Left:  maximal assistance  Rolling Right: maximal assistance  Scooting: maximal assistance, of 2 persons, and pt assisting with BLE to scoot to HOB  Supine to Sit: maximal assistance and of 2 persons  Sit to Supine: maximal assistance and of 2 persons  Transfers:     Sit to Stand:  Pt attempting sit<>stand x3 trials from EOB using SaraStedy, pt able to lift buttocks off of bed but unable to achieve full stand at this time.       AM-PAC 6 CLICK MOBILITY  Turning over in bed (including adjusting bedclothes, sheets and blankets)?: 2  Sitting down on and standing up from a chair with arms (e.g., wheelchair, bedside commode, etc.): 2  Moving from lying on back to sitting on the side of the bed?: 2  Moving to and from a bed to a chair (including a wheelchair)?: 1  Need to walk in hospital room?: 1  Climbing 3-5 steps with a railing?: 1  Basic Mobility Total Score: 9       Treatment & Education:  Patient performed bed mobility as above; pt with mild dizziness -BP 89/51; performed BUE/BLE exercises, wt shift forward/backward, core ex, alternating isometrics while seated at EOB; BP taken and now up to 106/56; pt reported dizziness resolving; pt stood with michael gould as above; returned to supine as pt started to have a liquidy BM; pt rolled multiple times for cleansing- noted to have wound on buttocks - nsg to room to assess and apply mepilex; pt /fam educated on waffle mattress and wedge for pressure relief; pt's heels floated off bed with pillow; BP no 115/59.    Patient left HOB elevated with all lines intact, call button in reach, bed alarm on, nurse notified, and family present..    GOALS:   Multidisciplinary Problems       Physical Therapy Goals          Problem: Physical Therapy    Goal Priority Disciplines Outcome Goal Variances Interventions   Physical Therapy Goal     PT, PT/OT Ongoing, Progressing     Description: Goals to be met by: 11/26/2023      Patient will increase functional independence with mobility by performin. Supine to sit with Minimal Assistance  2. Sit to supine with [id number] Assistance  3. Rolling with Minimal Assistance.  4. Sit to stand transfer with Minimal Assistance using Rolling Walker  5. Bed to chair transfer with Minimal Assistance using Rolling Walker  6. Gait  x 25 feet with Minimal Assistance using Rolling Walker.   7. Lower extremity exercise program x10 reps with assistance as needed                         Time Tracking:     PT Received On: 23  PT Start Time: 1408     PT Stop Time: 1516  PT Total Time (min): 68 min cotx with OT in consideration of need for 2 skilled therapists for safety and outcomes    Billable Minutes: Therapeutic Activity 68    Treatment Type: Evaluation  PT/PTA: PT     Number of PTA visits since last PT visit: 0     2023

## 2023-11-03 NOTE — NURSING
Attempted to call report twice. Left my number and they will call back for report. Will continue plan of care.

## 2023-11-03 NOTE — PROGRESS NOTES
Surgery follow up  /66 (BP Location: Left arm, Patient Position: Lying)   Pulse 95   Temp 97.7 °F (36.5 °C) (Oral)   Resp 18   Ht 6' (1.829 m)   Wt 105.3 kg (232 lb 2.3 oz)   SpO2 97%   BMI 31.48 kg/m²   I/O last 3 completed shifts:  In: 90 [P.O.:90]  Out: 1300 [Urine:1300]  I/O this shift:  In: -   Out: 350 [Urine:350]    Recent Results (from the past 336 hour(s))   CBC auto differential    Collection Time: 11/03/23  5:21 AM   Result Value Ref Range    WBC 27.69 (H) 3.90 - 12.70 K/uL    Hemoglobin 9.3 (L) 14.0 - 18.0 g/dL    Hematocrit 29.3 (L) 40.0 - 54.0 %    Platelets 184 150 - 450 K/uL   CBC auto differential    Collection Time: 11/02/23  9:02 AM   Result Value Ref Range    WBC 29.98 (H) 3.90 - 12.70 K/uL    Hemoglobin 9.6 (L) 14.0 - 18.0 g/dL    Hematocrit 30.5 (L) 40.0 - 54.0 %    Platelets 201 150 - 450 K/uL   CBC auto differential    Collection Time: 11/01/23  1:44 AM   Result Value Ref Range    WBC 23.68 (H) 3.90 - 12.70 K/uL    Hemoglobin 8.8 (L) 14.0 - 18.0 g/dL    Hematocrit 28.4 (L) 40.0 - 54.0 %    Platelets 174 150 - 450 K/uL     Dressing dry and intact changed yesterday.  Pathology noted    Metastatic squamous clell cancer in the biopsy report,  Further recommend per oncologist.

## 2023-11-03 NOTE — PLAN OF CARE
Discharge orders noted for SNF. AVS prepared with medication details, clinical reference list and 24/7 [facility] Nurse On Call number attached. Bedside nurse to print AVS and place in discharge packet for accepting facility.

## 2023-11-03 NOTE — PROGRESS NOTES
[facility] - Telemetry  Adult Nutrition  Consult Note    SUMMARY     Recommendations    Recommendation:  1. Continue Encourage intake at meals as tolerated.   2. Monitor weight/labs.   3. RD to continue to follow to monitor po intake    Goals:  Pt intake >/= 50% EEN/EPN by RD follow up  Nutrition Goal Status: progressing towards goal  Communication of RD Recs: other (comment) (POC)    Assessment and Plan  Nutrition Problem  Inadequate energy intake     Related to (etiology):   AMS, dx/hx     Signs and Symptoms (as evidenced by):   Weight loss, recent decreased appetite, 25% intake of texture/consistency modified diet recorded.     Interventions:  Collaboration with other providers  Commercial Beverage-Boost Plus BID  Modified Beverage- Vince BID      Nutrition Diagnosis Status:   Continues     Malnutrition Assessment  Weight Loss (Malnutrition):  (4% x 3 months)   Orbital Region (Subcutaneous Fat Loss): well nourished  Upper Arm Region (Subcutaneous Fat Loss): well nourished  Thoracic and Lumbar Region: well nourished   Mutual Region (Muscle Loss): well nourished  Clavicle Bone Region (Muscle Loss): well nourished  Clavicle and Acromion Bone Region (Muscle Loss): well nourished  Scapular Bone Region (Muscle Loss): well nourished  Dorsal Hand (Muscle Loss): well nourished  Patellar Region (Muscle Loss): well nourished  Anterior Thigh Region (Muscle Loss): well nourished  Posterior Calf Region (Muscle Loss): well nourished       Reason for Assessment  Reason For Assessment: RD follow-up  Diagnosis:  (sepsis)  Relevant Medical History: HTN, UTI, prostate surgery  General Information Comments: Pt on Regular diet with Boost pudding, Vince, & Boost Plus. Pt with poor intake of hospital foods but consuming some food from family. PICC line. Fidel 12- groin incision, L leg ulcer, R heel, sacral spine wounds. Noted 10lb weight loss x 2 months. NFPE completed 10/25-nourished.  Nutrition Discharge Planning: d/c diet to be  determined    Nutrition Risk Screen  Nutrition Risk Screen: difficulty chewing/swallowing    Nutrition/Diet History  Food Preferences: no Worship or cultural food prefs identified  Spiritual, Cultural Beliefs, Nondenominational Practices, Values that Affect Care: no  Factors Affecting Nutritional Intake: impaired cognitive status/motor control    Anthropometrics  Temp: 97.6 °F (36.4 °C)  Height: 6' (182.9 cm)  Height (inches): 72 in  Weight Method: Bed Scale  Weight: 105.3 kg (232 lb 2.3 oz)  Weight (lb): 232.15 lb  Ideal Body Weight (IBW), Male: 178 lb  % Ideal Body Weight, Male (lb): 130.42 %  BMI (Calculated): 31.5  BMI Grade: 30 - 34.9- obesity - grade I  Usual Body Weight (UBW), k.9 kg (8/10)  % Usual Body Weight: 96.01  % Weight Change From Usual Weight: -4.19 %    Lab/Procedures/Meds  Pertinent Labs Reviewed: reviewed  Pertinent Labs Comments: BUN 7L, Ca 8.0L, Mg 1.5L, Alb 2.0L  Pertinent Medications Reviewed: reviewed  Pertinent Medications Comments: lactobacillus, melatonin, pantopraozle, Vit D    Estimated/Assessed Needs  Weight Used For Calorie Calculations: 80.9 kg (178 lb 5.6 oz) (IBW)  Energy Calorie Requirements (kcal):  (25 kcal/kg IBW)  Energy Need Method: Kcal/kg  Protein Requirements: 80g (1.0g/kg IBW)  Weight Used For Protein Calculations: 80.9 kg (178 lb 5.6 oz)  Estimated Fluid Requirement Method: RDA Method  RDA Method (mL):      Nutrition Prescription Ordered  Current Diet Order: Regular  Oral Nutrition Supplement: Boost pudding, Vince, Boost Plus    Evaluation of Received Nutrient/Fluid Intake  I/O:   Energy Calories Required: not meeting needs  Protein Required: not meeting needs  Fluid Required: not meeting needs  Comments: LBM 11/1  % Intake of Estimated Energy Needs: 25 - 50 %  % Meal Intake: 25 - 50 %    Nutrition Risk  Level of Risk/Frequency of Follow-up:  (2xweekly)     Monitor and Evaluation  Food and Nutrient Intake: food and beverage intake  Food and Nutrient  Adminstration: diet order  Physical Activity and Function: nutrition-related ADLs and IADLs  Anthropometric Measurements: weight  Biochemical Data, Medical Tests and Procedures: electrolyte and renal panel  Nutrition-Focused Physical Findings: overall appearance     Nutrition Follow-Up  RD Follow-up?: Yes

## 2023-11-03 NOTE — PLAN OF CARE
[facility]    FACILITY TRANSFER ORDERS      Patient Name: Aquiles Montana  YOB: 1940    PCP: Marty Hernandez MD   PCP Address: [address] / [place] [address]  PCP Phone Number: [phone]  PCP Fax: [phone]    Encounter Date: 11/03/2023    Admit to: Skilled Nursing facility    Vital Signs:  Routine    Diagnoses:   Active Hospital Problems    Diagnosis  POA    *Sepsis [A41.9]  Yes    Abscess of right groin [L02.214]  Yes    A-fib [I48.91]  Yes    ACP (advance care planning) [Z71.89]  Not Applicable    Anemia [D64.9]  Yes    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 [C44.722]  Yes    Hypertension [I10]  Yes      Resolved Hospital Problems    Diagnosis Date Resolved POA    Encephalopathy, metabolic [G93.41] 11/03/2023 Yes    Hypercalcemia [E83.52] 11/03/2023 Yes       Allergies:Review of patient's allergies indicates:  No Known Allergies    Diet: cardiac diet    Activities: Activity as tolerated    Goals of Care Treatment Preferences:  Code Status: DNR    Health care agent: Jessica Montana (spouse)  Health care agent number: [phone]          What is most important right now is to focus on remaining as independent as possible, symptom/pain control, improvement in condition but with limits to invasive therapies, spending time at home, avoiding the hospital.  Accordingly, we have decided that the best plan to meet the patient's goals includes continuing with treatment.      Nursing: routine     Labs: weekly CBC, CMP    Recommendations for follow up:  - If patient is noted to have uptrending calcium levels, please restart lasix po    CONSULTS:    Physical Therapy to evaluate and treat.  and Occupational Therapy to evaluate and treat.      WOUND CARE ORDERS  Dressing chnages every other day with bactroban and xeroform, 4 x4 and tape    Nursing to cleanse LLE wound with Vashe wound cleanser and apply Santyl ointment to wound. Cover with bordered gauze dressing. Dressing changes every  other day.     Nursing to cleanse sacrum/buttocks with cleansing cloths and apply thin layer of Triad ointment BID and prn cleansing of incontinent episode. Do not apply cover dressing. Dressing changes every other day.     Nursing to cleanse R heel and RLE wound with Vashe wound cleanser and apply Mepilex border to each wound. Change 3x/week.  Don heel boots. Dressing changes every other day.     Medications: Review discharge medications with patient and family and provide education.      Current Discharge Medication List        START taking these medications    Details   metoprolol tartrate (LOPRESSOR) 25 MG tablet Take 1 tablet (25 mg total) by mouth 2 (two) times daily.  Qty: 60 tablet, Refills: 3    Comments: .      traZODone (DESYREL) 50 MG tablet Take 1 tablet (50 mg total) by mouth every evening.  Qty: 30 tablet, Refills: 3      vitamin D (VITAMIN D3) 1000 units Tab Take 1 tablet (1,000 Units total) by mouth once daily.           CONTINUE these medications which have CHANGED    Details   SANTYL ointment Nursing to cleanse LLE wound with Vashe wound cleanser and apply Santyl ointment to wound. Cover with bordered gauze dressing. Change daily.  Refills: 0           CONTINUE these medications which have NOT CHANGED    Details   digoxin (LANOXIN) 125 mcg tablet Take 0.125 mg by mouth once daily.      latanoprost 0.005 % ophthalmic solution Place 1 drop into both eyes every evening.      pantoprazole (PROTONIX) 40 MG tablet Take 40 mg by mouth 2 (two) times daily.      ascorbic acid, vitamin C, (VITAMIN C) 250 MG tablet Take 1 tablet by mouth every morning.      ketorolac 0.5% (ACULAR) 0.5 % Drop Place 1 drop into the left eye 4 (four) times daily.      melatonin (MELATIN) 3 mg tablet Take 2 tablets by mouth every evening.      multivitamin with folic acid 400 mcg Tab Take 1 tablet by mouth every morning.      OCUFLOX 0.3 % ophthalmic solution Place 1 drop into the left eye 4 (four) times daily.      prednisoLONE  acetate (PRED FORTE) 1 % DrpS Place 1 drop into the left eye 4 (four) times daily.      zinc sulfate (ZINCATE) 50 mg zinc (220 mg) capsule Take 1 capsule by mouth every morning.           STOP taking these medications       furosemide (LASIX) 40 MG tablet Comments:   Reason for Stopping:         metoprolol succinate (TOPROL-XL) 50 MG 24 hr tablet Comments:   Reason for Stopping:         potassium chloride SA (K-DUR,KLOR-CON) 20 MEQ tablet Comments:   Reason for Stopping:         sulfamethoxazole-trimethoprim 800-160mg (BACTRIM DS) 800-160 mg Tab Comments:   Reason for Stopping:         triamcinolone acetonide 0.1% (KENALOG) 0.1 % cream Comments:   Reason for Stopping:                  Immunizations Administered as of 11/3/2023       Name Date Dose VIS Date Route Exp Date    COVID-19, MRNA, LN-S, PF (Moderna) 3/11/2021 -- -- Intramuscular --    Site: Left arm     Lot: [id number]     COVID-19, MRNA, LN-S, PF (Moderna) 2/9/2021 -- -- -- --    Lot: [id number]             _________________________________  Gabriella Madrid MD  11/03/2023

## 2023-11-03 NOTE — PT/OT/SLP PROGRESS
"Occupational Therapy   Treatment    Name: Aquiles Montana  MRN: [id number]  Admitting Diagnosis:  Sepsis  3 Days Post-Op    Recommendations:     Discharge Recommendations: Moderate Intensity Therapy  Discharge Equipment Recommendations:  shower chair, to be determined by next level of care  Barriers to discharge:  Other (Comment) (Pt requires increased level of assistance)    Assessment:     Aquiles Montana is a 83 y.o. male with a medical diagnosis of Sepsis.  He presents with The primary encounter diagnosis was 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. Diagnoses of AMS (altered mental status), Sepsis, due to unspecified organism, unspecified whether acute organ dysfunction present, FERNANDO (acute kidney injury), Hypercalcemia, Chest pain, Sepsis, Class 2 severe obesity with serious comorbidity and body mass index (BMI) of 38.0 to 38.9 in adult, unspecified obesity type, and ACP (advance care planning) were also pertinent to this visit. Performance deficits affecting function are weakness, impaired functional mobility, gait instability, impaired endurance, decreased coordination, decreased upper extremity function, decreased lower extremity function, decreased ROM, impaired self care skills, impaired balance, decreased safety awareness, impaired coordination, impaired joint extensibility, impaired skin, impaired cardiopulmonary response to activity.     Rehab Prognosis:  Good and Fair; patient would benefit from acute skilled OT services to address these deficits and reach maximum level of function.       Plan:     Patient to be seen 5 x/week to address the above listed problems via self-care/home management, therapeutic activities, therapeutic exercises  Plan of Care Expires: 11/26/23  Plan of Care Reviewed with: patient, family    Subjective     Chief Complaint: "I'm embarrassed" in regards to BM  Patient/Family Comments/goals: Agreeable to therapy  Pain/Comfort:  Pain Rating 1: other (see comments) (none " stated)    Objective:     Communicated with: nsg prior to session.  Patient found HOB elevated with bed alarm, oxygen, PureWick, telemetry, peripheral IV upon OT entry to room.    General Precautions: Standard, fall    Orthopedic Precautions:N/A  Braces: N/A  Respiratory Status: Nasal cannula, flow 0.5 L/min     Occupational Performance:     Bed Mobility:    Patient completed Rolling/Turning to Left with  maximal assistance  Patient completed Rolling/Turning to Right with maximal assistance  Patient completed Scooting/Bridging with maximal assistance, 2 persons, and pt assisting with BLE to scoot to HOB  Patient completed Supine to Sit with maximal assistance and 2 persons  Patient completed Sit to Supine with maximal assistance and 2 persons     Functional Mobility/Transfers:  Pt attempting sit<>stand x3 trials from EOB using SaraStedy, pt able to lift buttocks off of bed but unable to achieve full stand at this time.    Activities of Daily Living:  Toileting: total assistance in supine for hygiene & diaper management 2/2 [facility] 6 Click ADL: 12    Treatment & Education:  Pt progressing towards goals this date, agreeable to therapy.  Pt performing bed mobility as above.  EOB pt with c/o mild dizziness, BP taken at 89/51.  Pt performing BLE/BUE exs, weight shift, core exs & ROM seated EOB. BP retaken & up to 106/56; pt reporting dizziness resolving.  Pt noted with wound on buttocks; nsg in room to assess & apply mepilex.   Pt/family educated on use of waffle mattress & wedge for pressure relief.   Pt heels floated end of session for pressure relief.   BP taken end of session 115/59.  Will progress as able.    Patient left HOB elevated with all lines intact, call button in reach, bed alarm on, nsg notified, and family present    GOALS:   Multidisciplinary Problems       Occupational Therapy Goals          Problem: Occupational Therapy    Goal Priority Disciplines Outcome Interventions   Occupational Therapy  Goal     OT, PT/OT Ongoing, Progressing    Description: Goals to be met by: 11/26     Patient will increase functional independence with ADLs by performing:    Grooming while EOB with Stand-by Assistance.  Toileting from bedside commode with Moderate Assistance for hygiene and clothing management.   Toilet transfer to bedside commode with Minimal Assistance.                         Time Tracking:     OT Date of Treatment: 11/03/23  OT Start Time: 1408  OT Stop Time: 1516  OT Total Time (min): 68 min cotx with PT    Billable Minutes:Self Care/Home Management 30  Therapeutic Activity 38    OT/ROMÁN: OT     Number of ROMÁN visits since last OT visit: 0    11/3/2023

## 2023-11-03 NOTE — PROGRESS NOTES
Progress Note  Nephrology      Consult Requested By: Gabriella Madrid MD      SUBJECTIVE:     Overnight events  Patient is a 83 y.o. 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    Dermatitis    Tinea pedis of both feet    Open wound of right lower leg    Right leg swelling    Left anterior fascicular block    Class 2 severe obesity with serious comorbidity and body mass index (BMI) of 38.0 to 38.9 in adult    Sepsis    Abscess of right groin    A-fib    ACP (advance care planning)    Anemia     Past Medical History:   Diagnosis Date    Hypertension     Urinary tract infection               OBJECTIVE:     Vitals:    11/03/23 0353 11/03/23 0723 11/03/23 0841 11/03/23 1121   BP:  126/66  139/65   BP Location:  Left arm     Patient Position:  Lying     Pulse: 69 84 95 72   Resp:  18 18 20   Temp:  97.7 °F (36.5 °C)  98.5 °F (36.9 °C)   TempSrc:  Oral     SpO2:  (!) 91% 97% (!) 92%   Weight:       Height:           Temp: 98.5 °F (36.9 °C) (11/03/23 1121)  Pulse: 72 (11/03/23 1121)  Resp: 20 (11/03/23 1121)  BP: 139/65 (11/03/23 1121)  SpO2: (!) 92 % (11/03/23 1121)    Date 11/03/23 0700 - 11/04/23 0659   Shift 5681-2867 2145-1936 9502-8558 24 Hour Total   INTAKE   Shift Total(mL/kg)       OUTPUT   Urine(mL/kg/hr) 350   350   Shift Total(mL/kg) 350(3.3)   350(3.3)   Weight (kg) 105.3 105.3 105.3 105.3             Medications:   acetaminophen  650 mg Oral Q6H WAKE    collagenase   Topical (Top) Daily    digoxin  0.125 mg Oral Daily    enoxparin  40 mg Subcutaneous Q24H (prophylaxis, 1700)    Lactobacillus acidoph-L.bulgar  1 tablet Oral TID WM    latanoprost  1 drop Both Eyes QHS    melatonin  6 mg Oral Nightly    metoprolol tartrate  25 mg Oral BID    pantoprazole  40 mg Intravenous BID     "potassium, sodium phosphates  1 packet Oral Once    sodium chloride 0.9%  10 mL Intravenous Q6H    sodium chloride 0.9%  10 mL Intravenous Q6H    traZODone  50 mg Oral QHS    vitamin D  1,000 Units Oral Daily      sodium chloride 0.9%      sodium chloride 0.9%                 Physical Exam:  General appearance:NAD  Weak  Lungs: diminished breath sounds  Heart: pulse 62  Abdomen: soft  Extremities: edema  Skin: dry  Laboratory:  ABG  Labs reviewed  No results found for this or any previous visit (from the past 336 hour(s)).  Recent Results (from the past 336 hour(s))   CBC auto differential    Collection Time: 11/03/23  5:21 AM   Result Value Ref Range    WBC 27.69 (H) 3.90 - 12.70 K/uL    Hemoglobin 9.3 (L) 14.0 - 18.0 g/dL    Hematocrit 29.3 (L) 40.0 - 54.0 %    Platelets 184 150 - 450 K/uL   CBC auto differential    Collection Time: 11/02/23  9:02 AM   Result Value Ref Range    WBC 29.98 (H) 3.90 - 12.70 K/uL    Hemoglobin 9.6 (L) 14.0 - 18.0 g/dL    Hematocrit 30.5 (L) 40.0 - 54.0 %    Platelets 201 150 - 450 K/uL   CBC auto differential    Collection Time: 11/01/23  1:44 AM   Result Value Ref Range    WBC 23.68 (H) 3.90 - 12.70 K/uL    Hemoglobin 8.8 (L) 14.0 - 18.0 g/dL    Hematocrit 28.4 (L) 40.0 - 54.0 %    Platelets 174 150 - 450 K/uL     Urinalysis  No results for input(s): "COLORU", "CLARITYU", "SPECGRAV", "PHUR", "PROTEINUA", "GLUCOSEU", "BILIRUBINCON", "BLOODU", "WBCU", "RBCU", "BACTERIA", "MUCUS", "NITRITE", "LEUKOCYTESUR", "UROBILINOGEN", "HYALINECASTS" in the last 24 hours.    Diagnostic Results:  X-Ray: Reviewed  US: Reviewed  Echo: Reviewed  ACCESS    ASSESSMENT/PLAN:     FERNANDO  UA protein trace, RBC 27, WBC 21  Creatinine  0.8 - 0.7- 0.8- 0.7  BUN 14- 13- 9- 7- 8  K 3.5 - 3.2- 3.5- 3.9- 3.7  Mag 1.7- 1.5   Replace prn  Metabolic alkalosis  PTH 6.5  Hypercalcemia improved with IVF  Ca corrected approximately 12- 11.2- 10.6- 9.6- 9.5- 9.6- 9.4  Vit D 27  CT  Regional osseous structures demonstrate no " aggressive appearing lytic or blastic lesions.   SPIEP- no monoclonal peaks  Albumin 2.2- 2.1- 2  Phos 2 - 1.3- 1.4- 1.9- 2.4  Anemia multifactorial  Hb 10- 8.7- 8.1- 8.8- 9.6- 9.3  /77, 122/59, 138/63, 128/68, 157/87, 139/65  Weight daily  I and O  Avoid nephrotoxic agents, hypotension, hypovolemia  Replace e-lytes prn  Supplements

## 2023-11-03 NOTE — PLAN OF CARE
Recommendation:  1. Continue Encourage intake at meals as tolerated.   2. Monitor weight/labs.   3. RD to continue to follow to monitor po intake    Goals:  Pt intake >/= 50% EEN/EPN by RD follow up  Nutrition Goal Status: progressing towards goal

## 2023-11-03 NOTE — PLAN OF CARE
"0800  DC order noted. Pt has been accepted to [facility]. Spouse scheduled to sign admission paperwork this AM. Awaiting SNF orders.        11/03/23 0905   Rounds   Attendance Provider;Nurse ;Nurse;Pharmacist   Discharge Plan A Skilled Nursing Facility   Transition of Care Barriers Transportation     0905  Patient resting quietly in bed with son, Len Montana ([phone]), at the bedside when CM participated in SIBR with Dr Madrid, nurse Jocelyn, & pharmacist Han. Pt medically stable to discharge to [facility] today. Spouse at the facility signing admission paperwork. Awaiting wound care instructions from Dr Barber for SNF orders. "Pt Choice" form signed by Len. Message sent to the schedulers informing of hospfu appt needed with palliative care. Awaiting response.     0930  CM was informed by Rigo /[facility] & the pt's spouse that the spouse does not want to the admitted to [facility]. CM was informed by Nicole boyer/[facility] that they do not have any male beds available today however [facility] does. Spouse to tour [facility] before making a final decision if the pt will dc to SNF vs home w/HH. CM informed Dr Madrid of above.     0945  [facility].     1030  CM was informed by Angelika ([phone]) w/[facility] that the pt has been accepted for admission.     1030  Patient resting quietly in bed with granddaughter, Maru [place] ([phone]), at the bedside when CM rounded. CM informed Maru of above. Maru contacted the pt's spouse via phone while CM present to informing of above. Pt's spouse declined pt's admission to [facility]. CM informed Angelika of above.     1045  Gen surg note noted. Awaiting final decision from spouse.     1130  CM received a call from Len questioning if [facility] is still agreeable to accept the pt & if a male bed is available today. CM was " "informed by Bere boyer/[facility] that the pt has been accepted & that they do have bed availability. JASON informed Len of above. Spouse & son en route to tour facility. Updated notes sent to [facility] via [facility].     1300  CM was informed by Len that the family would like the pt to be admitted to [facility]. Message sent to Dr Madrid informing of above. SNF orders sent to [facility] via [facility]. Awaiting response. Bere boyer/[facility] to email admission packet to this CM for family to complete. Len stated that he & the pt's spouse are en route back to the hospital. JASON informed nurse Banks of the pt's discharge status.     1350  CM was informed by  Elana of a hospfu appt scheduled for the patient with Dr Baxter on 11/7/2023 at 0900. Information added to the patient's discharge paperwork.     1410  Printed [facility] admission packet hand delivered to Len at the pt's bedside for completion. Signed "Pt Choice" form placed in the pt's chart. Transportation packet left at the nurse's station.     1455  JASON was informed by Bere boyer/[facility] that a covid test is needed prior to admission. JASON informed nurse Banks of covid test ordered. Awaiting result.    1535  Completed admission packet set to [facility] via [facility]. Awaiting covid test results.    1610  JASON received confirmation from Bere that the completed admission packet was received.      1618  (-) covid result sent to [facility] via [facility]. Ambulance transportation scheduled with requested pickup at 1730. Bere stated that the pt will be accepted to room 12A & requested that report be called to [phone].    1622  Message sent to nurse Banks & virtual nurse Harley informing that the pt is cleared to discharge to [facility] room 12A, of ambulance transportation scheduled, & requested that Jocelyn call report.       Will continue to follow.   "

## 2023-11-04 LAB — BACTERIA SPEC AEROBE CULT: NO GROWTH

## 2023-11-04 NOTE — PLAN OF CARE
[place] - Telemetry  Discharge Final Note    Primary Care Provider: Marty Hernandez MD    Expected Discharge Date: 11/3/2023    Final Discharge Note (most recent)       Final Note - 11/04/23 0714          Final Note    Assessment Type Final Discharge Note (P)      Anticipated Discharge Disposition Skilled Nursing Facility (P)    [facility]    Hospital Resources/Appts/Education Provided Appointments scheduled and added to AVS (P)         Post-Acute Status    Post-Acute Authorization Placement (P)      Post-Acute Placement Status Set-up Complete/Auth obtained (P)    [facility]    Discharge Delays PFC Arranged Transportation (P)                      Contact Info       Delonte Robin MD   Specialty: Hematology and Oncology    [address]  [address]  [facility] [address]   Phone: [phone]       Next Steps: Follow up on 11/7/2023    Instructions: at 2:20pm; previously scheduled hem/onc appointment    Benjmaín Baxter Jr., MD   Specialty: Palliative Medicine    [address]  [address]  [place] [address]   Phone: [phone]       Next Steps: Follow up on 11/7/2023    Instructions: at 9:00 AM: palliative care hospital follow up appointment.

## 2023-11-04 NOTE — DISCHARGE SUMMARY
[facility] Medicine  Discharge Summary      Patient Name: Aquiles Montana  MRN: [id number]  MAGALI: [id number]  Patient Class: IP- Inpatient  Admission Date: 10/24/2023  Hospital Length of Stay: 10 days  Discharge Date and Time: 11/3/2023  8:43 PM  Attending Physician: No att. providers found   Discharging Provider: Gabriella Madrid MD  Primary Care Provider: Marty Hernandez MD    Primary Care Team: Networked reference to record PCT     HPI:   Aquiles Montana is an 83-year-old male w/ PMH SCC, BPH, HTN, obesity, and left anterior fascicular block. He presented to the ED via EMS for evaluation due to worsening mental status since admission 5 weeks ago. He was previously admitted to the hospital for sepsis and then discharged to a SNF. Patient's family reports that he came home recently but has just not been himself since the previous admission. Patient was seen by PCP today and status deteriorated following the visit.  Chart review of lab work performed prior to arrival today shows hypercalcemia and leukocytosis.  Also evident of acute kidney injury.  Patient was reportedly agitated and difficult to direct earlier today and at time of presentation to our facility is nonverbal. Patient opens eyes to verbal stimuli and responds to painful stimuli. History obtained from patient's family at bedside. They state that patient has not been experiencing fever, N/V/D, CP, or SOB recently. He has had a decrease in PO intake and activity. They also report that patient started experiencing delirium and hallucinations during his previous hospitalization, which have continued. Of note, patient has a large abscess in his right inguinal area and family states that this has been causing him quite a bit of pain recently.       Procedure(s) (LRB):  INCISION AND DRAINAGE, LOWER EXTREMITY (Right)      Hospital Course:   83-year-old male with PMH of squamous cell cancer, BPH, hypertension, obesity who presented to the ER with  altered mental status found to have FERNANDO and hypercalcemia which improved with IV fluids and supportive care.  Also noted to have leukocytosis of more than 20,000. Imaging showed no acute signs of infection except for CT pelvis showing necrotic lymph node in the right groin.  Patient underwent I and D of the necrotic lymph node on 10/31.  Cultures remained negative at the time of discharge.  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  Patient's mentation improved but continued to have generalized weakness.  Infectious Disease was consulted and recommended observation off antibiotics.  Patient was seen by the palliative care team.  He is a DNR.  Per discussion with palliative care team, family wished for short-term rehab and outpatient oncology and palliative care follow-up.  Pending progress, patient and family agreeable to hospice if patient was not deemed a good candidate for therapy.  At this time, patient discharged to skilled nursing facility for short-term rehab.  Weekly CMP ordered to monitor for hypercalcemia.  If noted to have up trending calcium, consider restarting Lasix.  Patient's wife and children appropriately involved in his care.    BP (!) 115/56 (BP Location: Left arm, Patient Position: Lying)   Pulse 78   Temp 97.9 °F (36.6 °C) (Oral)   Resp 17   Ht 6' (1.829 m)   Wt 105.3 kg (232 lb 2.3 oz)   SpO2 (!) 91%   BMI 31.48 kg/m²     Physical Exam  Vitals and nursing note reviewed.   Constitutional:       General: He is not in acute distress.     Appearance: He is well-developed.      Comments: Elderly male   Cardiovascular:      Rate and Rhythm: Normal rate and regular rhythm.      Heart sounds: Normal heart sounds. No murmur heard.  Pulmonary:      Effort: Pulmonary  effort is normal.      Breath sounds: Normal breath sounds. No wheezing.   Abdominal:      Palpations: Abdomen is soft.      Tenderness: There is no abdominal tenderness.      Comments: R groin area dressed   Musculoskeletal:      Comments: Ulcer on the RLE , and pressure ulcer on the heel   Skin:     General: Skin is warm and dry.      Findings: No rash.   Neurological:      Mental Status: He is alert.        Goals of Care Treatment Preferences:  Code Status: DNR    Health care agent: Cristal Montana (spouse)  Health care agent number: [phone]          What is most important right now is to focus on remaining as independent as possible, symptom/pain control, improvement in condition but with limits to invasive therapies, spending time at home, avoiding the hospital.  Accordingly, we have decided that the best plan to meet the patient's goals includes continuing with treatment.      Consults:   Consults (From admission, onward)        Status Ordering Provider     Inpatient consult to Infectious Diseases  Once        Provider:  (Not yet assigned)    Completed ROSSY MIX     Inpatient consult to Registered Dietitian/Nutritionist  Once        Provider:  (Not yet assigned)    Completed EDILBERTO NATION     Inpatient consult to Palliative Care  Once        Provider:  (Not yet assigned)    Completed EDILBERTO NATION     Inpatient consult to [facility] (Javier Mcdaniel Nimkevych)  Once        Provider:  (Not yet assigned)    Completed CRISTAL ABRAHAM     Inpatient consult to Registered Dietitian/Nutritionist  Once        Provider:  (Not yet assigned)    Completed CHRISTIAN CAMACHO     IP consult to case management  Once        Provider:  (Not yet assigned)    Completed CHRISTIAN CAMACHO     Inpatient consult to Hematology/Oncology  Once        Provider:  (Not yet assigned)    Completed CRISTAL ABRAHAM          Final Active Diagnoses:    Diagnosis Date Noted POA    PRINCIPAL  PROBLEM:  Sepsis [A41.9] 10/24/2023 Yes    Abscess of right groin [L02.214] 10/25/2023 Yes    A-fib [I48.91] 10/25/2023 Yes    ACP (advance care planning) [Z71.89] 10/25/2023 Not Applicable    Anemia [D64.9] 10/25/2023 Yes    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 [C44.722] 09/18/2019 Yes    Hypertension [I10] 03/22/2017 Yes      Problems Resolved During this Admission:    Diagnosis Date Noted Date Resolved POA    Encephalopathy, metabolic [G93.41] 10/25/2023 11/03/2023 Yes    Hypercalcemia [E83.52] 10/25/2023 11/03/2023 Yes       Discharged Condition: stable    Disposition: Skilled Nursing Facility    Follow Up:   Follow-up Information     Delonte Robin MD Follow up on 11/7/2023.    Specialty: Hematology and Oncology  Why: at 2:20pm; previously scheduled hem/onc appointment  Contact information:  [address] [facility]  [address]  [place] [address]  [phone]             Benjamín Baxter Jr., MD Follow up on 11/7/2023.    Specialty: Palliative Medicine  Why: at 9:00 AM: palliative care hospital follow up appointment.  Contact information:  [address] [facility] [address]  [address]  [place] [address]  [phone]                       Patient Instructions:      Ambulatory referral/consult to CLINIC Palliative Care   Standing Status: Future   Referral Priority: Routine Referral Type: Consultation   Requested Specialty: Palliative Medicine   Number of Visits Requested: 1     Ambulatory referral/consult to Hematology / Oncology   Standing Status: Future   Referral Priority: Routine Referral Type: Consultation   Referral Reason: Specialty Services Required   Requested Specialty: Hematology and Oncology   Number of Visits Requested: 1       Significant Diagnostic Studies: Labs:   BMP:   Recent Labs   Lab 11/03/23  0521   GLU 93      K 3.7      CO2 31*   BUN 8   CREATININE 0.7   CALCIUM 7.8*    and CBC   Recent Labs   Lab 11/03/23  0521   WBC 27.69*   HGB 9.3*   HCT 29.3*          Pending Diagnostic  Studies:     None         Medications:  Reconciled Home Medications:      Medication List      START taking these medications    metoprolol tartrate 25 MG tablet  Commonly known as: LOPRESSOR  Take 1 tablet (25 mg total) by mouth 2 (two) times daily.     traZODone 50 MG tablet  Commonly known as: DESYREL  Take 1 tablet (50 mg total) by mouth every evening.     vitamin D 1000 units Tab  Commonly known as: VITAMIN D3  Take 1 tablet (1,000 Units total) by mouth once daily.        CHANGE how you take these medications    SantyL ointment  Generic drug: collagenase  Nursing to cleanse LLE wound with Vashe wound cleanser and apply Santyl ointment to wound. Cover with bordered gauze dressing. Change daily.  What changed: See the new instructions.        CONTINUE taking these medications    ascorbic acid (vitamin C) 250 MG tablet  Commonly known as: VITAMIN C  Take 1 tablet by mouth every morning.     digoxin 125 mcg tablet  Commonly known as: LANOXIN  Take 0.125 mg by mouth once daily.     ketorolac 0.5% 0.5 % Drop  Commonly known as: ACULAR  Place 1 drop into the left eye 4 (four) times daily.     latanoprost 0.005 % ophthalmic solution  Place 1 drop into both eyes every evening.     melatonin 3 mg tablet  Commonly known as: MELATIN  Take 2 tablets by mouth every evening.     multivitamin with folic acid 400 mcg Tab  Take 1 tablet by mouth every morning.     OCUFLOX 0.3 % ophthalmic solution  Generic drug: ofloxacin  Place 1 drop into the left eye 4 (four) times daily.     pantoprazole 40 MG tablet  Commonly known as: PROTONIX  Take 40 mg by mouth 2 (two) times daily.     prednisoLONE acetate 1 % Drps  Commonly known as: PRED FORTE  Place 1 drop into the left eye 4 (four) times daily.     zinc sulfate 50 mg zinc (220 mg) capsule  Commonly known as: ZINCATE  Take 1 capsule by mouth every morning.        STOP taking these medications    furosemide 40 MG tablet  Commonly known as: LASIX     metoprolol succinate 50 MG 24 hr  tablet  Commonly known as: TOPROL-XL     potassium chloride SA 20 MEQ tablet  Commonly known as: K-DUR,KLOR-CON     sulfamethoxazole-trimethoprim 800-160mg 800-160 mg Tab  Commonly known as: BACTRIM DS     triamcinolone acetonide 0.1% 0.1 % cream  Commonly known as: KENALOG            Indwelling Lines/Drains at time of discharge:   Lines/Drains/Airways     Peripherally Inserted Central Catheter Line  Duration           PICC Double Lumen 10/28/23 1310 right basilic 7 days          Drain  Duration           Female External Urinary Catheter 10/27/23 8 days                Time spent on the discharge of patient: 38 minutes         Gabriella Madrid MD  Department of Hospital Medicine  [place] [facility]

## 2023-11-07 ENCOUNTER — TELEPHONE (OUTPATIENT)
Dept: PALLIATIVE MEDICINE | Facility: CLINIC | Age: 83
End: 2023-11-07

## 2023-11-07 NOTE — TELEPHONE ENCOUNTER
Spoke with the pt about his dad appointment being pushed back due to his father being in the hospital . Patient son verbalized understanding about the rescheduled appt.

## 2023-11-08 LAB — BACTERIA SPEC ANAEROBE CULT: NORMAL

## 2023-11-20 ENCOUNTER — TELEPHONE (OUTPATIENT)
Dept: FAMILY MEDICINE | Facility: CLINIC | Age: 83
End: 2023-11-20
Payer: MEDICARE

## 2023-11-20 NOTE — TELEPHONE ENCOUNTER
Spoke to pt's wife. Scheduled pt for Tuesday Dec 5th at 2:00. Wife confirmed appt.     ----- Message from Trevor Rosenberg sent at 11/20/2023  9:32 AM CST -----  Contact: pt  Type:  Sooner Apoointment Request    Caller is requesting a sooner appointment.  Caller declined first available appointment listed below.  Caller will not accept being placed on the waitlist and is requesting a message be sent to doctor.  Name of Caller:PT   When is the first available appointment? JAN 2024  Symptoms:HFU WITHIN 10 DAYS   Would the patient rather a call back or a response via [facility]? Call  Best Call Back Number:[phone]  Additional Information:

## 2023-11-27 ENCOUNTER — TELEPHONE (OUTPATIENT)
Dept: FAMILY MEDICINE | Facility: CLINIC | Age: 83
End: 2023-11-27
Payer: MEDICARE

## 2023-11-27 NOTE — TELEPHONE ENCOUNTER
----- Message from Trevor Rosenberg sent at 11/27/2023  2:09 PM CST -----  Type: Requesting to speak with nurse        Who Called: Dimple   Regarding: need verbal order for airlock mattress due to pressure ulcer   Would the patient rather a call back or a response via [facility]? Call back  Best Call Back Number: [phone]  Additional Information:

## 2023-11-28 ENCOUNTER — TELEPHONE (OUTPATIENT)
Dept: FAMILY MEDICINE | Facility: CLINIC | Age: 83
End: 2023-11-28
Payer: MEDICARE

## 2023-11-28 NOTE — TELEPHONE ENCOUNTER
----- Message from Trevor Rosenberg sent at 11/28/2023  9:02 AM CST -----  Contact: pt wife  Type: Requesting refill    Who Called: PT wife  Regarding: eliquis 5 mg  Would the patient rather a call back or a response via [facility]? Call back to verify  Best Call Back Number: [phone]          Pt started on eliquis 5 mg BID - pt needs refill as he only has enough for tomorrow  [facility]

## 2023-11-28 NOTE — TELEPHONE ENCOUNTER
I spoke to wife; started on eliquis 5 bid while at [facility] earlier this month

## 2023-12-04 LAB — FUNGUS SPEC CULT: NORMAL

## 2023-12-05 ENCOUNTER — OFFICE VISIT (OUTPATIENT)
Dept: FAMILY MEDICINE | Facility: CLINIC | Age: 83
End: 2023-12-05
Payer: MEDICARE

## 2023-12-05 VITALS
BODY MASS INDEX: 31.42 KG/M2 | TEMPERATURE: 98 F | HEIGHT: 72 IN | WEIGHT: 232 LBS | HEART RATE: 158 BPM | OXYGEN SATURATION: 94 %

## 2023-12-05 DIAGNOSIS — M19.90 ARTHRITIS: ICD-10-CM

## 2023-12-05 DIAGNOSIS — S81.801A OPEN WOUND OF RIGHT LOWER LEG, INITIAL ENCOUNTER: ICD-10-CM

## 2023-12-05 DIAGNOSIS — E66.01 CLASS 2 SEVERE OBESITY WITH SERIOUS COMORBIDITY AND BODY MASS INDEX (BMI) OF 38.0 TO 38.9 IN ADULT, UNSPECIFIED OBESITY TYPE: ICD-10-CM

## 2023-12-05 DIAGNOSIS — C44.722 SQUAMOUS CELL CARCINOMA OF RIGHT LOWER LEG: Primary | ICD-10-CM

## 2023-12-05 DIAGNOSIS — C44.92 SCCA (SQUAMOUS CELL CARCINOMA) OF SKIN: ICD-10-CM

## 2023-12-05 DIAGNOSIS — M79.89 RIGHT LEG SWELLING: ICD-10-CM

## 2023-12-05 DIAGNOSIS — I10 PRIMARY HYPERTENSION: ICD-10-CM

## 2023-12-05 DIAGNOSIS — I48.91 ATRIAL FIBRILLATION, UNSPECIFIED TYPE: ICD-10-CM

## 2023-12-05 DIAGNOSIS — D64.9 ANEMIA, UNSPECIFIED TYPE: ICD-10-CM

## 2023-12-05 DIAGNOSIS — L02.214 ABSCESS OF RIGHT GROIN: ICD-10-CM

## 2023-12-05 PROCEDURE — 99214 PR OFFICE/OUTPT VISIT, EST, LEVL IV, 30-39 MIN: ICD-10-PCS | Mod: S$GLB,,, | Performed by: FAMILY MEDICINE

## 2023-12-05 PROCEDURE — 99214 OFFICE O/P EST MOD 30 MIN: CPT | Mod: S$GLB,,, | Performed by: FAMILY MEDICINE

## 2023-12-05 RX ORDER — CEVIMELINE HYDROCHLORIDE 30 MG/1
30 CAPSULE ORAL 3 TIMES DAILY
Qty: 90 CAPSULE | Refills: 11 | Status: SHIPPED | OUTPATIENT
Start: 2023-12-05 | End: 2023-12-27

## 2023-12-05 RX ORDER — PANTOPRAZOLE SODIUM 40 MG/1
40 TABLET, DELAYED RELEASE ORAL 2 TIMES DAILY
Qty: 180 TABLET | Refills: 3 | Status: SHIPPED | OUTPATIENT
Start: 2023-12-05

## 2023-12-05 RX ORDER — DIGOXIN 125 MCG
0.12 TABLET ORAL DAILY
Qty: 90 TABLET | Refills: 3 | Status: SHIPPED | OUTPATIENT
Start: 2023-12-05

## 2023-12-05 NOTE — PROGRESS NOTES
Subjective:      Patient ID: Aquiles Montana is a 83 y.o. male.    Chief Complaint: Follow-up (Rehab followup)      Vitals:    12/05/23 1415   Pulse: (!) 158   Temp: 98.3 °F (36.8 °C)   SpO2: (!) 94%   Weight: 105.2 kg (232 lb)   Height: 6' (1.829 m)        HPI   Follow up admit, inW, dry mouth, right groin cancer, has vanesa moon, here with ewkalli fairchildd son  In pain  Needs refills  Edue labs  I'll see him at house    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    Dermatitis    Tinea pedis of both feet    Open wound of right lower leg    Right leg swelling    Left anterior fascicular block    Class 2 severe obesity with serious comorbidity and body mass index (BMI) of 38.0 to 38.9 in adult    Abscess of right groin    A-fib    ACP (advance care planning)    Anemia        ALLERGIES: Review of patient's allergies indicates:  No Known Allergies    MEDS:   Current Outpatient Medications:     ascorbic acid, vitamin C, (VITAMIN C) 250 MG tablet, Take 1 tablet by mouth every morning., Disp: , Rfl:     ketorolac 0.5% (ACULAR) 0.5 % Drop, Place 1 drop into the left eye 4 (four) times daily., Disp: , Rfl:     latanoprost 0.005 % ophthalmic solution, Place 1 drop into both eyes every evening., Disp: , Rfl:     melatonin (MELATIN) 3 mg tablet, Take 2 tablets by mouth every evening., Disp: , Rfl:     metoprolol tartrate (LOPRESSOR) 25 MG tablet, Take 1 tablet (25 mg total) by mouth 2 (two) times daily., Disp: 60 tablet, Rfl: 3    multivitamin with folic acid 400 mcg Tab, Take 1 tablet by mouth every morning., Disp: , Rfl:     OCUFLOX 0.3 % ophthalmic solution, Place 1 drop into the left eye 4 (four) times daily., Disp: , Rfl:     prednisoLONE acetate (PRED FORTE) 1 % DrpS, Place 1 drop into  "the left eye 4 (four) times daily., Disp: , Rfl:     SANTYL ointment, Nursing to cleanse LLE wound with Vashe wound cleanser and apply Santyl ointment to wound. Cover with bordered gauze dressing. Change daily., Disp: , Rfl: 0    traZODone (DESYREL) 50 MG tablet, Take 1 tablet (50 mg total) by mouth every evening., Disp: 30 tablet, Rfl: 2    vitamin D (VITAMIN D3) 1000 units Tab, Take 1 tablet (1,000 Units total) by mouth once daily., Disp: , Rfl:     zinc sulfate (ZINCATE) 50 mg zinc (220 mg) capsule, Take 1 capsule by mouth every morning., Disp: , Rfl:     apixaban (ELIQUIS) 5 mg Tab, Take 1 tablet (5 mg total) by mouth once daily., Disp: 30 tablet, Rfl: 2    cevimeline (EVOXAC) 30 mg capsule, Take 1 capsule (30 mg total) by mouth 3 (three) times daily. For dry mouth, Disp: 90 capsule, Rfl: 11    digoxin (LANOXIN) 125 mcg tablet, Take 1 tablet (0.125 mg total) by mouth once daily., Disp: 90 tablet, Rfl: 3    pantoprazole (PROTONIX) 40 MG tablet, Take 1 tablet (40 mg total) by mouth 2 (two) times daily., Disp: 180 tablet, Rfl: 3      History:  Current Providers as of 12/5/2023  PCP: Marty Hernandez MD  Care Team Provider: Rafael Schwab MD  Care Team Provider: Sola Hampton LPN  Care Team Provider: Delonte Marino MD  Care Team Provider: Haleigh Veliz PA-C  Encounter Provider: Marty Hernandez MD, starting on Tue Dec 5, 2023 12:00 AM  Referring Provider: not found, starting on Tue Dec 5, 2023 12:00 AM  Consulting Physician: Marty Hernandez MD, starting on Tue Dec 5, 2023  2:08 PM (Active)   Past Medical History:   Diagnosis Date    Hypertension     Urinary tract infection      Past Surgical History:   Procedure Laterality Date    hand sugery      INCISION AND DRAINAGE, LOWER EXTREMITY Right 10/31/2023    Procedure: INCISION AND DRAINAGE, LOWER EXTREMITY;  Surgeon: Emeka Barber MD;  Location: [facility];  Service: General;  Laterality: Right;    PROSTATE SURGERY      "Rotor " "rooter- opened up"     Social History     Tobacco Use    Smoking status: Never     Passive exposure: Never    Smokeless tobacco: Never   Substance Use Topics    Alcohol use: No     Alcohol/week: 0.0 standard drinks of alcohol    Drug use: No         Review of Systems   Constitutional:  Positive for fatigue. Negative for appetite change, fever and unexpected weight change.   HENT:  Negative for congestion, ear pain, sinus pressure and sore throat.    Eyes:  Negative for pain and visual disturbance.   Respiratory:  Negative for shortness of breath.    Cardiovascular:  Negative for chest pain.   Gastrointestinal:  Negative for abdominal pain, constipation and diarrhea.   Endocrine: Negative for polyuria.   Genitourinary:  Negative for difficulty urinating and frequency.   Musculoskeletal:  Positive for arthralgias and gait problem. Negative for back pain and myalgias.   Skin:  Positive for wound. Negative for color change.   Allergic/Immunologic: Negative.    Neurological:  Positive for weakness. Negative for syncope and headaches.   Hematological:  Does not bruise/bleed easily.   Psychiatric/Behavioral:  Negative for dysphoric mood and suicidal ideas. The patient is not nervous/anxious.    All other systems reviewed and are negative.    Objective:     Physical Exam  Vitals and nursing note reviewed.   Constitutional:       General: He is not in acute distress.     Appearance: He is well-developed. He is ill-appearing. He is not diaphoretic.   HENT:      Head: Normocephalic and atraumatic.      Right Ear: External ear normal.      Left Ear: External ear normal.      Mouth/Throat:      Pharynx: No oropharyngeal exudate.   Eyes:      General: No scleral icterus.        Right eye: No discharge.         Left eye: No discharge.      Conjunctiva/sclera: Conjunctivae normal.      Pupils: Pupils are equal, round, and reactive to light.   Neck:      Thyroid: No thyromegaly.      Vascular: No JVD.      Trachea: No tracheal " deviation.   Cardiovascular:      Rate and Rhythm: Normal rate and regular rhythm.      Heart sounds: No murmur heard.     No friction rub. No gallop.   Pulmonary:      Effort: Pulmonary effort is normal. No respiratory distress.      Breath sounds: Normal breath sounds. No stridor. No wheezing or rales.   Chest:      Chest wall: No tenderness.   Abdominal:      General: There is no distension.      Palpations: Abdomen is soft. There is no mass.      Tenderness: There is no abdominal tenderness. There is no guarding or rebound.   Musculoskeletal:         General: No tenderness.      Cervical back: Normal range of motion and neck supple.   Lymphadenopathy:      Cervical: No cervical adenopathy.   Skin:     General: Skin is warm and dry.      Coloration: Skin is not pale.      Findings: Lesion present. No erythema or rash.   Neurological:      Mental Status: He is alert and oriented to person, place, and time. Mental status is at baseline.      Cranial Nerves: No cranial nerve deficit.      Motor: Weakness present. No abnormal muscle tone.      Coordination: Coordination normal.      Gait: Gait abnormal.      Deep Tendon Reflexes: Reflexes are normal and symmetric. Reflexes normal.   Psychiatric:         Mood and Affect: Mood normal.         Behavior: Behavior normal.         Thought Content: Thought content normal.         Judgment: Judgment normal.             Assessment:     1. 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 Atrial fibrillation, unspecified type    3. Primary hypertension    4. Abscess of right groin    5. SCCA (squamous cell carcinoma) of skin    6. Anemia, unspecified type    7. Class 2 severe obesity with serious comorbidity and body mass index (BMI) of 38.0 to 38.9 in adult, unspecified obesity type    8. Arthritis    9. Open wound of right lower leg, initial encounter    10. Right leg swelling      Plan:        Medication List            Accurate as of December 5, 2023 11:59 PM. If you have  any questions, ask your nurse or doctor.                START taking these medications      cevimeline 30 mg capsule  Commonly known as: EVOXAC  Take 1 capsule (30 mg total) by mouth 3 (three) times daily. For dry mouth  Started by: Marty Hernandez MD            CHANGE how you take these medications      apixaban 5 mg Tab  Commonly known as: ELIQUIS  Take 1 tablet (5 mg total) by mouth once daily.  What changed:   when to take this  additional instructions  Changed by: Marty Hernandez MD            CONTINUE taking these medications      ascorbic acid (vitamin C) 250 MG tablet  Commonly known as: VITAMIN C     digoxin 125 mcg tablet  Commonly known as: LANOXIN  Take 1 tablet (0.125 mg total) by mouth once daily.     ketorolac 0.5% 0.5 % Drop  Commonly known as: ACULAR     latanoprost 0.005 % ophthalmic solution     melatonin 3 mg tablet  Commonly known as: MELATIN     metoprolol tartrate 25 MG tablet  Commonly known as: LOPRESSOR  Take 1 tablet (25 mg total) by mouth 2 (two) times daily.     multivitamin with folic acid 400 mcg Tab     OCUFLOX 0.3 % ophthalmic solution  Generic drug: ofloxacin     pantoprazole 40 MG tablet  Commonly known as: PROTONIX  Take 1 tablet (40 mg total) by mouth 2 (two) times daily.     prednisoLONE acetate 1 % Drps  Commonly known as: PRED FORTE     SantyL ointment  Generic drug: collagenase  Nursing to cleanse LLE wound with Vashe wound cleanser and apply Santyl ointment to wound. Cover with bordered gauze dressing. Change daily.     traZODone 50 MG tablet  Commonly known as: DESYREL  Take 1 tablet (50 mg total) by mouth every evening.     vitamin D 1000 units Tab  Commonly known as: VITAMIN D3  Take 1 tablet (1,000 Units total) by mouth once daily.     zinc sulfate 50 mg zinc (220 mg) capsule  Commonly known as: ZINCATE               Where to Get Your Medications        These medications were sent to [id number] - [facility] [address] [facility]  [address] [facility]  [place] [address]      Phone: [phone]   cevimeline 30 mg capsule  digoxin 125 mcg tablet  pantoprazole 40 MG tablet       Information about where to get these medications is not yet available    Ask your nurse or doctor about these medications  apixaban 5 mg Tab       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    Atrial fibrillation, unspecified type    Primary hypertension    Abscess of right groin    SCCA (squamous cell carcinoma) of skin    Anemia, unspecified type    Class 2 severe obesity with serious comorbidity and body mass index (BMI) of 38.0 to 38.9 in adult, unspecified obesity type    Arthritis    Open wound of right lower leg, initial encounter    Right leg swelling    Other orders  -     digoxin (LANOXIN) 125 mcg tablet; Take 1 tablet (0.125 mg total) by mouth once daily.  Dispense: 90 tablet; Refill: 3  -     pantoprazole (PROTONIX) 40 MG tablet; Take 1 tablet (40 mg total) by mouth 2 (two) times daily.  Dispense: 180 tablet; Refill: 3  -     apixaban (ELIQUIS) 5 mg Tab; Take 1 tablet (5 mg total) by mouth once daily.  Dispense: 30 tablet; Refill: 2  -     cevimeline (EVOXAC) 30 mg capsule; Take 1 capsule (30 mg total) by mouth 3 (three) times daily. For dry mouth  Dispense: 90 capsule; Refill: 11

## 2023-12-10 PROBLEM — A41.9 SEPSIS: Status: RESOLVED | Noted: 2023-10-24 | Resolved: 2023-12-10

## 2023-12-12 PROCEDURE — G0179 MD RECERTIFICATION HHA PT: HCPCS | Mod: ,,, | Performed by: FAMILY MEDICINE

## 2023-12-13 NOTE — PROGRESS NOTES
"PATIENT: Aquiles Montana  MRN: [id number]  DATE: 12/14/2023    Diagnosis:   1. 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 Secondary malignant neoplasm of inguinal lymph nodes    3. Physical deconditioning    4. Advance care planning        Chief Complaint: Squamous Cell Carcinoma      Oncologic History:      Oncologic History Squamous cell carcinoma of right lower extremity      Oncologic Treatment To be determined    Pathology 10/31/23:  skin, right groin, biopsy:   -WELL-DIFFERENTIATED KERATINIZING INVASIVE SQUAMOUS CELL CARCINOMA, EXTENDING TO THE PERIPHERAL AND DEEP BIOPSY EDGES     COMMENT:  Clinical images were reviewed in epic.  The histological findings (see microscopic description) are those of a well-differentiated keratinizing invasive squamous cell carcinoma.  Although there is epidermis present, the interpretation of this   biopsy is somewhat limited due to the fragmentation, ulcerated areas, and somewhat devitalized areas.  Although the tumor appears to be abutting the epidermis, it is somewhat unclear if the tumor is arising from the epidermis.  Therefore, the tumor could    represent a primary squamous cell carcinoma of this site or potentially a metastatic squamous cell carcinoma.  Clinical correlation is recommended.     This lesion is skin cancer. You will be contacted regarding treatment.           Subjective:    History of Present Illness: Mr. Montana is a 83 y.o. male who presents for evaluation and management of squamous cell carcinoma of right lower leg. I had seen him during a hospitalization in October/November 2023.    Information per discharge summary dated 11/3/23:  " male with squamous cell cancer, BPH, hypertension, obesity who presented to the ER with altered mental status found to have FERNANDO and hypercalcemia which improved with IV fluids and supportive care.  Also noted to have leukocytosis of more than 20,000. Imaging showed no acute signs of infection except for CT pelvis " "showing necrotic lymph node in the right groin.  Patient underwent I and D of the necrotic lymph node on 10/31.  Cultures remained negative at the time of discharge.  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  Patient's mentation improved but continued to have generalized weakness.  Infectious Disease was consulted and recommended observation off antibiotics.  Patient was seen by the palliative care team.  He is a DNR.  Per discussion with palliative care team, family wished for short-term rehab and outpatient oncology and palliative care follow-up.  Pending progress, patient and family agreeable to hospice if patient was not deemed a good candidate for therapy.  At this time, patient discharged to skilled nursing facility for short-term rehab."      Interval history:  - he presents for an appointment regarding his squamous cell carcinoma  - he has been home from skilled nursing facility since 11/21/23.  - he still has an open wound and follows with Dr. Barber.    Past medical, surgical, family, and social histories have been reviewed and updated below.    Past Medical History:   Past Medical History:   Diagnosis Date    Hypertension     Urinary tract infection        Past Surgical History:   Past Surgical History:   Procedure Laterality Date    hand sugery      INCISION AND DRAINAGE, LOWER EXTREMITY Right 10/31/2023    Procedure: INCISION AND DRAINAGE, LOWER EXTREMITY;  Surgeon: Emeka Barber MD;  Location: [facility];  Service: General;  Laterality: Right;    PROSTATE SURGERY      "Rotor rooter- opened up"       Family History:   Family History   Problem Relation Age of Onset    Hypertension Father     Hypertension Mother     No Known Problems Sister     Arthritis Son     Prostate " cancer Neg Hx     Kidney disease Neg Hx        Social History:  reports that he has never smoked. He has never been exposed to tobacco smoke. He has never used smokeless tobacco. He reports that he does not drink alcohol and does not use drugs.    Allergies:  Review of patient's allergies indicates:  No Known Allergies    Medications:  Current Outpatient Medications   Medication Sig Dispense Refill    apixaban (ELIQUIS) 5 mg Tab Take 1 tablet (5 mg total) by mouth once daily. 30 tablet 2    ascorbic acid, vitamin C, (VITAMIN C) 250 MG tablet Take 1 tablet by mouth every morning.      cevimeline (EVOXAC) 30 mg capsule Take 1 capsule (30 mg total) by mouth 3 (three) times daily. For dry mouth 90 capsule 11    digoxin (LANOXIN) 125 mcg tablet Take 1 tablet (0.125 mg total) by mouth once daily. 90 tablet 3    ketorolac 0.5% (ACULAR) 0.5 % Drop Place 1 drop into the left eye 4 (four) times daily.      latanoprost 0.005 % ophthalmic solution Place 1 drop into both eyes every evening.      melatonin (MELATIN) 3 mg tablet Take 2 tablets by mouth every evening.      metoprolol tartrate (LOPRESSOR) 25 MG tablet Take 1 tablet (25 mg total) by mouth 2 (two) times daily. 60 tablet 3    multivitamin with folic acid 400 mcg Tab Take 1 tablet by mouth every morning.      OCUFLOX 0.3 % ophthalmic solution Place 1 drop into the left eye 4 (four) times daily.      pantoprazole (PROTONIX) 40 MG tablet Take 1 tablet (40 mg total) by mouth 2 (two) times daily. 180 tablet 3    prednisoLONE acetate (PRED FORTE) 1 % DrpS Place 1 drop into the left eye 4 (four) times daily.      SANTYL ointment Nursing to cleanse LLE wound with Vashe wound cleanser and apply Santyl ointment to wound. Cover with bordered gauze dressing. Change daily.  0    traZODone (DESYREL) 50 MG tablet Take 1 tablet (50 mg total) by mouth every evening. 30 tablet 2    vitamin D (VITAMIN D3) 1000 units Tab Take 1 tablet (1,000 Units total) by mouth once daily.      zinc  sulfate (ZINCATE) 50 mg zinc (220 mg) capsule Take 1 capsule by mouth every morning.       No current facility-administered medications for this visit.       Review of Systems   Constitutional:  Positive for fever. Negative for fatigue.   HENT:  Negative for sore throat.    Eyes:  Negative for visual disturbance.   Respiratory:  Negative for shortness of breath.    Cardiovascular:  Negative for chest pain.   Gastrointestinal:  Negative for abdominal pain.   Genitourinary:  Negative for dysuria.   Musculoskeletal:  Negative for back pain.   Skin:  Positive for wound. Negative for rash.   Neurological:  Positive for weakness. Negative for headaches.   Hematological:  Negative for adenopathy.   Psychiatric/Behavioral:  Positive for confusion. The patient is not nervous/anxious.        ECOG Performance Status:   ECOG SCORE    2 - Capable of all selfcare but unable to carry out any work activities, active > 50% of hours         Objective:      Vitals:   Vitals:    12/14/23 1423   Resp: 18   SpO2: 98%     BMI: There is no height or weight on file to calculate BMI.    Physical Exam  Vitals and nursing note reviewed.   Constitutional:       Appearance: He is well-developed.      Comments: Fatigued, in wheelchair   HENT:      Head: Normocephalic and atraumatic.   Eyes:      Pupils: Pupils are equal, round, and reactive to light.   Cardiovascular:      Rate and Rhythm: Normal rate and regular rhythm.   Pulmonary:      Effort: Pulmonary effort is normal.      Breath sounds: Normal breath sounds.   Abdominal:      General: Bowel sounds are normal.      Palpations: Abdomen is soft.   Musculoskeletal:         General: Normal range of motion.      Cervical back: Normal range of motion and neck supple.   Skin:     General: Skin is warm and dry.      Comments: Wound in right inguinal area   Neurological:      Mental Status: He is alert and oriented to person, place, and time.   Psychiatric:         Behavior: Behavior normal.          "Thought Content: Thought content normal.         Judgment: Judgment normal.         Laboratory Data:  Labs have been reviewed.    Lab Results   Component Value Date    WBC 27.69 (H) 11/03/2023    HGB 9.3 (L) 11/03/2023    HCT 29.3 (L) 11/03/2023    MCV 92 11/03/2023     11/03/2023           Imaging:    CT chest (10/29/23):    Bilateral lower lobe atelectasis/scarring with some traction bronchiectasis.  No consolidation.     Multi-vessel coronary artery calcification.     Cholelithiasis.        CT pelvis (10/25/23): I have personally reviewed the images  1. Large lobular masslike focus within the right inguinal regions suggesting lymph node conglomerate.  Correlation with history of malignancy advised.  Additional lymph node enlargement noted along the right iliac chain.  Please note, this likely correlates with finding on prior CT 09/13/2023 although images from that exam are not available for review.  2. No acute displaced fracture or dislocation of the pelvis.    Assessment:       1. 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 Secondary malignant neoplasm of inguinal lymph nodes    3. Physical deconditioning    4. 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 Pathology revealed "WELL-DIFFERENTIATED KERATINIZING INVASIVE SQUAMOUS CELL CARCINOMA, EXTENDING TO THE PERIPHERAL AND DEEP BIOPSY EDGES"  - he still has an open wound from his procedure on 10/31/23. He is getting wound care with surgery / Dr. Barber and home health.  - I stated that treatment options are somewhat limited in the setting of an open wound.  - I recommend another few weeks of aggressive wound care, followed by repeat imaging to get a full sense of extent of disease. If still localized, we can consider radiation vs immunotherapy. If " more extensive, we will consider immunotherapy.  - check labs today.  - return to clinic in one month with repeat imaging.    2. Physical deconditioning  - continue home health.    3. Advance Care Planning     Date: 12/14/2023    Power of   I initiated the process of voluntary advance care planning today and explained the importance of this process to the patient.  I introduced the concept of advance directives to the patient, as well. Then the patient received detailed information about the importance of designating a Health Care Power of  (HCPOA). He was also instructed to communicate with this person about their wishes for future healthcare, should he become sick and lose decision-making capacity. The patient has not previously appointed a HCPOA. After our discussion, the patient has decided to complete a HCPOA and has appointed his  wife Jessica Saeiddeliciataye ([phone]), and sons Len Nan ([phone]) and Lata Nan ([phone]) . I spent a total time of 16 minutes discussing this issue with the patient.          - return to clinic in one month with repeat imaging.    Delonte Robin M.D.  Hematology/Oncology  [facility] - [address], [address]  [place] [address]  Phone: [phone]  Fax: [phone]

## 2023-12-14 ENCOUNTER — OFFICE VISIT (OUTPATIENT)
Dept: HEMATOLOGY/ONCOLOGY | Facility: CLINIC | Age: 83
End: 2023-12-14
Payer: MEDICARE

## 2023-12-14 ENCOUNTER — LAB VISIT (OUTPATIENT)
Dept: LAB | Facility: HOSPITAL | Age: 83
End: 2023-12-14
Attending: FAMILY MEDICINE
Payer: MEDICARE

## 2023-12-14 VITALS — OXYGEN SATURATION: 98 % | RESPIRATION RATE: 18 BRPM

## 2023-12-14 DIAGNOSIS — R41.82 ALTERED MENTAL STATUS, UNSPECIFIED ALTERED MENTAL STATUS TYPE: ICD-10-CM

## 2023-12-14 DIAGNOSIS — R30.0 DYSURIA: ICD-10-CM

## 2023-12-14 DIAGNOSIS — Z71.89 ADVANCE CARE PLANNING: ICD-10-CM

## 2023-12-14 DIAGNOSIS — C77.4 SECONDARY MALIGNANT NEOPLASM OF INGUINAL LYMPH NODES: ICD-10-CM

## 2023-12-14 DIAGNOSIS — R53.81 PHYSICAL DECONDITIONING: ICD-10-CM

## 2023-12-14 DIAGNOSIS — C44.722 SQUAMOUS CELL CARCINOMA OF RIGHT LOWER LEG: ICD-10-CM

## 2023-12-14 DIAGNOSIS — C44.722 SQUAMOUS CELL CARCINOMA OF RIGHT LOWER LEG: Primary | ICD-10-CM

## 2023-12-14 LAB
ALBUMIN SERPL BCP-MCNC: 2.3 G/DL (ref 3.5–5.2)
ALP SERPL-CCNC: 150 U/L (ref 55–135)
ALT SERPL W/O P-5'-P-CCNC: 8 U/L (ref 10–44)
ANION GAP SERPL CALC-SCNC: 16 MMOL/L (ref 8–16)
ANISOCYTOSIS BLD QL SMEAR: SLIGHT
AST SERPL-CCNC: 15 U/L (ref 10–40)
BASOPHILS # BLD AUTO: 0.05 K/UL (ref 0–0.2)
BASOPHILS NFR BLD: 0.1 % (ref 0–1.9)
BILIRUB SERPL-MCNC: 0.4 MG/DL (ref 0.1–1)
BUN SERPL-MCNC: 15 MG/DL (ref 8–23)
BURR CELLS BLD QL SMEAR: ABNORMAL
CALCIUM SERPL-MCNC: 10.6 MG/DL (ref 8.7–10.5)
CHLORIDE SERPL-SCNC: 101 MMOL/L (ref 95–110)
CO2 SERPL-SCNC: 21 MMOL/L (ref 23–29)
CREAT SERPL-MCNC: 1 MG/DL (ref 0.5–1.4)
DIFFERENTIAL METHOD: ABNORMAL
EOSINOPHIL # BLD AUTO: 0.1 K/UL (ref 0–0.5)
EOSINOPHIL NFR BLD: 0.2 % (ref 0–8)
ERYTHROCYTE [DISTWIDTH] IN BLOOD BY AUTOMATED COUNT: 17.3 % (ref 11.5–14.5)
EST. GFR  (NO RACE VARIABLE): >60 ML/MIN/1.73 M^2
GLUCOSE SERPL-MCNC: 157 MG/DL (ref 70–110)
HCT VFR BLD AUTO: 41.3 % (ref 40–54)
HGB BLD-MCNC: 12.6 G/DL (ref 14–18)
IMM GRANULOCYTES # BLD AUTO: 0.53 K/UL (ref 0–0.04)
IMM GRANULOCYTES NFR BLD AUTO: 1.3 % (ref 0–0.5)
LYMPHOCYTES # BLD AUTO: 1.5 K/UL (ref 1–4.8)
LYMPHOCYTES NFR BLD: 3.7 % (ref 18–48)
MCH RBC QN AUTO: 27.8 PG (ref 27–31)
MCHC RBC AUTO-ENTMCNC: 30.5 G/DL (ref 32–36)
MCV RBC AUTO: 91 FL (ref 82–98)
MONOCYTES # BLD AUTO: 1.5 K/UL (ref 0.3–1)
MONOCYTES NFR BLD: 3.6 % (ref 4–15)
NEUTROPHILS # BLD AUTO: 38.1 K/UL (ref 1.8–7.7)
NEUTROPHILS NFR BLD: 91.1 % (ref 38–73)
NRBC BLD-RTO: 0 /100 WBC
OVALOCYTES BLD QL SMEAR: ABNORMAL
PLATELET # BLD AUTO: 326 K/UL (ref 150–450)
PLATELET BLD QL SMEAR: ABNORMAL
PMV BLD AUTO: 10.1 FL (ref 9.2–12.9)
POIKILOCYTOSIS BLD QL SMEAR: SLIGHT
POLYCHROMASIA BLD QL SMEAR: ABNORMAL
POTASSIUM SERPL-SCNC: 3.9 MMOL/L (ref 3.5–5.1)
PROT SERPL-MCNC: 6.5 G/DL (ref 6–8.4)
RBC # BLD AUTO: 4.53 M/UL (ref 4.6–6.2)
SODIUM SERPL-SCNC: 138 MMOL/L (ref 136–145)
TSH SERPL DL<=0.005 MIU/L-ACNC: 0.57 UIU/ML (ref 0.4–4)
WBC # BLD AUTO: 41.78 K/UL (ref 3.9–12.7)

## 2023-12-14 PROCEDURE — 36415 COLL VENOUS BLD VENIPUNCTURE: CPT | Performed by: INTERNAL MEDICINE

## 2023-12-14 PROCEDURE — 80053 COMPREHEN METABOLIC PANEL: CPT | Performed by: INTERNAL MEDICINE

## 2023-12-14 PROCEDURE — 99497 PR ADVNCD CARE PLAN 30 MIN: ICD-10-PCS | Mod: S$PBB,,, | Performed by: INTERNAL MEDICINE

## 2023-12-14 PROCEDURE — 99213 OFFICE O/P EST LOW 20 MIN: CPT | Mod: PBBFAC,PO | Performed by: INTERNAL MEDICINE

## 2023-12-14 PROCEDURE — 99497 ADVNCD CARE PLAN 30 MIN: CPT | Mod: PBBFAC,PO | Performed by: INTERNAL MEDICINE

## 2023-12-14 PROCEDURE — 99497 ADVNCD CARE PLAN 30 MIN: CPT | Mod: S$PBB,,, | Performed by: INTERNAL MEDICINE

## 2023-12-14 PROCEDURE — 99215 OFFICE O/P EST HI 40 MIN: CPT | Mod: S$PBB,,, | Performed by: INTERNAL MEDICINE

## 2023-12-14 PROCEDURE — 99999 PR PBB SHADOW E&M-EST. PATIENT-LVL III: ICD-10-PCS | Mod: PBBFAC,,, | Performed by: INTERNAL MEDICINE

## 2023-12-14 PROCEDURE — 85025 COMPLETE CBC W/AUTO DIFF WBC: CPT | Performed by: INTERNAL MEDICINE

## 2023-12-14 PROCEDURE — 99215 PR OFFICE/OUTPT VISIT, EST, LEVL V, 40-54 MIN: ICD-10-PCS | Mod: S$PBB,,, | Performed by: INTERNAL MEDICINE

## 2023-12-14 PROCEDURE — 99999 PR PBB SHADOW E&M-EST. PATIENT-LVL III: CPT | Mod: PBBFAC,,, | Performed by: INTERNAL MEDICINE

## 2023-12-14 PROCEDURE — 84443 ASSAY THYROID STIM HORMONE: CPT | Performed by: INTERNAL MEDICINE

## 2023-12-14 RX ORDER — AMOXICILLIN AND CLAVULANATE POTASSIUM 875; 125 MG/1; MG/1
1 TABLET, FILM COATED ORAL 2 TIMES DAILY
Qty: 14 TABLET | Refills: 0 | Status: SHIPPED | OUTPATIENT
Start: 2023-12-14 | End: 2023-12-21

## 2023-12-15 ENCOUNTER — TELEPHONE (OUTPATIENT)
Dept: HEMATOLOGY/ONCOLOGY | Facility: CLINIC | Age: 83
End: 2023-12-15
Payer: MEDICARE

## 2023-12-15 DIAGNOSIS — E83.52 HYPERCALCEMIA OF MALIGNANCY: Primary | ICD-10-CM

## 2023-12-15 RX ORDER — ZOLEDRONIC ACID 4 MG/100ML
4 SOLUTION INTRAVENOUS
Status: CANCELLED | OUTPATIENT
Start: 2023-12-19

## 2023-12-15 RX ORDER — SODIUM CHLORIDE 0.9 % (FLUSH) 0.9 %
10 SYRINGE (ML) INJECTION
Status: CANCELLED | OUTPATIENT
Start: 2023-12-19

## 2023-12-15 RX ORDER — HEPARIN 100 UNIT/ML
500 SYRINGE INTRAVENOUS
Status: CANCELLED | OUTPATIENT
Start: 2023-12-19

## 2023-12-15 NOTE — TELEPHONE ENCOUNTER
Informed pt's wife that Dr. Robin stated he reviewed pt's labs and his calcium level is elevated, which can contribute to lots of his symptoms (fatigue, confusion, etc). Dr. Robin advises if pt gets worse, he should go to emergency room but we'll try to set up IV fluids and zoledronic acid (which can bring down calcium level) in next week or so. Pt's wife verbalized understanding.

## 2023-12-18 ENCOUNTER — TELEPHONE (OUTPATIENT)
Dept: HEMATOLOGY/ONCOLOGY | Facility: CLINIC | Age: 83
End: 2023-12-18
Payer: MEDICARE

## 2023-12-18 ENCOUNTER — TELEPHONE (OUTPATIENT)
Dept: INFUSION THERAPY | Facility: HOSPITAL | Age: 83
End: 2023-12-18
Payer: MEDICARE

## 2023-12-18 NOTE — TELEPHONE ENCOUNTER
Pt's wife asked why the eliquis was discontinued on pt's AVS summary. Informed pt that Dr. Hernandez ordered this medication but based on his last office noted he stated to take eliquis 5mg once a day. Pt's wife verbalized understanding and stated she thinks her son may have discontinued by mistake while updating pt's med list through portal.

## 2023-12-18 NOTE — TELEPHONE ENCOUNTER
Confirmed upcoming infusion appointment with the patient's wife. Pre infusion instructions reviewed with the patient's wife  12/21 at 8a

## 2023-12-19 ENCOUNTER — TELEPHONE (OUTPATIENT)
Dept: HEMATOLOGY/ONCOLOGY | Facility: CLINIC | Age: 83
End: 2023-12-19
Payer: MEDICARE

## 2023-12-19 LAB
ACID FAST MOD KINY STN SPEC: NORMAL
MYCOBACTERIUM SPEC QL CULT: NORMAL

## 2023-12-19 NOTE — TELEPHONE ENCOUNTER
----- Message -----  From: Lis Ramirez  Sent: 12/19/2023  10:41 AM CST  To: [facility] Staff    Type:  Needs Medical Advice    Who Called: Pt's son   Symptoms (please be specific): Elevated calcium levels   How long has patient had these symptoms:  Last week   Would the patient rather a call back or a response via [facility]? Call back   Best Call Back Number:  [phone]  Additional Information: Please be advised, caller stated that he thinks that pt wouldn't be able to sit down for long for IV appt coming up and would like to know what to do to keep calcium levels down

## 2023-12-24 LAB
DNA RANGE(S) EXAMINED NAR: NORMAL
GENE DIS ANL INTERP-IMP: POSITIVE
GENE DIS ASSESSED: NORMAL
MSI CA SPEC-IMP: NOT DETECTED
REASON FOR STUDY: NORMAL
TEMPUS LCA: NORMAL
TEMPUS PORTAL: NORMAL
TEMPUS THERAPY1: NORMAL
TEMPUS THERAPYCOUNT: 1
TEMPUS TRIAL1: NORMAL
TEMPUS TRIAL2: NORMAL
TEMPUS TRIAL3: NORMAL
TEMPUS TRIALCOUNT: 3

## 2023-12-27 ENCOUNTER — OFFICE VISIT (OUTPATIENT)
Dept: FAMILY MEDICINE | Facility: CLINIC | Age: 83
End: 2023-12-27
Payer: MEDICARE

## 2023-12-27 ENCOUNTER — DOCUMENT SCAN (OUTPATIENT)
Dept: HOME HEALTH SERVICES | Facility: HOSPITAL | Age: 83
End: 2023-12-27
Payer: MEDICARE

## 2023-12-27 DIAGNOSIS — I48.91 ATRIAL FIBRILLATION, UNSPECIFIED TYPE: ICD-10-CM

## 2023-12-27 DIAGNOSIS — I50.20 HEART FAILURE WITH REDUCED EJECTION FRACTION: ICD-10-CM

## 2023-12-27 DIAGNOSIS — C44.92 SCCA (SQUAMOUS CELL CARCINOMA) OF SKIN: ICD-10-CM

## 2023-12-27 DIAGNOSIS — C77.4 SECONDARY MALIGNANT NEOPLASM OF INGUINAL LYMPH NODES: Primary | ICD-10-CM

## 2023-12-27 DIAGNOSIS — I42.9 CARDIOMYOPATHY, UNSPECIFIED TYPE: ICD-10-CM

## 2023-12-27 DIAGNOSIS — C44.722 SQUAMOUS CELL CARCINOMA OF RIGHT LOWER LEG: ICD-10-CM

## 2023-12-27 PROCEDURE — 99349 HOME/RES VST EST MOD MDM 40: CPT | Mod: S$GLB,,, | Performed by: FAMILY MEDICINE

## 2023-12-28 ENCOUNTER — DOCUMENT SCAN (OUTPATIENT)
Dept: HOME HEALTH SERVICES | Facility: HOSPITAL | Age: 83
End: 2023-12-28
Payer: MEDICARE

## 2023-12-28 ENCOUNTER — TELEPHONE (OUTPATIENT)
Dept: FAMILY MEDICINE | Facility: CLINIC | Age: 83
End: 2023-12-28
Payer: MEDICARE

## 2023-12-28 NOTE — PROGRESS NOTES
Subjective:      Patient ID: Aquiles Montana is a 83 y.o. male.    Chief Complaint: No chief complaint on file.      There were no vitals filed for this visit.     HPI House call with wife and son present; see photo of large sq cell cancer wound; I told family to stop anticoagulation as it has been bleeding  Try to move up the PET scan and update Dr Robin, oncology, on appearance of mass.      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    Dermatitis    Tinea pedis of both feet    Open wound of right lower leg    Right leg swelling    Left anterior fascicular block    Abscess of right groin    A-fib    ACP (advance care planning)    Anemia    Hypercalcemia of malignancy    Secondary malignant neoplasm of inguinal lymph nodes    Heart failure with reduced ejection fraction    Cardiomyopathy, unspecified type        ALLERGIES: Review of patient's allergies indicates:  No Known Allergies    MEDS:   Current Outpatient Medications:     ascorbic acid, vitamin C, (VITAMIN C) 250 MG tablet, Take 1 tablet by mouth every morning., Disp: , Rfl:     ciprofloxacin (CIPRO) 500 mg/5 mL suspension, , Disp: , Rfl:     ciprofloxacin HCl (CIPRO) 500 MG tablet, Take 500 mg by mouth 2 (two) times daily., Disp: , Rfl:     digoxin (LANOXIN) 125 mcg tablet, Take 1 tablet (0.125 mg total) by mouth once daily., Disp: 90 tablet, Rfl: 3    gentamicin (GARAMYCIN) 0.1 % ointment, Apply topically., Disp: , Rfl:     latanoprost 0.005 % ophthalmic solution, Place 1 drop into both eyes every evening., Disp: , Rfl:     melatonin (MELATIN) 3 mg tablet, Take 2 tablets by mouth every evening., Disp: , Rfl:     metoprolol tartrate (LOPRESSOR) 25 MG tablet, Take 1 tablet (25 mg total) by mouth 2 (two) times daily.,  "Disp: 60 tablet, Rfl: 3    multivitamin with folic acid 400 mcg Tab, Take 1 tablet by mouth every morning., Disp: , Rfl:     pantoprazole (PROTONIX) 40 MG tablet, Take 1 tablet (40 mg total) by mouth 2 (two) times daily., Disp: 180 tablet, Rfl: 3    traZODone (DESYREL) 50 MG tablet, Take 1 tablet (50 mg total) by mouth every evening., Disp: 30 tablet, Rfl: 2    vitamin D (VITAMIN D3) 1000 units Tab, Take 1 tablet (1,000 Units total) by mouth once daily., Disp: , Rfl:     zinc sulfate (ZINCATE) 50 mg zinc (220 mg) capsule, Take 1 capsule by mouth every morning., Disp: , Rfl:       History:  Current Providers as of 12/27/2023  PCP: Marty Hernandez MD  Care Team Provider: Rafael Schwab MD  Care Team Provider: Sola Hampton LPN  Care Team Provider: Delonte Marino MD  Care Team Provider: Haleigh Veliz PA-C  Encounter Provider: Marty Hernandez MD, starting on Wed Dec 27, 2023 12:00 AM  Referring Provider: not found, starting on Wed Dec 27, 2023 12:00 AM  Consulting Physician: Marty Hernandez MD, starting on Wed Dec 27, 2023  5:39 PM (Active)   Past Medical History:   Diagnosis Date    Hypertension     Urinary tract infection      Past Surgical History:   Procedure Laterality Date    hand sugery      INCISION AND DRAINAGE, LOWER EXTREMITY Right 10/31/2023    Procedure: INCISION AND DRAINAGE, LOWER EXTREMITY;  Surgeon: Emeka Barber MD;  Location: [facility];  Service: General;  Laterality: Right;    PROSTATE SURGERY      "Rotor rooter- opened up"     Social History     Tobacco Use    Smoking status: Never     Passive exposure: Never    Smokeless tobacco: Never   Substance Use Topics    Alcohol use: No     Alcohol/week: 0.0 standard drinks of alcohol    Drug use: No         Review of Systems   Constitutional:  Positive for fatigue. Negative for appetite change, fever and unexpected weight change.   HENT:  Negative for congestion, ear pain, sinus pressure and sore throat.    Eyes:  " Negative for pain and visual disturbance.   Respiratory:  Negative for shortness of breath.    Cardiovascular:  Negative for chest pain.   Gastrointestinal:  Negative for abdominal pain, constipation and diarrhea.   Endocrine: Negative for polyuria.   Genitourinary:  Negative for difficulty urinating and frequency.   Musculoskeletal:  Negative for arthralgias, back pain and myalgias.   Skin:  Positive for wound. Negative for color change.   Allergic/Immunologic: Negative.    Neurological:  Positive for weakness. Negative for syncope and headaches.   Hematological:  Bruises/bleeds easily.   Psychiatric/Behavioral:  Positive for confusion. Negative for dysphoric mood and suicidal ideas. The patient is not nervous/anxious.    All other systems reviewed and are negative.    Objective:     Physical Exam  Constitutional:       Appearance: He is ill-appearing. He is not toxic-appearing.   Cardiovascular:      Rate and Rhythm: Normal rate.   Pulmonary:      Effort: Pulmonary effort is normal.   Abdominal:      General: There is distension.      Palpations: There is mass.      Comments: RLQ/groin swollen above sq cell wound   Skin:     Findings: Lesion present.      Comments: See photo of right groin mass   Neurological:      General: No focal deficit present.      Cranial Nerves: No cranial nerve deficit.      Sensory: No sensory deficit.      Motor: Weakness present.      Coordination: Coordination normal.      Gait: Gait abnormal.      Deep Tendon Reflexes: Reflexes normal.   Psychiatric:         Mood and Affect: Mood normal.         Behavior: Behavior normal.         Thought Content: Thought content normal.             Assessment:     1. Secondary malignant neoplasm of inguinal lymph nodes    2. Heart failure with reduced ejection fraction    3. Cardiomyopathy, unspecified type    4. Atrial fibrillation, unspecified type    5. SCCA (squamous cell carcinoma) of skin    6. 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 If you have any questions, ask your nurse or doctor.                CONTINUE taking these medications      ascorbic acid (vitamin C) 250 MG tablet  Commonly known as: VITAMIN C     CIPRO 500 mg/5 mL suspension  Generic drug: ciprofloxacin     ciprofloxacin HCl 500 MG tablet  Commonly known as: CIPRO     digoxin 125 mcg tablet  Commonly known as: LANOXIN  Take 1 tablet (0.125 mg total) by mouth once daily.     gentamicin 0.1 % ointment  Commonly known as: GARAMYCIN     latanoprost 0.005 % ophthalmic solution     melatonin 3 mg tablet  Commonly known as: MELATIN     metoprolol tartrate 25 MG tablet  Commonly known as: LOPRESSOR  Take 1 tablet (25 mg total) by mouth 2 (two) times daily.     multivitamin with folic acid 400 mcg Tab     pantoprazole 40 MG tablet  Commonly known as: PROTONIX  Take 1 tablet (40 mg total) by mouth 2 (two) times daily.     traZODone 50 MG tablet  Commonly known as: DESYREL  Take 1 tablet (50 mg total) by mouth every evening.     vitamin D 1000 units Tab  Commonly known as: VITAMIN D3  Take 1 tablet (1,000 Units total) by mouth once daily.     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

## 2023-12-28 NOTE — TELEPHONE ENCOUNTER
MANISH    ----- Message from Marielena Allen sent at 12/28/2023  8:39 AM CST -----  Regarding: PET CT ROUTINE  [facility] PET CT1 LIMIT 500 LBS  [place] Team,    Dr. Hernandez,    Pt is rescheduled for the below on Jan 04, 2023 @ 11:00 in [place]. Spoke with pt Son he is aware.         PET CT ROUTINE [[id number]]   [facility] PET CT1 LIMIT 500 LBS     Thank you,  Marielena

## 2024-01-01 PROBLEM — C77.4: Status: ACTIVE | Noted: 2024-01-01

## 2024-01-01 PROBLEM — I42.9 CARDIOMYOPATHY, UNSPECIFIED TYPE: Status: ACTIVE | Noted: 2024-01-01

## 2024-01-01 PROBLEM — E66.01 CLASS 2 SEVERE OBESITY WITH SERIOUS COMORBIDITY AND BODY MASS INDEX (BMI) OF 38.0 TO 38.9 IN ADULT: Status: RESOLVED | Noted: 2021-05-21 | Resolved: 2024-01-01

## 2024-01-01 PROBLEM — I50.20 HEART FAILURE WITH REDUCED EJECTION FRACTION: Status: ACTIVE | Noted: 2024-01-01

## 2024-01-04 ENCOUNTER — DOCUMENTATION ONLY (OUTPATIENT)
Dept: HEMATOLOGY/ONCOLOGY | Facility: CLINIC | Age: 84
End: 2024-01-04
Payer: MEDICARE

## 2024-01-04 ENCOUNTER — TELEPHONE (OUTPATIENT)
Dept: HEMATOLOGY/ONCOLOGY | Facility: CLINIC | Age: 84
End: 2024-01-04
Payer: MEDICARE

## 2024-01-04 ENCOUNTER — HOSPITAL ENCOUNTER (INPATIENT)
Facility: HOSPITAL | Age: 84
LOS: 4 days | Discharge: HOSPICE | DRG: 871 | End: 2024-01-08
Attending: EMERGENCY MEDICINE | Admitting: STUDENT IN AN ORGANIZED HEALTH CARE EDUCATION/TRAINING PROGRAM
Payer: MEDICARE

## 2024-01-04 DIAGNOSIS — E83.52 HYPERCALCEMIA: Primary | ICD-10-CM

## 2024-01-04 DIAGNOSIS — C77.4 SECONDARY MALIGNANT NEOPLASM OF INGUINAL LYMPH NODES: ICD-10-CM

## 2024-01-04 DIAGNOSIS — G93.40 ACUTE ENCEPHALOPATHY: ICD-10-CM

## 2024-01-04 DIAGNOSIS — R07.9 CHEST PAIN: ICD-10-CM

## 2024-01-04 DIAGNOSIS — J18.9 PNEUMONIA DUE TO INFECTIOUS ORGANISM, UNSPECIFIED LATERALITY, UNSPECIFIED PART OF LUNG: ICD-10-CM

## 2024-01-04 DIAGNOSIS — C44.722 SQUAMOUS CELL CARCINOMA OF RIGHT LOWER LEG: Primary | ICD-10-CM

## 2024-01-04 DIAGNOSIS — E03.2 DRUG-INDUCED HYPOTHYROIDISM: ICD-10-CM

## 2024-01-04 LAB
ALBUMIN SERPL BCP-MCNC: 3.4 G/DL (ref 3.5–5.2)
ALP SERPL-CCNC: 167 U/L (ref 38–126)
ALT SERPL W/O P-5'-P-CCNC: 16 U/L (ref 10–44)
ANION GAP SERPL CALC-SCNC: 14 MMOL/L (ref 8–16)
AST SERPL-CCNC: 25 U/L (ref 15–46)
BASOPHILS # BLD AUTO: ABNORMAL K/UL (ref 0–0.2)
BASOPHILS NFR BLD: 0 % (ref 0–1.9)
BILIRUB SERPL-MCNC: 0.6 MG/DL (ref 0.1–1)
CALCIUM SERPL-MCNC: 12.8 MG/DL (ref 8.7–10.5)
CHLORIDE SERPL-SCNC: 94 MMOL/L (ref 95–110)
CO2 SERPL-SCNC: 30 MMOL/L (ref 23–29)
CREAT SERPL-MCNC: 0.95 MG/DL (ref 0.5–1.4)
DIFFERENTIAL METHOD BLD: ABNORMAL
EOSINOPHIL # BLD AUTO: ABNORMAL K/UL (ref 0–0.5)
EOSINOPHIL NFR BLD: 0 % (ref 0–8)
ERYTHROCYTE [DISTWIDTH] IN BLOOD BY AUTOMATED COUNT: 18.1 % (ref 11.5–14.5)
EST. GFR  (NO RACE VARIABLE): >60 ML/MIN/1.73 M^2
GLUCOSE SERPL-MCNC: 107 MG/DL (ref 70–110)
HCT VFR BLD AUTO: 44.5 % (ref 40–54)
HGB BLD-MCNC: 13.8 G/DL (ref 14–18)
IMM GRANULOCYTES # BLD AUTO: ABNORMAL K/UL (ref 0–0.04)
IMM GRANULOCYTES NFR BLD AUTO: ABNORMAL % (ref 0–0.5)
INFLUENZA A, MOLECULAR: NEGATIVE
INFLUENZA B, MOLECULAR: NEGATIVE
LACTATE SERPL-SCNC: 1.6 MMOL/L (ref 0.5–2.2)
LACTATE SERPL-SCNC: 2.4 MMOL/L (ref 0.5–2.2)
LYMPHOCYTES # BLD AUTO: ABNORMAL K/UL (ref 1–4.8)
LYMPHOCYTES NFR BLD: 4 % (ref 18–48)
MAGNESIUM SERPL-MCNC: 2 MG/DL (ref 1.6–2.6)
MCH RBC QN AUTO: 27.7 PG (ref 27–31)
MCHC RBC AUTO-ENTMCNC: 31 G/DL (ref 32–36)
MCV RBC AUTO: 89 FL (ref 82–98)
MONOCYTES # BLD AUTO: ABNORMAL K/UL (ref 0.3–1)
MONOCYTES NFR BLD: 1 % (ref 4–15)
NEUTROPHILS NFR BLD: 95 % (ref 38–73)
NRBC BLD-RTO: 0 /100 WBC
PHOSPHATE SERPL-MCNC: 3.5 MG/DL (ref 2.7–4.5)
PLATELET # BLD AUTO: 256 K/UL (ref 150–450)
PLATELET BLD QL SMEAR: ABNORMAL
PMV BLD AUTO: 10.1 FL (ref 9.2–12.9)
POTASSIUM SERPL-SCNC: 3.8 MMOL/L (ref 3.5–5.1)
PROCALCITONIN SERPL IA-MCNC: 0.15 NG/ML
PROT SERPL-MCNC: 7.5 G/DL (ref 6–8.4)
RBC # BLD AUTO: 4.98 M/UL (ref 4.6–6.2)
SARS-COV-2 RDRP RESP QL NAA+PROBE: NEGATIVE
SODIUM SERPL-SCNC: 138 MMOL/L (ref 136–145)
SPECIMEN SOURCE: NORMAL
UUN UR-MCNC: 31 MG/DL (ref 2–20)
WBC # BLD AUTO: 54.14 K/UL (ref 3.9–12.7)

## 2024-01-04 PROCEDURE — 25000003 PHARM REV CODE 250: Performed by: NURSE PRACTITIONER

## 2024-01-04 PROCEDURE — 63600175 PHARM REV CODE 636 W HCPCS: Performed by: STUDENT IN AN ORGANIZED HEALTH CARE EDUCATION/TRAINING PROGRAM

## 2024-01-04 PROCEDURE — 25000003 PHARM REV CODE 250: Mod: ER | Performed by: EMERGENCY MEDICINE

## 2024-01-04 PROCEDURE — 96365 THER/PROPH/DIAG IV INF INIT: CPT | Mod: ER

## 2024-01-04 PROCEDURE — 87502 INFLUENZA DNA AMP PROBE: CPT | Mod: ER | Performed by: EMERGENCY MEDICINE

## 2024-01-04 PROCEDURE — 80053 COMPREHEN METABOLIC PANEL: CPT | Mod: ER | Performed by: EMERGENCY MEDICINE

## 2024-01-04 PROCEDURE — 93010 ELECTROCARDIOGRAM REPORT: CPT | Mod: ,,, | Performed by: INTERNAL MEDICINE

## 2024-01-04 PROCEDURE — 85027 COMPLETE CBC AUTOMATED: CPT | Mod: ER | Performed by: EMERGENCY MEDICINE

## 2024-01-04 PROCEDURE — 83735 ASSAY OF MAGNESIUM: CPT | Mod: ER | Performed by: EMERGENCY MEDICINE

## 2024-01-04 PROCEDURE — 84145 PROCALCITONIN (PCT): CPT | Performed by: EMERGENCY MEDICINE

## 2024-01-04 PROCEDURE — 85007 BL SMEAR W/DIFF WBC COUNT: CPT | Mod: ER | Performed by: EMERGENCY MEDICINE

## 2024-01-04 PROCEDURE — A4216 STERILE WATER/SALINE, 10 ML: HCPCS | Performed by: NURSE PRACTITIONER

## 2024-01-04 PROCEDURE — 96360 HYDRATION IV INFUSION INIT: CPT | Mod: 59,ER

## 2024-01-04 PROCEDURE — 63600175 PHARM REV CODE 636 W HCPCS: Mod: ER | Performed by: EMERGENCY MEDICINE

## 2024-01-04 PROCEDURE — U0002 COVID-19 LAB TEST NON-CDC: HCPCS | Mod: ER | Performed by: EMERGENCY MEDICINE

## 2024-01-04 PROCEDURE — 93005 ELECTROCARDIOGRAM TRACING: CPT | Mod: ER

## 2024-01-04 PROCEDURE — 96361 HYDRATE IV INFUSION ADD-ON: CPT | Mod: ER

## 2024-01-04 PROCEDURE — 87040 BLOOD CULTURE FOR BACTERIA: CPT | Mod: ER | Performed by: EMERGENCY MEDICINE

## 2024-01-04 PROCEDURE — 94761 N-INVAS EAR/PLS OXIMETRY MLT: CPT

## 2024-01-04 PROCEDURE — 25500020 PHARM REV CODE 255: Mod: ER | Performed by: EMERGENCY MEDICINE

## 2024-01-04 PROCEDURE — 83605 ASSAY OF LACTIC ACID: CPT | Mod: 91,ER | Performed by: EMERGENCY MEDICINE

## 2024-01-04 PROCEDURE — 84100 ASSAY OF PHOSPHORUS: CPT | Mod: ER | Performed by: EMERGENCY MEDICINE

## 2024-01-04 PROCEDURE — 99900035 HC TECH TIME PER 15 MIN (STAT)

## 2024-01-04 PROCEDURE — 99900035 HC TECH TIME PER 15 MIN (STAT): Mod: ER

## 2024-01-04 PROCEDURE — 11000001 HC ACUTE MED/SURG PRIVATE ROOM

## 2024-01-04 PROCEDURE — 25000003 PHARM REV CODE 250: Performed by: STUDENT IN AN ORGANIZED HEALTH CARE EDUCATION/TRAINING PROGRAM

## 2024-01-04 PROCEDURE — 99291 CRITICAL CARE FIRST HOUR: CPT | Mod: ER

## 2024-01-04 RX ORDER — AZITHROMYCIN 250 MG/1
500 TABLET, FILM COATED ORAL DAILY
Status: DISCONTINUED | OUTPATIENT
Start: 2024-01-05 | End: 2024-01-08 | Stop reason: HOSPADM

## 2024-01-04 RX ORDER — SIMETHICONE 80 MG
1 TABLET,CHEWABLE ORAL 4 TIMES DAILY PRN
Status: DISCONTINUED | OUTPATIENT
Start: 2024-01-04 | End: 2024-01-08 | Stop reason: HOSPADM

## 2024-01-04 RX ORDER — AMOXICILLIN 250 MG
1 CAPSULE ORAL 2 TIMES DAILY
Status: DISCONTINUED | OUTPATIENT
Start: 2024-01-04 | End: 2024-01-08 | Stop reason: HOSPADM

## 2024-01-04 RX ORDER — ACETAMINOPHEN 325 MG/1
650 TABLET ORAL EVERY 4 HOURS PRN
Status: DISCONTINUED | OUTPATIENT
Start: 2024-01-04 | End: 2024-01-08 | Stop reason: HOSPADM

## 2024-01-04 RX ORDER — ONDANSETRON 8 MG/1
8 TABLET, ORALLY DISINTEGRATING ORAL EVERY 8 HOURS PRN
Status: DISCONTINUED | OUTPATIENT
Start: 2024-01-04 | End: 2024-01-08 | Stop reason: HOSPADM

## 2024-01-04 RX ORDER — METOPROLOL TARTRATE 25 MG/1
25 TABLET, FILM COATED ORAL 2 TIMES DAILY
Status: DISCONTINUED | OUTPATIENT
Start: 2024-01-04 | End: 2024-01-08 | Stop reason: HOSPADM

## 2024-01-04 RX ORDER — ACETAMINOPHEN 500 MG
500 TABLET ORAL EVERY 6 HOURS PRN
COMMUNITY

## 2024-01-04 RX ORDER — PANTOPRAZOLE SODIUM 40 MG/1
40 TABLET, DELAYED RELEASE ORAL 2 TIMES DAILY
Status: DISCONTINUED | OUTPATIENT
Start: 2024-01-04 | End: 2024-01-08 | Stop reason: HOSPADM

## 2024-01-04 RX ORDER — MV-MIN/FOLIC/K1/LYCOPEN/LUTEIN 300-60 MCG
1 TABLET ORAL DAILY
COMMUNITY

## 2024-01-04 RX ORDER — ONDANSETRON HYDROCHLORIDE 2 MG/ML
4 INJECTION, SOLUTION INTRAVENOUS EVERY 8 HOURS PRN
Status: DISCONTINUED | OUTPATIENT
Start: 2024-01-04 | End: 2024-01-08 | Stop reason: HOSPADM

## 2024-01-04 RX ORDER — TALC
6 POWDER (GRAM) TOPICAL NIGHTLY PRN
Status: DISCONTINUED | OUTPATIENT
Start: 2024-01-04 | End: 2024-01-08 | Stop reason: HOSPADM

## 2024-01-04 RX ORDER — ALUMINUM HYDROXIDE, MAGNESIUM HYDROXIDE, AND SIMETHICONE 1200; 120; 1200 MG/30ML; MG/30ML; MG/30ML
30 SUSPENSION ORAL 4 TIMES DAILY PRN
Status: DISCONTINUED | OUTPATIENT
Start: 2024-01-04 | End: 2024-01-08 | Stop reason: HOSPADM

## 2024-01-04 RX ORDER — SODIUM CHLORIDE 0.9 % (FLUSH) 0.9 %
10 SYRINGE (ML) INJECTION EVERY 8 HOURS
Status: DISCONTINUED | OUTPATIENT
Start: 2024-01-04 | End: 2024-01-08 | Stop reason: HOSPADM

## 2024-01-04 RX ORDER — LATANOPROST 50 UG/ML
1 SOLUTION/ DROPS OPHTHALMIC NIGHTLY
Status: DISCONTINUED | OUTPATIENT
Start: 2024-01-04 | End: 2024-01-08 | Stop reason: HOSPADM

## 2024-01-04 RX ORDER — DIGOXIN 125 MCG
0.12 TABLET ORAL DAILY
Status: DISCONTINUED | OUTPATIENT
Start: 2024-01-05 | End: 2024-01-08 | Stop reason: HOSPADM

## 2024-01-04 RX ORDER — IPRATROPIUM BROMIDE AND ALBUTEROL SULFATE 2.5; .5 MG/3ML; MG/3ML
3 SOLUTION RESPIRATORY (INHALATION) EVERY 4 HOURS PRN
Status: DISCONTINUED | OUTPATIENT
Start: 2024-01-04 | End: 2024-01-08 | Stop reason: HOSPADM

## 2024-01-04 RX ADMIN — SODIUM CHLORIDE, POTASSIUM CHLORIDE, SODIUM LACTATE AND CALCIUM CHLORIDE 1328 ML: 600; 310; 30; 20 INJECTION, SOLUTION INTRAVENOUS at 02:01

## 2024-01-04 RX ADMIN — LATANOPROST 1 DROP: 50 SOLUTION OPHTHALMIC at 10:01

## 2024-01-04 RX ADMIN — METOPROLOL TARTRATE 25 MG: 25 TABLET, FILM COATED ORAL at 10:01

## 2024-01-04 RX ADMIN — ACETAMINOPHEN 650 MG: 325 TABLET ORAL at 10:01

## 2024-01-04 RX ADMIN — CEFEPIME 2 G: 2 INJECTION, POWDER, FOR SOLUTION INTRAVENOUS at 02:01

## 2024-01-04 RX ADMIN — SODIUM CHLORIDE 1000 ML: 9 INJECTION, SOLUTION INTRAVENOUS at 01:01

## 2024-01-04 RX ADMIN — AZITHROMYCIN MONOHYDRATE 500 MG: 500 INJECTION, POWDER, LYOPHILIZED, FOR SOLUTION INTRAVENOUS at 04:01

## 2024-01-04 RX ADMIN — Medication 10 ML: at 10:01

## 2024-01-04 RX ADMIN — PANTOPRAZOLE SODIUM 40 MG: 40 TABLET, DELAYED RELEASE ORAL at 10:01

## 2024-01-04 RX ADMIN — IOHEXOL 100 ML: 350 INJECTION, SOLUTION INTRAVENOUS at 03:01

## 2024-01-04 RX ADMIN — CEFEPIME 2 G: 2 INJECTION, POWDER, FOR SOLUTION INTRAVENOUS at 10:01

## 2024-01-04 NOTE — PLAN OF CARE
START ON PATHWAY REGIMEN - Melanoma and Other Skin Cancers    [id number]        Pembrolizumab (Keytruda)           Additional Orders: Serious immune-mediated adverse events can occur with   pembrolizumab. Please monitor your patient and refer to the linked   immune-mediated adverse reaction management materials for more information.    **Always confirm dose/schedule in your pharmacy ordering system**    Patient Characteristics:  Cutaneous Squamous Cell Carcinoma, Distant Metastases  Disease Classification: Cutaneous Squamous Cell Carcinoma  Therapeutic Status: Distant Metastases  Click here if multiple primary tumors are present.: false    Intent of Therapy:  Non-Curative / Palliative Intent, Discussed with Patient

## 2024-01-04 NOTE — ED PROVIDER NOTES
"  Chief Complaint:  Elevated calcium    History of Present Illness:    Aquiles Zimmerred 83 y.o. with a  has a past medical history of Hypertension and Urinary tract infection. who presents to the emergency department today with his family after being called from [facility] for an elevated calcium.    Story comes from his family member secondary to condition.  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 Robin with [facility], not currently on any chemotherapy or radiation treatment who went to get his regular labs drawn today and they were called on the way back home to and told that he had an elevated calcium and he needed to come to the emergency department.  According to the family the patient has been having a steady decline with worsening weakness since [place].  They report that he has had similar presentations like this in the past when his calcium levels were high.  They do report decreased p.o. intake, decreased responsiveness, not talking as much as usual for last couple days.  Decreased urinary output as well.       ROS  Unable to obtain secondary to condition    The history is provided by the patients wife and son      Reviewed and verified by myself:   PMH/PSH/SOC/FH REVIEWED :    Past Medical History:   Diagnosis Date    Hypertension     Urinary tract infection        Past Surgical History:   Procedure Laterality Date    hand sugery      INCISION AND DRAINAGE, LOWER EXTREMITY Right 10/31/2023    Procedure: INCISION AND DRAINAGE, LOWER EXTREMITY;  Surgeon: Emeka Barber MD;  Location: [facility];  Service: General;  Laterality: Right;    PROSTATE SURGERY      "Rotor rooter- opened up"       Social History     Socioeconomic History    Marital status:    Tobacco Use    Smoking status: Never     Passive exposure: Never    Smokeless tobacco: Never   Substance and Sexual Activity    " Alcohol use: No     Alcohol/week: 0.0 standard drinks of alcohol    Drug use: No    Sexual activity: Not Currently     Social Determinants of Health     Financial Resource Strain: Patient Declined (12/14/2023)    Overall Financial Resource Strain (CARDIA)     Difficulty of Paying Living Expenses: Patient declined   Food Insecurity: Patient Declined (12/14/2023)    Hunger Vital Sign     Worried About Running Out of Food in the Last Year: Patient declined     Ran Out of Food in the Last Year: Patient declined   Transportation Needs: Unmet Transportation Needs (12/14/2023)    PRAPARE - Transportation     Lack of Transportation (Medical): Yes     Lack of Transportation (Non-Medical): Yes   Physical Activity: Unknown (12/14/2023)    Exercise Vital Sign     Days of Exercise per Week: Patient declined     Minutes of Exercise per Session: 0 min   Stress: Stress Concern Present (12/14/2023)    [place] [employer] - Occupational Stress Questionnaire     Feeling of Stress : To some extent   Social Connections: Moderately Integrated (12/14/2023)    Social Connection and Isolation Panel [NHANES]     Frequency of Communication with Friends and Family: Twice a week     Frequency of Social Gatherings with Friends and Family: Three times a week     Attends Sikh Services: 1 to 4 times per year     Active Member of Clubs or Organizations: No     Attends Club or Organization Meetings: Patient declined     Marital Status:    Housing Stability: Unknown (12/14/2023)    Housing Stability Vital Sign     Unable to Pay for Housing in the Last Year: Patient refused     Number of Places Lived in the Last Year: 1     Unstable Housing in the Last Year: Patient refused       Family History   Problem Relation Age of Onset    Hypertension Father     Hypertension Mother     No Known Problems Sister     Arthritis Son     Prostate cancer Neg Hx     Kidney disease Neg Hx                ALLERGIES REVIEWED  Review of  patient's allergies indicates:  No Known Allergies    MEDICATIONS REVIEWED  Medication List with Changes/Refills   Current Medications    ASCORBIC ACID, VITAMIN C, (VITAMIN C) 250 MG TABLET    Take 1 tablet by mouth every morning.    CIPROFLOXACIN (CIPRO) 500 MG/5 ML SUSPENSION        CIPROFLOXACIN HCL (CIPRO) 500 MG TABLET    Take 500 mg by mouth 2 (two) times daily.    DIGOXIN (LANOXIN) 125 MCG TABLET    Take 1 tablet (0.125 mg total) by mouth once daily.    GENTAMICIN (GARAMYCIN) 0.1 % OINTMENT    Apply topically.    LATANOPROST 0.005 % OPHTHALMIC SOLUTION    Place 1 drop into both eyes every evening.    MELATONIN (MELATIN) 3 MG TABLET    Take 2 tablets by mouth every evening.    METOPROLOL TARTRATE (LOPRESSOR) 25 MG TABLET    Take 1 tablet (25 mg total) by mouth 2 (two) times daily.    MULTIVITAMIN WITH FOLIC ACID 400 MCG TAB    Take 1 tablet by mouth every morning.    PANTOPRAZOLE (PROTONIX) 40 MG TABLET    Take 1 tablet (40 mg total) by mouth 2 (two) times daily.    TRAZODONE (DESYREL) 50 MG TABLET    Take 1 tablet (50 mg total) by mouth every evening.    VITAMIN D (VITAMIN D3) 1000 UNITS TAB    Take 1 tablet (1,000 Units total) by mouth once daily.    ZINC SULFATE (ZINCATE) 50 MG ZINC (220 MG) CAPSULE    Take 1 capsule by mouth every morning.           VS reviewed    Nursing/Ancillary staff note reviewed.       Physical Exam     ED Triage Vitals   BP 01/04/24 1211 (!) 116/50   Pulse 01/04/24 1211 80   Resp 01/04/24 1211 18   Temp 01/04/24 1211 97 °F (36.1 °C)   SpO2 01/04/24 1227 97 %       Physical Exam    Constitutional:  Confused.  Appears ill.  Afebrile.   Eyes: Pupils equal and round no pallor or injection. Extra ocular movements intact. No drainage.   ENT: Mucous membranes very dry mucous membranes. Oropharynx clear.   Neck: Neck is supple non-tender. No lymphadenopathy. No stridor.   Respiratory: There are no retractions, lungs are clear to auscultation. No wheezing, no crackles.   Cardiovascular:  Regular rate and rhythm. No murmurs, rubs or gallops.  Chest/Breast: Chest wall nontender to palpation.   Gastrointestinal:  Abdomen is soft. Known malignancy to the RLQ/groin area. Neurological: Alert and oriented x 4. CN II-XII grossly intact. No focal weakness. Strength intact 5/5 bilaterally in upper and lower extremities.   Skin: Warm and dry, no rashes. + Sacral decubitus ulcer.         ED Course         ED Course as of 01/04/24 1714   Thu Jan 04, 2024   1349 54.14 WBC white blood cell count has been trending high for several weeks, however given altered mental status will obtain a septic workup at this point adding on blood cultures, lactic acid, procalcitonin, CT scan of the chest abdomen and pelvis for further evaluation. [JA]   1409 Corrected calcium 13.3 [JA]   1528 I had a long conversation with the family members regarding the patient's wishes for resuscitation, they report that should his heart is not beating or he should stop breathing he would not want any interventions.  I will place DNR order. [JA]   1528 Influenza A, Molecular: Negative [JA]   1528 Influenza B, Molecular: Negative [JA]   1529 CMP with elevated calcium [JA]   1556 Lactate, Quan(!): 2.4  Elevated has received IVFs repeat ordered for 4 hours after initial. [JA]   1713 Patient's CT scan shows a possibility of pneumonia will add azithromycin onto the cefepime. [JA]      ED Course User Index  [JA] Gladys aMne MD            ED Management:            Medical Decision Making  Differential diagnosis included but not limited to : Sepsis, stroke, head trauma, MI, meningitis, thyroid storm, DKA, shock, hepatic encephalopathy,  electrolytes, hypoxia, CO poisoning, nutritional (thiamin (EtOH), B6 (on TB tx), B12, folate, niacin), CO poisoning, drugs, EtOH,  hypothermia, dementia/delirium      Initial: This is a 83 y.o. male  with  has a past medical history of Hypertension and Urinary tract infection. who comes in for emergent evaluation  of a new, acute, complicated and undiagnosed problem of confusion, AMS, weakness and elevated calcium. On examination patient was afebrile, he is not tachycardic, he is oxygenating appropriately on room air. On physical exam notable for very dry mucous membranes.   Appears to be sick.    Orders I ordered to further evaluate included:  EKG, CBC, CMP, phosphorus, magnesium, will start IV fluids.    Comorbidity impacting this encounter includes:  Known malignancy    Social determinants of health taken into consideration during development of our treatment plan include   Body mass index is 31.46 kg/m². - Cumulative social disadvantage, denoted by higher SDOH burden, was associated with increased odds of obesity, independent of clinical and demographic factors. PMID: [id number] DOI: 10.1002/danielle.30616        Problems Addressed:  Acute encephalopathy: complicated acute illness or injury with systemic symptoms that poses a threat to life or bodily functions  Hypercalcemia: complicated acute illness or injury with systemic symptoms that poses a threat to life or bodily functions  Pneumonia due to infectious organism, unspecified laterality, unspecified part of lung: complicated acute illness or injury with systemic symptoms    Amount and/or Complexity of Data Reviewed  External Data Reviewed: labs.     Details: Labs reviewed from earlier today show a corrected calcium of 14, significantly high  Labs: ordered. Decision-making details documented in ED Course.  Radiology: ordered.     Details: I independently reviewed the CT scan of the chest abdomen and pelvis, final read by Radiology, agree.  ECG/medicine tests: ordered and independent interpretation performed.     Details: 77 beats per minute, AFib with rate control, low-voltage QRS, no STEMI    Risk  Prescription drug management.         Pt received the following in the ED:   Medications   azithromycin (ZITHROMAX) 500 mg in dextrose 5 % (D5W) 250 mL IVPB (Vial-Mate) (500 mg  Intravenous New Bag 1/4/24 1625)   ceFEPIme (MAXIPIME) 2 g in dextrose 5 % in water (D5W) 100 mL IVPB (MB+) (has no administration in time range)   azithromycin tablet 500 mg (has no administration in time range)   sodium chloride 0.9% flush 10 mL (has no administration in time range)   albuterol-ipratropium 2.5 mg-0.5 mg/3 mL nebulizer solution 3 mL (has no administration in time range)   melatonin tablet 6 mg (has no administration in time range)   ondansetron disintegrating tablet 8 mg (has no administration in time range)   ondansetron injection 4 mg (has no administration in time range)   senna-docusate 8.6-50 mg per tablet 1 tablet (has no administration in time range)   simethicone chewable tablet 80 mg (has no administration in time range)   aluminum-magnesium hydroxide-simethicone 200-200-20 mg/5 mL suspension 30 mL (has no administration in time range)   acetaminophen tablet 650 mg (has no administration in time range)   sodium chloride 0.9% bolus 1,000 mL 1,000 mL (0 mLs Intravenous Stopped 1/4/24 1424)   ceFEPIme (MAXIPIME) 2 g in dextrose 5 % in water (D5W) 100 mL IVPB (MB+) (0 g Intravenous Stopped 1/4/24 1505)   lactated ringers bolus 1,328 mL (0 mLs Intravenous Stopped 1/4/24 1559)   iohexoL (OMNIPAQUE 350) injection 100 mL (100 mLs Intravenous Given 1/4/24 1517)           MDM continued:     Aquiles Nan  presents to the emergency Department today with altered mental status, found to have hypercalcemia along with the possibility of sepsis did not pneumonia.  Patient's white blood cell count significantly elevated but has been trending elevated for several months.  Lactic acid a 2.4, received fluid resuscitation, repeat lactic acid scheduled for 4 hours after initial.  If still here in this department will drop that time.  COVID negative, flu negative, phosphorus normal, magnesium normal.  CT scan of the chest abdomen and pelvis show the possibility of a pneumonia, antibiotics to cover for  pulmonary etiology of possibility of sepsis.  The patient will be admitted for continued care and management, continued IV antibiotics, IV fluids for his hypercalcemia and supportive care.          This patient does have evidence of infective focus  My overall impression is sepsis.  Source: Respiratory  Antibiotics given-   Antibiotics (72h ago, onward)      Start     Stop Route Frequency Ordered    01/05/24 1430  ceFEPIme (MAXIPIME) 2 g in dextrose 5 % in water (D5W) 100 mL IVPB (MB+)         -- IV Every 24 hours (non-standard times) 01/04/24 1607    01/05/24 0900  azithromycin tablet 500 mg         01/10/24 0859 Oral Daily 01/04/24 1607    01/04/24 1600  azithromycin (ZITHROMAX) 500 mg in dextrose 5 % (D5W) 250 mL IVPB (Vial-Mate)         01/05/24 0359 IV ED 1 Time 01/04/24 1556          Latest lactate reviewed-  Recent Labs   Lab 01/04/24  1541   LACTATE 2.4*     Organ dysfunction indicated by Encephalopathy    Fluid challenge Ideal Body Weight- The patient's ideal body weight is Ideal body weight: 77.6 kg (171 lb 1.2 oz) which will be used to calculate fluid bolus of 30 ml/kg for treatment of septic shock.      Post- resuscitation assessment No - Post resuscitation assessment not needed       Will Not start Pressors- Levophed for MAP of 65    Voice recognition software utilized in this note.      Critical Care    Date/Time: 1/4/2024 5:17 PM    Performed by: Gladys Mane MD  Authorized by: Gladys Mane MD  Total critical care time (exclusive of procedural time) : 69 minutes  Critical care time was exclusive of separately billable procedures and treating other patients.  Critical care was necessary to treat or prevent imminent or life-threatening deterioration of the following conditions: metabolic crisis and sepsis.  Critical care was time spent personally by me on the following activities: development of treatment plan with patient or surrogate, discussions with primary provider, interpretation of  cardiac output measurements, evaluation of patient's response to treatment, examination of patient, obtaining history from patient or surrogate, ordering and performing treatments and interventions, ordering and review of laboratory studies, ordering and review of radiographic studies, pulse oximetry, re-evaluation of patient's condition and review of old charts.            Impression      The primary encounter diagnosis was Hypercalcemia. Diagnoses of Acute encephalopathy and Pneumonia due to infectious organism, unspecified laterality, unspecified part of lung were also pertinent to this visit.               Gladys Mane MD  01/04/24 4564

## 2024-01-05 PROBLEM — L89.90 DECUBITUS ULCER: Status: ACTIVE | Noted: 2024-01-05

## 2024-01-05 PROBLEM — R53.81 DEBILITY: Status: ACTIVE | Noted: 2024-01-05

## 2024-01-05 PROBLEM — Z51.5 PALLIATIVE CARE ENCOUNTER: Status: ACTIVE | Noted: 2024-01-05

## 2024-01-05 LAB
ANION GAP SERPL CALC-SCNC: 11 MMOL/L (ref 8–16)
BACTERIA #/AREA URNS HPF: ABNORMAL /HPF
BASOPHILS # BLD AUTO: ABNORMAL K/UL (ref 0–0.2)
BASOPHILS NFR BLD: 0 % (ref 0–1.9)
BILIRUB UR QL STRIP: NEGATIVE
BUN SERPL-MCNC: 27 MG/DL (ref 8–23)
BURR CELLS BLD QL SMEAR: ABNORMAL
CA-I BLDV-SCNC: 1.63 MMOL/L (ref 1.06–1.42)
CALCIUM SERPL-MCNC: 11.6 MG/DL (ref 8.7–10.5)
CAOX CRY URNS QL MICRO: ABNORMAL
CHLORIDE SERPL-SCNC: 102 MMOL/L (ref 95–110)
CLARITY UR: ABNORMAL
CO2 SERPL-SCNC: 26 MMOL/L (ref 23–29)
COLOR UR: ABNORMAL
CREAT SERPL-MCNC: 0.8 MG/DL (ref 0.5–1.4)
DIFFERENTIAL METHOD BLD: ABNORMAL
DIGOXIN SERPL-MCNC: 1.6 NG/ML (ref 0.8–2)
EOSINOPHIL # BLD AUTO: ABNORMAL K/UL (ref 0–0.5)
EOSINOPHIL NFR BLD: 0 % (ref 0–8)
ERYTHROCYTE [DISTWIDTH] IN BLOOD BY AUTOMATED COUNT: 18 % (ref 11.5–14.5)
EST. GFR  (NO RACE VARIABLE): >60 ML/MIN/1.73 M^2
GLUCOSE SERPL-MCNC: 68 MG/DL (ref 70–110)
GLUCOSE UR QL STRIP: NEGATIVE
HCT VFR BLD AUTO: 33.9 % (ref 40–54)
HGB BLD-MCNC: 10.8 G/DL (ref 14–18)
HGB UR QL STRIP: ABNORMAL
HYALINE CASTS #/AREA URNS LPF: 3 /LPF
IMM GRANULOCYTES # BLD AUTO: ABNORMAL K/UL (ref 0–0.04)
IMM GRANULOCYTES NFR BLD AUTO: ABNORMAL % (ref 0–0.5)
KETONES UR QL STRIP: ABNORMAL
LEUKOCYTE ESTERASE UR QL STRIP: NEGATIVE
LYMPHOCYTES # BLD AUTO: ABNORMAL K/UL (ref 1–4.8)
LYMPHOCYTES NFR BLD: 4 % (ref 18–48)
MAGNESIUM SERPL-MCNC: 1.7 MG/DL (ref 1.6–2.6)
MCH RBC QN AUTO: 27.4 PG (ref 27–31)
MCHC RBC AUTO-ENTMCNC: 31.9 G/DL (ref 32–36)
MCV RBC AUTO: 86 FL (ref 82–98)
MICROSCOPIC COMMENT: ABNORMAL
MONOCYTES # BLD AUTO: ABNORMAL K/UL (ref 0.3–1)
MONOCYTES NFR BLD: 0 % (ref 4–15)
NEUTROPHILS NFR BLD: 96 % (ref 38–73)
NITRITE UR QL STRIP: NEGATIVE
NRBC BLD-RTO: 0 /100 WBC
OTHER ELEMENTS URNS MICRO: ABNORMAL
PH UR STRIP: 6 [PH] (ref 5–8)
PLATELET # BLD AUTO: 186 K/UL (ref 150–450)
PLATELET BLD QL SMEAR: ABNORMAL
PMV BLD AUTO: 10.5 FL (ref 9.2–12.9)
POCT GLUCOSE: 74 MG/DL (ref 70–110)
POIKILOCYTOSIS BLD QL SMEAR: SLIGHT
POTASSIUM SERPL-SCNC: 3.3 MMOL/L (ref 3.5–5.1)
PROT UR QL STRIP: ABNORMAL
RBC # BLD AUTO: 3.94 M/UL (ref 4.6–6.2)
RBC #/AREA URNS HPF: 75 /HPF (ref 0–4)
SODIUM SERPL-SCNC: 139 MMOL/L (ref 136–145)
SP GR UR STRIP: >1.03 (ref 1–1.03)
URN SPEC COLLECT METH UR: ABNORMAL
UROBILINOGEN UR STRIP-ACNC: NEGATIVE EU/DL
WBC # BLD AUTO: 49.93 K/UL (ref 3.9–12.7)
WBC #/AREA URNS HPF: 1 /HPF (ref 0–5)

## 2024-01-05 PROCEDURE — 25000003 PHARM REV CODE 250: Performed by: FAMILY MEDICINE

## 2024-01-05 PROCEDURE — 80162 ASSAY OF DIGOXIN TOTAL: CPT | Performed by: NURSE PRACTITIONER

## 2024-01-05 PROCEDURE — 82330 ASSAY OF CALCIUM: CPT | Performed by: NURSE PRACTITIONER

## 2024-01-05 PROCEDURE — 92610 EVALUATE SWALLOWING FUNCTION: CPT

## 2024-01-05 PROCEDURE — 80048 BASIC METABOLIC PNL TOTAL CA: CPT | Performed by: NURSE PRACTITIONER

## 2024-01-05 PROCEDURE — 97535 SELF CARE MNGMENT TRAINING: CPT

## 2024-01-05 PROCEDURE — 99498 ADVNCD CARE PLAN ADDL 30 MIN: CPT | Mod: ,,,

## 2024-01-05 PROCEDURE — 99497 ADVNCD CARE PLAN 30 MIN: CPT | Mod: 25,,,

## 2024-01-05 PROCEDURE — 25000003 PHARM REV CODE 250: Performed by: NURSE PRACTITIONER

## 2024-01-05 PROCEDURE — A4216 STERILE WATER/SALINE, 10 ML: HCPCS | Performed by: NURSE PRACTITIONER

## 2024-01-05 PROCEDURE — 83735 ASSAY OF MAGNESIUM: CPT | Performed by: NURSE PRACTITIONER

## 2024-01-05 PROCEDURE — 99223 1ST HOSP IP/OBS HIGH 75: CPT | Mod: ,,, | Performed by: INTERNAL MEDICINE

## 2024-01-05 PROCEDURE — 85007 BL SMEAR W/DIFF WBC COUNT: CPT | Performed by: NURSE PRACTITIONER

## 2024-01-05 PROCEDURE — 63700000 PHARM REV CODE 250 ALT 637 W/O HCPCS: Performed by: NURSE PRACTITIONER

## 2024-01-05 PROCEDURE — 63600175 PHARM REV CODE 636 W HCPCS: Performed by: FAMILY MEDICINE

## 2024-01-05 PROCEDURE — 99223 1ST HOSP IP/OBS HIGH 75: CPT | Mod: ,,,

## 2024-01-05 PROCEDURE — 36415 COLL VENOUS BLD VENIPUNCTURE: CPT | Performed by: NURSE PRACTITIONER

## 2024-01-05 PROCEDURE — 81000 URINALYSIS NONAUTO W/SCOPE: CPT | Performed by: EMERGENCY MEDICINE

## 2024-01-05 PROCEDURE — 63600175 PHARM REV CODE 636 W HCPCS: Performed by: STUDENT IN AN ORGANIZED HEALTH CARE EDUCATION/TRAINING PROGRAM

## 2024-01-05 PROCEDURE — 11000001 HC ACUTE MED/SURG PRIVATE ROOM

## 2024-01-05 PROCEDURE — 99900035 HC TECH TIME PER 15 MIN (STAT)

## 2024-01-05 PROCEDURE — 85027 COMPLETE CBC AUTOMATED: CPT | Performed by: NURSE PRACTITIONER

## 2024-01-05 PROCEDURE — 25000003 PHARM REV CODE 250: Performed by: STUDENT IN AN ORGANIZED HEALTH CARE EDUCATION/TRAINING PROGRAM

## 2024-01-05 PROCEDURE — 94761 N-INVAS EAR/PLS OXIMETRY MLT: CPT

## 2024-01-05 RX ORDER — IBUPROFEN 200 MG
24 TABLET ORAL
Status: DISCONTINUED | OUTPATIENT
Start: 2024-01-05 | End: 2024-01-08 | Stop reason: HOSPADM

## 2024-01-05 RX ORDER — ZOLEDRONIC ACID 4 MG/5ML
5 INJECTION INTRAVENOUS ONCE
Status: DISCONTINUED | OUTPATIENT
Start: 2024-01-05 | End: 2024-01-05 | Stop reason: CLARIF

## 2024-01-05 RX ORDER — GENTAMICIN SULFATE 1 MG/G
OINTMENT TOPICAL DAILY
Status: DISCONTINUED | OUTPATIENT
Start: 2024-01-05 | End: 2024-01-08 | Stop reason: HOSPADM

## 2024-01-05 RX ORDER — SODIUM CHLORIDE 0.9 % (FLUSH) 0.9 %
3 SYRINGE (ML) INJECTION EVERY 12 HOURS PRN
Status: DISCONTINUED | OUTPATIENT
Start: 2024-01-05 | End: 2024-01-08 | Stop reason: HOSPADM

## 2024-01-05 RX ORDER — IBUPROFEN 200 MG
16 TABLET ORAL
Status: DISCONTINUED | OUTPATIENT
Start: 2024-01-05 | End: 2024-01-08 | Stop reason: HOSPADM

## 2024-01-05 RX ORDER — ENOXAPARIN SODIUM 100 MG/ML
40 INJECTION SUBCUTANEOUS EVERY 24 HOURS
Status: DISCONTINUED | OUTPATIENT
Start: 2024-01-05 | End: 2024-01-08 | Stop reason: HOSPADM

## 2024-01-05 RX ORDER — GLUCAGON 1 MG
1 KIT INJECTION
Status: DISCONTINUED | OUTPATIENT
Start: 2024-01-05 | End: 2024-01-08 | Stop reason: HOSPADM

## 2024-01-05 RX ORDER — NALOXONE HCL 0.4 MG/ML
0.02 VIAL (ML) INJECTION
Status: DISCONTINUED | OUTPATIENT
Start: 2024-01-05 | End: 2024-01-08 | Stop reason: HOSPADM

## 2024-01-05 RX ORDER — SODIUM CHLORIDE, SODIUM LACTATE, POTASSIUM CHLORIDE, CALCIUM CHLORIDE 600; 310; 30; 20 MG/100ML; MG/100ML; MG/100ML; MG/100ML
INJECTION, SOLUTION INTRAVENOUS CONTINUOUS
Status: DISCONTINUED | OUTPATIENT
Start: 2024-01-05 | End: 2024-01-08 | Stop reason: HOSPADM

## 2024-01-05 RX ADMIN — DIGOXIN 0.12 MG: 125 TABLET ORAL at 08:01

## 2024-01-05 RX ADMIN — CEFEPIME 2 G: 2 INJECTION, POWDER, FOR SOLUTION INTRAVENOUS at 06:01

## 2024-01-05 RX ADMIN — DOCUSATE SODIUM AND SENNOSIDES 1 TABLET: 8.6; 5 TABLET, FILM COATED ORAL at 08:01

## 2024-01-05 RX ADMIN — METOPROLOL TARTRATE 25 MG: 25 TABLET, FILM COATED ORAL at 08:01

## 2024-01-05 RX ADMIN — ZOLEDRONIC ACID 4 MG: 4 INJECTION, SOLUTION, CONCENTRATE INTRAVENOUS at 10:01

## 2024-01-05 RX ADMIN — AZITHROMYCIN DIHYDRATE 500 MG: 250 TABLET ORAL at 08:01

## 2024-01-05 RX ADMIN — SODIUM CHLORIDE, POTASSIUM CHLORIDE, SODIUM LACTATE AND CALCIUM CHLORIDE: 600; 310; 30; 20 INJECTION, SOLUTION INTRAVENOUS at 08:01

## 2024-01-05 RX ADMIN — Medication 10 ML: at 06:01

## 2024-01-05 RX ADMIN — GENTAMICIN SULFATE: 1 OINTMENT TOPICAL at 03:01

## 2024-01-05 RX ADMIN — CEFEPIME 2 G: 2 INJECTION, POWDER, FOR SOLUTION INTRAVENOUS at 03:01

## 2024-01-05 RX ADMIN — CEFEPIME 2 G: 2 INJECTION, POWDER, FOR SOLUTION INTRAVENOUS at 11:01

## 2024-01-05 RX ADMIN — Medication 10 ML: at 03:01

## 2024-01-05 RX ADMIN — PANTOPRAZOLE SODIUM 40 MG: 40 TABLET, DELAYED RELEASE ORAL at 08:01

## 2024-01-05 RX ADMIN — Medication 10 ML: at 10:01

## 2024-01-05 RX ADMIN — SODIUM CHLORIDE, POTASSIUM CHLORIDE, SODIUM LACTATE AND CALCIUM CHLORIDE: 600; 310; 30; 20 INJECTION, SOLUTION INTRAVENOUS at 10:01

## 2024-01-05 NOTE — CONSULTS
[facility]  Adult Nutrition  Consult Note    SUMMARY     Recommendations    Recommendation:   1. Continue regular diet  2. Add walt BID   3. Add Boost Plus BID  4. Monitor weight and labs    Goals: Pt will consume 50-75% of meals by RD follow up    Nutrition Goal Status: new  Communication of RD Recs: other (comment) (POC)    Assessment and Plan  Nutrition Problem  Increased protein needs    Related to (etiology):   Wound healing    Signs and Symptoms (as evidenced by):   Right groin, sacral spine, right heel     Interventions:  Increased protein diet   Collaboration with other providers    Nutrition Diagnosis Status:   New    Malnutrition Assessment  Weight Loss (Malnutrition):  (Noted 24% weight loss since August)     Reason for Assessment  Reason For Assessment: consult (wound care order set)  Diagnosis: cancer diagnosis/related complications (hypercalcemia of malignany)  Relevant Medical History: HTN, CHF, UTI, skin cancer to Right leg  Interdisciplinary Rounds: did not attend  General Information Comments: Pt is currently on a regular diet. Per chart review pt has been experiencing fatigue, weakness, and leg pain. According to chart, pt hasn't felt like getting out of bed recently and has a decreased appetite and urinary output. Pt is not currently undergoing chemotherpay/radiation. Per chart review pt has some difficulty chewing and swallowing. Fidel-16/right groin, sacral spine, R. heel. Unable to conduct NFPE d/t pt with MD. Per chart review pt is a poor historian. Noted ~60 lb weight loss since August. No meal intake noted per chart reivew  Nutrition Discharge Planning: d/c on regular diet    Nutrition Risk Screen  Nutrition Risk Screen: difficulty chewing/swallowing    Nutrition/Diet History  Patient Reported Diet/Restrictions/Preferences: general  Spiritual, Cultural Beliefs, Anglican Practices, Values that Affect Care:  (unable to assess at this time)  Factors Affecting Nutritional Intake:  impaired cognitive status/motor control, chewing difficulties/inability to chew food, decreased appetite, difficulty/impaired swallowing    Anthropometrics   Temp: 98.8 °F (37.1 °C)  Height Method: Stated  Height: 6' (182.9 cm)  Height (inches): 72 in  Weight Method: Bed Scale  Weight: 83.4 kg (183 lb 13.8 oz)  Weight (lb): 183.87 lb  Ideal Body Weight (IBW), Male: 178 lb  % Ideal Body Weight, Male (lb): 103.3 %  BMI (Calculated): 24.9  BMI Grade: 18.5-24.9 - normal  Usual Body Weight (UBW), k.9 kg  % Usual Body Weight: 76.05  % Weight Change From Usual Weight: -24.11 %     Lab/Procedures/Meds  Pertinent Labs Reviewed: reviewed  Pertinent Labs Comments: hgb 10.8, hct 33.9, K 3.3L, BUN 27H, BG 68L, [place]on 11.6  Pertinent Medications Reviewed: reviewed  Pertinent Medications Comments: pantaprazole    Estimated/Assessed Needs  Weight Used For Calorie Calculations: 83.4 kg (183 lb 13.8 oz)  Energy Calorie Requirements (kcal): 2502 kcals (30 kcals/kg)  Energy Need Method: Kcal/kg  Protein Requirements: 100g (1.2g/kg)  Weight Used For Protein Calculations: 83.4 kg (183 lb 13.8 oz)     Estimated Fluid Requirement Method: RDA Method  RDA Method (mL): 2502     Nutrition Prescription Ordered  Current Diet Order: Regular Diet    Evaluation of Received Nutrient/Fluid Intake  I/O: 2796/200  Energy Calories Required: not meeting needs  Protein Required: not meeting needs  Fluid Required: not meeting needs  Comments: LBM-1/5  Tolerance: not tolerating  % Intake of Estimated Energy Needs: 25 - 50 %  % Meal Intake: 25 - 50 %    Nutrition Risk  Level of Risk/Frequency of Follow-up: moderate (2x/weekly)     Monitor and Evaluation  Food and Nutrient Intake: energy intake, food and beverage intake  Food and Nutrient Adminstration: diet order  Anthropometric Measurements: weight, weight change, body mass index  Biochemical Data, Medical Tests and Procedures: electrolyte and renal panel, gastrointestinal profile, lipid profile      Nutrition Follow-Up  RD Follow-up?: Yes

## 2024-01-05 NOTE — ASSESSMENT & PLAN NOTE
Controlled   Patient not on diuretics  Monitor closely for signs of volume overload s/p 2 L IVF  Continue BB  ECHO 09/12/2023  1. Normal left ventricular cavity size. Low normal left ventricular systolic function.   LVEF 50 - 55%.   2. Normal right ventricular size. Normal right ventricular systolic function.   3. Mild to moderate mitral valve regurgitation.   4. Mild to moderate tricuspid valve regurgitation.   5. A pleural effusion is visualized.

## 2024-01-05 NOTE — PT/OT/SLP EVAL
"Speech Language Pathology Evaluation  Bedside Swallow    Patient Name:  Aquiles Montana   MRN:  [id number]  Admitting Diagnosis: Hypercalcemia of malignancy    Recommendations:                 General Recommendations:  Dysphagia therapy and Speech/language therapy  Diet recommendations:  Minced & Moist Diet - IDDSI Level 5,     Aspiration Precautions: 1 bite/sip at a time, Alternating bites/sips, Avoid talking while eating, Frequent oral care, and Meds whole 1 at a time   General Precautions: Standard, fall  Communication strategies:  none    Assessment:     Aquiles Montana is a 83 y.o. male with an SLP diagnosis of AMS and mild dysphagia.     History:     Past Medical History:   Diagnosis Date    Hypertension     Urinary tract infection        Past Surgical History:   Procedure Laterality Date    hand sugery      INCISION AND DRAINAGE, LOWER EXTREMITY Right 10/31/2023    Procedure: INCISION AND DRAINAGE, LOWER EXTREMITY;  Surgeon: Emeka Barber MD;  Location: [facility];  Service: General;  Laterality: Right;    PROSTATE SURGERY      "Rotor rooter- opened up"       Social History: Not stated    Prior Intubation HX:  na for current admission    Modified Barium Swallow: na for current admission    CT chest: Interval enlargement of the right inguinal malignancy and right external iliac chain lymphadenopathy.  No evidence of distant spread of metastatic disease.     Focal consolidation right posterior lung base compatible with pneumonia.     Prior diet: mechanical soft/thin      Subjective     Pt in bed with wife present during session. Pt appeared confused throughout session and pt with jargon filled, nonsensical speech. Pt with reduced speech intelligibility throughout session  Patient goals: "I'm going to the graveyard"     Pain/Comfort:  Pain Rating 1: 0/10    Respiratory Status: Nasal cannula    Objective:   Cognition/communication: Pt able to state name only. Pt unable to orient to , current place, or " current month/year. Pt with confusion throughout session. Pt unable to follow ST's commands. Pt required frequent verbal stim to sustain wakeful state throughout session.     Oral Musculature Evaluation  Oral Musculature: general weakness  Dentition: present and adequate, scattered dentition  Secretion Management: adequate  Mucosal Quality: adequate  Mandibular Strength and Mobility: flaccid  Oral Labial Strength and Mobility: functional coordination, functional seal, functional pursing, functional retraction  Lingual Strength and Mobility: functional strength, functional lateral movement, functional anterior elevation, functional protrusion  Volitional Swallow:  (reduced laryngeal elevation and excursion per hand palpation)  Voice Prior to PO Intake:  (clear; however, weak. Pt with reduced movement of the articulators causing speech intelligibility to be ~70%)    Bedside Swallow Eval:   Consistencies Assessed:  Thin liquids via tsp sips x2, straw sips x5+  Puree tsp bites of pudding x4  Mixed consistencies bites of diced peaches     Pt consumed administered pills from RN 1 by 1 with no overt s/s of aspiration     Oral Phase:   Decreased closure around utensil  Prolonged mastication  Slow oral transit time    Pharyngeal Phase:   decreased hyolaryngeal excursion to palpation  delayed swallow initation    Compensatory Strategies  Multiple swallows    Treatment: Pt is safe for minced and moist po diet. ST to follow up for diet safety/upgrade as able. Pt with jargon filled and nonsensical speech throughout session. Pt with reduced speech intelligibility.     Goals:   Multidisciplinary Problems       SLP Goals          Problem: SLP    Goal Priority Disciplines Outcome   SLP Goal     SLP Ongoing, Progressing   Description: Short Term Goals:  1. Pt will participate in a clinical swallow eval to determine least restrictive diet. -ongoing  2. Pt will safely tolerate >75% of Minced and Moist with no overt s/s of aspiration.  -ongoing                           Plan:     Patient to be seen:  3 x/week, 4 x/week   Plan of Care expires:  02/04/24  Plan of Care reviewed with:  patient, spouse   SLP Follow-Up:  Yes       Discharge recommendations:  Moderate Intensity Therapy   Barriers to Discharge:  None    Time Tracking:     SLP Treatment Date:   01/05/24  Speech Start Time:  0845  Speech Stop Time:  0910     Speech Total Time (min):  25 min    Billable Minutes: Eval Swallow and Oral Function 15 and Self Care/Home Management Training 10    01/05/2024

## 2024-01-05 NOTE — PROGRESS NOTES
Pharmacist Renal Dose Adjustment Note    Aquiles Montana is a 83 y.o. male being treated with the medication cefepime    Patient Data:    Vital Signs (Most Recent):  Temp: 97.9 °F (36.6 °C) (01/04/24 1849)  Pulse: (!) 53 (01/04/24 1849)  Resp: 16 (01/04/24 1849)  BP: (!) 109/56 (01/04/24 1849)  SpO2: 97 % (01/04/24 1700) Vital Signs (72h Range):  Temp:  [97 °F (36.1 °C)-97.9 °F (36.6 °C)]   Pulse:  [53-83]   Resp:  [16-20]   BP: ()/(50-64)   SpO2:  [97 %]      Recent Labs   Lab 01/04/24  1014 01/04/24  1322   CREATININE 0.9 0.95     Serum creatinine: 0.95 mg/dL 01/04/24 1322  Estimated creatinine clearance: 73.8 mL/min    Medication:cefepime dose: 2g frequency q24 will be changed to medication:cefepime dose:2g frequency:q8    Pharmacist's Name: Michelle Eaton  Pharmacist's Extension: 1143

## 2024-01-05 NOTE — ASSESSMENT & PLAN NOTE
The patient has hypercalcemia that is currently uncontrolled. The patient has the following symptoms due to their hypercalcemia: weakness and encephalopathy. The hypercalcemia is likely due to Malignancy. We will obtain the following labs to work up the hypercalcemia: alkaline phosphatase   Alkaline Phosphatase   Date Value Ref Range Status   01/04/2024 167 (H) 38 - 126 U/L Final    . We will treat the hypercalcemia with:  2,328 mL IVF . Their latest calcium has been reviewed and is listed below.  Ionized Calcium   Date Value Ref Range Status   10/25/2023 1.60 (H) 1.06 - 1.42 mmol/L Final

## 2024-01-05 NOTE — CONSULTS
[facility]  Palliative Medicine  Consult Note    Patient Name: Aquiles Montana  MRN: [id number]  Admission Date: 1/4/2024  Hospital Length of Stay: 1 days  Code Status: DNR   Attending Provider: Veronica Clay MD  Consulting Provider: Chery Ghosh NP  Primary Care Physician: Marty Hernandez MD  Principal Problem:Hypercalcemia of malignancy    Patient information was obtained from patient, spouse/SO, relative(s), past medical records, and primary team.      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 Post-biopsy pt has had worsening pain and cellulitis at the R groin suspicious for underlying abscess and has had severely limited mobility leading to early pressure injuries and missed oncology f/u. Admitted to  service for AMS and FERNANDO 2/2 malignant hypercalcemia, currently being treated. Palliative following for family support and GOC.     -Pt known to service from previous admission. Met with pts wife Jessica and son Len at the bedside.  Pt not able to participate in conversation at this time.    -Family recognizes pts continued decline, reporting that pt recently expressed that he would just "like to go home and be comfortable. "    -Family expresses that while they were hopeful that pt would regain strength to receive immunotherapy offered by oncology, they realize that this is proving to be an impossible feat for the pt.  Introduced home hospice as a resource that may offer family support and aligns with family's stated goals. " Wife reports knowing that it is now the time to utilize this service.   -Pt had recent house call from PCP Dr Beauchamp who also discussed home hospice as an option for the pt at this time.  They report that he will stay on as PCP and assist in management of pts care under hospice services.  CM notified and will send home hospice referral to appropriate company a time of discharge.   -Recommend continued correction of calcium and then discharge home under the care of home hospice when medically appropriate.   -Pt remains a DNR.        Thank you for your consult.     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 Post-op course was c/b residual lymphedema of the RLE and a pressure injury of the LLE which has been slow to heal and followed by wound care for 3 years.     Pt recovered well and had been in his USOH (ambulating with walker, making clinic appts) until 9/10/2023 when pt presented to OSH ([facility] in [place]) for CAP and cellulitis of the right groin with a firm underlying mass. 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 Family states they were informed that pathologic analysis indicated pt was a candidate for immunotherapy and they were referred to Dr. Robin for outpt oncology f/u at this facility.     Pt was due to met with Dr. Robin on 10/20/23 however he was unable to ambulate due to global weakness and RLE pain, both of which had been progressing over the preceding 4 weeks along with worsening edema and pain at the R groin c/f postprocedural abscess and a new stage 1 pressure injury of the sacrum.    Pt was admitted to the  service at that point for weakness and was discharged to SNF for  "rehab with the goal of recovering sufficiently for salvage immunotherapy. In brief pt has continued to decline and has not ambulated in weeks. Pt did outpt labs recently and was found severely hypercalcemic and referred to ED.    Pt was admitted.  Palliative care was consulted for goals of care.       Interval History: GOC meeting held at bedside. NO acute events reported overnight. Oncology and Wound care following pt.     Past Medical History:   Diagnosis Date    Hypertension     Urinary tract infection        Past Surgical History:   Procedure Laterality Date    hand sugery      INCISION AND DRAINAGE, LOWER EXTREMITY Right 10/31/2023    Procedure: INCISION AND DRAINAGE, LOWER EXTREMITY;  Surgeon: Emeka Barber MD;  Location: [facility];  Service: General;  Laterality: Right;    PROSTATE SURGERY      "Rotor rooter- opened up"       Review of patient's allergies indicates:  No Known Allergies    Medications:  Continuous Infusions:   lactated ringers 100 mL/hr at 01/05/24 1023     Scheduled Meds:   azithromycin  500 mg Oral Daily    ceFEPime (MAXIPIME) IVPB  2 g Intravenous Q8H    digoxin  0.125 mg Oral Daily    enoxparin  40 mg Subcutaneous Daily    gentamicin   Topical (Top) Daily    latanoprost  1 drop Both Eyes QHS    metoprolol tartrate  25 mg Oral BID    pantoprazole  40 mg Oral BID    senna-docusate 8.6-50 mg  1 tablet Oral BID    sodium chloride 0.9%  10 mL Intravenous Q8H     PRN Meds:acetaminophen, albuterol-ipratropium, aluminum-magnesium hydroxide-simethicone, dextrose 10%, dextrose 10%, glucagon (human recombinant), glucose, glucose, melatonin, naloxone, ondansetron, ondansetron, simethicone, sodium chloride 0.9%    Family History       Problem Relation (Age of Onset)    Arthritis Son    Hypertension Father, Mother    No Known Problems Sister          Tobacco Use    Smoking status: Never     Passive exposure: Never    Smokeless tobacco: Never   Substance and Sexual Activity    Alcohol use: No     " Alcohol/week: 0.0 standard drinks of alcohol    Drug use: No    Sexual activity: Not Currently       Review of Systems   Unable to perform ROS: Mental status change     Objective:     Vital Signs (Most Recent):  Temp: 98.8 °F (37.1 °C) (01/05/24 0828)  Pulse: 70 (01/05/24 0828)  Resp: (!) 22 (01/05/24 0828)  BP: 115/60 (01/05/24 0828)  SpO2: 95 % (01/05/24 0828) Vital Signs (24h Range):  Temp:  [96.7 °F (35.9 °C)-98.8 °F (37.1 °C)] 98.8 °F (37.1 °C)  Pulse:  [53-80] 70  Resp:  [16-22] 22  SpO2:  [91 %-99 %] 95 %  BP: (107-125)/(56-60) 115/60     Weight: 83.4 kg (183 lb 13.8 oz)  Body mass index is 24.94 kg/m².       Physical Exam  Vitals and nursing note reviewed.   Constitutional:       Appearance: He is well-groomed and underweight. He is ill-appearing (chronically).   HENT:      Head: Normocephalic.      Mouth/Throat:      Mouth: Mucous membranes are dry.   Cardiovascular:      Rate and Rhythm: Normal rate.   Pulmonary:      Effort: Pulmonary effort is normal.   Musculoskeletal:      Comments: Wounds present, see photograph    Skin:     General: Skin is warm and dry.      Coloration: Skin is pale.      Findings: Bruising present.   Neurological:      Mental Status: He is lethargic.      Motor: Weakness present.   Psychiatric:         Cognition and Memory: Cognition is impaired. Memory is impaired.            Review of Symptoms      Symptom Assessment (ESAS 0-10 Scale)  Unable to complete assessment due to Mental status change         Pain Assessment in Advanced Demential Scale (PAINAD)   Breathing - Independent of vocalization:  0  Negative vocalization:  0  Facial expression:  1  Body language:  0  Consolability:  0  Total:  1    Performance Status:  30    Living Arrangements:  Lives with family and Lives in home    Psychosocial/Cultural:   See Palliative Psychosocial Note: No  Social Issues Identified: Coping deficit pt/family and New Diagnosis/Trauma  Bereavement Risk: Yes: Close or dependent relationship to  the  person  Caregiver Needs Discussed. Caregiver Distress: Yes: Intensity of family caregiving and Caregiver Burnout Risk  Cultural: none identified   **Primary  to Follow**  Palliative Care  Consult: No    Spiritual:  F - Katie and Belief:  Yazdanism   C - Community:  Family support      Time-Based Charting:  Yes  Chart Review: 23 minutes  Face to Face: 22 minutes  Symptom Assessment: 10 minutes  Coordination of Care: 13 minutes  Discharge Planning: 10 minutes  Advance Care Plannin minutes  Goals of Care: 28 minutes    Total Time Spent: 127 minutes        Advance Care Planning  Advance Directives:   Living Will: No    LaPOST: No    Do Not Resuscitate Status: Yes    Medical Power of : Yes    Agent's Name:  Jessica Montana   Agent's Contact Number:  [phone]    Decision Making:  Family answered questions and Patient unable to communicate due to disease severity/cognitive impairment  Goals of Care: The family and healthcare power of   endorses that what is most important right now is to focus on spending time at home, avoiding the hospital, remaining as independent as possible, symptom/pain control, quality of life, even if it means sacrificing a little time, improvement in condition but with limits to invasive therapies, and comfort and QOL     Accordingly, we have decided that the best plan to meet the patient's goals includes enrolling in hospice care at time of discharge            Significant Labs: All pertinent labs within the past 24 hours have been reviewed.  CBC:   Recent Labs   Lab 24   WBC 49.93*   HGB 10.8*   HCT 33.9*   MCV 86        BMP:  Recent Labs   Lab 24   GLU 68*      K 3.3*      CO2 26   BUN 27*   CREATININE 0.8   CALCIUM 11.6*   MG 1.7     LFT:  Lab Results   Component Value Date    AST 25 2024    ALKPHOS 167 (H) 2024    BILITOT 0.6 2024     Albumin:   Albumin   Date Value Ref  Range Status   01/04/2024 3.4 (L) 3.5 - 5.2 g/dL Final     Protein:   Total Protein   Date Value Ref Range Status   01/04/2024 7.5 6.0 - 8.4 g/dL Final     Lactic acid:   Lab Results   Component Value Date    LACTATE 1.6 01/04/2024    LACTATE 2.4 (H) 01/04/2024       Significant Imaging: I have reviewed all pertinent imaging results/findings within the past 24 hours.      Chery Ghosh NP  Palliative Medicine  [facility]    Advance Care Planning     Date: 01/05/2024    [facility]  I engaged the family and healthcare power of   in a voluntary conversation about advance care planning and we specifically addressed what the goals of care would be moving forward, in light of the patient's change in clinical status, specifically hypercalcemia of malignancy and SCCA, debility.  We did specifically address the patient's likely prognosis, which is poor.  We explored the patient's values and preferences for future care.  The family and healthcare power of   endorses that what is most important right now is to focus on spending time at home, avoiding the hospital, remaining as independent as possible, symptom/pain control, quality of life, even if it means sacrificing a little time, improvement in condition but with limits to invasive therapies, and comfort and QOL     Accordingly, we have decided that the best plan to meet the patient's goals includes enrolling in hospice care    I did explain the role for hospice care at this stage of the patient's illness, including its ability to help the patient live with the best quality of life possible.  We will be making a hospice referral.    I spent a total of 49 minutes engaging the patient in this advance care planning discussion.

## 2024-01-05 NOTE — PLAN OF CARE
CM notified by PC and primary team family has decided on home hospice at d/c.  Wife states they would like to use agency recommended or affiliated with their PCP.  [facility] confirms Dr Hernandez is physician on staff.  Referral placed to facility, anticipate in hospital meeting with family prior to d/c.  [facility] notified wife preference that [facility] during admission meeting.  Agency will f/u with CM team once scheduled.    Wife states family informed pt will require continued inpatient care to manage Ca correction, electrolyte abnormalities.         01/05/24 1413   Discharge Reassessment   Assessment Type Discharge Planning Brief Assessment   Did the patient's condition or plan change since previous assessment? Yes   Discharge Plan discussed with: Spouse/sig other   Discharge Plan A Hospice/home   DME Needed Upon Discharge    (TBD once assessed by hospice agency)   Transition of Care Barriers None   Why the patient remains in the hospital Requires continued medical care   Post-Acute Status   Post-Acute Authorization Hospice   Hospice Status Referrals Sent   Discharge Delays None known at this time         Lorena Arevalo RN [facility]  Supervisor Case Management-Jose  [phone]    Health Care Proxy (HCP)

## 2024-01-05 NOTE — CONSULTS
[facility] - Telemetry  Hematology/Oncology  Consult Note    Patient Name: Aquiles Montana  MRN: [id number]  Admission Date: 1/4/2024  Hospital Length of Stay: 1 days  Code Status: DNR   Attending Provider: Veronica Clay MD  Consulting Provider: Delonte Robin MD  Primary Care Physician: Marty Hernandez MD  Principal Problem:Hypercalcemia of malignancy    Inpatient consult to Hematology/Oncology  Consult performed by: Delonte Robin MD  Consult ordered by: Jessica Neff NP  Reason for consult: squamous cell skin cancer        Subjective:     HPI:  83 year-old male with squamous cell skin cancer was admitted on 1/4/24 for hypercalcemia of malignancy. Consult is for squamous cell skin cancer.    - he endorses fatigue, weakness, muscle/leg pain. He denies shortness of breath, abdominal pain, diarrhea.      Oncology Treatment Plan:   OP pembrolizumab 200mg Q3W    Medications:  Continuous Infusions:  Scheduled Meds:   azithromycin  500 mg Oral Daily    ceFEPime (MAXIPIME) IVPB  2 g Intravenous Q8H    digoxin  0.125 mg Oral Daily    enoxparin  40 mg Subcutaneous Daily    gentamicin   Topical (Top) Daily    latanoprost  1 drop Both Eyes QHS    metoprolol tartrate  25 mg Oral BID    pantoprazole  40 mg Oral BID    senna-docusate 8.6-50 mg  1 tablet Oral BID    sodium chloride 0.9%  10 mL Intravenous Q8H     PRN Meds:acetaminophen, albuterol-ipratropium, aluminum-magnesium hydroxide-simethicone, dextrose 10%, dextrose 10%, glucagon (human recombinant), glucose, glucose, melatonin, naloxone, ondansetron, ondansetron, simethicone, sodium chloride 0.9%     Review of patient's allergies indicates:  No Known Allergies     Past Medical History:   Diagnosis Date    Hypertension     Urinary tract infection      Past Surgical History:   Procedure Laterality Date    hand sugery      INCISION AND DRAINAGE, LOWER EXTREMITY Right 10/31/2023    Procedure: INCISION AND DRAINAGE, LOWER EXTREMITY;  Surgeon: Emeka Barber MD;   "Location: [facility] OR;  Service: General;  Laterality: Right;    PROSTATE SURGERY      "Rotor rooter- opened up"     Family History       Problem Relation (Age of Onset)    Arthritis Son    Hypertension Father, Mother    No Known Problems Sister          Tobacco Use    Smoking status: Never     Passive exposure: Never    Smokeless tobacco: Never   Substance and Sexual Activity    Alcohol use: No     Alcohol/week: 0.0 standard drinks of alcohol    Drug use: No    Sexual activity: Not Currently       Review of Systems   Constitutional:  Positive for fatigue and unexpected weight change.   HENT:  Negative for sore throat.    Eyes:  Negative for visual disturbance.   Respiratory:  Negative for shortness of breath.    Cardiovascular:  Negative for chest pain.   Gastrointestinal:  Negative for abdominal pain.   Genitourinary:  Negative for dysuria.   Musculoskeletal:  Negative for back pain.        Leg pain   Skin:  Negative for rash.   Neurological:  Positive for weakness. Negative for headaches.   Hematological:  Negative for adenopathy.   Psychiatric/Behavioral:  The patient is not nervous/anxious.      Objective:     Vital Signs (Most Recent):  Temp: 97.5 °F (36.4 °C) (01/05/24 0611)  Pulse: 66 (01/05/24 0611)  Resp: (!) 22 (01/05/24 0611)  BP: (!) 114/57 (01/05/24 0611)  SpO2: (!) 91 % (01/05/24 0611) Vital Signs (24h Range):  Temp:  [96.7 °F (35.9 °C)-97.9 °F (36.6 °C)] 97.5 °F (36.4 °C)  Pulse:  [53-83] 66  Resp:  [16-22] 22  SpO2:  [91 %-99 %] 91 %  BP: ()/(50-64) 114/57     Weight: 83.4 kg (183 lb 13.8 oz)  Body mass index is 24.94 kg/m².  Body surface area is 2.06 meters squared.      Intake/Output Summary (Last 24 hours) at 1/5/2024 0758  Last data filed at 1/5/2024 0612  Gross per 24 hour   Intake 2796 ml   Output 200 ml   Net 2596 ml        Physical Exam  Vitals and nursing note reviewed.   Constitutional:       Appearance: He is well-developed. He is ill-appearing.      Comments: fatigued   HENT:      " Head: Normocephalic and atraumatic.   Eyes:      Pupils: Pupils are equal, round, and reactive to light.   Cardiovascular:      Rate and Rhythm: Normal rate and regular rhythm.   Pulmonary:      Effort: Pulmonary effort is normal.      Breath sounds: Normal breath sounds.   Abdominal:      General: Bowel sounds are normal.      Palpations: Abdomen is soft.   Musculoskeletal:      Cervical back: Normal range of motion and neck supple.      Comments: Decreased range of motion   Skin:     General: Skin is warm and dry.      Comments: Large open wound in right inguinal area.   Neurological:      Mental Status: He is alert and oriented to person, place, and time.   Psychiatric:         Behavior: Behavior normal.         Thought Content: Thought content normal.         Judgment: Judgment normal.          Significant Labs:   CBC:   Recent Labs   Lab 01/04/24  1014 01/04/24  1322 01/05/24  0307   WBC 49.07* 54.14* 49.93*   HGB 12.5* 13.8* 10.8*   HCT 39.7* 44.5 33.9*    256 186    and CMP:   Recent Labs   Lab 01/04/24  1014 01/04/24  1322 01/05/24  0307    138 139   K 3.4* 3.8 3.3*   CL 98 94* 102   CO2 29 30* 26   GLU 85 107 68*   BUN 24* 31* 27*   CREATININE 0.9 0.95 0.8   CALCIUM 12.7* 12.8* 11.6*   PROT 6.5 7.5  --    ALBUMIN 2.3* 3.4*  --    BILITOT 0.4 0.6  --    ALKPHOS 147* 167*  --    AST 15 25  --    ALT 8* 16  --    ANIONGAP 12 14 11       Diagnostic Results:  CT chest/abdomen/pelvis (1/4/24): I have personally reviewed the images  Abdominal wall: Interval enlargement of the right inguinal malignancy now 16 x 12 x 10 cm previously 14 x 11 x 10 cm 10/25/2023.  Right external iliac chain lymph node now measures 3.8 x 2.7 cm previously 3.2 x 2.5 cm.  No evidence of distant spread of metastatic disease.     Vasculature: Moderate aortoiliac atherosclerotic calcification     Bones: No acute fracture. Moderate severity multilevel discogenic degenerative change of the lumbar spine.     Impression:  Interval  enlargement of the right inguinal malignancy and right external iliac chain lymphadenopathy.  No evidence of distant spread of metastatic disease.     Focal consolidation right posterior lung base compatible with pneumonia.    Assessment/Plan:     * Hypercalcemia of malignancy  - corrected calcium today is about 12 mg/dL  - he has received IV fluids. Continue IV fluids  - I recommend a one-time dose of zoledronic acid for better control of his hypercalcemia.    Secondary malignant neoplasm of inguinal lymph nodes  - see below    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  - my patient in outpatient setting  - CT scan (1/4/24) confirms that his metastatic disease is limited to the right inguinal area.  - this is a difficult situation. I discussed his case with a colleague as well. Although his performance status is poor, and he has an open wound from his cancer, we still think it is reasonable to proceed with palliative immunotherapy if possible. I have placed a treatment plan for pembrolizumab, and we will attempt to give this in our infusion center following discharge. Pembrolizumab is relatively well tolerated, and response rates are relatively good (40-50%).   - I explained to his wife that this is not guaranteed, that he is in a difficult situation, and we may not ever get to giving him immune therapy due to wounds/sepsis/etc. She understands the risks but is willing to proceed.  - recommend palliative care consult  - recommend wound care consult.        Thank you for your consult.     Delonte Robin MD  Hematology/Oncology  [facility] - Telemetry

## 2024-01-05 NOTE — PLAN OF CARE
Recommendations    Recommendation:   1. Continue regular diet  2. Add walt BID   3. Add Boost plus BID  4. Monitor weight and labs    Goals: Pt will consume 50-75% of meals by RD follow up    Nutrition Goal Status: new  Communication of RD Recs: other (comment) (POC)

## 2024-01-05 NOTE — HOSPITAL COURSE
1/5/23: pt with poor prognosis followed by heme/onc. Palliative consulted. Family ok going home with home hospice once hypercalcemia is fixed. Cont iv abx   1/6: ca down to 11.3. cont with current tx plan of ivfs and iv abx  1/7: Ca down to 10.6. anticipate home with hospice in am  83 y.o. with a  has a past medical history of Hypertension, squamous cell carcinoma, AFib, CHF, and Urinary tract infection. was admitted with hypercalcemia secondary to malignancy. Heme/onc was consulted and due to patient worsening confusion Serial routine labs with persistence leukocytosis due malignancy. His hypercalcemia has improved. He was seen by palliative care, due to his worsening condition family has opted for end of life care with comfort care with hospice

## 2024-01-05 NOTE — ASSESSMENT & PLAN NOTE
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 Post-biopsy pt has had worsening pain and cellulitis at the R groin suspicious for underlying abscess and has had severely limited mobility leading to early pressure injuries and missed oncology f/u. Admitted to  service for AMS and FERNANDO 2/2 malignant hypercalcemia, currently being treated. Palliative following for family support and GOC.     -Pt known to service from previous admission. Met with pts wife Jessica and son Len at the bedside.  Pt not able to participate in conversation at this time.    -Family recognizes pts continued decline, reporting that pt recently expressed that he would just "like to go home and be comfortable. "    -Family expresses that while they were hopeful that pt would regain strength to receive immunotherapy offered by oncology, they realize that this is proving to be an impossible feat for the pt.  Introduced home hospice as a resource that may offer family support and aligns with family's stated goals. Wife reports knowing that it is now the time to utilize this service.   -Pt had recent house call from PCP Dr Beauchamp who also discussed home hospice as an option for the pt at this time.  They report that he will stay on as PCP and assist in management of pts care under hospice services.  CM notified and will send home hospice referral to appropriate company a time of discharge.   -Recommend continued correction of calcium and then discharge home under the care of home hospice when medically appropriate.   -Pt remains a DNR.  "

## 2024-01-05 NOTE — SUBJECTIVE & OBJECTIVE
"Interval History: [facility] meeting held at bedside. NO acute events reported overnight. Oncology and Wound care following pt.     Past Medical History:   Diagnosis Date    Hypertension     Urinary tract infection        Past Surgical History:   Procedure Laterality Date    hand sugery      INCISION AND DRAINAGE, LOWER EXTREMITY Right 10/31/2023    Procedure: INCISION AND DRAINAGE, LOWER EXTREMITY;  Surgeon: Emeka Barber MD;  Location: [facility];  Service: General;  Laterality: Right;    PROSTATE SURGERY      "Rotor rooter- opened up"       Review of patient's allergies indicates:  No Known Allergies    Medications:  Continuous Infusions:   lactated ringers 100 mL/hr at 01/05/24 1023     Scheduled Meds:   azithromycin  500 mg Oral Daily    ceFEPime (MAXIPIME) IVPB  2 g Intravenous Q8H    digoxin  0.125 mg Oral Daily    enoxparin  40 mg Subcutaneous Daily    gentamicin   Topical (Top) Daily    latanoprost  1 drop Both Eyes QHS    metoprolol tartrate  25 mg Oral BID    pantoprazole  40 mg Oral BID    senna-docusate 8.6-50 mg  1 tablet Oral BID    sodium chloride 0.9%  10 mL Intravenous Q8H     PRN Meds:acetaminophen, albuterol-ipratropium, aluminum-magnesium hydroxide-simethicone, dextrose 10%, dextrose 10%, glucagon (human recombinant), glucose, glucose, melatonin, naloxone, ondansetron, ondansetron, simethicone, sodium chloride 0.9%    Family History       Problem Relation (Age of Onset)    Arthritis Son    Hypertension Father, Mother    No Known Problems Sister          Tobacco Use    Smoking status: Never     Passive exposure: Never    Smokeless tobacco: Never   Substance and Sexual Activity    Alcohol use: No     Alcohol/week: 0.0 standard drinks of alcohol    Drug use: No    Sexual activity: Not Currently       Review of Systems   Unable to perform ROS: Mental status change     Objective:     Vital Signs (Most Recent):  Temp: 98.8 °F (37.1 °C) (01/05/24 0828)  Pulse: 70 (01/05/24 0828)  Resp: (!) 22 (01/05/24 " 0828)  BP: 115/60 (24 0828)  SpO2: 95 % (24 0828) Vital Signs (24h Range):  Temp:  [96.7 °F (35.9 °C)-98.8 °F (37.1 °C)] 98.8 °F (37.1 °C)  Pulse:  [53-80] 70  Resp:  [16-22] 22  SpO2:  [91 %-99 %] 95 %  BP: (107-125)/(56-60) 115/60     Weight: 83.4 kg (183 lb 13.8 oz)  Body mass index is 24.94 kg/m².       Physical Exam  Vitals and nursing note reviewed.   Constitutional:       Appearance: He is well-groomed and underweight. He is ill-appearing (chronically).   HENT:      Head: Normocephalic.      Mouth/Throat:      Mouth: Mucous membranes are dry.   Cardiovascular:      Rate and Rhythm: Normal rate.   Pulmonary:      Effort: Pulmonary effort is normal.   Musculoskeletal:      Comments: Wounds present, see photograph    Skin:     General: Skin is warm and dry.      Coloration: Skin is pale.      Findings: Bruising present.   Neurological:      Mental Status: He is lethargic.      Motor: Weakness present.   Psychiatric:         Cognition and Memory: Cognition is impaired. Memory is impaired.            Review of Symptoms      Symptom Assessment (ESAS 0-10 Scale)  Unable to complete assessment due to Mental status change         Pain Assessment in Advanced Demential Scale (PAINAD)   Breathing - Independent of vocalization:  0  Negative vocalization:  0  Facial expression:  1  Body language:  0  Consolability:  0  Total:  1    Performance Status:  30    Living Arrangements:  Lives with family and Lives in home    Psychosocial/Cultural:   See Palliative Psychosocial Note: No  Social Issues Identified: Coping deficit pt/family and New Diagnosis/Trauma  Bereavement Risk: Yes: Close or dependent relationship to the  person  Caregiver Needs Discussed. Caregiver Distress: Yes: Intensity of family caregiving and Caregiver Burnout Risk  Cultural: none identified   **Primary  to Follow**  Palliative Care  Consult: No    Spiritual:  F - Katie and Belief:  Christianity   C - Community:   Family support      Time-Based Charting:  Yes  Chart Review: 23 minutes  Face to Face: 22 minutes  Symptom Assessment: 10 minutes  Coordination of Care: 13 minutes  Discharge Planning: 10 minutes  Advance Care Plannin minutes  Goals of Care: 28 minutes    Total Time Spent: 127 minutes        Advance Care Planning   Advance Directives:   Living Will: No    LaPOST: No    Do Not Resuscitate Status: Yes    Medical Power of : Yes    Agent's Name:  Jessica Montana   Agent's Contact Number:  [phone]    Decision Making:  Family answered questions and Patient unable to communicate due to disease severity/cognitive impairment  Goals of Care: The family and healthcare power of   endorses that what is most important right now is to focus on spending time at home, avoiding the hospital, remaining as independent as possible, symptom/pain control, quality of life, even if it means sacrificing a little time, improvement in condition but with limits to invasive therapies, and comfort and QOL     Accordingly, we have decided that the best plan to meet the patient's goals includes enrolling in hospice care at time of discharge            Significant Labs: All pertinent labs within the past 24 hours have been reviewed.  CBC:   Recent Labs   Lab 24  0307   WBC 49.93*   HGB 10.8*   HCT 33.9*   MCV 86        BMP:  Recent Labs   Lab 24  0307   GLU 68*      K 3.3*      CO2 26   BUN 27*   CREATININE 0.8   CALCIUM 11.6*   MG 1.7     LFT:  Lab Results   Component Value Date    AST 25 2024    ALKPHOS 167 (H) 2024    BILITOT 0.6 2024     Albumin:   Albumin   Date Value Ref Range Status   2024 3.4 (L) 3.5 - 5.2 g/dL Final     Protein:   Total Protein   Date Value Ref Range Status   2024 7.5 6.0 - 8.4 g/dL Final     Lactic acid:   Lab Results   Component Value Date    LACTATE 1.6 2024    LACTATE 2.4 (H) 2024       Significant Imaging: I have reviewed  all pertinent imaging results/findings within the past 24 hours.

## 2024-01-05 NOTE — HPI
Aquiles Montana 83 y.o. with a  has a past medical history of Hypertension, squamous cell carcinoma, AFib, CHF, and Urinary tract infection. who presents to the emergency department today with his family after being called from [facility] for an elevated calcium.     Story comes from his family member secondary to condition.  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 Robin with [facility], not currently on any chemotherapy or radiation treatment who went to get his regular labs drawn today and they were called on the way back home and told that he had an elevated calcium and he needed to come to the emergency department.  According to the family the patient has been having a steady decline with worsening weakness since Chaz.  They report that he has had similar presentations like this in the past when his calcium levels were high.  They do report decreased p.o. intake, decreased responsiveness, not talking as much as usual for last couple days, decreased urinary output.  They also report that he has gotten to the point where he can not get out of bed.  Patient is unable to assist with history.

## 2024-01-05 NOTE — PROGRESS NOTES
[facility] Medicine  Progress Note    Patient Name: Aquiles Montana  MRN: [id number]  Patient Class: IP- Inpatient   Admission Date: 1/4/2024  Length of Stay: 1 days  Attending Physician: Veronica Clay MD  Primary Care Provider: Marty Hernandez MD        Subjective:     Principal Problem:Hypercalcemia of malignancy        HPI:  Aquiles Montana 83 y.o. with a  has a past medical history of Hypertension, squamous cell carcinoma, AFib, CHF, and Urinary tract infection. who presents to the emergency department today with his family after being called from [facility] for an elevated calcium.     Story comes from his family member secondary to condition.  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 Robin with heme Onc, not currently on any chemotherapy or radiation treatment who went to get his regular labs drawn today and they were called on the way back home and told that he had an elevated calcium and he needed to come to the emergency department.  According to the family the patient has been having a steady decline with worsening weakness since Christmas.  They report that he has had similar presentations like this in the past when his calcium levels were high.  They do report decreased p.o. intake, decreased responsiveness, not talking as much as usual for last couple days, decreased urinary output.  They also report that he has gotten to the point where he can not get out of bed.  Patient is unable to assist with history.    Overview/Hospital Course:  1/5/23: pt with poor prognosis followed by heme/onc. Palliative consulted. Family ok going home with home hospice once hypercalcemia is fixed. Cont iv abx     Interval History: sleeping without any issues voiced    Review of Systems   All other systems reviewed and are negative.    Objective:     Vital Signs (Most Recent):  Temp:  98.8 °F (37.1 °C) (01/05/24 0828)  Pulse: 70 (01/05/24 0828)  Resp: (!) 22 (01/05/24 0828)  BP: 115/60 (01/05/24 0828)  SpO2: 95 % (01/05/24 0828) Vital Signs (24h Range):  Temp:  [96.7 °F (35.9 °C)-98.8 °F (37.1 °C)] 98.8 °F (37.1 °C)  Pulse:  [53-80] 70  Resp:  [16-22] 22  SpO2:  [91 %-99 %] 95 %  BP: (108-125)/(56-60) 115/60     Weight: 83.4 kg (183 lb 13.8 oz)  Body mass index is 24.94 kg/m².    Intake/Output Summary (Last 24 hours) at 1/5/2024 1713  Last data filed at 1/5/2024 0720  Gross per 24 hour   Intake 368 ml   Output 300 ml   Net 68 ml         Physical Exam  Vitals and nursing note reviewed.   Constitutional:       General: He is not in acute distress.     Appearance: He is well-developed.   HENT:      Head: Normocephalic and atraumatic.      Nose: Nose normal.   Eyes:      Conjunctiva/sclera: Conjunctivae normal.   Cardiovascular:      Rate and Rhythm: Normal rate and regular rhythm.      Heart sounds: Normal heart sounds. No murmur heard.  Pulmonary:      Effort: Pulmonary effort is normal.      Breath sounds: Normal breath sounds. No wheezing.   Abdominal:      General: Bowel sounds are normal.      Palpations: Abdomen is soft. There is no mass.      Tenderness: There is no abdominal tenderness. There is no guarding or rebound.   Genitourinary:     Comments: Left inguinal with wound dressings  Musculoskeletal:         General: Normal range of motion.      Cervical back: Normal range of motion and neck supple.   Skin:     General: Skin is warm and dry.      Findings: No rash.   Neurological:      Mental Status: He is alert and oriented to person, place, and time.   Psychiatric:         Behavior: Behavior normal.             Significant Labs: All pertinent labs within the past 24 hours have been reviewed.  BMP:   Recent Labs   Lab 01/05/24  0307   GLU 68*      K 3.3*      CO2 26   BUN 27*   CREATININE 0.8   CALCIUM 11.6*   MG 1.7     CBC:   Recent Labs   Lab 01/04/24  1014 01/04/24  1322  01/05/24  0307   WBC 49.07* 54.14* 49.93*   HGB 12.5* 13.8* 10.8*   HCT 39.7* 44.5 33.9*    256 186       Significant Imaging: I have reviewed all pertinent imaging results/findings within the past 24 hours.  I have reviewed and interpreted all pertinent imaging results/findings within the past 24 hours.    Assessment/Plan:      * Hypercalcemia of malignancy  The patient has hypercalcemia that is currently uncontrolled. The patient has the following symptoms due to their hypercalcemia: weakness and encephalopathy. The hypercalcemia is likely due to Malignancy. We will obtain the following labs to work up the hypercalcemia: alkaline phosphatase   Alkaline Phosphatase   Date Value Ref Range Status   01/04/2024 167 (H) 38 - 126 U/L Final    . We will treat the hypercalcemia with:  2,328 mL IVF . Their latest calcium has been reviewed and is listed below.  Ionized Calcium   Date Value Ref Range Status   01/05/2024 1.63 (H) 1.06 - 1.42 mmol/L Final       Debility  Patient with Acute on chronic debility due to neoplastic/malignant related fatigue and bed confinement status. Latest AMPAC and GEMS scores have not been reviewed. Evaluation for etiology is complete. Plan includes progressive mobility protocol initated and fall precautions in place.  Will hold off on ordering PT/OT at this time given patient's current mental status    Decubitus ulcer  Large sacral ulcer noted   Wound care consult ordered  Q.2 hour turns      Pneumonia due to infectious organism  Continue Rocephin and azithromycin      Heart failure with reduced ejection fraction  Controlled   Patient not on diuretics  Monitor closely for signs of volume overload s/p 2 L IVF  Continue BB  ECHO 09/12/2023  1. Normal left ventricular cavity size. Low normal left ventricular systolic function.   LVEF 50 - 55%.   2. Normal right ventricular size. Normal right ventricular systolic function.   3. Mild to moderate mitral valve regurgitation.   4. Mild to  moderate tricuspid valve regurgitation.   5. A pleural effusion is visualized.       ACP (advance care planning)  Advance Care Planning    Date: 01/05/2024    Code Status  In light of the patients advanced and life limiting illness,I engaged the the family in a voluntary conversation about the patient's preferences for care  at the very end of life. The patient wishes to have a natural, peaceful death.  Along those lines, the patient does not wish to have CPR or other invasive treatments performed when his heart and/or breathing stops. I communicated to the family that a DNR order would be placed in his medical record to reflect this preference.  I spent a total of 4 minutes engaging the patient in this advance care planning discussion.      Hospice at home, anticipated on Monday with hypercalemia resolved          A-fib  Patient with Permanent atrial fibrillation which is controlled currently with Beta Blocker and Digoxin. Patient is currently in atrial fibrillation.[id number] Score: 2. HASBLED Score: 3. Anticoagulation not indicated due to risk of bleeding .  Lovenox started for DVT prophylaxis while inpatient  Check digoxin level with a.m. labs    Sepsis  This patient does have evidence of infective focus  My overall impression is sepsis.  Source: Respiratory  Antibiotics given-   Antibiotics (72h ago, onward)      Start     Stop Route Frequency Ordered    01/05/24 0900  azithromycin tablet 500 mg         01/10/24 0859 Oral Daily 01/04/24 1607    01/05/24 0900  gentamicin 0.1 % ointment         -- Top Daily 01/05/24 0021    01/04/24 2200  ceFEPIme (MAXIPIME) 2 g in dextrose 5 % in water (D5W) 100 mL IVPB (MB+)         -- IV Every 8 hours (non-standard times) 01/04/24 1954          Latest lactate reviewed-  Recent Labs   Lab 01/04/24  1541 01/04/24  1848   LACTATE 2.4* 1.6     Organ dysfunction indicated by Encephalopathy    Fluid challenge Actual Body weight- Patient will receive 30ml/kg actual body weight to  calculate fluid bolus for treatment of septic shock.     Post- resuscitation assessment Yes Perfusion exam was performed within 6 hours of septic shock presentation after bolus shows Adequate tissue perfusion assessed by non-invasive monitoring       Will Not start Pressors- Levophed for MAP of 65  Source control achieved by:  Ceftriaxone/azithromycin    SCCA (squamous cell carcinoma) of skin  Follows with Heme-Onc outpatient  Large right inguinal tumor noted        VTE Risk Mitigation (From admission, onward)           Ordered     enoxaparin injection 40 mg  Daily         01/05/24 0050     IP VTE HIGH RISK PATIENT  Once         01/05/24 0050     Place sequential compression device  Until discontinued         01/05/24 0050     Place sequential compression device  Until discontinued         01/04/24 1608                    Discharge Planning   NANCY:      Code Status: DNR   Is the patient medically ready for discharge?:     Reason for patient still in hospital (select all that apply): Treatment  Discharge Plan A: Hospice/home   Discharge Delays: None known at this time              Veronica Clay MD  Department of Hospital Medicine   [facility]

## 2024-01-05 NOTE — ASSESSMENT & PLAN NOTE
Advance Care Planning     Date: 01/05/2024    Code Status  In light of the patients advanced and life limiting illness,I engaged the the family in a voluntary conversation about the patient's preferences for care  at the very end of life. The patient wishes to have a natural, peaceful death.  Along those lines, the patient does not wish to have CPR or other invasive treatments performed when his heart and/or breathing stops. I communicated to the family that a DNR order would be placed in his medical record to reflect this preference.  I spent a total of 4 minutes engaging the patient in this advance care planning discussion.      Hospice at home, anticipated on Monday with hypercalemia resolved

## 2024-01-05 NOTE — ASSESSMENT & PLAN NOTE
Patient with Permanent atrial fibrillation which is controlled currently with Beta Blocker and Digoxin. Patient is currently in atrial fibrillation.[id number] Score: 2. HASBLED Score: 3. Anticoagulation not indicated due to risk of bleeding .  Lovenox started for DVT prophylaxis while inpatient  Check digoxin level with a.m. labs

## 2024-01-05 NOTE — SUBJECTIVE & OBJECTIVE
Interval History: sleeping without any issues voiced    Review of Systems   All other systems reviewed and are negative.    Objective:     Vital Signs (Most Recent):  Temp: 98.8 °F (37.1 °C) (01/05/24 0828)  Pulse: 70 (01/05/24 0828)  Resp: (!) 22 (01/05/24 0828)  BP: 115/60 (01/05/24 0828)  SpO2: 95 % (01/05/24 0828) Vital Signs (24h Range):  Temp:  [96.7 °F (35.9 °C)-98.8 °F (37.1 °C)] 98.8 °F (37.1 °C)  Pulse:  [53-80] 70  Resp:  [16-22] 22  SpO2:  [91 %-99 %] 95 %  BP: (108-125)/(56-60) 115/60     Weight: 83.4 kg (183 lb 13.8 oz)  Body mass index is 24.94 kg/m².    Intake/Output Summary (Last 24 hours) at 1/5/2024 1713  Last data filed at 1/5/2024 0720  Gross per 24 hour   Intake 368 ml   Output 300 ml   Net 68 ml         Physical Exam  Vitals and nursing note reviewed.   Constitutional:       General: He is not in acute distress.     Appearance: He is well-developed.   HENT:      Head: Normocephalic and atraumatic.      Nose: Nose normal.   Eyes:      Conjunctiva/sclera: Conjunctivae normal.   Cardiovascular:      Rate and Rhythm: Normal rate and regular rhythm.      Heart sounds: Normal heart sounds. No murmur heard.  Pulmonary:      Effort: Pulmonary effort is normal.      Breath sounds: Normal breath sounds. No wheezing.   Abdominal:      General: Bowel sounds are normal.      Palpations: Abdomen is soft. There is no mass.      Tenderness: There is no abdominal tenderness. There is no guarding or rebound.   Genitourinary:     Comments: Left inguinal with wound dressings  Musculoskeletal:         General: Normal range of motion.      Cervical back: Normal range of motion and neck supple.   Skin:     General: Skin is warm and dry.      Findings: No rash.   Neurological:      Mental Status: He is alert and oriented to person, place, and time.   Psychiatric:         Behavior: Behavior normal.             Significant Labs: All pertinent labs within the past 24 hours have been reviewed.  BMP:   Recent Labs   Lab  01/05/24  0307   GLU 68*      K 3.3*      CO2 26   BUN 27*   CREATININE 0.8   CALCIUM 11.6*   MG 1.7     CBC:   Recent Labs   Lab 01/04/24  1014 01/04/24  1322 01/05/24  0307   WBC 49.07* 54.14* 49.93*   HGB 12.5* 13.8* 10.8*   HCT 39.7* 44.5 33.9*    256 186       Significant Imaging: I have reviewed all pertinent imaging results/findings within the past 24 hours.  I have reviewed and interpreted all pertinent imaging results/findings within the past 24 hours.

## 2024-01-05 NOTE — HPI
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 Post-op course was c/b residual lymphedema of the RLE and a pressure injury of the LLE which has been slow to heal and followed by wound care for 3 years.     Pt recovered well and had been in his USOH (ambulating with walker, making clinic appts) until 9/10/2023 when pt presented to OSH ([facility] in [place]) for CAP and cellulitis of the right groin with a firm underlying mass. 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 Family states they were informed that pathologic analysis indicated pt was a candidate for immunotherapy and they were referred to Dr. Robin for outpt oncology f/u at this facility.     Pt was due to met with Dr. Robin on 10/20/23 however he was unable to ambulate due to global weakness and RLE pain, both of which had been progressing over the preceding 4 weeks along with worsening edema and pain at the R groin c/f postprocedural abscess and a new stage 1 pressure injury of the sacrum.    Pt was admitted to the  service at that point for weakness and was discharged to SNF for rehab with the goal of recovering sufficiently for salvage immunotherapy. In brief pt has continued to decline and has not ambulated in weeks. Pt did outpt labs recently and was found severely hypercalcemic and referred to ED.    Pt was admitted.  Palliative care was consulted for goals of care.

## 2024-01-05 NOTE — PLAN OF CARE
Problem: Adult Inpatient Plan of Care  Goal: Plan of Care Review  Outcome: Ongoing, Progressing  Flowsheets (Taken 1/5/2024 0413)  Plan of Care Reviewed With:   patient   spouse     Problem: Impaired Wound Healing  Goal: Optimal Wound Healing  Outcome: Ongoing, Progressing  Intervention: Promote Wound Healing  Flowsheets (Taken 1/5/2024 0413)  Sleep/Rest Enhancement:   awakenings minimized   family presence promoted   relaxation techniques promoted   regular sleep/rest pattern promoted  Activity Management: Rolling - L1  Pain Management Interventions:   pillow support provided   pain management plan reviewed with patient/caregiver   care clustered   position adjusted     Problem: Fall Injury Risk  Goal: Absence of Fall and Fall-Related Injury  Outcome: Ongoing, Progressing  Intervention: Identify and Manage Contributors  Flowsheets (Taken 1/5/2024 0413)  Self-Care Promotion: BADL personal routines maintained  Medication Review/Management: medications reviewed     Problem: Pain Acute (Oncology Care)  Goal: Optimal Pain Control  Outcome: Ongoing, Progressing  Intervention: Develop Pain Management Plan  Flowsheets (Taken 1/5/2024 0413)  Pain Management Interventions:   pillow support provided   pain management plan reviewed with patient/caregiver   care clustered   position adjusted     Problem: Confusion Acute  Goal: Optimal Cognitive Function  Outcome: Ongoing, Progressing  Intervention: Minimize Contributing Factors  Flowsheets (Taken 1/5/2024 0413)  Environment Familiarity/Consistency: daily routine followed  Reorientation Measures:   familiar social contact encouraged   reorientation provided  Sensory Stimulation Regulation:   care clustered   quiet environment promoted   tactile stimulation minimized     Problem: Skin Injury Risk Increased  Goal: Skin Health and Integrity  Outcome: Ongoing, Progressing  Intervention: Optimize Skin Protection  Flowsheets (Taken 1/5/2024 0413)  Pressure Reduction Techniques:    positioned off wounds   pressure points protected   heels elevated off bed  Pressure Reduction Devices: positioning supports utilized  Skin Protection:   adhesive use limited   incontinence pads utilized  Head of Bed (HOB) Positioning: HOB elevated   Plan of care progressing.

## 2024-01-05 NOTE — ASSESSMENT & PLAN NOTE
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  - this is a difficult situation. I discussed his case with a colleague as well. Although his performance status is poor, and he has an open wound from his cancer, we still think it is reasonable to proceed with palliative immunotherapy if possible. I have placed a treatment plan for pembrolizumab, and we will attempt to give this in our infusion center following discharge. Pembrolizumab is relatively well tolerated, and response rates are relatively good (40-50%).   - I explained to his wife that this is not guaranteed, that he is in a difficult situation, and we may not ever get to giving him immune therapy due to wounds/sepsis/etc. She understands the risks but is willing to proceed.  - recommend palliative care consult  - recommend wound care consult.

## 2024-01-05 NOTE — PLAN OF CARE
SOCIAL WORK DISCHARGE PLANNING ASSESSMENT    Sw completed discharge planning assessment with pt and pt's wife Jessica ([phone]). Pt and Jessica were easily engaged and education on the role of  was provided. Pt lives with Jessica and Jessica is a huge support for pt. Jessica stated pt has the following DME: hospital bed, wc, rw, and bsc. Jessica stated pt is current with [facility] and they wish to continue with them upon discharge. Devon sent referral to [facility] via [employer] so therefore pt can continue services with them upon discharge. Jessica stated pt uses health transportation for when pt goes to doctor appointments. Jessica stated pt will most likely need transportation home following discharge. Pt and Jessica were encouraged to call with any questions or concerns. Pt and Jessica verbalized understanding.     Devon sent referral to [facility] via [employer].     Future Appointments   Date Time Provider Department Center   1/17/2024 12:30 PM LAB, [facility] RVPH LAB [facility]   1/18/2024  2:20 PM Delonte Robin MD [facility] HEM ONC [facility]   1/18/2024  3:15 PM Emeka Barber MD [facility] O[facility]   8/12/2024 11:00 AM Marty Hernandez MD [facility] MED [facility]      Patient Active Problem List   Diagnosis    Benign non-nodular prostatic hyperplasia with lower urinary tract symptoms    Nocturia    Hypertension    Obesity    Arthritis    Psoriasis    SCCA (squamous cell carcinoma) of skin    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    Dermatitis    Tinea pedis of both feet    Open wound of right lower leg    Right leg swelling    Left anterior fascicular block    Sepsis    Abscess of right groin    A-fib    ACP (advance care planning)    Anemia    Hypercalcemia of malignancy    Secondary malignant neoplasm of inguinal lymph nodes    Heart failure with reduced ejection fraction    Cardiomyopathy, unspecified type    Pneumonia due to infectious organism    Decubitus ulcer    Debility       01/05/24 1149   Discharge Assessment   Assessment Type Discharge Planning Assessment   Confirmed/corrected address, phone number and insurance Yes   Confirmed Demographics Correct on Facesheet   Source of Information patient;family   Communicated NANCY with patient/caregiver Date not available/Unable to determine   People in Home spouse   Facility Arrived From: home   Do you expect to return to your current living situation? Yes   Do you have help at home or someone to help you manage your care at home? Yes   Who are your caregiver(s) and their phone number(s)? pt's wife Jessica [phone]   Walking or Climbing Stairs Difficulty yes   Walking or Climbing Stairs ambulation difficulty, requires equipment   Dressing/Bathing Difficulty yes   Dressing/Bathing bathing difficulty, requires equipment   Home Accessibility wheelchair accessible   Equipment Currently Used at Home hospital bed;walker, rolling;wheelchair;bedside commode   Readmission within 30 days? No   Patient currently being followed by outpatient case management? No   Do you currently have service(s) that help you manage your care at home? Yes   Name and Contact number of agency [facility]   Is the pt/caregiver preference to resume services with current agency Yes   Do you take prescription medications? Yes   Do you have prescription coverage? Yes   Coverage Medicare   Do you have any problems affording any of your prescribed medications? No   Is the patient taking medications as prescribed? yes   Who is going to help you get home at discharge? pt's wife Jessica [phone]   How do you get to doctors appointments? health plan transportation   Are you on dialysis? No   Discharge Plan [facility]   DME Needed Upon Discharge    (TBD)   Discharge Plan discussed with: Spouse/sig other;Patient   Name(s) and Number(s) pt's wife Jessica [phone]   OTHER   Name(s) of People in Home pt's wife Jessica [phone]

## 2024-01-05 NOTE — PLAN OF CARE
Problem: SLP  Goal: SLP Goal  Description: Short Term Goals:  1. Pt will participate in a clinical swallow eval to determine least restrictive diet. -ongoing  2. Pt will safely tolerate >75% of Minced and Moist with no overt s/s of aspiration. -ongoing      Outcome: Ongoing, Progressing     Pt participated in clinical BSE. Pt appeared confused throughout session and required frequent verbal stim to sustain wakeful state during session. Pt is safe for minced and moist diet. ST to cont to follow for diet safety/upgrade as able.

## 2024-01-05 NOTE — SUBJECTIVE & OBJECTIVE
"- he endorses fatigue, weakness, muscle/leg pain. He denies shortness of breath, abdominal pain, diarrhea.      Oncology Treatment Plan:   OP pembrolizumab 200mg Q3W    Medications:  Continuous Infusions:  Scheduled Meds:   azithromycin  500 mg Oral Daily    ceFEPime (MAXIPIME) IVPB  2 g Intravenous Q8H    digoxin  0.125 mg Oral Daily    enoxparin  40 mg Subcutaneous Daily    gentamicin   Topical (Top) Daily    latanoprost  1 drop Both Eyes QHS    metoprolol tartrate  25 mg Oral BID    pantoprazole  40 mg Oral BID    senna-docusate 8.6-50 mg  1 tablet Oral BID    sodium chloride 0.9%  10 mL Intravenous Q8H     PRN Meds:acetaminophen, albuterol-ipratropium, aluminum-magnesium hydroxide-simethicone, dextrose 10%, dextrose 10%, glucagon (human recombinant), glucose, glucose, melatonin, naloxone, ondansetron, ondansetron, simethicone, sodium chloride 0.9%     Review of patient's allergies indicates:  No Known Allergies     Past Medical History:   Diagnosis Date    Hypertension     Urinary tract infection      Past Surgical History:   Procedure Laterality Date    hand sugery      INCISION AND DRAINAGE, LOWER EXTREMITY Right 10/31/2023    Procedure: INCISION AND DRAINAGE, LOWER EXTREMITY;  Surgeon: Emeka Barber MD;  Location: [facility];  Service: General;  Laterality: Right;    PROSTATE SURGERY      "Rotor rooter- opened up"     Family History       Problem Relation (Age of Onset)    Arthritis Son    Hypertension Father, Mother    No Known Problems Sister          Tobacco Use    Smoking status: Never     Passive exposure: Never    Smokeless tobacco: Never   Substance and Sexual Activity    Alcohol use: No     Alcohol/week: 0.0 standard drinks of alcohol    Drug use: No    Sexual activity: Not Currently       Review of Systems   Constitutional:  Positive for fatigue and unexpected weight change.   HENT:  Negative for sore throat.    Eyes:  Negative for visual disturbance.   Respiratory:  Negative for shortness of " breath.    Cardiovascular:  Negative for chest pain.   Gastrointestinal:  Negative for abdominal pain.   Genitourinary:  Negative for dysuria.   Musculoskeletal:  Negative for back pain.        Leg pain   Skin:  Negative for rash.   Neurological:  Positive for weakness. Negative for headaches.   Hematological:  Negative for adenopathy.   Psychiatric/Behavioral:  The patient is not nervous/anxious.      Objective:     Vital Signs (Most Recent):  Temp: 97.5 °F (36.4 °C) (01/05/24 0611)  Pulse: 66 (01/05/24 0611)  Resp: (!) 22 (01/05/24 0611)  BP: (!) 114/57 (01/05/24 0611)  SpO2: (!) 91 % (01/05/24 0611) Vital Signs (24h Range):  Temp:  [96.7 °F (35.9 °C)-97.9 °F (36.6 °C)] 97.5 °F (36.4 °C)  Pulse:  [53-83] 66  Resp:  [16-22] 22  SpO2:  [91 %-99 %] 91 %  BP: ()/(50-64) 114/57     Weight: 83.4 kg (183 lb 13.8 oz)  Body mass index is 24.94 kg/m².  Body surface area is 2.06 meters squared.      Intake/Output Summary (Last 24 hours) at 1/5/2024 0727  Last data filed at 1/5/2024 0612  Gross per 24 hour   Intake 2796 ml   Output 200 ml   Net 2596 ml        Physical Exam  Vitals and nursing note reviewed.   Constitutional:       Appearance: He is well-developed. He is ill-appearing.      Comments: fatigued   HENT:      Head: Normocephalic and atraumatic.   Eyes:      Pupils: Pupils are equal, round, and reactive to light.   Cardiovascular:      Rate and Rhythm: Normal rate and regular rhythm.   Pulmonary:      Effort: Pulmonary effort is normal.      Breath sounds: Normal breath sounds.   Abdominal:      General: Bowel sounds are normal.      Palpations: Abdomen is soft.   Musculoskeletal:      Cervical back: Normal range of motion and neck supple.      Comments: Decreased range of motion   Skin:     General: Skin is warm and dry.      Comments: Large open wound in right inguinal area.   Neurological:      Mental Status: He is alert and oriented to person, place, and time.   Psychiatric:         Behavior: Behavior  normal.         Thought Content: Thought content normal.         Judgment: Judgment normal.          Significant Labs:   CBC:   Recent Labs   Lab 01/04/24  1014 01/04/24  1322 01/05/24  0307   WBC 49.07* 54.14* 49.93*   HGB 12.5* 13.8* 10.8*   HCT 39.7* 44.5 33.9*    256 186    and CMP:   Recent Labs   Lab 01/04/24  1014 01/04/24  1322 01/05/24  0307    138 139   K 3.4* 3.8 3.3*   CL 98 94* 102   CO2 29 30* 26   GLU 85 107 68*   BUN 24* 31* 27*   CREATININE 0.9 0.95 0.8   CALCIUM 12.7* 12.8* 11.6*   PROT 6.5 7.5  --    ALBUMIN 2.3* 3.4*  --    BILITOT 0.4 0.6  --    ALKPHOS 147* 167*  --    AST 15 25  --    ALT 8* 16  --    ANIONGAP 12 14 11       Diagnostic Results:  CT chest/abdomen/pelvis (1/4/24): I have personally reviewed the images  Abdominal wall: Interval enlargement of the right inguinal malignancy now 16 x 12 x 10 cm previously 14 x 11 x 10 cm 10/25/2023.  Right external iliac chain lymph node now measures 3.8 x 2.7 cm previously 3.2 x 2.5 cm.  No evidence of distant spread of metastatic disease.     Vasculature: Moderate aortoiliac atherosclerotic calcification     Bones: No acute fracture. Moderate severity multilevel discogenic degenerative change of the lumbar spine.     Impression:  Interval enlargement of the right inguinal malignancy and right external iliac chain lymphadenopathy.  No evidence of distant spread of metastatic disease.     Focal consolidation right posterior lung base compatible with pneumonia.

## 2024-01-05 NOTE — ASSESSMENT & PLAN NOTE
The patient has hypercalcemia that is currently uncontrolled. The patient has the following symptoms due to their hypercalcemia: weakness and encephalopathy. The hypercalcemia is likely due to Malignancy. We will obtain the following labs to work up the hypercalcemia: alkaline phosphatase   Alkaline Phosphatase   Date Value Ref Range Status   01/04/2024 167 (H) 38 - 126 U/L Final    . We will treat the hypercalcemia with:  2,328 mL IVF . Their latest calcium has been reviewed and is listed below.  Ionized Calcium   Date Value Ref Range Status   01/05/2024 1.63 (H) 1.06 - 1.42 mmol/L Final

## 2024-01-05 NOTE — PLAN OF CARE
Problem: Adult Inpatient Plan of Care  Goal: Plan of Care Review  Outcome: Ongoing, Progressing    VN cued into room to complete admit assessment. VIP model introduced; VN working alongside bedside treatment team.  Plan of care reviewed with wife.  Informed of fall risk, fall precautions, call light within reach, side rails x2 elevated. Notified to ask staff for assistance. Patient verbalized complete understanding. Time allowed for questions. Will continue to monitor and intervene as needed.  Chart reviewed.

## 2024-01-05 NOTE — ASSESSMENT & PLAN NOTE
Patient with Acute on chronic debility due to neoplastic/malignant related fatigue and bed confinement status. Latest AMPAC and GEMS scores have not been reviewed. Evaluation for etiology is complete. Plan includes progressive mobility protocol initated and fall precautions in place.  Will hold off on ordering PT/OT at this time given patient's current mental status

## 2024-01-05 NOTE — PHARMACY MED REC
"Admission Medication History     The home medication history was taken by Dede Rosales CPhT.    Medication history obtained from, Patient's Wife Verified    You may go to "Admission" then "Reconcile Home Medications" tabs to review and/or act upon these items.     The home medication list has been updated by the Pharmacy department.   Please read ALL comments highlighted in yellow.   Please address this information as you see fit.    Feel free to contact us if you have any questions or require assistance.      The medications listed below were removed from the home medication list.  Please reorder if appropriate:  Patient reports no longer taking the following medication(s):  Trazodone 50 mg      Dede Rosales CPhT.  Ext [phone]               .          "

## 2024-01-05 NOTE — PLAN OF CARE
VN note: Patient chart, labs, and vitals reviewed. VN to continue to be available as needed.     Problem: Adult Inpatient Plan of Care  Goal: Plan of Care Review  Outcome: Ongoing, Progressing  Goal: Patient-Specific Goal (Individualized)  Outcome: Ongoing, Progressing  Goal: Absence of Hospital-Acquired Illness or Injury  Outcome: Ongoing, Progressing  Goal: Optimal Comfort and Wellbeing  Outcome: Ongoing, Progressing  Goal: Readiness for Transition of Care  Outcome: Ongoing, Progressing     Problem: Adult Inpatient Plan of Care  Goal: Patient-Specific Goal (Individualized)  Outcome: Ongoing, Progressing

## 2024-01-05 NOTE — ASSESSMENT & PLAN NOTE
This patient does have evidence of infective focus  My overall impression is sepsis.  Source: Respiratory  Antibiotics given-   Antibiotics (72h ago, onward)      Start     Stop Route Frequency Ordered    01/05/24 0900  azithromycin tablet 500 mg         01/10/24 0859 Oral Daily 01/04/24 1607    01/05/24 0900  gentamicin 0.1 % ointment         -- Top Daily 01/05/24 0021    01/04/24 2200  ceFEPIme (MAXIPIME) 2 g in dextrose 5 % in water (D5W) 100 mL IVPB (MB+)         -- IV Every 8 hours (non-standard times) 01/04/24 1954          Latest lactate reviewed-  Recent Labs   Lab 01/04/24  1541 01/04/24  1848   LACTATE 2.4* 1.6     Organ dysfunction indicated by Encephalopathy    Fluid challenge Actual Body weight- Patient will receive 30ml/kg actual body weight to calculate fluid bolus for treatment of septic shock.     Post- resuscitation assessment Yes Perfusion exam was performed within 6 hours of septic shock presentation after bolus shows Adequate tissue perfusion assessed by non-invasive monitoring       Will Not start Pressors- Levophed for MAP of 65  Source control achieved by:  Ceftriaxone/azithromycin

## 2024-01-05 NOTE — H&P
[facility] Medicine  History & Physical    Patient Name: Aquiles Montana  MRN: [id number]  Patient Class: IP- Inpatient  Admission Date: 1/4/2024  Attending Physician: Gabriella Madrid MD   Primary Care Provider: Marty Hernandez MD         Patient information was obtained from spouse/SO, relative(s), past medical records, and ER records.     Subjective:     Principal Problem:Hypercalcemia of malignancy    Chief Complaint:   Chief Complaint   Patient presents with    abnormal lab work      PT was just at [place] for lab work and was called on the way home and told the pt had a high calcium and needed to be brought into an emergency room.        HPI: Aquiles Montana 83 y.o. with a  has a past medical history of Hypertension, squamous cell carcinoma, AFib, CHF, and Urinary tract infection. who presents to the emergency department today with his family after being called from [facility] for an elevated calcium.     Story comes from his family member secondary to condition.  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 Robin with [facility], not currently on any chemotherapy or radiation treatment who went to get his regular labs drawn today and they were called on the way back home and told that he had an elevated calcium and he needed to come to the emergency department.  According to the family the patient has been having a steady decline with worsening weakness since Christmas.  They report that he has had similar presentations like this in the past when his calcium levels were high.  They do report decreased p.o. intake, decreased responsiveness, not talking as much as usual for last couple days, decreased urinary output.  They also report that he has gotten to the point where he can not get out of bed.  Patient is unable to assist with history.    Past Medical History:   Diagnosis Date     "Hypertension     Urinary tract infection        Past Surgical History:   Procedure Laterality Date    hand sugery      INCISION AND DRAINAGE, LOWER EXTREMITY Right 10/31/2023    Procedure: INCISION AND DRAINAGE, LOWER EXTREMITY;  Surgeon: Emeka Barber MD;  Location: [facility] OR;  Service: General;  Laterality: Right;    PROSTATE SURGERY      "Rotor rooter- opened up"       Review of patient's allergies indicates:  No Known Allergies    No current facility-administered medications on file prior to encounter.     Current Outpatient Medications on File Prior to Encounter   Medication Sig    acetaminophen (TYLENOL) 500 MG tablet Take 500 mg by mouth every 6 (six) hours as needed for Pain.    ascorbic acid, vitamin C, (VITAMIN C) 250 MG tablet Take 250 mg by mouth every morning.    ciprofloxacin HCl (CIPRO) 500 MG tablet Take 500 mg by mouth 2 (two) times daily.    digoxin (LANOXIN) 125 mcg tablet Take 1 tablet (0.125 mg total) by mouth once daily.    gentamicin (GARAMYCIN) 0.1 % ointment Apply topically once daily.    latanoprost 0.005 % ophthalmic solution Place 1 drop into both eyes every evening.    metoprolol tartrate (LOPRESSOR) 25 MG tablet Take 1 tablet (25 mg total) by mouth 2 (two) times daily.    mv-min-folic-K1-lycopen-lutein (CENTRUM SILVER MEN) 801--300 mcg Tab Take 1 tablet by mouth once daily.    pantoprazole (PROTONIX) 40 MG tablet Take 1 tablet (40 mg total) by mouth 2 (two) times daily.    sodium hypochlorite 0.125% (DAKIN'S SOLUTION) external solution Apply topically once daily.    vitamin D (VITAMIN D3) 1000 units Tab Take 1 tablet (1,000 Units total) by mouth once daily.    zinc sulfate (ZINCATE) 50 mg zinc (220 mg) capsule Take 220 mg by mouth every morning.    melatonin (MELATIN) 3 mg tablet Take 2 tablets by mouth nightly as needed.     Family History       Problem Relation (Age of Onset)    Arthritis Son    Hypertension Father, Mother    No Known Problems Sister          Tobacco Use "    Smoking status: Never     Passive exposure: Never    Smokeless tobacco: Never   Substance and Sexual Activity    Alcohol use: No     Alcohol/week: 0.0 standard drinks of alcohol    Drug use: No    Sexual activity: Not Currently     Review of Systems   Unable to perform ROS: Acuity of condition     Objective:     Vital Signs (Most Recent):  Temp: 96.8 °F (36 °C) (01/05/24 0001)  Pulse: 70 (01/05/24 0022)  Resp: (!) 22 (01/05/24 0001)  BP: (!) 125/58 (01/05/24 0001)  SpO2: 99 % (01/05/24 0001) Vital Signs (24h Range):  Temp:  [96.7 °F (35.9 °C)-97.9 °F (36.6 °C)] 96.8 °F (36 °C)  Pulse:  [53-83] 70  Resp:  [16-22] 22  SpO2:  [92 %-99 %] 99 %  BP: ()/(50-64) 125/58     Weight: 83.4 kg (183 lb 13.8 oz)  Body mass index is 24.94 kg/m².     Physical Exam  Constitutional:       General: He is not in acute distress.     Appearance: He is ill-appearing.   HENT:      Head: Normocephalic and atraumatic.   Eyes:      Pupils: Pupils are equal, round, and reactive to light.   Cardiovascular:      Rate and Rhythm: Normal rate. Rhythm irregular.      Pulses: Normal pulses.      Heart sounds: Normal heart sounds.   Pulmonary:      Effort: Pulmonary effort is normal. No respiratory distress.      Breath sounds: Decreased breath sounds present. No wheezing, rhonchi or rales.   Abdominal:      General: Bowel sounds are normal. There is no distension.      Palpations: Abdomen is soft.      Tenderness: There is no abdominal tenderness. There is no guarding or rebound.   Musculoskeletal:      Right lower leg: No edema.      Left lower leg: No edema.   Skin:     General: Skin is warm.      Findings: Wound present.      Comments: Right inguinal tumor. Sacral pressure ulcer with necrosis, redness, and bruising of surrounding skin   Neurological:      Mental Status: He is alert.              CRANIAL NERVES     CN III, IV, VI   Pupils are equal, round, and reactive to light.       Significant Labs: All pertinent labs within the past  24 hours have been reviewed.    Significant Imaging: I have reviewed all pertinent imaging results/findings within the past 24 hours.  Assessment/Plan:     * Hypercalcemia of malignancy  The patient has hypercalcemia that is currently uncontrolled. The patient has the following symptoms due to their hypercalcemia: weakness and encephalopathy. The hypercalcemia is likely due to Malignancy. We will obtain the following labs to work up the hypercalcemia: alkaline phosphatase   Alkaline Phosphatase   Date Value Ref Range Status   01/04/2024 167 (H) 38 - 126 U/L Final    . We will treat the hypercalcemia with:  2,328 mL IVF . Their latest calcium has been reviewed and is listed below.  Ionized Calcium   Date Value Ref Range Status   10/25/2023 1.60 (H) 1.06 - 1.42 mmol/L Final       Debility  Patient with Acute on chronic debility due to neoplastic/malignant related fatigue and bed confinement status. Latest AMPAC and GEMS scores have not been reviewed. Evaluation for etiology is complete. Plan includes progressive mobility protocol initated and fall precautions in place.  Will hold off on ordering PT/OT at this time given patient's current mental status    Decubitus ulcer  Large sacral ulcer noted   Wound care consult ordered  Q.2 hour turns      Pneumonia due to infectious organism  Continue Rocephin and azithromycin      Heart failure with reduced ejection fraction  Controlled   Patient not on diuretics  Monitor closely for signs of volume overload s/p 2 L IVF  Continue BB  ECHO 09/12/2023  1. Normal left ventricular cavity size. Low normal left ventricular systolic function.   LVEF 50 - 55%.   2. Normal right ventricular size. Normal right ventricular systolic function.   3. Mild to moderate mitral valve regurgitation.   4. Mild to moderate tricuspid valve regurgitation.   5. A pleural effusion is visualized.       ACP (advance care planning)  Advance Care Planning    Date: 01/05/2024    Code Status  In light of the  patients advanced and life limiting illness,I engaged the the family in a voluntary conversation about the patient's preferences for care  at the very end of life. The patient wishes to have a natural, peaceful death.  Along those lines, the patient does not wish to have CPR or other invasive treatments performed when his heart and/or breathing stops. I communicated to the family that a DNR order would be placed in his medical record to reflect this preference.  I spent a total of 4 minutes engaging the patient in this advance care planning discussion.             A-fib  Patient with Permanent atrial fibrillation which is controlled currently with Beta Blocker and Digoxin. Patient is currently in atrial fibrillation.[id number] Score: 2. HASBLED Score: 3. Anticoagulation not indicated due to risk of bleeding .  Lovenox started for DVT prophylaxis while inpatient  Check digoxin level with a.m. labs    Sepsis  This patient does have evidence of infective focus  My overall impression is sepsis.  Source: Respiratory  Antibiotics given-   Antibiotics (72h ago, onward)      Start     Stop Route Frequency Ordered    01/05/24 0900  azithromycin tablet 500 mg         01/10/24 0859 Oral Daily 01/04/24 1607    01/05/24 0900  gentamicin 0.1 % ointment         -- Top Daily 01/05/24 0021    01/04/24 2200  ceFEPIme (MAXIPIME) 2 g in dextrose 5 % in water (D5W) 100 mL IVPB (MB+)         -- IV Every 8 hours (non-standard times) 01/04/24 1954          Latest lactate reviewed-  Recent Labs   Lab 01/04/24  1541 01/04/24  1848   LACTATE 2.4* 1.6     Organ dysfunction indicated by Encephalopathy    Fluid challenge Actual Body weight- Patient will receive 30ml/kg actual body weight to calculate fluid bolus for treatment of septic shock.     Post- resuscitation assessment Yes Perfusion exam was performed within 6 hours of septic shock presentation after bolus shows Adequate tissue perfusion assessed by non-invasive monitoring       Will  Not start Pressors- Levophed for MAP of 65  Source control achieved by:  Ceftriaxone/azithromycin    SCCA (squamous cell carcinoma) of skin  Follows with Heme-Onc outpatient  Large right inguinal tumor noted        VTE Risk Mitigation (From admission, onward)           Ordered     enoxaparin injection 40 mg  Daily         01/05/24 0050     IP VTE HIGH RISK PATIENT  Once         01/05/24 0050     Place sequential compression device  Until discontinued         01/05/24 0050     Place sequential compression device  Until discontinued         01/04/24 1608                               Pharmacist Renal Dose Adjustment Note    Aquiles Montana is a 83 y.o. male being treated with the medication cefepime    Patient Data:    Vital Signs (Most Recent):  Temp: 97.9 °F (36.6 °C) (01/04/24 1849)  Pulse: (!) 53 (01/04/24 1849)  Resp: 16 (01/04/24 1849)  BP: (!) 109/56 (01/04/24 1849)  SpO2: 97 % (01/04/24 1700) Vital Signs (72h Range):  Temp:  [97 °F (36.1 °C)-97.9 °F (36.6 °C)]   Pulse:  [53-83]   Resp:  [16-20]   BP: ()/(50-64)   SpO2:  [97 %]      Recent Labs   Lab 01/04/24  1014 01/04/24  1322   CREATININE 0.9 0.95     Serum creatinine: 0.95 mg/dL 01/04/24 1322  Estimated creatinine clearance: 73.8 mL/min    Medication:cefepime dose: 2g frequency q24 will be changed to medication:cefepime dose:2g frequency:q8    Pharmacist's Name: Michelle Eaton  Pharmacist's Extension: [phone]      Jessica Neff NP  Department of Hospital Medicine  [facility] - Telemetry

## 2024-01-05 NOTE — CONSULTS
Pt transferring to home hospice- DTI present on admit to R lateral ankle and L heel- unable to assess sacrum wound as directed by Dr. Baxter not to turn pt at this time to assess. Changed R groin squamous cell carcinoma dressing- cleansed with Vashe wound cleanser and applied gentamicin, covered with abd pad. Photos in media.

## 2024-01-05 NOTE — SUBJECTIVE & OBJECTIVE
"Past Medical History:   Diagnosis Date    Hypertension     Urinary tract infection        Past Surgical History:   Procedure Laterality Date    hand sugery      INCISION AND DRAINAGE, LOWER EXTREMITY Right 10/31/2023    Procedure: INCISION AND DRAINAGE, LOWER EXTREMITY;  Surgeon: Emeka Barber MD;  Location: [facility];  Service: General;  Laterality: Right;    PROSTATE SURGERY      "Rotor rooter- opened up"       Review of patient's allergies indicates:  No Known Allergies    No current facility-administered medications on file prior to encounter.     Current Outpatient Medications on File Prior to Encounter   Medication Sig    acetaminophen (TYLENOL) 500 MG tablet Take 500 mg by mouth every 6 (six) hours as needed for Pain.    ascorbic acid, vitamin C, (VITAMIN C) 250 MG tablet Take 250 mg by mouth every morning.    ciprofloxacin HCl (CIPRO) 500 MG tablet Take 500 mg by mouth 2 (two) times daily.    digoxin (LANOXIN) 125 mcg tablet Take 1 tablet (0.125 mg total) by mouth once daily.    gentamicin (GARAMYCIN) 0.1 % ointment Apply topically once daily.    latanoprost 0.005 % ophthalmic solution Place 1 drop into both eyes every evening.    metoprolol tartrate (LOPRESSOR) 25 MG tablet Take 1 tablet (25 mg total) by mouth 2 (two) times daily.    mv-min-folic-K1-lycopen-lutein (CENTRUM SILVER MEN) 425--300 mcg Tab Take 1 tablet by mouth once daily.    pantoprazole (PROTONIX) 40 MG tablet Take 1 tablet (40 mg total) by mouth 2 (two) times daily.    sodium hypochlorite 0.125% (DAKIN'S SOLUTION) external solution Apply topically once daily.    vitamin D (VITAMIN D3) 1000 units Tab Take 1 tablet (1,000 Units total) by mouth once daily.    zinc sulfate (ZINCATE) 50 mg zinc (220 mg) capsule Take 220 mg by mouth every morning.    melatonin (MELATIN) 3 mg tablet Take 2 tablets by mouth nightly as needed.     Family History       Problem Relation (Age of Onset)    Arthritis Son    Hypertension Father, Mother    No " Known Problems Sister          Tobacco Use    Smoking status: Never     Passive exposure: Never    Smokeless tobacco: Never   Substance and Sexual Activity    Alcohol use: No     Alcohol/week: 0.0 standard drinks of alcohol    Drug use: No    Sexual activity: Not Currently     Review of Systems   Unable to perform ROS: Acuity of condition     Objective:     Vital Signs (Most Recent):  Temp: 96.8 °F (36 °C) (01/05/24 0001)  Pulse: 70 (01/05/24 0022)  Resp: (!) 22 (01/05/24 0001)  BP: (!) 125/58 (01/05/24 0001)  SpO2: 99 % (01/05/24 0001) Vital Signs (24h Range):  Temp:  [96.7 °F (35.9 °C)-97.9 °F (36.6 °C)] 96.8 °F (36 °C)  Pulse:  [53-83] 70  Resp:  [16-22] 22  SpO2:  [92 %-99 %] 99 %  BP: ()/(50-64) 125/58     Weight: 83.4 kg (183 lb 13.8 oz)  Body mass index is 24.94 kg/m².     Physical Exam  Constitutional:       General: He is not in acute distress.     Appearance: He is ill-appearing.   HENT:      Head: Normocephalic and atraumatic.   Eyes:      Pupils: Pupils are equal, round, and reactive to light.   Cardiovascular:      Rate and Rhythm: Normal rate. Rhythm irregular.      Pulses: Normal pulses.      Heart sounds: Normal heart sounds.   Pulmonary:      Effort: Pulmonary effort is normal. No respiratory distress.      Breath sounds: Decreased breath sounds present. No wheezing, rhonchi or rales.   Abdominal:      General: Bowel sounds are normal. There is no distension.      Palpations: Abdomen is soft.      Tenderness: There is no abdominal tenderness. There is no guarding or rebound.   Musculoskeletal:      Right lower leg: No edema.      Left lower leg: No edema.   Skin:     General: Skin is warm.      Findings: Wound present.      Comments: Right inguinal tumor. Sacral pressure ulcer with necrosis, redness, and bruising of surrounding skin   Neurological:      Mental Status: He is alert.              CRANIAL NERVES     CN III, IV, VI   Pupils are equal, round, and reactive to light.       Significant  Labs: All pertinent labs within the past 24 hours have been reviewed.    Significant Imaging: I have reviewed all pertinent imaging results/findings within the past 24 hours.

## 2024-01-05 NOTE — ASSESSMENT & PLAN NOTE
After Visit Summary   1/11/2018    Elizabeth Bingham    MRN: [id number]           Patient Information     Date Of Birth          1998        Visit Information        Provider Department      1/11/2018 2:15 PM Kyle Green MD [facility]        Today's Diagnoses     Acute non-recurrent sinusitis, unspecified location    -  1      Care Instructions    General Scheduling Information  To schedule your CT/MRI scan, please contact [facility] at [phone] OR [facility] at [phone]    To schedule your Surgery, please contact our Specialty Schedulers at [phone]      ENT Clinic Locations Clinic Hours Telephone Number     [facility]  [address]  [place] [address]     2nd & 4th Thursday:           8:00am - 12:00pm   To schedule/reschedule an appointment with   Dr. Green,   please contact our   Specialty Scheduling Department at:     [phone]       [facility]  [address] [place] [address] [place] [address]   Monday:             8:00am -- 4:30 pm      1st, 3rd & 5th Thursday:           8:00am - 12:00pm      [address] [address]   Wednesday:       9:00 -- 4:30 pm                    Follow-ups after your visit        Who to contact     If you have questions or need follow up information about today's clinic visit or your schedule please contact [facility] directly at [phone].  Normal or non-critical lab and imaging results will be communicated to you by MyChart, letter or phone within 4 business days after the clinic has received the results. If you do not hear from us within 7 days, please contact the clinic through MyChart or phone. If you have a critical or abnormal lab result, we will notify you by phone as soon as possible.  Submit refill requests through Fillm or call your pharmacy and they will forward the refill request to us. Please allow 3 business days for your refill to be  Continue Rocephin and azithromycin     "completed.          Additional Information About Your Visit        [employer] Information     [employer] lets you send messages to your doctor, view your test results, renew your prescriptions, schedule appointments and more. To sign up, go to [email or web address] . Click on \"Log in\" on the left side of the screen, which will take you to the Welcome page. Then click on \"Sign up Now\" on the right side of the page.     You will be asked to enter the access code listed below, as well as some personal information. Please follow the directions to create your username and password.     Your access code is: [email or web address]  Expires: 2018  5:47 PM     Your access code will  in 90 days. If you need help or a new code, please call your [facility] clinic or [phone].        Care EveryWhere ID     This is your Care EveryWhere ID. This could be used by other organizations to access your [facility] medical records  [id number]        Your Vitals Were     Temperature Height BMI (Body Mass Index)             98.3  F (36.8  C) (Tympanic) 1.676 m (5' 6\") 20.18 kg/m2          Blood Pressure from Last 3 Encounters:   13 115/69    Weight from Last 3 Encounters:   18 56.7 kg (125 lb) (45 %)*   13 45 kg (99 lb 3.3 oz) (23 %)*     * Growth percentiles are based on [facility] 2-20 Years data.              Today, you had the following     No orders found for display         Today's Medication Changes          These changes are accurate as of: 18  5:47 PM.  If you have any questions, ask your nurse or doctor.               Start taking these medicines.        Dose/Directions    amoxicillin-clavulanate 875-125 MG per tablet   Commonly known as:  AUGMENTIN   Used for:  Acute non-recurrent sinusitis, unspecified location   Started by:  Kyle Green MD        Dose:  1 tablet   Take 1 tablet by mouth 2 times daily   Quantity:  20 tablet   Refills:  0            Where to get your medicines      These medications were sent " to [facility]/pharmacy #[id number] - [facility] [address] [place] [address] [facility]  [address] [facility] [address]     Phone:  [phone]     amoxicillin-clavulanate 875-125 MG per tablet                Primary Care Provider Office Phone # Fax #    [facility] [phone]       [address] [place] [address]        Equal Access to Services     RODGER BRITTON : Hadii aad ku hadasho Soomaali, waaxda luqadaha, qaybta kaalmada adeegyada, waxay idiin hayaan adeeg kharash lagwendolyn pickard. So [facility] [phone].    ATENCIÓN: Si habla español, tiene a pappas disposición servicios gratuitos de asistencia lingüística. [facility] [phone].    We comply with applicable federal civil rights laws and [place] laws. We do not discriminate on the basis of race, color, national origin, age, disability, sex, sexual orientation, or gender identity.            Thank you!     Thank you for choosing [facility]  for your care. Our goal is always to provide you with excellent care. Hearing back from our patients is one way we can continue to improve our services. Please take a few minutes to complete the written survey that you may receive in the mail after your visit with us. Thank you!             Your Updated Medication List - Protect others around you: Learn how to safely use, store and throw away your medicines at www.disposemymeds.org.          This list is accurate as of: 1/11/18  5:47 PM.  Always use your most recent med list.                   Brand Name Dispense Instructions for use Diagnosis    amoxicillin-clavulanate 875-125 MG per tablet    AUGMENTIN    20 tablet    Take 1 tablet by mouth 2 times daily    Acute non-recurrent sinusitis, unspecified location       NO ACTIVE MEDICATIONS

## 2024-01-05 NOTE — HPI
83 year-old male with squamous cell skin cancer was admitted on 1/4/24 for hypercalcemia of malignancy. Consult is for squamous cell skin cancer.

## 2024-01-05 NOTE — NURSING
Pt arrived to unit via stretcher from [facility] with EMS services. Pt asleep, responds to voice, assisted onto bed.  Pt wife and son at bedside. JAM Neff NP made aware of pt's arrival.

## 2024-01-05 NOTE — ASSESSMENT & PLAN NOTE
- corrected calcium today is about 12 mg/dL  - he has received IV fluids. Continue IV fluids  - I recommend a one-time dose of zoledronic acid for better control of his hypercalcemia.

## 2024-01-05 NOTE — ASSESSMENT & PLAN NOTE
Advance Care Planning     Date: 01/05/2024    Code Status  In light of the patients advanced and life limiting illness,I engaged the the family in a voluntary conversation about the patient's preferences for care  at the very end of life. The patient wishes to have a natural, peaceful death.  Along those lines, the patient does not wish to have CPR or other invasive treatments performed when his heart and/or breathing stops. I communicated to the family that a DNR order would be placed in his medical record to reflect this preference.  I spent a total of 4 minutes engaging the patient in this advance care planning discussion.

## 2024-01-06 LAB
ANION GAP SERPL CALC-SCNC: 11 MMOL/L (ref 8–16)
BASOPHILS # BLD AUTO: ABNORMAL K/UL (ref 0–0.2)
BASOPHILS NFR BLD: 0 % (ref 0–1.9)
BUN SERPL-MCNC: 27 MG/DL (ref 8–23)
CALCIUM SERPL-MCNC: 11.3 MG/DL (ref 8.7–10.5)
CHLORIDE SERPL-SCNC: 102 MMOL/L (ref 95–110)
CO2 SERPL-SCNC: 26 MMOL/L (ref 23–29)
CREAT SERPL-MCNC: 0.8 MG/DL (ref 0.5–1.4)
DIFFERENTIAL METHOD BLD: ABNORMAL
EOSINOPHIL # BLD AUTO: ABNORMAL K/UL (ref 0–0.5)
EOSINOPHIL NFR BLD: 0 % (ref 0–8)
ERYTHROCYTE [DISTWIDTH] IN BLOOD BY AUTOMATED COUNT: 18.2 % (ref 11.5–14.5)
EST. GFR  (NO RACE VARIABLE): >60 ML/MIN/1.73 M^2
GLUCOSE SERPL-MCNC: 52 MG/DL (ref 70–110)
HCT VFR BLD AUTO: 35.7 % (ref 40–54)
HGB BLD-MCNC: 11.1 G/DL (ref 14–18)
IMM GRANULOCYTES # BLD AUTO: ABNORMAL K/UL (ref 0–0.04)
IMM GRANULOCYTES NFR BLD AUTO: ABNORMAL % (ref 0–0.5)
LYMPHOCYTES # BLD AUTO: ABNORMAL K/UL (ref 1–4.8)
LYMPHOCYTES NFR BLD: 1 % (ref 18–48)
MAGNESIUM SERPL-MCNC: 1.7 MG/DL (ref 1.6–2.6)
MCH RBC QN AUTO: 27.5 PG (ref 27–31)
MCHC RBC AUTO-ENTMCNC: 31.1 G/DL (ref 32–36)
MCV RBC AUTO: 88 FL (ref 82–98)
MONOCYTES # BLD AUTO: ABNORMAL K/UL (ref 0.3–1)
MONOCYTES NFR BLD: 3 % (ref 4–15)
NEUTROPHILS NFR BLD: 96 % (ref 38–73)
NRBC BLD-RTO: 0 /100 WBC
PLATELET # BLD AUTO: 203 K/UL (ref 150–450)
PLATELET BLD QL SMEAR: ABNORMAL
PMV BLD AUTO: 11.1 FL (ref 9.2–12.9)
POTASSIUM SERPL-SCNC: 3.2 MMOL/L (ref 3.5–5.1)
RBC # BLD AUTO: 4.04 M/UL (ref 4.6–6.2)
SODIUM SERPL-SCNC: 139 MMOL/L (ref 136–145)
WBC # BLD AUTO: 48.76 K/UL (ref 3.9–12.7)

## 2024-01-06 PROCEDURE — 83735 ASSAY OF MAGNESIUM: CPT | Performed by: NURSE PRACTITIONER

## 2024-01-06 PROCEDURE — 25000003 PHARM REV CODE 250: Performed by: STUDENT IN AN ORGANIZED HEALTH CARE EDUCATION/TRAINING PROGRAM

## 2024-01-06 PROCEDURE — 94761 N-INVAS EAR/PLS OXIMETRY MLT: CPT

## 2024-01-06 PROCEDURE — 63600175 PHARM REV CODE 636 W HCPCS: Performed by: NURSE PRACTITIONER

## 2024-01-06 PROCEDURE — 85007 BL SMEAR W/DIFF WBC COUNT: CPT | Performed by: NURSE PRACTITIONER

## 2024-01-06 PROCEDURE — 63600175 PHARM REV CODE 636 W HCPCS: Performed by: STUDENT IN AN ORGANIZED HEALTH CARE EDUCATION/TRAINING PROGRAM

## 2024-01-06 PROCEDURE — 63700000 PHARM REV CODE 250 ALT 637 W/O HCPCS: Performed by: NURSE PRACTITIONER

## 2024-01-06 PROCEDURE — 63600175 PHARM REV CODE 636 W HCPCS: Performed by: FAMILY MEDICINE

## 2024-01-06 PROCEDURE — 25000003 PHARM REV CODE 250: Performed by: NURSE PRACTITIONER

## 2024-01-06 PROCEDURE — 25000003 PHARM REV CODE 250: Performed by: FAMILY MEDICINE

## 2024-01-06 PROCEDURE — 85027 COMPLETE CBC AUTOMATED: CPT | Performed by: NURSE PRACTITIONER

## 2024-01-06 PROCEDURE — 11000001 HC ACUTE MED/SURG PRIVATE ROOM

## 2024-01-06 PROCEDURE — 36415 COLL VENOUS BLD VENIPUNCTURE: CPT | Performed by: NURSE PRACTITIONER

## 2024-01-06 PROCEDURE — A4216 STERILE WATER/SALINE, 10 ML: HCPCS | Performed by: NURSE PRACTITIONER

## 2024-01-06 PROCEDURE — 80048 BASIC METABOLIC PNL TOTAL CA: CPT | Performed by: NURSE PRACTITIONER

## 2024-01-06 PROCEDURE — 99900035 HC TECH TIME PER 15 MIN (STAT)

## 2024-01-06 RX ORDER — POTASSIUM CHLORIDE 20 MEQ/1
40 TABLET, EXTENDED RELEASE ORAL ONCE
Status: COMPLETED | OUTPATIENT
Start: 2024-01-06 | End: 2024-01-06

## 2024-01-06 RX ADMIN — VANCOMYCIN HYDROCHLORIDE 2250 MG: 500 INJECTION, POWDER, LYOPHILIZED, FOR SOLUTION INTRAVENOUS at 10:01

## 2024-01-06 RX ADMIN — DIGOXIN 0.12 MG: 125 TABLET ORAL at 09:01

## 2024-01-06 RX ADMIN — Medication 10 ML: at 06:01

## 2024-01-06 RX ADMIN — Medication 10 ML: at 10:01

## 2024-01-06 RX ADMIN — ENOXAPARIN SODIUM 40 MG: 40 INJECTION SUBCUTANEOUS at 05:01

## 2024-01-06 RX ADMIN — POTASSIUM CHLORIDE 40 MEQ: 1500 TABLET, EXTENDED RELEASE ORAL at 09:01

## 2024-01-06 RX ADMIN — VANCOMYCIN HYDROCHLORIDE 1000 MG: 1 INJECTION, POWDER, LYOPHILIZED, FOR SOLUTION INTRAVENOUS at 11:01

## 2024-01-06 RX ADMIN — CEFEPIME 2 G: 2 INJECTION, POWDER, FOR SOLUTION INTRAVENOUS at 02:01

## 2024-01-06 RX ADMIN — AZITHROMYCIN DIHYDRATE 500 MG: 250 TABLET ORAL at 09:01

## 2024-01-06 RX ADMIN — METOPROLOL TARTRATE 25 MG: 25 TABLET, FILM COATED ORAL at 09:01

## 2024-01-06 RX ADMIN — CEFEPIME 2 G: 2 INJECTION, POWDER, FOR SOLUTION INTRAVENOUS at 09:01

## 2024-01-06 RX ADMIN — PANTOPRAZOLE SODIUM 40 MG: 40 TABLET, DELAYED RELEASE ORAL at 09:01

## 2024-01-06 RX ADMIN — SODIUM CHLORIDE, POTASSIUM CHLORIDE, SODIUM LACTATE AND CALCIUM CHLORIDE: 600; 310; 30; 20 INJECTION, SOLUTION INTRAVENOUS at 06:01

## 2024-01-06 RX ADMIN — GENTAMICIN SULFATE: 1 OINTMENT TOPICAL at 02:01

## 2024-01-06 RX ADMIN — LATANOPROST 1 DROP: 50 SOLUTION OPHTHALMIC at 12:01

## 2024-01-06 RX ADMIN — Medication 10 ML: at 02:01

## 2024-01-06 RX ADMIN — CEFEPIME 2 G: 2 INJECTION, POWDER, FOR SOLUTION INTRAVENOUS at 05:01

## 2024-01-06 NOTE — CONSULTS
[facility]  Telemedicine Consult Note    Patient Name: Aquiles Montana  MRN: [id number]  Admission Date: 1/4/2024  Hospital Length of Stay: 1 days  Attending Physician: Veronica Clay MD   Primary Care Provider: Marty Hernandez MD         Thank you for your consult to [facility]. We have reviewed the patient chart. This patient does not meet criteria for [facility] service at this time due to Patient has a condition likely to benefit from bedside evaluation such as open abdominal wound. Ongoing EOL discussions, and [facility] service capacity limitations.  Will not assume care of the patient at this time.      Damaris Ricks MD  Department of [facility] Medicine   [facility]

## 2024-01-06 NOTE — PLAN OF CARE
Problem: Adult Inpatient Plan of Care  Goal: Optimal Comfort and Wellbeing  Outcome: Ongoing, Progressing     Problem: Impaired Wound Healing  Goal: Optimal Wound Healing  Outcome: Ongoing, Progressing

## 2024-01-06 NOTE — SUBJECTIVE & OBJECTIVE
Interval History: sleeping without any issues voiced    Review of Systems   All other systems reviewed and are negative.    Objective:     Vital Signs (Most Recent):  Temp: 97.6 °F (36.4 °C) (01/06/24 1146)  Pulse: 65 (01/06/24 1619)  Resp: 18 (01/06/24 1146)  BP: (!) 104/56 (01/06/24 1146)  SpO2: 95 % (01/06/24 1146) Vital Signs (24h Range):  Temp:  [96.5 °F (35.8 °C)-98 °F (36.7 °C)] 97.6 °F (36.4 °C)  Pulse:  [63-80] 65  Resp:  [18-22] 18  SpO2:  [93 %-100 %] 95 %  BP: (104-137)/(56-83) 104/56     Weight: 87.1 kg (192 lb 0.3 oz)  Body mass index is 26.04 kg/m².    Intake/Output Summary (Last 24 hours) at 1/6/2024 1654  Last data filed at 1/6/2024 1400  Gross per 24 hour   Intake 10 ml   Output 275 ml   Net -265 ml           Physical Exam  Vitals and nursing note reviewed.   Constitutional:       General: He is not in acute distress.     Appearance: He is well-developed.   HENT:      Head: Normocephalic and atraumatic.      Nose: Nose normal.   Eyes:      Conjunctiva/sclera: Conjunctivae normal.   Cardiovascular:      Rate and Rhythm: Normal rate and regular rhythm.      Heart sounds: Normal heart sounds. No murmur heard.  Pulmonary:      Effort: Pulmonary effort is normal.      Breath sounds: Normal breath sounds. No wheezing.   Abdominal:      General: Bowel sounds are normal.      Palpations: Abdomen is soft. There is no mass.      Tenderness: There is no abdominal tenderness. There is no guarding or rebound.   Genitourinary:     Comments: Left inguinal with wound dressings  Musculoskeletal:         General: Normal range of motion.      Cervical back: Normal range of motion and neck supple.   Skin:     General: Skin is warm and dry.      Findings: No rash.   Neurological:      Mental Status: He is alert and oriented to person, place, and time.   Psychiatric:         Behavior: Behavior normal.             Significant Labs: All pertinent labs within the past 24 hours have been reviewed.  BMP:   Recent Labs   Lab  01/06/24  0353   GLU 52*      K 3.2*      CO2 26   BUN 27*   CREATININE 0.8   CALCIUM 11.3*   MG 1.7       CBC:   Recent Labs   Lab 01/05/24  0307 01/06/24  0353   WBC 49.93* 48.76*   HGB 10.8* 11.1*   HCT 33.9* 35.7*    203         Significant Imaging: I have reviewed all pertinent imaging results/findings within the past 24 hours.  I have reviewed and interpreted all pertinent imaging results/findings within the past 24 hours.

## 2024-01-06 NOTE — PROGRESS NOTES
[facility] Medicine  Progress Note    Patient Name: Aquiles Montana  MRN: [id number]  Patient Class: IP- Inpatient   Admission Date: 1/4/2024  Length of Stay: 2 days  Attending Physician: Veronica Clay MD  Primary Care Provider: Marty Hernandez MD        Subjective:     Principal Problem:Hypercalcemia of malignancy        HPI:  Aquiles Montana 83 y.o. with a  has a past medical history of Hypertension, squamous cell carcinoma, AFib, CHF, and Urinary tract infection. who presents to the emergency department today with his family after being called from Heme-Onc for an elevated calcium.     Story comes from his family member secondary to condition.  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 Robin with heme Onc, not currently on any chemotherapy or radiation treatment who went to get his regular labs drawn today and they were called on the way back home and told that he had an elevated calcium and he needed to come to the emergency department.  According to the family the patient has been having a steady decline with worsening weakness since Chaz.  They report that he has had similar presentations like this in the past when his calcium levels were high.  They do report decreased p.o. intake, decreased responsiveness, not talking as much as usual for last couple days, decreased urinary output.  They also report that he has gotten to the point where he can not get out of bed.  Patient is unable to assist with history.    Overview/Hospital Course:  1/5/23: pt with poor prognosis followed by heme/onc. Palliative consulted. Family ok going home with home hospice once hypercalcemia is fixed. Cont iv abx   1/6: ca down to 11.3. cont with current tx plan of ivfs and iv abx    Interval History: sleeping without any issues voiced    Review of Systems   All other systems reviewed and  are negative.    Objective:     Vital Signs (Most Recent):  Temp: 97.6 °F (36.4 °C) (01/06/24 1146)  Pulse: 65 (01/06/24 1619)  Resp: 18 (01/06/24 1146)  BP: (!) 104/56 (01/06/24 1146)  SpO2: 95 % (01/06/24 1146) Vital Signs (24h Range):  Temp:  [96.5 °F (35.8 °C)-98 °F (36.7 °C)] 97.6 °F (36.4 °C)  Pulse:  [63-80] 65  Resp:  [18-22] 18  SpO2:  [93 %-100 %] 95 %  BP: (104-137)/(56-83) 104/56     Weight: 87.1 kg (192 lb 0.3 oz)  Body mass index is 26.04 kg/m².    Intake/Output Summary (Last 24 hours) at 1/6/2024 1654  Last data filed at 1/6/2024 1400  Gross per 24 hour   Intake 10 ml   Output 275 ml   Net -265 ml           Physical Exam  Vitals and nursing note reviewed.   Constitutional:       General: He is not in acute distress.     Appearance: He is well-developed.   HENT:      Head: Normocephalic and atraumatic.      Nose: Nose normal.   Eyes:      Conjunctiva/sclera: Conjunctivae normal.   Cardiovascular:      Rate and Rhythm: Normal rate and regular rhythm.      Heart sounds: Normal heart sounds. No murmur heard.  Pulmonary:      Effort: Pulmonary effort is normal.      Breath sounds: Normal breath sounds. No wheezing.   Abdominal:      General: Bowel sounds are normal.      Palpations: Abdomen is soft. There is no mass.      Tenderness: There is no abdominal tenderness. There is no guarding or rebound.   Genitourinary:     Comments: Left inguinal with wound dressings  Musculoskeletal:         General: Normal range of motion.      Cervical back: Normal range of motion and neck supple.   Skin:     General: Skin is warm and dry.      Findings: No rash.   Neurological:      Mental Status: He is alert and oriented to person, place, and time.   Psychiatric:         Behavior: Behavior normal.             Significant Labs: All pertinent labs within the past 24 hours have been reviewed.  BMP:   Recent Labs   Lab 01/06/24  0353   GLU 52*      K 3.2*      CO2 26   BUN 27*   CREATININE 0.8   CALCIUM 11.3*    MG 1.7       CBC:   Recent Labs   Lab 01/05/24  0307 01/06/24  0353   WBC 49.93* 48.76*   HGB 10.8* 11.1*   HCT 33.9* 35.7*    203         Significant Imaging: I have reviewed all pertinent imaging results/findings within the past 24 hours.  I have reviewed and interpreted all pertinent imaging results/findings within the past 24 hours.    Assessment/Plan:      * Hypercalcemia of malignancy  The patient has hypercalcemia that is currently uncontrolled. The patient has the following symptoms due to their hypercalcemia: weakness and encephalopathy. The hypercalcemia is likely due to Malignancy. We will obtain the following labs to work up the hypercalcemia: alkaline phosphatase   Alkaline Phosphatase   Date Value Ref Range Status   01/04/2024 167 (H) 38 - 126 U/L Final    . We will treat the hypercalcemia with:  2,328 mL IVF . Their latest calcium has been reviewed and is listed below.  Ionized Calcium   Date Value Ref Range Status   01/05/2024 1.63 (H) 1.06 - 1.42 mmol/L Final       Debility  Patient with Acute on chronic debility due to neoplastic/malignant related fatigue and bed confinement status. Latest AMPAC and GEMS scores have not been reviewed. Evaluation for etiology is complete. Plan includes progressive mobility protocol initated and fall precautions in place.  Will hold off on ordering PT/OT at this time given patient's current mental status    Decubitus ulcer  Large sacral ulcer noted   Wound care consult ordered  Q.2 hour turns      Pneumonia due to infectious organism  Continue Rocephin and azithromycin      Heart failure with reduced ejection fraction  Controlled   Patient not on diuretics  Monitor closely for signs of volume overload s/p 2 L IVF  Continue BB  ECHO 09/12/2023  1. Normal left ventricular cavity size. Low normal left ventricular systolic function.   LVEF 50 - 55%.   2. Normal right ventricular size. Normal right ventricular systolic function.   3. Mild to moderate mitral valve  regurgitation.   4. Mild to moderate tricuspid valve regurgitation.   5. A pleural effusion is visualized.       ACP (advance care planning)  Advance Care Planning    Date: 01/05/2024    Code Status  In light of the patients advanced and life limiting illness,I engaged the the family in a voluntary conversation about the patient's preferences for care  at the very end of life. The patient wishes to have a natural, peaceful death.  Along those lines, the patient does not wish to have CPR or other invasive treatments performed when his heart and/or breathing stops. I communicated to the family that a DNR order would be placed in his medical record to reflect this preference.  I spent a total of 4 minutes engaging the patient in this advance care planning discussion.      Hospice at home, anticipated on Monday with hypercalemia resolved          A-fib  Patient with Permanent atrial fibrillation which is controlled currently with Beta Blocker and Digoxin. Patient is currently in atrial fibrillation.[id number] Score: 2. HASBLED Score: 3. Anticoagulation not indicated due to risk of bleeding .  Lovenox started for DVT prophylaxis while inpatient  Check digoxin level with a.m. labs    Sepsis  This patient does have evidence of infective focus  My overall impression is sepsis.  Source: Respiratory  Antibiotics given-   Antibiotics (72h ago, onward)      Start     Stop Route Frequency Ordered    01/05/24 0900  azithromycin tablet 500 mg         01/10/24 0859 Oral Daily 01/04/24 1607    01/05/24 0900  gentamicin 0.1 % ointment         -- Top Daily 01/05/24 0021    01/04/24 2200  ceFEPIme (MAXIPIME) 2 g in dextrose 5 % in water (D5W) 100 mL IVPB (MB+)         -- IV Every 8 hours (non-standard times) 01/04/24 1954          Latest lactate reviewed-  Recent Labs   Lab 01/04/24  1541 01/04/24  1848   LACTATE 2.4* 1.6     Organ dysfunction indicated by Encephalopathy    Fluid challenge Actual Body weight- Patient will receive  30ml/kg actual body weight to calculate fluid bolus for treatment of septic shock.     Post- resuscitation assessment Yes Perfusion exam was performed within 6 hours of septic shock presentation after bolus shows Adequate tissue perfusion assessed by non-invasive monitoring       Will Not start Pressors- Levophed for MAP of 65  Source control achieved by:  Ceftriaxone/azithromycin    SCCA (squamous cell carcinoma) of skin  Follows with Heme-Onc outpatient  Large right inguinal tumor noted        VTE Risk Mitigation (From admission, onward)           Ordered     enoxaparin injection 40 mg  Daily         01/05/24 0050     IP VTE HIGH RISK PATIENT  Once         01/05/24 0050     Place sequential compression device  Until discontinued         01/05/24 0050                    Discharge Planning   NANCY: 1/8/2024     Code Status: DNR   Is the patient medically ready for discharge?:     Reason for patient still in hospital (select all that apply): Treatment  Discharge Plan A: Hospice/home   Discharge Delays: None known at this time              Veronica Clay MD  Department of Hospital Medicine   [facility]

## 2024-01-06 NOTE — PLAN OF CARE
1240  JASON was informed by Dr Clay that the pt's spouse would like to meet with [facility] today & for the pt to be discharged home tomorrow.     1245  CM paged on-call nurse with [facility] ([phone]) to inform of above. Awaiting response.     1305  CM was informed by Agnieszka ([phone]) [facility] that the meeting was scheduled for Monday 1/8/2024 & that she is unsure if anyone can meet with the spouse today.  Awaiting return call.     1335  CM received a return call from Brianne ([phone]) [facility] stating that she spoke with the spouse & that the spouse would like to resume previously scheduled meeting on Monday. CM received confirmation from the pt's spouse, Jessica Montana ([phone]), via phone regarding above. Updated notes sent to [facility] via [facility]. CM informed Dr Clay of above.

## 2024-01-06 NOTE — PROGRESS NOTES
Pharmacokinetic Initial Assessment: IV Vancomycin    Assessment/Plan:    Initiate intravenous vancomycin with loading dose of 2250 mg once followed by a maintenance dose of vancomycin 1000 mg IV every 12 hours  Desired empiric serum trough concentration is 15 to 20 mcg/mL  Draw vancomycin trough level 60 min prior to third dose on 1/7/24 at approximately 1000  Pharmacy will continue to follow and monitor vancomycin.      Please contact pharmacy at extension [phone] with any questions regarding this assessment.     Thank you for the consult,   Florina Jose       Patient brief summary:  Aquiles Montana is a 83 y.o. male initiated on antimicrobial therapy with IV Vancomycin for treatment of suspected  pneumonia    Drug Allergies:   Review of patient's allergies indicates:  No Known Allergies    Actual Body Weight:   87.1 kg     Renal Function:   Estimated Creatinine Clearance: 76.8 mL/min (based on SCr of 0.8 mg/dL).,       CBC (last 72 hours):  Recent Labs   Lab Result Units 01/04/24  1014 01/04/24  1322 01/05/24  0307 01/06/24  0353   WBC K/uL 49.07* 54.14* 49.93* 48.76*   Hemoglobin g/dL 12.5* 13.8* 10.8* 11.1*   Hematocrit % 39.7* 44.5 33.9* 35.7*   Platelets K/uL 241 256 186 203   Gran % % 93.0* 95.0* 96.0* 96.0*   Lymph % % 3.5* 4.0* 4.0* 1.0*   Mono % % 2.5* 1.0* 0.0* 3.0*   Eosinophil % % 1.0 0.0 0.0 0.0   Basophil % % 0.0 0.0 0.0 0.0   Differential Method  Manual Manual Manual Manual       Metabolic Panel (last 72 hours):  Recent Labs   Lab Result Units 01/04/24  1014 01/04/24  1322 01/05/24  0307 01/05/24  0604 01/06/24  0353   Sodium mmol/L 139 138 139  --  139   Potassium mmol/L 3.4* 3.8 3.3*  --  3.2*   Chloride mmol/L 98 94* 102  --  102   CO2 mmol/L 29 30* 26  --  26   Glucose mg/dL 85 107 68*  --  52*   Glucose, UA   --   --   --  Negative  --    BUN mg/dL 24* 31* 27*  --  27*   Creatinine mg/dL 0.9 0.95 0.8  --  0.8   Albumin g/dL 2.3* 3.4*  --   --   --    Total Bilirubin mg/dL 0.4 0.6  --   --   --   "  Alkaline Phosphatase U/L 147* 167*  --   --   --    AST U/L 15 25  --   --   --    ALT U/L 8* 16  --   --   --    Magnesium mg/dL  --  2.0 1.7  --  1.7   Phosphorus mg/dL  --  3.5  --   --   --        Drug levels (last 3 results):  No results for input(s): "VANCOMYCINRA", "VANCORANDOM", "VANCOMYCINPE", "VANCOPEAK", "VANCOMYCINTR", "VANCOTROUGH" in the last 72 hours.    Microbiologic Results:  Microbiology Results (last 7 days)       Procedure Component Value Units Date/Time    Blood culture x two cultures. Draw prior to antibiotics. [[id number]] Collected: 01/04/24 1425    Order Status: Completed Specimen: Blood from Peripheral, Forearm, Left Updated: 01/05/24 2212     Blood Culture, Routine No Growth to date      No Growth to date    Narrative:      Aerobic and anaerobic    Blood culture x two cultures. Draw prior to antibiotics. [[id number]] Collected: 01/04/24 1425    Order Status: Completed Specimen: Blood from Peripheral, Antecubital, Left Updated: 01/05/24 2212     Blood Culture, Routine No Growth to date      No Growth to date    Narrative:      Aerobic and anaerobic    Influenza A & B by Molecular [[id number]] Collected: 01/04/24 1434    Order Status: Completed Specimen: Nasopharyngeal Swab Updated: 01/04/24 1517     Influenza A, Molecular Negative     Influenza B, Molecular Negative     Flu A & B Source Nasal swab            "

## 2024-01-07 LAB
ANION GAP SERPL CALC-SCNC: 10 MMOL/L (ref 8–16)
BASOPHILS # BLD AUTO: 0.05 K/UL (ref 0–0.2)
BASOPHILS NFR BLD: 0.1 % (ref 0–1.9)
BUN SERPL-MCNC: 23 MG/DL (ref 8–23)
CALCIUM SERPL-MCNC: 10.6 MG/DL (ref 8.7–10.5)
CHLORIDE SERPL-SCNC: 103 MMOL/L (ref 95–110)
CO2 SERPL-SCNC: 25 MMOL/L (ref 23–29)
CREAT SERPL-MCNC: 0.8 MG/DL (ref 0.5–1.4)
DIFFERENTIAL METHOD BLD: ABNORMAL
EOSINOPHIL # BLD AUTO: 0.3 K/UL (ref 0–0.5)
EOSINOPHIL NFR BLD: 0.7 % (ref 0–8)
ERYTHROCYTE [DISTWIDTH] IN BLOOD BY AUTOMATED COUNT: 18.6 % (ref 11.5–14.5)
EST. GFR  (NO RACE VARIABLE): >60 ML/MIN/1.73 M^2
GLUCOSE SERPL-MCNC: 77 MG/DL (ref 70–110)
HCT VFR BLD AUTO: 35.2 % (ref 40–54)
HGB BLD-MCNC: 11 G/DL (ref 14–18)
IMM GRANULOCYTES # BLD AUTO: 1.01 K/UL (ref 0–0.04)
IMM GRANULOCYTES NFR BLD AUTO: 2.2 % (ref 0–0.5)
LYMPHOCYTES # BLD AUTO: 0.9 K/UL (ref 1–4.8)
LYMPHOCYTES NFR BLD: 2 % (ref 18–48)
MAGNESIUM SERPL-MCNC: 1.5 MG/DL (ref 1.6–2.6)
MCH RBC QN AUTO: 27.6 PG (ref 27–31)
MCHC RBC AUTO-ENTMCNC: 31.3 G/DL (ref 32–36)
MCV RBC AUTO: 88 FL (ref 82–98)
MONOCYTES # BLD AUTO: 1.8 K/UL (ref 0.3–1)
MONOCYTES NFR BLD: 4 % (ref 4–15)
NEUTROPHILS # BLD AUTO: 41.1 K/UL (ref 1.8–7.7)
NEUTROPHILS NFR BLD: 91 % (ref 38–73)
NRBC BLD-RTO: 0 /100 WBC
PLATELET # BLD AUTO: 196 K/UL (ref 150–450)
PLATELET BLD QL SMEAR: ABNORMAL
PMV BLD AUTO: 12 FL (ref 9.2–12.9)
POCT GLUCOSE: 143 MG/DL (ref 70–110)
POCT GLUCOSE: 156 MG/DL (ref 70–110)
POTASSIUM SERPL-SCNC: 3.5 MMOL/L (ref 3.5–5.1)
RBC # BLD AUTO: 3.98 M/UL (ref 4.6–6.2)
SODIUM SERPL-SCNC: 138 MMOL/L (ref 136–145)
VANCOMYCIN TROUGH SERPL-MCNC: 20.3 UG/ML (ref 10–22)
WBC # BLD AUTO: 45.2 K/UL (ref 3.9–12.7)

## 2024-01-07 PROCEDURE — A4216 STERILE WATER/SALINE, 10 ML: HCPCS | Performed by: NURSE PRACTITIONER

## 2024-01-07 PROCEDURE — 80048 BASIC METABOLIC PNL TOTAL CA: CPT | Performed by: NURSE PRACTITIONER

## 2024-01-07 PROCEDURE — 80202 ASSAY OF VANCOMYCIN: CPT | Performed by: FAMILY MEDICINE

## 2024-01-07 PROCEDURE — 85025 COMPLETE CBC W/AUTO DIFF WBC: CPT | Performed by: NURSE PRACTITIONER

## 2024-01-07 PROCEDURE — 63600175 PHARM REV CODE 636 W HCPCS: Performed by: FAMILY MEDICINE

## 2024-01-07 PROCEDURE — 11000001 HC ACUTE MED/SURG PRIVATE ROOM

## 2024-01-07 PROCEDURE — 63600175 PHARM REV CODE 636 W HCPCS: Performed by: NURSE PRACTITIONER

## 2024-01-07 PROCEDURE — 25000003 PHARM REV CODE 250: Performed by: FAMILY MEDICINE

## 2024-01-07 PROCEDURE — 36415 COLL VENOUS BLD VENIPUNCTURE: CPT | Performed by: NURSE PRACTITIONER

## 2024-01-07 PROCEDURE — 99900035 HC TECH TIME PER 15 MIN (STAT)

## 2024-01-07 PROCEDURE — 36415 COLL VENOUS BLD VENIPUNCTURE: CPT | Mod: XB | Performed by: FAMILY MEDICINE

## 2024-01-07 PROCEDURE — 25000003 PHARM REV CODE 250: Performed by: NURSE PRACTITIONER

## 2024-01-07 PROCEDURE — 94761 N-INVAS EAR/PLS OXIMETRY MLT: CPT

## 2024-01-07 PROCEDURE — 63600175 PHARM REV CODE 636 W HCPCS: Performed by: STUDENT IN AN ORGANIZED HEALTH CARE EDUCATION/TRAINING PROGRAM

## 2024-01-07 PROCEDURE — 83735 ASSAY OF MAGNESIUM: CPT | Performed by: NURSE PRACTITIONER

## 2024-01-07 PROCEDURE — 63700000 PHARM REV CODE 250 ALT 637 W/O HCPCS: Performed by: NURSE PRACTITIONER

## 2024-01-07 PROCEDURE — 25000003 PHARM REV CODE 250: Performed by: STUDENT IN AN ORGANIZED HEALTH CARE EDUCATION/TRAINING PROGRAM

## 2024-01-07 RX ADMIN — Medication 10 ML: at 02:01

## 2024-01-07 RX ADMIN — Medication 10 ML: at 06:01

## 2024-01-07 RX ADMIN — ENOXAPARIN SODIUM 40 MG: 40 INJECTION SUBCUTANEOUS at 06:01

## 2024-01-07 RX ADMIN — CEFEPIME 2 G: 2 INJECTION, POWDER, FOR SOLUTION INTRAVENOUS at 09:01

## 2024-01-07 RX ADMIN — Medication 10 ML: at 09:01

## 2024-01-07 RX ADMIN — VANCOMYCIN HYDROCHLORIDE 750 MG: 750 INJECTION, POWDER, LYOPHILIZED, FOR SOLUTION INTRAVENOUS at 12:01

## 2024-01-07 RX ADMIN — GENTAMICIN SULFATE: 1 OINTMENT TOPICAL at 09:01

## 2024-01-07 RX ADMIN — PANTOPRAZOLE SODIUM 40 MG: 40 TABLET, DELAYED RELEASE ORAL at 09:01

## 2024-01-07 RX ADMIN — AZITHROMYCIN DIHYDRATE 500 MG: 250 TABLET ORAL at 09:01

## 2024-01-07 RX ADMIN — SODIUM CHLORIDE, POTASSIUM CHLORIDE, SODIUM LACTATE AND CALCIUM CHLORIDE: 600; 310; 30; 20 INJECTION, SOLUTION INTRAVENOUS at 06:01

## 2024-01-07 RX ADMIN — SODIUM CHLORIDE, POTASSIUM CHLORIDE, SODIUM LACTATE AND CALCIUM CHLORIDE: 600; 310; 30; 20 INJECTION, SOLUTION INTRAVENOUS at 02:01

## 2024-01-07 RX ADMIN — CEFEPIME 2 G: 2 INJECTION, POWDER, FOR SOLUTION INTRAVENOUS at 05:01

## 2024-01-07 RX ADMIN — METOPROLOL TARTRATE 25 MG: 25 TABLET, FILM COATED ORAL at 09:01

## 2024-01-07 RX ADMIN — CEFEPIME 2 G: 2 INJECTION, POWDER, FOR SOLUTION INTRAVENOUS at 02:01

## 2024-01-07 RX ADMIN — DIGOXIN 0.12 MG: 125 TABLET ORAL at 09:01

## 2024-01-07 RX ADMIN — DOCUSATE SODIUM AND SENNOSIDES 1 TABLET: 8.6; 5 TABLET, FILM COATED ORAL at 09:01

## 2024-01-07 RX ADMIN — LATANOPROST 1 DROP: 50 SOLUTION OPHTHALMIC at 09:01

## 2024-01-07 NOTE — PROGRESS NOTES
[facility] Medicine  Progress Note    Patient Name: Aquiles Montana  MRN: [id number]  Patient Class: IP- Inpatient   Admission Date: 1/4/2024  Length of Stay: 3 days  Attending Physician: Veronica Clay MD  Primary Care Provider: Marty Hernandez MD        Subjective:     Principal Problem:Hypercalcemia of malignancy        HPI:  Aquiles Montana 83 y.o. with a  has a past medical history of Hypertension, squamous cell carcinoma, AFib, CHF, and Urinary tract infection. who presents to the emergency department today with his family after being called from Heme-Onc for an elevated calcium.     Story comes from his family member secondary to condition.  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 Robin with heme Onc, not currently on any chemotherapy or radiation treatment who went to get his regular labs drawn today and they were called on the way back home and told that he had an elevated calcium and he needed to come to the emergency department.  According to the family the patient has been having a steady decline with worsening weakness since Chaz.  They report that he has had similar presentations like this in the past when his calcium levels were high.  They do report decreased p.o. intake, decreased responsiveness, not talking as much as usual for last couple days, decreased urinary output.  They also report that he has gotten to the point where he can not get out of bed.  Patient is unable to assist with history.    Overview/Hospital Course:  1/5/23: pt with poor prognosis followed by heme/onc. Palliative consulted. Family ok going home with home hospice once hypercalcemia is fixed. Cont iv abx   1/6: ca down to 11.3. cont with current tx plan of ivfs and iv abx  1/7: Ca down to 10.6. anticipate home with hospice in am    Interval History: no  issues voiced    Review of  Systems   All other systems reviewed and are negative.    Objective:     Vital Signs (Most Recent):  Temp: 97.6 °F (36.4 °C) (01/07/24 1144)  Pulse: (!) 58 (01/07/24 1608)  Resp: 18 (01/07/24 1144)  BP: 123/75 (01/07/24 1144)  SpO2: (!) 94 % (01/07/24 1144) Vital Signs (24h Range):  Temp:  [97.1 °F (36.2 °C)-97.6 °F (36.4 °C)] 97.6 °F (36.4 °C)  Pulse:  [] 58  Resp:  [18] 18  SpO2:  [93 %-100 %] 94 %  BP: (121-163)/(58-75) 123/75     Weight: 87.1 kg (192 lb 0.3 oz)  Body mass index is 26.04 kg/m².    Intake/Output Summary (Last 24 hours) at 1/7/2024 1618  Last data filed at 1/7/2024 1600  Gross per 24 hour   Intake 0 ml   Output 1200 ml   Net -1200 ml           Physical Exam  Vitals and nursing note reviewed.   Constitutional:       General: He is not in acute distress.     Appearance: He is well-developed.   HENT:      Head: Normocephalic and atraumatic.      Nose: Nose normal.   Eyes:      Conjunctiva/sclera: Conjunctivae normal.   Cardiovascular:      Rate and Rhythm: Normal rate and regular rhythm.      Heart sounds: Normal heart sounds. No murmur heard.  Pulmonary:      Effort: Pulmonary effort is normal.      Breath sounds: Normal breath sounds. No wheezing.   Abdominal:      General: Bowel sounds are normal.      Palpations: Abdomen is soft. There is no mass.      Tenderness: There is no abdominal tenderness. There is no guarding or rebound.   Genitourinary:     Comments: Left inguinal with wound dressings  Musculoskeletal:         General: Normal range of motion.      Cervical back: Normal range of motion and neck supple.   Skin:     General: Skin is warm and dry.      Findings: No rash.   Neurological:      Mental Status: He is alert and oriented to person, place, and time.   Psychiatric:         Behavior: Behavior normal.             Significant Labs: All pertinent labs within the past 24 hours have been reviewed.  BMP:   Recent Labs   Lab 01/07/24  0558   GLU 77      K 3.5      CO2 25    BUN 23   CREATININE 0.8   CALCIUM 10.6*   MG 1.5*       CBC:   Recent Labs   Lab 01/06/24  0353 01/07/24  0558   WBC 48.76* 45.20*   HGB 11.1* 11.0*   HCT 35.7* 35.2*    196         Significant Imaging: I have reviewed all pertinent imaging results/findings within the past 24 hours.  I have reviewed and interpreted all pertinent imaging results/findings within the past 24 hours.    Assessment/Plan:      * Hypercalcemia of malignancy  The patient has hypercalcemia that is currently uncontrolled. The patient has the following symptoms due to their hypercalcemia: weakness and encephalopathy. The hypercalcemia is likely due to Malignancy. We will obtain the following labs to work up the hypercalcemia: alkaline phosphatase   Alkaline Phosphatase   Date Value Ref Range Status   01/04/2024 167 (H) 38 - 126 U/L Final    . We will treat the hypercalcemia with:  2,328 mL IVF . Their latest calcium has been reviewed and is listed below.  Ionized Calcium   Date Value Ref Range Status   01/05/2024 1.63 (H) 1.06 - 1.42 mmol/L Final       Debility  Patient with Acute on chronic debility due to neoplastic/malignant related fatigue and bed confinement status. Latest AMPAC and GEMS scores have not been reviewed. Evaluation for etiology is complete. Plan includes progressive mobility protocol initated and fall precautions in place.  Will hold off on ordering PT/OT at this time given patient's current mental status    Decubitus ulcer  Large sacral ulcer noted   Wound care consult ordered  Q.2 hour turns      Pneumonia due to infectious organism  Continue Rocephin and azithromycin      Heart failure with reduced ejection fraction  Controlled   Patient not on diuretics  Monitor closely for signs of volume overload s/p 2 L IVF  Continue BB  ECHO 09/12/2023  1. Normal left ventricular cavity size. Low normal left ventricular systolic function.   LVEF 50 - 55%.   2. Normal right ventricular size. Normal right ventricular systolic  function.   3. Mild to moderate mitral valve regurgitation.   4. Mild to moderate tricuspid valve regurgitation.   5. A pleural effusion is visualized.       ACP (advance care planning)  Advance Care Planning    Date: 01/05/2024    Code Status  In light of the patients advanced and life limiting illness,I engaged the the family in a voluntary conversation about the patient's preferences for care  at the very end of life. The patient wishes to have a natural, peaceful death.  Along those lines, the patient does not wish to have CPR or other invasive treatments performed when his heart and/or breathing stops. I communicated to the family that a DNR order would be placed in his medical record to reflect this preference.  I spent a total of 4 minutes engaging the patient in this advance care planning discussion.      Hospice at home, anticipated on Monday with hypercalemia resolved          A-fib  Patient with Permanent atrial fibrillation which is controlled currently with Beta Blocker and Digoxin. Patient is currently in atrial fibrillation.[id number] Score: 2. HASBLED Score: 3. Anticoagulation not indicated due to risk of bleeding .  Lovenox started for DVT prophylaxis while inpatient  Check digoxin level with a.m. labs    Sepsis  This patient does have evidence of infective focus  My overall impression is sepsis.  Source: Respiratory  Antibiotics given-   Antibiotics (72h ago, onward)      Start     Stop Route Frequency Ordered    01/05/24 0900  azithromycin tablet 500 mg         01/10/24 0859 Oral Daily 01/04/24 1607    01/05/24 0900  gentamicin 0.1 % ointment         -- Top Daily 01/05/24 0021    01/04/24 2200  ceFEPIme (MAXIPIME) 2 g in dextrose 5 % in water (D5W) 100 mL IVPB (MB+)         -- IV Every 8 hours (non-standard times) 01/04/24 1954          Latest lactate reviewed-  Recent Labs   Lab 01/04/24  1541 01/04/24  1848   LACTATE 2.4* 1.6     Organ dysfunction indicated by Encephalopathy    Fluid challenge  Actual Body weight- Patient will receive 30ml/kg actual body weight to calculate fluid bolus for treatment of septic shock.     Post- resuscitation assessment Yes Perfusion exam was performed within 6 hours of septic shock presentation after bolus shows Adequate tissue perfusion assessed by non-invasive monitoring       Will Not start Pressors- Levophed for MAP of 65  Source control achieved by:  Ceftriaxone/azithromycin    SCCA (squamous cell carcinoma) of skin  Follows with Heme-Onc outpatient  Large right inguinal tumor noted        VTE Risk Mitigation (From admission, onward)           Ordered     enoxaparin injection 40 mg  Daily         01/05/24 0050     IP VTE HIGH RISK PATIENT  Once         01/05/24 0050     Place sequential compression device  Until discontinued         01/05/24 0050                    Discharge Planning   NANCY: 1/8/2024     Code Status: DNR   Is the patient medically ready for discharge?:     Reason for patient still in hospital (select all that apply): Treatment  Discharge Plan A: Hospice/home   Discharge Delays: None known at this time              Veronica Clay MD  Department of Hospital Medicine   [facility]

## 2024-01-07 NOTE — NURSING
RAPID RESPONSE NURSE PROACTIVE ROUNDING NOTE       Time of Visit:     Admit Date: 2024  LOS: 2  Code Status: DNR   Date of Visit: 2024  : 1940  Age: 83 y.o.  Sex: male  Race: White  Bed: K470/K470 A:   MRN: [id number]  Was the patient discharged from an ICU this admission? No   Was the patient discharged from a PACU within last 24 hours? No   Did the patient receive conscious sedation/general anesthesia in last 24 hours? No   Was the patient in the ED within the past 24 hours? No   Was the patient on NIPPV within the past 24 hours? No   Attending Physician: Veronica Clay MD  Primary Service: Hospitalist   Time spent at the bedside: < 15 min    SITUATION    Notified by bedside RN via phone call  Reason for alert: IV access    Diagnosis: Hypercalcemia of malignancy   has a past medical history of Hypertension and Urinary tract infection.    Last Vitals:  Temp: 97.6 °F (36.4 °C) ( 1146)  Pulse: 65 ( 1619)  Resp: 18 ( 114)  BP: 104/56 ( 1146)  SpO2: 96 % (2007)    24 Hour Vitals Range:  Temp:  [96.5 °F (35.8 °C)-98 °F (36.7 °C)]   Pulse:  [63-80]   Resp:  [18-22]   BP: (104-137)/(56-66)   SpO2:  [93 %-98 %]     Contacted by bedside RN for IV access. 20g PIV placed with ultrasound guidance.  FOLLOW UP    Call back the Rapid Response NurseWill at [phone] for additional questions or concerns.

## 2024-01-07 NOTE — SUBJECTIVE & OBJECTIVE
Interval History: no  issues voiced    Review of Systems   All other systems reviewed and are negative.    Objective:     Vital Signs (Most Recent):  Temp: 97.6 °F (36.4 °C) (01/07/24 1144)  Pulse: (!) 58 (01/07/24 1608)  Resp: 18 (01/07/24 1144)  BP: 123/75 (01/07/24 1144)  SpO2: (!) 94 % (01/07/24 1144) Vital Signs (24h Range):  Temp:  [97.1 °F (36.2 °C)-97.6 °F (36.4 °C)] 97.6 °F (36.4 °C)  Pulse:  [] 58  Resp:  [18] 18  SpO2:  [93 %-100 %] 94 %  BP: (121-163)/(58-75) 123/75     Weight: 87.1 kg (192 lb 0.3 oz)  Body mass index is 26.04 kg/m².    Intake/Output Summary (Last 24 hours) at 1/7/2024 1618  Last data filed at 1/7/2024 1600  Gross per 24 hour   Intake 0 ml   Output 1200 ml   Net -1200 ml           Physical Exam  Vitals and nursing note reviewed.   Constitutional:       General: He is not in acute distress.     Appearance: He is well-developed.   HENT:      Head: Normocephalic and atraumatic.      Nose: Nose normal.   Eyes:      Conjunctiva/sclera: Conjunctivae normal.   Cardiovascular:      Rate and Rhythm: Normal rate and regular rhythm.      Heart sounds: Normal heart sounds. No murmur heard.  Pulmonary:      Effort: Pulmonary effort is normal.      Breath sounds: Normal breath sounds. No wheezing.   Abdominal:      General: Bowel sounds are normal.      Palpations: Abdomen is soft. There is no mass.      Tenderness: There is no abdominal tenderness. There is no guarding or rebound.   Genitourinary:     Comments: Left inguinal with wound dressings  Musculoskeletal:         General: Normal range of motion.      Cervical back: Normal range of motion and neck supple.   Skin:     General: Skin is warm and dry.      Findings: No rash.   Neurological:      Mental Status: He is alert and oriented to person, place, and time.   Psychiatric:         Behavior: Behavior normal.             Significant Labs: All pertinent labs within the past 24 hours have been reviewed.  BMP:   Recent Labs   Lab  01/07/24  0558   GLU 77      K 3.5      CO2 25   BUN 23   CREATININE 0.8   CALCIUM 10.6*   MG 1.5*       CBC:   Recent Labs   Lab 01/06/24  0353 01/07/24  0558   WBC 48.76* 45.20*   HGB 11.1* 11.0*   HCT 35.7* 35.2*    196         Significant Imaging: I have reviewed all pertinent imaging results/findings within the past 24 hours.  I have reviewed and interpreted all pertinent imaging results/findings within the past 24 hours.

## 2024-01-07 NOTE — PROGRESS NOTES
Pharmacokinetic Assessment Follow Up: IV Vancomycin    Vancomycin serum concentration assessment(s):    The trough level was drawn correctly and can be used to guide therapy at this time. The measurement is above the desired definitive target range of 15 to 20 mcg/mL.    Vancomycin Regimen Plan:    Change regimen to Vancomycin 750 mg IV every 12 hours with next serum trough concentration measured at 1100 prior to third dose on 01/08/2024    Drug levels (last 3 results):  Recent Labs   Lab Result Units 01/07/24  1014   Vancomycin-Trough ug/mL 20.3       Pharmacy will continue to follow and monitor vancomycin.    Please contact pharmacy at extension [phone] for questions regarding this assessment.    Thank you for the consult,   Leslie Marcus       Patient brief summary:  Aquiles Montana is a 83 y.o. male initiated on antimicrobial therapy with IV Vancomycin for treatment of  pneumonia    The patient's current regimen is vancomycin 750 mg ivpb q12h    Drug Allergies:   Review of patient's allergies indicates:  No Known Allergies    Actual Body Weight:   87.1 kg    Renal Function:   Estimated Creatinine Clearance: 76.8 mL/min (based on SCr of 0.8 mg/dL).,     Dialysis Method (if applicable):  N/A    CBC (last 72 hours):  Recent Labs   Lab Result Units 01/04/24  1322 01/05/24  0307 01/06/24  0353 01/07/24  0558   WBC K/uL 54.14* 49.93* 48.76* 45.20*   Hemoglobin g/dL 13.8* 10.8* 11.1* 11.0*   Hematocrit % 44.5 33.9* 35.7* 35.2*   Platelets K/uL 256 186 203 196   Gran % % 95.0* 96.0* 96.0* 91.0*   Lymph % % 4.0* 4.0* 1.0* 2.0*   Mono % % 1.0* 0.0* 3.0* 4.0   Eosinophil % % 0.0 0.0 0.0 0.7   Basophil % % 0.0 0.0 0.0 0.1   Differential Method  Manual Manual Manual Automated       Metabolic Panel (last 72 hours):  Recent Labs   Lab Result Units 01/04/24  1322 01/05/24  0307 01/05/24  0604 01/06/24  0353 01/07/24  0558   Sodium mmol/L 138 139  --  139 138   Potassium mmol/L 3.8 3.3*  --  3.2* 3.5   Chloride mmol/L  94* 102  --  102 103   CO2 mmol/L 30* 26  --  26 25   Glucose mg/dL 107 68*  --  52* 77   Glucose, UA   --   --  Negative  --   --    BUN mg/dL 31* 27*  --  27* 23   Creatinine mg/dL 0.95 0.8  --  0.8 0.8   Albumin g/dL 3.4*  --   --   --   --    Total Bilirubin mg/dL 0.6  --   --   --   --    Alkaline Phosphatase U/L 167*  --   --   --   --    AST U/L 25  --   --   --   --    ALT U/L 16  --   --   --   --    Magnesium mg/dL 2.0 1.7  --  1.7 1.5*   Phosphorus mg/dL 3.5  --   --   --   --        Vancomycin Administrations:  vancomycin given in the last 96 hours                     vancomycin (VANCOCIN) 1,000 mg in dextrose 5 % (D5W) 250 mL IVPB (Vial-Mate) (mg) 1,000 mg New Bag 01/06/24 2323    vancomycin (VANCOCIN) 2,250 mg in dextrose 5 % (D5W) 500 mL IVPB (mg) 2,250 mg New Bag 01/06/24 1056                    Microbiologic Results:  Microbiology Results (last 7 days)       Procedure Component Value Units Date/Time    Blood culture x two cultures. Draw prior to antibiotics. [[id number]] Collected: 01/04/24 1425    Order Status: Completed Specimen: Blood from Peripheral, Forearm, Left Updated: 01/06/24 2212     Blood Culture, Routine No Growth to date      No Growth to date      No Growth to date    Narrative:      Aerobic and anaerobic    Blood culture x two cultures. Draw prior to antibiotics. [[id number]] Collected: 01/04/24 1425    Order Status: Completed Specimen: Blood from Peripheral, Antecubital, Left Updated: 01/06/24 2212     Blood Culture, Routine No Growth to date      No Growth to date      No Growth to date    Narrative:      Aerobic and anaerobic    Influenza A & B by Molecular [[id number]] Collected: 01/04/24 1434    Order Status: Completed Specimen: Nasopharyngeal Swab Updated: 01/04/24 1517     Influenza A, Molecular Negative     Influenza B, Molecular Negative     Flu A & B Source Nasal swab

## 2024-01-08 VITALS
SYSTOLIC BLOOD PRESSURE: 118 MMHG | HEART RATE: 51 BPM | OXYGEN SATURATION: 94 % | RESPIRATION RATE: 18 BRPM | TEMPERATURE: 98 F | BODY MASS INDEX: 24.99 KG/M2 | DIASTOLIC BLOOD PRESSURE: 57 MMHG | WEIGHT: 184.5 LBS | HEIGHT: 72 IN

## 2024-01-08 LAB
ANISOCYTOSIS BLD QL SMEAR: SLIGHT
BASOPHILS # BLD AUTO: ABNORMAL K/UL (ref 0–0.2)
BASOPHILS NFR BLD: 0 % (ref 0–1.9)
DIFFERENTIAL METHOD BLD: ABNORMAL
EOSINOPHIL # BLD AUTO: ABNORMAL K/UL (ref 0–0.5)
EOSINOPHIL NFR BLD: 0 % (ref 0–8)
ERYTHROCYTE [DISTWIDTH] IN BLOOD BY AUTOMATED COUNT: 18.1 % (ref 11.5–14.5)
HCT VFR BLD AUTO: 33.9 % (ref 40–54)
HGB BLD-MCNC: 11.4 G/DL (ref 14–18)
IMM GRANULOCYTES # BLD AUTO: ABNORMAL K/UL (ref 0–0.04)
IMM GRANULOCYTES NFR BLD AUTO: ABNORMAL % (ref 0–0.5)
LYMPHOCYTES # BLD AUTO: ABNORMAL K/UL (ref 1–4.8)
LYMPHOCYTES NFR BLD: 3 % (ref 18–48)
MCH RBC QN AUTO: 28.1 PG (ref 27–31)
MCHC RBC AUTO-ENTMCNC: 33.6 G/DL (ref 32–36)
MCV RBC AUTO: 84 FL (ref 82–98)
MONOCYTES # BLD AUTO: ABNORMAL K/UL (ref 0.3–1)
MONOCYTES NFR BLD: 4 % (ref 4–15)
MYELOCYTES NFR BLD MANUAL: 1 %
NEUTROPHILS NFR BLD: 87 % (ref 38–73)
NEUTS BAND NFR BLD MANUAL: 5 %
NRBC BLD-RTO: 0 /100 WBC
OVALOCYTES BLD QL SMEAR: ABNORMAL
PLATELET # BLD AUTO: 151 K/UL (ref 150–450)
PLATELET BLD QL SMEAR: ABNORMAL
PMV BLD AUTO: 11.6 FL (ref 9.2–12.9)
POCT GLUCOSE: 77 MG/DL (ref 70–110)
POIKILOCYTOSIS BLD QL SMEAR: SLIGHT
RBC # BLD AUTO: 4.05 M/UL (ref 4.6–6.2)
VANCOMYCIN TROUGH SERPL-MCNC: 22.3 UG/ML (ref 10–22)
WBC # BLD AUTO: 47.91 K/UL (ref 3.9–12.7)

## 2024-01-08 PROCEDURE — 99233 SBSQ HOSP IP/OBS HIGH 50: CPT | Mod: ,,, | Performed by: INTERNAL MEDICINE

## 2024-01-08 PROCEDURE — 85027 COMPLETE CBC AUTOMATED: CPT | Performed by: FAMILY MEDICINE

## 2024-01-08 PROCEDURE — 63600175 PHARM REV CODE 636 W HCPCS: Performed by: STUDENT IN AN ORGANIZED HEALTH CARE EDUCATION/TRAINING PROGRAM

## 2024-01-08 PROCEDURE — 36415 COLL VENOUS BLD VENIPUNCTURE: CPT | Performed by: FAMILY MEDICINE

## 2024-01-08 PROCEDURE — 63700000 PHARM REV CODE 250 ALT 637 W/O HCPCS: Performed by: NURSE PRACTITIONER

## 2024-01-08 PROCEDURE — 80202 ASSAY OF VANCOMYCIN: CPT | Performed by: FAMILY MEDICINE

## 2024-01-08 PROCEDURE — 36415 COLL VENOUS BLD VENIPUNCTURE: CPT | Mod: XB | Performed by: FAMILY MEDICINE

## 2024-01-08 PROCEDURE — 85007 BL SMEAR W/DIFF WBC COUNT: CPT | Performed by: FAMILY MEDICINE

## 2024-01-08 PROCEDURE — 63600175 PHARM REV CODE 636 W HCPCS: Performed by: FAMILY MEDICINE

## 2024-01-08 PROCEDURE — 25000003 PHARM REV CODE 250: Performed by: STUDENT IN AN ORGANIZED HEALTH CARE EDUCATION/TRAINING PROGRAM

## 2024-01-08 PROCEDURE — 25000003 PHARM REV CODE 250: Performed by: FAMILY MEDICINE

## 2024-01-08 PROCEDURE — 94761 N-INVAS EAR/PLS OXIMETRY MLT: CPT

## 2024-01-08 PROCEDURE — 25000003 PHARM REV CODE 250: Performed by: NURSE PRACTITIONER

## 2024-01-08 RX ORDER — AZITHROMYCIN 500 MG/1
500 TABLET, FILM COATED ORAL DAILY
Qty: 5 TABLET | Refills: 0 | Status: SHIPPED | OUTPATIENT
Start: 2024-01-09

## 2024-01-08 RX ORDER — AMOXICILLIN 250 MG
1 CAPSULE ORAL 2 TIMES DAILY
Qty: 60 TABLET | Refills: 1 | Status: SHIPPED | OUTPATIENT
Start: 2024-01-08

## 2024-01-08 RX ORDER — ALUMINUM HYDROXIDE, MAGNESIUM HYDROXIDE, AND SIMETHICONE 1200; 120; 1200 MG/30ML; MG/30ML; MG/30ML
30 SUSPENSION ORAL 4 TIMES DAILY PRN
Qty: 120 ML | Refills: 1 | Status: SHIPPED | OUTPATIENT
Start: 2024-01-08 | End: 2025-01-07

## 2024-01-08 RX ADMIN — CEFEPIME 2 G: 2 INJECTION, POWDER, FOR SOLUTION INTRAVENOUS at 05:01

## 2024-01-08 RX ADMIN — Medication 6 MG: at 02:01

## 2024-01-08 RX ADMIN — AZITHROMYCIN DIHYDRATE 500 MG: 250 TABLET ORAL at 10:01

## 2024-01-08 RX ADMIN — VANCOMYCIN HYDROCHLORIDE 750 MG: 750 INJECTION, POWDER, LYOPHILIZED, FOR SOLUTION INTRAVENOUS at 12:01

## 2024-01-08 NOTE — PROGRESS NOTES
[facility] - Telemetry  Adult Nutrition  Progress Note    SUMMARY       Recommendations  Recommendation:   1. Continue Minced and Moist Diet   2. Continue Boost plus BID   3. Monitor need for nutrition support   4. Monitor weight and labs    Goals: Pt will consume 50-75% of meals by RD follow up    Nutrition Goal Status: goal not met  Communication of RD Recs: other (comment) (POC)    Assessment and Plan  Nutrition Problem  Increased protein diet     Related to (etiology):   Wound healing    Signs and Symptoms (as evidenced by):   Right groin, sacral spine, right heel     Interventions:  Increased protein diet   Commercial beverage   Collaboration with other providers    Nutrition Diagnosis Status:   Continues      Malnutrition Assessment  Weight Loss (Malnutrition):  (Noted 24% weight loss since August)     Reason for Assessment  Reason For Assessment: RD follow-up  Diagnosis: cancer diagnosis/related complications (hypercalcemia of malignany)  Relevant Medical History: HTN, CHF, UTI, skin cancer to Right leg  Interdisciplinary Rounds: did not attend  General Information Comments: Pt is currently on a minced and moist diet w/Boost plus BID. Per chart review pt has been experiencing fatigue, weakness, and leg pain. According to chart, pt hasn't felt like getting out of bed recently and has a decreased appetite and urinary output. Pt is not currently undergoing chemotherpay/radiation. Per chart review pt has some difficulty chewing and swallowing. Fidel-11/right groin, sacral spine, R. heel. Unable to conduct NFPE d/t pt being asleep. Per chart review pt is a poor historian. Noted ~60 lb weight loss since August. No meal intake noted per chart reivew  Nutrition Discharge Planning: d/c on minced and moist diet    Nutrition Risk Screen  Nutrition Risk Screen: difficulty chewing/swallowing    Nutrition/Diet History  Patient Reported Diet/Restrictions/Preferences: pureed  Spiritual, Cultural Beliefs, Buddhist Practices,  Values that Affect Care:  (unable to assess at this time)  Factors Affecting Nutritional Intake: impaired cognitive status/motor control, chewing difficulties/inability to chew food, decreased appetite, difficulty/impaired swallowing    Anthropometrics  Temp: 97.6 °F (36.4 °C)  Height Method: Stated  Height: 6' (182.9 cm)  Height (inches): 72 in  Weight Method: Bed Scale  Weight: 83.7 kg (184 lb 8.4 oz)  Weight (lb): 184.53 lb  Ideal Body Weight (IBW), Male: 178 lb  % Ideal Body Weight, Male (lb): 103.67 %  BMI (Calculated): 25  BMI Grade: 18.5-24.9 - normal  Usual Body Weight (UBW), k.9 kg (8/10)  % Usual Body Weight: 76.32  % Weight Change From Usual Weight: -23.84 %     Lab/Procedures/Meds  Pertinent Labs Reviewed: reviewed  Pertinent Labs Comments: hgb 11L, hct 35.2L Ca 10.6H  Pertinent Medications Reviewed: reviewed  Pertinent Medications Comments: enoxaparin, pantaprazole    Estimated/Assessed Needs  Weight Used For Calorie Calculations: 83.7 kg (184 lb 8.4 oz)  Energy Calorie Requirements (kcal): 2511 kcals (30 kcals/kg)  Energy Need Method: Kcal/kg  Protein Requirements: 100 g (1.2g/kg)  Weight Used For Protein Calculations: 83.7 kg (184 lb 8.4 oz)     Estimated Fluid Requirement Method: RDA Method  RDA Method (mL): 2511     Nutrition Prescription Ordered  Current Diet Order: Minced and Moist Diet  Oral Nutrition Supplement: Boost Plus BID    Evaluation of Received Nutrient/Fluid Intake  I/O:   Energy Calories Required: not meeting needs  Protein Required: not meeting needs  Fluid Required: not meeting needs  Comments: LBM-   Tolerance: not tolerating  % Intake of Estimated Energy Needs: 0 - 25 %  % Meal Intake: 0 - 25 %    Nutrition Risk  Level of Risk/Frequency of Follow-up: moderate - high (2x/weekly)     Monitor and Evaluation  Food and Nutrient Intake: energy intake, food and beverage intake  Food and Nutrient Adminstration: diet order  Anthropometric Measurements: weight, body mass  index, weight change  Biochemical Data, Medical Tests and Procedures: electrolyte and renal panel, gastrointestinal profile, lipid profile     Nutrition Follow-Up  RD Follow-up?: Yes

## 2024-01-08 NOTE — SUBJECTIVE & OBJECTIVE
Interval History:   - wife states he is more confused, weak. They have decided to proceed with hospice.    Oncology Treatment Plan:   OP pembrolizumab 200mg Q3W    Medications:  Continuous Infusions:   lactated ringers 100 mL/hr at 01/07/24 1804     Scheduled Meds:   azithromycin  500 mg Oral Daily    ceFEPime (MAXIPIME) IVPB  2 g Intravenous Q8H    digoxin  0.125 mg Oral Daily    enoxparin  40 mg Subcutaneous Daily    gentamicin   Topical (Top) Daily    latanoprost  1 drop Both Eyes QHS    metoprolol tartrate  25 mg Oral BID    pantoprazole  40 mg Oral BID    senna-docusate 8.6-50 mg  1 tablet Oral BID    sodium chloride 0.9%  10 mL Intravenous Q8H    vancomycin (VANCOCIN) IV (PEDS and ADULTS)  750 mg Intravenous Q12H     PRN Meds:acetaminophen, albuterol-ipratropium, aluminum-magnesium hydroxide-simethicone, dextrose 10%, dextrose 10%, glucagon (human recombinant), glucose, glucose, melatonin, naloxone, ondansetron, ondansetron, simethicone, sodium chloride 0.9%, Pharmacy to dose Vancomycin consult **AND** vancomycin - pharmacy to dose     Review of Systems   Constitutional:  Positive for fatigue and unexpected weight change.   HENT:  Negative for sore throat.    Eyes:  Negative for visual disturbance.   Respiratory:  Negative for shortness of breath.    Cardiovascular:  Negative for chest pain.   Gastrointestinal:  Negative for abdominal pain.   Genitourinary:  Negative for dysuria.   Musculoskeletal:  Positive for back pain.        Leg pain   Skin:  Negative for rash.   Neurological:  Positive for weakness. Negative for headaches.   Hematological:  Negative for adenopathy.   Psychiatric/Behavioral:  Positive for confusion.      Objective:     Vital Signs (Most Recent):  Temp: 98.5 °F (36.9 °C) (01/08/24 0754)  Pulse: (!) 52 (01/08/24 0754)  Resp: 18 (01/08/24 0754)  BP: (!) 115/54 (01/08/24 0754)  SpO2: (!) 92 % (01/08/24 0754) Vital Signs (24h Range):  Temp:  [97.6 °F (36.4 °C)-98.5 °F (36.9 °C)] 98.5 °F (36.9  °C)  Pulse:  [] 52  Resp:  [18-26] 18  SpO2:  [84 %-98 %] 92 %  BP: (101-183)/(54-76) 115/54     Weight: 83.7 kg (184 lb 8.4 oz)  Body mass index is 25.03 kg/m².  Body surface area is 2.06 meters squared.      Intake/Output Summary (Last 24 hours) at 1/8/2024 0916  Last data filed at 1/8/2024 0222  Gross per 24 hour   Intake 360 ml   Output 2050 ml   Net -1690 ml        Physical Exam  Vitals and nursing note reviewed.   Constitutional:       Appearance: He is well-developed. He is ill-appearing.      Comments: fatigued   HENT:      Head: Normocephalic and atraumatic.   Eyes:      Pupils: Pupils are equal, round, and reactive to light.   Cardiovascular:      Rate and Rhythm: Normal rate and regular rhythm.   Pulmonary:      Effort: Pulmonary effort is normal.      Breath sounds: Normal breath sounds.   Abdominal:      General: Bowel sounds are normal.      Palpations: Abdomen is soft.   Musculoskeletal:      Cervical back: Normal range of motion and neck supple.      Comments: Decreased range of motion   Skin:     General: Skin is warm and dry.      Comments: Large open wound in right inguinal area.   Neurological:      Mental Status: He is alert and oriented to person, place, and time.   Psychiatric:         Behavior: Behavior normal.         Thought Content: Thought content normal.         Judgment: Judgment normal.          Significant Labs:   CBC:   Recent Labs   Lab 01/07/24  0558   WBC 45.20*   HGB 11.0*   HCT 35.2*       and CMP:   Recent Labs   Lab 01/07/24  0558      K 3.5      CO2 25   GLU 77   BUN 23   CREATININE 0.8   CALCIUM 10.6*   ANIONGAP 10

## 2024-01-08 NOTE — PROGRESS NOTES
Pharmacokinetic Assessment Follow Up: IV Vancomycin    Vancomycin serum concentration assessment(s):    The trough level was drawn correctly and can be used to guide therapy at this time. The measurement is above the desired definitive target range of 15 to 20 mcg/mL.    Vancomycin Regimen Plan:    Re-dose when the random level is less than 20 mcg/mL, next level to be drawn at 0400 on 1/9    Drug levels (last 3 results):  Recent Labs   Lab Result Units 01/07/24  1014 01/08/24  1244   Vancomycin-Trough ug/mL 20.3 22.3*       Pharmacy will continue to follow and monitor vancomycin.    Please contact pharmacy at extension [phone] for questions regarding this assessment.    Thank you for the consult,   Rishabh Clay       Patient brief summary:  Aquiles Montana is a 83 y.o. male initiated on antimicrobial therapy with IV Vancomycin for treatment of lower respiratory infection    The patient's current regimen is dose by level    Drug Allergies:   Review of patient's allergies indicates:  No Known Allergies    Actual Body Weight:   83.7kg    Renal Function:   Estimated Creatinine Clearance: 76.8 mL/min (based on SCr of 0.8 mg/dL).,     Dialysis Method (if applicable):      CBC (last 72 hours):  Recent Labs   Lab Result Units 01/06/24  0353 01/07/24  0558 01/08/24  0851   WBC K/uL 48.76* 45.20* 47.91*   Hemoglobin g/dL 11.1* 11.0* 11.4*   Hematocrit % 35.7* 35.2* 33.9*   Platelets K/uL 203 196 151   Gran % % 96.0* 91.0* 87.0*   Lymph % % 1.0* 2.0* 3.0*   Mono % % 3.0* 4.0 4.0   Eosinophil % % 0.0 0.7 0.0   Basophil % % 0.0 0.1 0.0   Differential Method  Manual Automated Manual       Metabolic Panel (last 72 hours):  Recent Labs   Lab Result Units 01/06/24  0353 01/07/24  0558   Sodium mmol/L 139 138   Potassium mmol/L 3.2* 3.5   Chloride mmol/L 102 103   CO2 mmol/L 26 25   Glucose mg/dL 52* 77   BUN mg/dL 27* 23   Creatinine mg/dL 0.8 0.8   Magnesium mg/dL 1.7 1.5*       Vancomycin Administrations:  vancomycin given in the  last 96 hours                     vancomycin 750 mg in dextrose 5 % (D5W) 250 mL IVPB (Vial-Mate) (mg) 750 mg New Bag 01/08/24 0022     750 mg New Bag 01/07/24 1251    vancomycin (VANCOCIN) 1,000 mg in dextrose 5 % (D5W) 250 mL IVPB (Vial-Mate) (mg) 1,000 mg New Bag 01/06/24 2323    vancomycin (VANCOCIN) 2,250 mg in dextrose 5 % (D5W) 500 mL IVPB (mg) 2,250 mg New Bag 01/06/24 1056                    Microbiologic Results:  Microbiology Results (last 7 days)       Procedure Component Value Units Date/Time    Blood culture x two cultures. Draw prior to antibiotics. [[id number]] Collected: 01/04/24 1425    Order Status: Completed Specimen: Blood from Peripheral, Forearm, Left Updated: 01/07/24 2212     Blood Culture, Routine No Growth to date      No Growth to date      No Growth to date      No Growth to date    Narrative:      Aerobic and anaerobic    Blood culture x two cultures. Draw prior to antibiotics. [[id number]] Collected: 01/04/24 1425    Order Status: Completed Specimen: Blood from Peripheral, Antecubital, Left Updated: 01/07/24 2212     Blood Culture, Routine No Growth to date      No Growth to date      No Growth to date      No Growth to date    Narrative:      Aerobic and anaerobic    Influenza A & B by Molecular [[id number]] Collected: 01/04/24 1434    Order Status: Completed Specimen: Nasopharyngeal Swab Updated: 01/04/24 1517     Influenza A, Molecular Negative     Influenza B, Molecular Negative     Flu A & B Source Nasal swab

## 2024-01-08 NOTE — ASSESSMENT & PLAN NOTE
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  - unfortunately, his clinical condition continues to worsen. While I had wanted to try pembrolizumab, I agree with patient/family/palliative care that his condition has not improved for this to be a feasible option.  - his wife states that they have decided on going home with hospice once clinically stable.   - appreciate palliative care's assistance, as well as hospital medicine's care.

## 2024-01-08 NOTE — NURSING
Tele and IV removed. Encouraged and answered questions. Family at the bedside. EMS here for transport.

## 2024-01-08 NOTE — PLAN OF CARE
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       Hospice Qualifying Diagnoses: Squamous Cell carcinoma       Patient has a life expectancy < 6 months due to:  Primary Hospice Diagnosis:  Comorbid Conditions Contributing to Decline:  worsening debility, A. Fib, CHF    Vital Signs: Routine per Hospice Protocol.    Code Status: DNR    Allergies: Review of patient's allergies indicates:  No Known Allergies    Diet: Dysphagia/Mechanical soft    Activities: As tolerated    Goals of Care Treatment Preferences:  Code Status: DNR    Health care agent: Jessica [facility] agent number: [phone]          What is most important right now is to focus on spending time at home, avoiding the hospital, remaining as independent as possible, symptom/pain control, quality of life, even if it means sacrificing a little time, improvement in condition but with limits to invasive therapies, comfort and QOL .  Accordingly, we have decided that the best plan to meet the patient's goals includes enrolling in hospice care.      Nursing: Per Hospice Routine.      Routine  Skin for Bedridden Patients: Apply moisture barrier cream to all skin folds and   wet areas in perineal area daily and after baths and all bowel movements.                     Oxygen:  N/A    Other Miscellaneous Care:     Medications:          Medication List        START taking these medications      aluminum-magnesium hydroxide-simethicone 200-200-20 mg/5 mL Susp  Commonly known as: MAALOX  Take 30 mLs by mouth 4 (four) times daily as needed.     azithromycin 500 MG tablet  Commonly known as: ZITHROMAX  Take 1 tablet (500 mg total) by mouth once daily.  Start taking on: January 9, 2024     senna-docusate 8.6-50 mg 8.6-50 mg per tablet  Commonly known as: PERICOLACE  Take 1 tablet by mouth 2 (two) times daily.            CONTINUE taking these medications      acetaminophen 500 MG tablet  Commonly known as: TYLENOL  Take 500 mg by mouth every 6 (six) hours as needed for Pain.     ascorbic acid (vitamin C) 250 MG tablet  Commonly known as: VITAMIN C  Take 250 mg by mouth every morning.     CENTRUM SILVER --300 mcg Tab  Generic drug: mv-min-folic-K1-lycopen-lutein  Take 1 tablet by mouth once daily.     digoxin 125 mcg tablet  Commonly known as: LANOXIN  Take 1 tablet (0.125 mg total) by mouth once daily.     gentamicin 0.1 % ointment  Commonly known as: GARAMYCIN  Apply topically once daily.     latanoprost 0.005 % ophthalmic solution  Place 1 drop into both eyes every evening.     melatonin 3 mg tablet  Commonly known as: MELATIN  Take 2 tablets by mouth nightly as needed.     metoprolol tartrate 25 MG tablet  Commonly known as: LOPRESSOR  Take 1 tablet (25 mg total) by mouth 2 (two) times daily.     pantoprazole 40 MG tablet  Commonly known as: PROTONIX  Take 1 tablet (40 mg total) by mouth 2 (two) times daily.     sodium hypochlorite 0.125% external solution  Commonly known as: DAKIN'S SOLUTION  Apply topically once daily.     vitamin D 1000 units Tab  Commonly known as: VITAMIN D3  Take 1 tablet  (1,000 Units total) by mouth once daily.     zinc sulfate 50 mg zinc (220 mg) capsule  Commonly known as: ZINCATE  Take 220 mg by mouth every morning.            STOP taking these medications      ciprofloxacin HCl 500 MG tablet  Commonly known as: CIPRO                    Future Orders:  Hospice Medical Director may dictate new orders for comfortable care measures & sign death certificate.        _________________________________  Finesse Urrutia MD  01/08/2024

## 2024-01-08 NOTE — PLAN OF CARE
Patient is oriented to self.  Left forearm IV/LR infusing @100/cc continously.  Wounds to sacrum, right groin. Hx;HTN, Right groin tumor..  A scheduled  family meeting on today 1/8/24.  Patient swallow meds. Without difficulty. Telemetry intact, Afib, and PVCs noted.

## 2024-01-08 NOTE — PLAN OF CARE
went to meet with patient's spouse and son Len. Plan is Home Hospice with [facility]. They met with [facility] and signed papers. Son had to break down previous hospital bed. They are waiting on equipment to be delivered to home. Wife worried about patient transferring in the bad weather. I told her will see what time equipment will be delivered, and then can set up transportation if patient is able to transport in weather. They are aware and agreeable to discharge plan.    Agnieszka with [facility] stated do not have an ETA of equipment yet.    1342--Son called and stated equipment is being delivered now. He stated ok to set up transport for 3 pm (hoping weather holds up). I also called Dr. Urrutia to update needing orders.      set up ambulance transport for 3 pm. Awaiting Hospice Orders to send.    1426--Hospice Orders sent. Agnieszka with [facility] updated.    Family was updated on transportation.    Patient Contacts    Name Relation Home Work Mobile   AudiffredJessica Spouse [phone]   AudiffredLen Son   [phone]   Audiffred,Maru Grandcam   [phone]   Audiffred,Lata Son   [phone]     Future Appointments   Date Time Provider Department Center   1/17/2024 12:30 PM LAB, [facility] RVPH LAB [facility]   1/18/2024  2:20 PM Delonte Robin MD [facility] HEM ON[facility]   1/18/2024  3:15 PM Emeka Barber MD [facility]   8/12/2024 11:00 AM Marty Hernandez MD [facility] MED [facility]       01/08/24 1343   Final Note   Assessment Type Final Discharge Note   Anticipated Discharge Disposition HospiceHome   Post-Acute Status   Post-Acute Authorization Placement   Hospice Status   (Pending Orders)   Discharge Delays (!) Ambulance Transport/Facility Transport     Sadie Benjamin RN    [phone]

## 2024-01-08 NOTE — PLAN OF CARE
Recommendations  Recommendation:   1. Continue Minced and Moist Diet   2. Continue Boost plus BID   3. Monitor need for nutrition support   4. Monitor weight and labs    Goals: Pt will consume 50-75% of meals by RD follow up    Nutrition Goal Status: goal not met  Communication of RD Recs: other (comment) (POC)

## 2024-01-08 NOTE — PROGRESS NOTES
Virtual Nurse:Discharge orders noted; additional clinical references attached.  and pharmacy tech notified.  Patient's discharge instruction packet given by bedside RN.    Cued into patient's room.  Permission received per patients wife Jessica to turn camera to view patient.  Introduced as VN that will be instructing on discharge instructions.  Charlie Harrington at bedside.  Educated patients family on reason for admission; medications to hold, continue, and start, appointment to follow-up with doctor, and when to return to ED. Teach back method used. Verbalized understanding  Number given for 24/7 Nurse Line. Opportunity given for questions and questions answered.  Bedside nurse updated.        01/08/24 1532   Shift   Virtual Nurse - Patient Verbalized Approval Of VN Rounding;Camera Use   Safety/Activity   Patient Rounds visualized patient

## 2024-01-08 NOTE — PROGRESS NOTES
[facility] - Telemetry  Hematology/Oncology  Progress Note    Patient Name: Aquiles Montana  Admission Date: 1/4/2024  Hospital Length of Stay: 4 days  Code Status: DNR     Subjective:     HPI:  83 year-old male with squamous cell skin cancer was admitted on 1/4/24 for hypercalcemia of malignancy. Consult is for squamous cell skin cancer.    Interval History:   - wife states he is more confused, weak. They have decided to proceed with hospice.    Oncology Treatment Plan:   OP pembrolizumab 200mg Q3W    Medications:  Continuous Infusions:   lactated ringers 100 mL/hr at 01/07/24 1804     Scheduled Meds:   azithromycin  500 mg Oral Daily    ceFEPime (MAXIPIME) IVPB  2 g Intravenous Q8H    digoxin  0.125 mg Oral Daily    enoxparin  40 mg Subcutaneous Daily    gentamicin   Topical (Top) Daily    latanoprost  1 drop Both Eyes QHS    metoprolol tartrate  25 mg Oral BID    pantoprazole  40 mg Oral BID    senna-docusate 8.6-50 mg  1 tablet Oral BID    sodium chloride 0.9%  10 mL Intravenous Q8H    vancomycin (VANCOCIN) IV (PEDS and ADULTS)  750 mg Intravenous Q12H     PRN Meds:acetaminophen, albuterol-ipratropium, aluminum-magnesium hydroxide-simethicone, dextrose 10%, dextrose 10%, glucagon (human recombinant), glucose, glucose, melatonin, naloxone, ondansetron, ondansetron, simethicone, sodium chloride 0.9%, Pharmacy to dose Vancomycin consult **AND** vancomycin - pharmacy to dose     Review of Systems   Constitutional:  Positive for fatigue and unexpected weight change.   HENT:  Negative for sore throat.    Eyes:  Negative for visual disturbance.   Respiratory:  Negative for shortness of breath.    Cardiovascular:  Negative for chest pain.   Gastrointestinal:  Negative for abdominal pain.   Genitourinary:  Negative for dysuria.   Musculoskeletal:  Positive for back pain.        Leg pain   Skin:  Negative for rash.   Neurological:  Positive for weakness. Negative for headaches.   Hematological:  Negative for adenopathy.    Psychiatric/Behavioral:  Positive for confusion.      Objective:     Vital Signs (Most Recent):  Temp: 98.5 °F (36.9 °C) (01/08/24 0754)  Pulse: (!) 52 (01/08/24 0754)  Resp: 18 (01/08/24 0754)  BP: (!) 115/54 (01/08/24 0754)  SpO2: (!) 92 % (01/08/24 0754) Vital Signs (24h Range):  Temp:  [97.6 °F (36.4 °C)-98.5 °F (36.9 °C)] 98.5 °F (36.9 °C)  Pulse:  [] 52  Resp:  [18-26] 18  SpO2:  [84 %-98 %] 92 %  BP: (101-183)/(54-76) 115/54     Weight: 83.7 kg (184 lb 8.4 oz)  Body mass index is 25.03 kg/m².  Body surface area is 2.06 meters squared.      Intake/Output Summary (Last 24 hours) at 1/8/2024 0916  Last data filed at 1/8/2024 0222  Gross per 24 hour   Intake 360 ml   Output 2050 ml   Net -1690 ml        Physical Exam  Vitals and nursing note reviewed.   Constitutional:       Appearance: He is well-developed. He is ill-appearing.      Comments: fatigued   HENT:      Head: Normocephalic and atraumatic.   Eyes:      Pupils: Pupils are equal, round, and reactive to light.   Cardiovascular:      Rate and Rhythm: Normal rate and regular rhythm.   Pulmonary:      Effort: Pulmonary effort is normal.      Breath sounds: Normal breath sounds.   Abdominal:      General: Bowel sounds are normal.      Palpations: Abdomen is soft.   Musculoskeletal:      Cervical back: Normal range of motion and neck supple.      Comments: Decreased range of motion   Skin:     General: Skin is warm and dry.      Comments: Large open wound in right inguinal area.   Neurological:      Mental Status: He is alert and oriented to person, place, and time.   Psychiatric:         Behavior: Behavior normal.         Thought Content: Thought content normal.         Judgment: Judgment normal.          Significant Labs:   CBC:   Recent Labs   Lab 01/07/24  0558   WBC 45.20*   HGB 11.0*   HCT 35.2*       and CMP:   Recent Labs   Lab 01/07/24  0558      K 3.5      CO2 25   GLU 77   BUN 23   CREATININE 0.8   CALCIUM 10.6*   ANIONGAP  10       Assessment/Plan:     * Hypercalcemia of malignancy  - corrected calcium today is about 11 mg/dL  - defer management to hospital medicine team.    Secondary malignant neoplasm of inguinal lymph nodes  - see below    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  - my patient in outpatient setting  - CT scan (1/4/24) confirms that his metastatic disease is limited to the right inguinal area.  - unfortunately, his clinical condition continues to worsen. While I had wanted to try pembrolizumab, I agree with patient/family/palliative care that his condition has not improved for this to be a feasible option.  - his wife states that they have decided on going home with hospice once clinically stable.   - appreciate palliative care's assistance, as well as hospital medicine's care.         Delonte Robin MD  Hematology/Oncology  [facility] - Telemetry

## 2024-01-09 LAB
BACTERIA BLD CULT: NORMAL
BACTERIA BLD CULT: NORMAL

## 2024-01-10 NOTE — DISCHARGE SUMMARY
[facility]  Discharge Summary      Patient Name: Aquiles Montana  MRN: [id number]  MAGALI: [id number]  Patient Class: IP- Inpatient  Admission Date: 1/4/2024  Hospital Length of Stay: 4 days  Discharge Date and Time: 1/8/2024  5:50 PM  Attending Physician: No att. providers found   Discharging Provider: Finesse Urrutia MD  Primary Care Provider: Marty Hernandez MD    Primary Care Team: Networked reference to record PCT     HPI:   Aquiles Montana 83 y.o. with a  has a past medical history of Hypertension, squamous cell carcinoma, AFib, CHF, and Urinary tract infection. who presents to the emergency department today with his family after being called from Heme-Onc for an elevated calcium.     Story comes from his family member secondary to condition.  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 Robin with heme Onc, not currently on any chemotherapy or radiation treatment who went to get his regular labs drawn today and they were called on the way back home and told that he had an elevated calcium and he needed to come to the emergency department.  According to the family the patient has been having a steady decline with worsening weakness since Chaz.  They report that he has had similar presentations like this in the past when his calcium levels were high.  They do report decreased p.o. intake, decreased responsiveness, not talking as much as usual for last couple days, decreased urinary output.  They also report that he has gotten to the point where he can not get out of bed.  Patient is unable to assist with history.    * No surgery found *      Hospital Course:   1/5/23: pt with poor prognosis followed by heme/onc. Palliative consulted. Family ok going home with home hospice once hypercalcemia is fixed. Cont iv abx   1/6: ca down to 11.3. cont with current tx plan of ivfs  and iv abx  1/7: Ca down to 10.6. anticipate home with hospice in am  83 y.o. with a  has a past medical history of Hypertension, squamous cell carcinoma, AFib, CHF, and Urinary tract infection. was admitted with hypercalcemia secondary to malignancy. Heme/onc was consulted and due to patient worsening confusion Serial routine labs with persistence leukocytosis due malignancy. His hypercalcemia has improved. He was seen by palliative care, due to his worsening condition family has opted for end of life care with comfort care with hospice     Goals of Care Treatment Preferences:  Code Status: DNR    [facility] [phone]          What is most important right now is to focus on spending time at home, avoiding the hospital, remaining as independent as possible, symptom/pain control, quality of life, even if it means sacrificing a little time, improvement in condition but with limits to invasive therapies, comfort and QOL .  Accordingly, we have decided that the best plan to meet the patient's goals includes enrolling in hospice care.      Consults:   Consults (From admission, onward)          Status Ordering Provider     Inpatient consult to Social Work  Once        Provider:  (Not yet assigned)    Completed JEREMIAS HERRERA     Inpatient virtual consult to Hospital Medicine  Once        Provider:  (Not yet assigned)    Completed ROSSY MIX     Inpatient consult to Palliative Care  Once        Provider:  (Not yet assigned)    Completed ROSSY MIX     Inpatient consult to Registered Dietitian/Nutritionist  Once        Provider:  (Not yet assigned)    Completed SUZANNA JONES     IP consult to case management  Once        Provider:  (Not yet assigned)    Completed SUZANNA JONES     Inpatient consult to Hematology/Oncology  Once        Provider:  (Not yet assigned)    Completed CRISTAL ABRAHAM            No new Assessment & Plan notes have been filed  under this hospital service since the last note was generated.  Service: Hospital Medicine    Final Active Diagnoses:    Diagnosis Date Noted POA    PRINCIPAL PROBLEM:  Hypercalcemia of malignancy [E83.52] 12/15/2023 Yes    Decubitus ulcer [L89.90] 01/05/2024 Yes    Debility [R53.81] 01/05/2024 Yes    Palliative care encounter [Z51.5] 01/05/2024 Not Applicable    Pneumonia due to infectious organism [J18.9] 01/04/2024 Yes    Heart failure with reduced ejection fraction [I50.20] 01/01/2024 Yes    Secondary malignant neoplasm of inguinal lymph nodes [C77.4] 01/01/2024 Yes    A-fib [I48.91] 10/25/2023 Yes    ACP (advance care planning) [Z71.89] 10/25/2023 Not Applicable    Sepsis [A41.9] 10/24/2023 Yes    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 [C44.722] 09/18/2019 Yes    SCCA (squamous cell carcinoma) of skin [C44.92] 09/01/2019 Yes      Problems Resolved During this Admission:       Discharged Condition: poor    Disposition: Home or Self Care    Follow Up:   Follow-up Information       Hospice, [facility].    Specialties: Hospice Services, Hospice and Palliative Medicine  Why: [facility]  Contact information:  [address]  [address]  [place] [address] [phone]                           Patient Instructions:      Diet Dysphagia Soft     Notify your health care provider if you experience any of the following:  temperature >100.4     Notify your health care provider if you experience any of the following:  persistent nausea and vomiting or diarrhea     Notify your health care provider if you experience any of the following:  severe uncontrolled pain     Notify your health care provider if you experience any of the following:  redness, tenderness, or signs of infection (pain, swelling, redness, odor or green/yellow discharge around incision site)     Notify your health care provider if you experience any of the following:  difficulty breathing or increased cough     Notify your health care  "provider if you experience any of the following:  severe persistent headache     Notify your health care provider if you experience any of the following:  worsening rash     Notify your health care provider if you experience any of the following:  persistent dizziness, light-headedness, or visual disturbances     Notify your health care provider if you experience any of the following:  increased confusion or weakness     Activity as tolerated       Significant Diagnostic Studies: Labs: BMP: No results for input(s): "GLU", "NA", "K", "CL", "CO2", "BUN", "CREATININE", "CALCIUM", "MG" in the last 48 hours., CMP No results for input(s): "NA", "K", "CL", "CO2", "GLU", "BUN", "CREATININE", "CALCIUM", "PROT", "ALBUMIN", "BILITOT", "ALKPHOS", "AST", "ALT", "ANIONGAP", "ESTGFRAFRICA", "EGFRNONAA" in the last 48 hours., CBC   Recent Labs   Lab 01/08/24  0851   WBC 47.91*   HGB 11.4*   HCT 33.9*      , INR   Lab Results   Component Value Date    INR 1.2 10/24/2023   , Lipid Panel   Lab Results   Component Value Date    CHOL 133 07/05/2022    HDL 28 (L) 07/05/2022    LDLCALC 89.0 07/05/2022    TRIG 80 07/05/2022    CHOLHDL 21.1 07/05/2022   , Troponin No results for input(s): "TROPONINI" in the last 168 hours., A1C:   Recent Labs   Lab 07/25/23  0727   HGBA1C 6.0*   , and All labs within the past 24 hours have been reviewed  Radiology: X-Ray: CXR: X-Ray Chest 1 View (CXR): No results found for this visit on 01/04/24.  CT scan: CT ABDOMEN PELVIS WITH CONTRAST: No results found for this visit on 01/04/24.    Pending Diagnostic Studies:       None           Medications:  Reconciled Home Medications:      Medication List        START taking these medications      azithromycin 500 MG tablet  Commonly known as: ZITHROMAX  Take 1 tablet (500 mg total) by mouth once daily.     MARIS-LANTA 200-200-20 mg/5 mL Susp  Generic drug: aluminum-magnesium hydroxide-simethicone  Take 30 mLs by mouth 4 (four) times daily as needed.   "   SENNA-S 8.6-50 mg per tablet  Generic drug: senna-docusate 8.6-50 mg  Take 1 tablet by mouth 2 (two) times daily.            CONTINUE taking these medications      acetaminophen 500 MG tablet  Commonly known as: TYLENOL  Take 500 mg by mouth every 6 (six) hours as needed for Pain.     ascorbic acid (vitamin C) 250 MG tablet  Commonly known as: VITAMIN C  Take 250 mg by mouth every morning.     CENTRUM SILVER --300 mcg Tab  Generic drug: mv-min-folic-K1-lycopen-lutein  Take 1 tablet by mouth once daily.     digoxin 125 mcg tablet  Commonly known as: LANOXIN  Take 1 tablet (0.125 mg total) by mouth once daily.     gentamicin 0.1 % ointment  Commonly known as: GARAMYCIN  Apply topically once daily.     latanoprost 0.005 % ophthalmic solution  Place 1 drop into both eyes every evening.     melatonin 3 mg tablet  Commonly known as: MELATIN  Take 2 tablets by mouth nightly as needed.     metoprolol tartrate 25 MG tablet  Commonly known as: LOPRESSOR  Take 1 tablet (25 mg total) by mouth 2 (two) times daily.     pantoprazole 40 MG tablet  Commonly known as: PROTONIX  Take 1 tablet (40 mg total) by mouth 2 (two) times daily.     sodium hypochlorite 0.125% external solution  Commonly known as: DAKIN'S SOLUTION  Apply topically once daily.     vitamin D 1000 units Tab  Commonly known as: VITAMIN D3  Take 1 tablet (1,000 Units total) by mouth once daily.     zinc sulfate 50 mg zinc (220 mg) capsule  Commonly known as: ZINCATE  Take 220 mg by mouth every morning.            STOP taking these medications      ciprofloxacin HCl 500 MG tablet  Commonly known as: CIPRO              Indwelling Lines/Drains at time of discharge:   Lines/Drains/Airways       None                   Time spent on the discharge of patient: >35 minutes         Finesse Urrutia MD  Department of Hospital Medicine  [facility]

## 2024-01-11 DIAGNOSIS — Z00.00 ENCOUNTER FOR MEDICARE ANNUAL WELLNESS EXAM: ICD-10-CM

## 2024-01-16 ENCOUNTER — EXTERNAL HOME HEALTH (OUTPATIENT)
Dept: HOME HEALTH SERVICES | Facility: HOSPITAL | Age: 84
End: 2024-01-16
Payer: MEDICARE

## (undated) DEVICE — DRAPE SPLIT STERILE

## (undated) DEVICE — SUT MCRYL PLUS 4-0 PS2 27IN

## (undated) DEVICE — SUT VICRYL 3-0 27 SH

## (undated) DEVICE — SYS LABEL CORRECT MED

## (undated) DEVICE — Device

## (undated) DEVICE — BANDAGE KERLIX AMD

## (undated) DEVICE — SEE MEDLINE ITEM 146417

## (undated) DEVICE — COVER OVERHEAD SURG LT BLUE

## (undated) DEVICE — DRESSING ABD OPEN GRANUFOAM

## (undated) DEVICE — NDL 22GA X1 1/2 REG BEVEL

## (undated) DEVICE — SYR B-D DISP CONTROL 10CC100/C

## (undated) DEVICE — SYR DISP LL 5CC

## (undated) DEVICE — APPLICATOR CHLORAPREP ORN 26ML

## (undated) DEVICE — PAD PREP CUFFED NS 24X48IN

## (undated) DEVICE — DRESSING TELFA STRL 4X3 LF

## (undated) DEVICE — APPLIER LIGACLIP SM 9.38IN

## (undated) DEVICE — GAUZE SPONGE 4X4 12PLY

## (undated) DEVICE — NDL HYPO REG 25G X 1 1/2

## (undated) DEVICE — CONTAINER SPECIMEN STRL 4OZ

## (undated) DEVICE — SEE MEDLINE ITEM 154981

## (undated) DEVICE — ELECTRODE BLADE INSULATED 1 IN

## (undated) DEVICE — GLOVE BIOGEL ECLIPSE SZ 7.5

## (undated) DEVICE — BANDAGE ACE ELASTIC 6"

## (undated) DEVICE — SYR SLIP TIP 1CC

## (undated) DEVICE — SEE MEDLINE ITEM 157128

## (undated) DEVICE — DRESSING XEROFORM 5X9IN

## (undated) DEVICE — DRESSING TRANS 4X4 3/4

## (undated) DEVICE — NEOGUARD COVER 4X30CM STERILE

## (undated) DEVICE — TRAY MINOR GEN SURG

## (undated) DEVICE — ELECTRODE REM PLYHSV RETURN 9